# Patient Record
Sex: MALE | Race: BLACK OR AFRICAN AMERICAN | Employment: OTHER | URBAN - METROPOLITAN AREA
[De-identification: names, ages, dates, MRNs, and addresses within clinical notes are randomized per-mention and may not be internally consistent; named-entity substitution may affect disease eponyms.]

---

## 2017-01-18 ENCOUNTER — OFFICE VISIT (OUTPATIENT)
Dept: PULMONOLOGY | Age: 57
End: 2017-01-18

## 2017-01-18 VITALS
RESPIRATION RATE: 16 BRPM | DIASTOLIC BLOOD PRESSURE: 60 MMHG | WEIGHT: 178 LBS | HEIGHT: 66 IN | BODY MASS INDEX: 28.61 KG/M2 | OXYGEN SATURATION: 98 % | HEART RATE: 100 BPM | TEMPERATURE: 97.9 F | SYSTOLIC BLOOD PRESSURE: 110 MMHG

## 2017-01-18 DIAGNOSIS — G72.9 MYOPATHY: ICD-10-CM

## 2017-01-18 DIAGNOSIS — G62.9 PERIPHERAL POLYNEUROPATHY: Primary | ICD-10-CM

## 2017-01-18 DIAGNOSIS — G47.33 OBSTRUCTIVE APNEA: ICD-10-CM

## 2017-01-18 DIAGNOSIS — D86.9 SARCOID: ICD-10-CM

## 2017-01-18 DIAGNOSIS — I27.21 PULMONARY ARTERY HYPERTENSION (HCC): ICD-10-CM

## 2017-01-18 RX ORDER — HYDROCODONE BITARTRATE AND ACETAMINOPHEN 5; 325 MG/1; MG/1
1 TABLET ORAL
Qty: 120 TAB | Refills: 0 | Status: SHIPPED | OUTPATIENT
Start: 2017-01-18 | End: 2017-02-13 | Stop reason: SDUPTHER

## 2017-01-18 NOTE — MR AVS SNAPSHOT
Visit Information Date & Time Provider Department Dept. Phone Encounter #  
 1/18/2017  9:45 AM Raul Reyna MD [facility] [id number] Follow-up Instructions Follow-up and Disposition History Your Appointments 5/12/2017  3:00 PM  
Nurse Visit with [facility] WB NURSE [facility] ([address]) Appt Note: bw  
 [address] 
[address] [place] [address]  
[address] [place] [address] 5/24/2017  3:45 PM  
Any with Isra Espino MD  
[facility] ([address]) Appt Note: annual  
 [address] 
[address] [place] [address]  
[address] [place] [address] Upcoming Health Maintenance Date Due Hepatitis C Screening 1960 Pneumococcal 19-64 Medium Risk (1 of 1 - PPSV23) 10/15/1978 DTaP/Tdap/Td series (1 - Tdap) 10/15/1980 FOBT Q 1 YEAR AGE 50-75 10/15/2009 INFLUENZA AGE 9 TO ADULT 8/1/2016 Allergies as of 1/18/2017  Review Complete On: 1/18/2017 By: Siomara Carroll RN No Known Allergies Current Immunizations  Reviewed on 8/11/2016 Name Date Influenza Vaccine Split 11/2/2012 Not reviewed this visit You Were Diagnosed With   
  
 Codes Comments Peripheral polyneuropathy    -  Primary ICD-10-CM: G62.9 ICD-9-CM: 356.9 Myopathy     ICD-10-CM: G72.9 ICD-9-CM: 359.9 Sarcoid ([facility]     ICD-10-CM: D86.9 ICD-9-CM: 135 Pulmonary artery hypertension ([facility]     ICD-10-CM: I27.2 ICD-9-CM: 416.8 Obstructive apnea     ICD-10-CM: G47.33 
ICD-9-CM: 327.23 Vitals BP Pulse Temp Resp Height(growth percentile) Weight(growth percentile) 110/60 (BP 1 Location: Left arm, BP Patient Position: Sitting) 100 97.9 °F (36.6 °C) (Oral) 16 5' 6\" (1.676 m) 178 lb (80.7 kg) SpO2 BMI Smoking Status 98% 28.73 kg/m2 Never Smoker Vitals History BMI and BSA Data Body Mass Index Body Surface Area 28.73 kg/m 2 1.94 m 2 Preferred Pharmacy Pharmacy Name Phone [facility] #[id number] Haro [phone] Your Updated Medication List  
  
   
This list is accurate as of: 1/18/17 10:14 AM.  Always use your most recent med list.  
  
  
  
  
 Carter Norberto 250-50 mcg/dose diskus inhaler Generic drug:  fluticasone-salmeterol INHALE 2 PUFFS BY MOUTH TWICE DAILY. * albuterol 90 mcg/actuation inhaler Commonly known as:  PROVENTIL HFA, VENTOLIN HFA, PROAIR HFA Take 2 Puffs by inhalation every four (4) hours as needed for Wheezing. * albuterol 2.5 mg /3 mL (0.083 %) nebulizer solution Commonly known as:  PROVENTIL VENTOLIN  
INHALE CONTENTS OF 1 VIAL VIA NEBULIZER EVERY FOUR (4) HOURS AS NEEDED FOR WHEEZING. aspirin delayed-release 81 mg tablet  
  
 bipap machine kit  
by Does Not Apply route nightly. doxazosin 4 mg tablet Commonly known as:  CARDURA Take 1 mg by mouth daily. * HYDROcodone-acetaminophen 5-325 mg per tablet Commonly known as:  Annamary Hadley Take 1 Tab by mouth every six (6) hours as needed for Pain. Max Daily Amount: 4 Tabs. * HYDROcodone-acetaminophen 5-325 mg per tablet Commonly known as:  Annamary Hadley Take 1 Tab by mouth every six (6) hours as needed for Pain. Max Daily Amount: 4 Tabs.  
  
 loratadine 10 mg tablet Commonly known as:  CLARITIN  
  
 modafinil 200 mg tablet Commonly known as:  PROVIGIL  
TAKE 1 TABLET BY MOUTH TWICE A DAY  
  
 NORVASC 10 mg tablet Generic drug:  amLODIPine Take 10 mg by mouth daily. OXYGEN-AIR DELIVERY SYSTEMS  
by Does Not Apply route. 3 liters hs with cpap   1 liter with activity  [employer]  
  
 pantoprazole 40 mg tablet Commonly known as:  PROTONIX Take 1 Tab by mouth daily. raNITIdine 150 mg tablet Commonly known as:  ZANTAC TAKE 1 TABLET BY MOUTH DAILY  
  
 sildenafil citrate 100 mg tablet Commonly known as:  VIAGRA Take 1 Tab by mouth as needed. testosterone 1.62 % (20.25 mg/1.25gram) gel Commonly known as:  ANDROGEL Use 4 pumps once daily THERAPEUTIC-M 9 mg iron-400 mcg Tab tablet Generic drug:  multivitamin, tx-iron-ca-min TRACLEER 125 mg tablet Generic drug:  bosentan Take 125 mg by mouth two (2) times a day. triamterene-hydroCHLOROthiazide 37.5-25 mg per tablet Commonly known as:  Alinda Adebayo Take 1 Tab by mouth daily. * Notice: This list has 4 medication(s) that are the same as other medications prescribed for you. Read the directions carefully, and ask your doctor or other care provider to review them with you. Prescriptions Printed Refills HYDROcodone-acetaminophen (NORCO) 5-325 mg per tablet 0 Sig: Take 1 Tab by mouth every six (6) hours as needed for Pain. Max Daily Amount: 4 Tabs. Class: Print Route: Oral  
  
To-Do List   
 04/11/2017 3:00 PM  
  Appointment with [facility] 1 at [facility] NON-INVASIVE [address] [place] [address] ([phone]) Age Limit for ALL Heart procedures @ all [facility] facilities: 18 yrs and older only. Under the age of 25, refer to 5 [facility] ([phone]). This study requires patient to bring a written physician's order or MD office may fax the order to Central Scheduling at [phone]. Patient needs to bring a current list of all medications. No preparation is required for this study. Patient Instructions Continue Advair one inhalation twice daily and remember to exhale fully before inhaling Advair also remember to wash mouth with water and spit it out after inhaling Advair Albuterol 2 puffs every 4 hours as needed and if you require albuterol too often to control respiratory symptoms call the office 
continueTracleer twice daily Continue CPAP nightly with sleep Norco one tablet every 6 hours as needed for pain Always call for symptoms such as increasing shortness of breath or cough productive of discolored mucus Patient Instructions History Introducing [facility]! [employer] introduces [facility] patient portal. Now you can access parts of your medical record, email your doctor's office, and request medication refills online. 1. In your internet browser, go to [email or web address] 2. Click on the First Time User? Click Here link in the Sign In box. You will see the New Member Sign Up page. 3. Enter your [facility] Access Code exactly as it appears below. You will not need to use this code after youve completed the sign-up process. If you do not sign up before the expiration date, you must request a new code. · [facility] Access Code: [email or web address] Expires: 3/14/2017 11:04 AM 
 
4. Enter the last four digits of your Social Security Number (xxxx) and Date of Birth (mm/dd/yyyy) as indicated and click Submit. You will be taken to the next sign-up page. 5. Create a [facility] ID. This will be your [facility] login ID and cannot be changed, so think of one that is secure and easy to remember. 6. Create a [facility] password. You can change your password at any time. 7. Enter your Password Reset Question and Answer. This can be used at a later time if you forget your password. 8. Enter your e-mail address. You will receive e-mail notification when new information is available in [address] 9. Click Sign Up. You can now view and download portions of your medical record. 10. Click the Download Summary menu link to download a portable copy of your medical information. If you have questions, please visit the Frequently Asked Questions section of the [facility] website. Remember, [facility] is NOT to be used for urgent needs. For medical emergencies, dial 911. Now available from your iPhone and Android! Please provide this summary of care documentation to your next provider. Your primary care clinician is listed as Brenda Barclay. If you have any questions after today's visit, please call [phone].

## 2017-01-18 NOTE — PATIENT INSTRUCTIONS
Continue Advair one inhalation twice daily and remember to exhale fully before inhaling Advair also remember to wash mouth with water and spit it out after inhaling Advair  Albuterol 2 puffs every 4 hours as needed and if you require albuterol too often to control respiratory symptoms call the office  continueTracleer twice daily  Continue CPAP nightly with sleep  Norco one tablet every 6 hours as needed for pain  Always call for symptoms such as increasing shortness of breath or cough productive of discolored mucus

## 2017-01-18 NOTE — PROGRESS NOTES
[facility]  Pulmonary, Critical Care, and Sleep Medicine      Chief complaint:  Sarcoidosis with resulting chronic myopathy and neuropathy and obstructive sleep apnea pulmonary hypertension asthma    HPI:  Elina Damon is 61years old and returns to the office today for followup and relates that he continues to take hisTracleer and use his CPAP faithfully. He also continues his Advair and rarely requires albuterol and says he is not having any chest pain and shortness of breath is stable. He also denies leg swelling. His main complaints are related to profound muscle weakness or muscle tenderness progressive weakness now in his right upper extremity and throbbing chronic pain in his arms and legs which keep him up at night when he does not have an analgesic. No Known Allergies  Current Outpatient Prescriptions   Medication Sig    HYDROcodone-acetaminophen (NORCO) 5-325 mg per tablet Take 1 Tab by mouth every six (6) hours as needed for Pain. Max Daily Amount: 4 Tabs.  sildenafil citrate (VIAGRA) 100 mg tablet Take 1 Tab by mouth as needed.  testosterone (ANDROGEL) 20.25 mg/1.25 gram (1.62 %) gel Use 4 pumps once daily    bipap machine kit by Does Not Apply route nightly.  albuterol (PROVENTIL VENTOLIN) 2.5 mg /3 mL (0.083 %) nebulizer solution INHALE CONTENTS OF 1 VIAL VIA NEBULIZER EVERY FOUR (4) HOURS AS NEEDED FOR WHEEZING.  modafinil (PROVIGIL) 200 mg tablet TAKE 1 TABLET BY MOUTH TWICE A DAY    ranitidine (ZANTAC) 150 mg tablet TAKE 1 TABLET BY MOUTH DAILY    albuterol (PROVENTIL HFA, VENTOLIN HFA, PROAIR HFA) 90 mcg/actuation inhaler Take 2 Puffs by inhalation every four (4) hours as needed for Wheezing.  pantoprazole (PROTONIX) 40 mg tablet Take 1 Tab by mouth daily.     aspirin delayed-release 81 mg tablet     loratadine (CLARITIN) 10 mg tablet     THERAPEUTIC-M 9 mg iron-400 mcg tab tablet     triamterene-hydrochlorothiazide (MAXZIDE) 37.5-25 mg per tablet Take 1 Tab by mouth daily.  ADVAIR DISKUS 250-50 mcg/dose diskus inhaler INHALE 2 PUFFS BY MOUTH TWICE DAILY.  OXYGEN-AIR DELIVERY SYSTEMS by Does Not Apply route. 3 liters hs with cpap   1 liter with activity  [address]    bosentan (TRACLEER) 125 mg tablet Take 125 mg by mouth two (2) times a day.  doxazosin (CARDURA) 4 mg tablet Take 1 mg by mouth daily.  amLODIPine (NORVASC) 10 mg tablet Take 10 mg by mouth daily. No current facility-administered medications for this visit. Past Medical History   Diagnosis Date    Essential hypertension     Essential hypertension, benign     Gout     Hypogonadism male     Impotence     Microscopic hematuria     Myopathy     Obesity, unspecified     Obstructive sleep apnea     Other chronic pulmonary heart diseases ([facility] [address]     Peripheral neuropathy ([facility] [address] 10/25/2012    [facility] 10[facility]2012     Past Surgical History   Procedure Laterality Date    Hx heart catheterization       Social History     Social History    Marital status:      Spouse name: N/A    Number of children: N/A    Years of education: N/A     Occupational History    Not on file.      Social History Main Topics    Smoking status: Never Smoker    Smokeless tobacco: Never Used    Alcohol use 7.0 oz/week     14 Glasses of wine per week    Drug use: No    Sexual activity: Yes     Other Topics Concern    Not on file     Social History Narrative     Family History   Problem Relation Age of Onset    Hypertension Father     Lung Disease Father     Heart Attack Father 61       Review of systems:  He denies fever chills poor appetite reports stable weight    Physical Exam:  Visit Vitals    /60 (BP 1 Location: Left arm, BP Patient Position: Sitting)    Pulse 100    Temp 97.9 °F (36.6 °C) (Oral)    Resp 16    Ht 5' 6\" (1.676 m)    Wt 80.7 kg (178 lb)    SpO2 98%    BMI 28.73 kg/m2       Well-developed well-nourished  HEENT: WNL  Lymph node exam: Supraclavicular cervical lymph nodes negative  Chest: Equal symmetrical expansion no dullness to percussion no wheezes rales rubs  Heart: Regular rhythm no gallop or murmur no JVD no peripheral edema  Extremities: No cyanosis clubbing  Musculoskeletal: Tender muscles particularly the forearms and calves  Neurological:profound weakness of his lower extremities and some weakness of his right upper extremity. The patient is wheelchair-bound    LABS:    O2 sat room air at rest 98%    Impression:   Asthma stable on Advair  Pulmonary hypertension appears to be controlled with Tracleer and CPAP for his TOBY  Sarcoidosis without clinical worsening except for progressive weakness in his right upper extremity. The patient has had a diagnosis from neurology for many years of myopathy and neuropathy related to sarcoidosis and has required analgesic therapy for relieving discomfort and has been on this regimen of Norco one tablet every 6 hours as needed for many years    Plan:  Continue Advair and albuterol, tracleer CPAP  Followup in 6 months with echocardiogram to re evaluate  Pulmonary hypertension  Continue analgesic regimen Norco one tablet every 6 hours as needed  Followup in future for pain control with Pain Management    Sergio Brody MD , [facility]    CC: Day Calderón MD     [address]  [place], [address] [facility] [address] [address]     P: [phone]     F: [phone]

## 2017-02-07 RX ORDER — PANTOPRAZOLE SODIUM 40 MG/1
TABLET, DELAYED RELEASE ORAL
Qty: 30 TAB | Refills: 5 | Status: SHIPPED | OUTPATIENT
Start: 2017-02-07 | End: 2017-03-18

## 2017-02-13 ENCOUNTER — TELEPHONE (OUTPATIENT)
Dept: PULMONOLOGY | Age: 57
End: 2017-02-13

## 2017-02-13 RX ORDER — HYDROCODONE BITARTRATE AND ACETAMINOPHEN 5; 325 MG/1; MG/1
1 TABLET ORAL
Qty: 120 TAB | Refills: 0 | Status: SHIPPED | OUTPATIENT
Start: 2017-02-13 | End: 2017-03-13 | Stop reason: SDUPTHER

## 2017-02-13 NOTE — TELEPHONE ENCOUNTER
The pt. Says he doesn't start Pain Management until March 30. He says that Dr. Tracee Beebe agreed to helping him with pain med scripts until he gets seen. Dr. Tracee Beebe is agreeable to sign the Hydrocodone script. The pt. Is notified that the prescription is ready for .

## 2017-03-13 RX ORDER — HYDROCODONE BITARTRATE AND ACETAMINOPHEN 5; 325 MG/1; MG/1
1 TABLET ORAL
Qty: 72 TAB | Refills: 0 | Status: SHIPPED | OUTPATIENT
Start: 2017-03-13 | End: 2017-12-18 | Stop reason: SDUPTHER

## 2017-03-13 NOTE — TELEPHONE ENCOUNTER
Pt called asking to speak with Alcrow Joselito about his pain medication. He said that he spoke with her a few weeks ago and that she would know what it was about.  Please call [phone]

## 2017-03-13 NOTE — TELEPHONE ENCOUNTER
The pt is requesting Hydrocodone. He is on plan to go to Pain Management March 30. When told that we would give him a script for two weeks, he wasn't happy. The pt. Is informed that once he starts Pain Management they set up a pain control plan for him and no other MD is to give him any kind of pain medication. A Hydrocodone script is provided for 3/14 - 3/31; 72 tabs. The pt. Is notified that it is ready for .

## 2017-03-17 ENCOUNTER — HOSPITAL ENCOUNTER (INPATIENT)
Age: 57
LOS: 1 days | Discharge: HOME | DRG: 254 | End: 2017-03-18
Attending: EMERGENCY MEDICINE | Admitting: FAMILY MEDICINE
Payer: MEDICAID

## 2017-03-17 ENCOUNTER — ANESTHESIA EVENT (OUTPATIENT)
Dept: ENDOSCOPY | Age: 57
DRG: 254 | End: 2017-03-17
Payer: MEDICAID

## 2017-03-17 DIAGNOSIS — K92.2 GASTROINTESTINAL HEMORRHAGE, UNSPECIFIED GASTROINTESTINAL HEMORRHAGE TYPE: Primary | ICD-10-CM

## 2017-03-17 DIAGNOSIS — D64.9 SYMPTOMATIC ANEMIA: ICD-10-CM

## 2017-03-17 LAB
ALBUMIN SERPL BCP-MCNC: 3.3 G/DL (ref 3.4–5)
ALBUMIN/GLOB SERPL: 1 {RATIO} (ref 0.8–1.7)
ALP SERPL-CCNC: 60 U/L (ref 45–117)
ALT SERPL-CCNC: 29 U/L (ref 16–61)
ANION GAP BLD CALC-SCNC: 6 MMOL/L (ref 3–18)
APPEARANCE UR: CLEAR
APTT PPP: 25.3 SEC (ref 23–36.4)
AST SERPL W P-5'-P-CCNC: 31 U/L (ref 15–37)
ATRIAL RATE: 95 BPM
BASOPHILS # BLD AUTO: 0 K/UL (ref 0–0.06)
BASOPHILS # BLD: 0 % (ref 0–2)
BILIRUB SERPL-MCNC: 0.4 MG/DL (ref 0.2–1)
BILIRUB UR QL: NEGATIVE
BUN SERPL-MCNC: 15 MG/DL (ref 7–18)
BUN/CREAT SERPL: 28 (ref 12–20)
CALCIUM SERPL-MCNC: 8.3 MG/DL (ref 8.5–10.1)
CALCULATED P AXIS, ECG09: 65 DEGREES
CALCULATED R AXIS, ECG10: 57 DEGREES
CALCULATED T AXIS, ECG11: 72 DEGREES
CHLORIDE SERPL-SCNC: 105 MMOL/L (ref 100–108)
CO2 SERPL-SCNC: 30 MMOL/L (ref 21–32)
COLOR UR: YELLOW
CREAT SERPL-MCNC: 0.53 MG/DL (ref 0.6–1.3)
DIAGNOSIS, 93000: NORMAL
DIFFERENTIAL METHOD BLD: ABNORMAL
EOSINOPHIL # BLD: 0 K/UL (ref 0–0.4)
EOSINOPHIL NFR BLD: 0 % (ref 0–5)
ERYTHROCYTE [DISTWIDTH] IN BLOOD BY AUTOMATED COUNT: 15 % (ref 11.6–14.5)
GLOBULIN SER CALC-MCNC: 3.2 G/DL (ref 2–4)
GLUCOSE SERPL-MCNC: 111 MG/DL (ref 74–99)
GLUCOSE UR STRIP.AUTO-MCNC: NEGATIVE MG/DL
HCT VFR BLD AUTO: 18.7 % (ref 36–48)
HGB BLD-MCNC: 6.1 G/DL (ref 13–16)
HGB UR QL STRIP: NEGATIVE
INR PPP: 1 (ref 0.8–1.2)
IRON SATN MFR SERPL: 3 %
IRON SERPL-MCNC: 12 UG/DL (ref 50–175)
KETONES UR QL STRIP.AUTO: NEGATIVE MG/DL
LEUKOCYTE ESTERASE UR QL STRIP.AUTO: NEGATIVE
LYMPHOCYTES # BLD AUTO: 11 % (ref 21–52)
LYMPHOCYTES # BLD: 1.1 K/UL (ref 0.9–3.6)
MAGNESIUM SERPL-MCNC: 1.9 MG/DL (ref 1.8–2.4)
MCH RBC QN AUTO: 27.2 PG (ref 24–34)
MCHC RBC AUTO-ENTMCNC: 32.6 G/DL (ref 31–37)
MCV RBC AUTO: 83.5 FL (ref 74–97)
MONOCYTES # BLD: 0.7 K/UL (ref 0.05–1.2)
MONOCYTES NFR BLD AUTO: 7 % (ref 3–10)
NEUTS SEG # BLD: 8.3 K/UL (ref 1.8–8)
NEUTS SEG NFR BLD AUTO: 82 % (ref 40–73)
NITRITE UR QL STRIP.AUTO: NEGATIVE
P-R INTERVAL, ECG05: 150 MS
PH UR STRIP: 6 [PH] (ref 5–8)
PLATELET # BLD AUTO: 262 K/UL (ref 135–420)
PMV BLD AUTO: 9.6 FL (ref 9.2–11.8)
POTASSIUM SERPL-SCNC: 3.2 MMOL/L (ref 3.5–5.5)
PROT SERPL-MCNC: 6.5 G/DL (ref 6.4–8.2)
PROT UR STRIP-MCNC: NEGATIVE MG/DL
PROTHROMBIN TIME: 13 SEC (ref 11.5–15.2)
Q-T INTERVAL, ECG07: 384 MS
QRS DURATION, ECG06: 88 MS
QTC CALCULATION (BEZET), ECG08: 482 MS
RBC # BLD AUTO: 2.24 M/UL (ref 4.7–5.5)
SODIUM SERPL-SCNC: 141 MMOL/L (ref 136–145)
SP GR UR REFRACTOMETRY: 1.01 (ref 1–1.03)
TIBC SERPL-MCNC: 423 UG/DL (ref 250–450)
UROBILINOGEN UR QL STRIP.AUTO: 1 EU/DL (ref 0.2–1)
VENTRICULAR RATE, ECG03: 95 BPM
WBC # BLD AUTO: 10 K/UL (ref 4.6–13.2)

## 2017-03-17 PROCEDURE — 74011000250 HC RX REV CODE- 250: Performed by: INTERNAL MEDICINE

## 2017-03-17 PROCEDURE — 86900 BLOOD TYPING SEROLOGIC ABO: CPT | Performed by: EMERGENCY MEDICINE

## 2017-03-17 PROCEDURE — 96374 THER/PROPH/DIAG INJ IV PUSH: CPT

## 2017-03-17 PROCEDURE — 83540 ASSAY OF IRON: CPT | Performed by: INTERNAL MEDICINE

## 2017-03-17 PROCEDURE — 85610 PROTHROMBIN TIME: CPT | Performed by: EMERGENCY MEDICINE

## 2017-03-17 PROCEDURE — 74011250636 HC RX REV CODE- 250/636: Performed by: EMERGENCY MEDICINE

## 2017-03-17 PROCEDURE — 65660000000 HC RM CCU STEPDOWN

## 2017-03-17 PROCEDURE — 74011250637 HC RX REV CODE- 250/637: Performed by: FAMILY MEDICINE

## 2017-03-17 PROCEDURE — 77030013169 SET IV BLD ICUM -A

## 2017-03-17 PROCEDURE — 85025 COMPLETE CBC W/AUTO DIFF WBC: CPT | Performed by: EMERGENCY MEDICINE

## 2017-03-17 PROCEDURE — 74011250637 HC RX REV CODE- 250/637: Performed by: INTERNAL MEDICINE

## 2017-03-17 PROCEDURE — 83735 ASSAY OF MAGNESIUM: CPT | Performed by: EMERGENCY MEDICINE

## 2017-03-17 PROCEDURE — 99284 EMERGENCY DEPT VISIT MOD MDM: CPT

## 2017-03-17 PROCEDURE — 36430 TRANSFUSION BLD/BLD COMPNT: CPT

## 2017-03-17 PROCEDURE — 30233N1 TRANSFUSION OF NONAUTOLOGOUS RED BLOOD CELLS INTO PERIPHERAL VEIN, PERCUTANEOUS APPROACH: ICD-10-PCS | Performed by: EMERGENCY MEDICINE

## 2017-03-17 PROCEDURE — 86920 COMPATIBILITY TEST SPIN: CPT | Performed by: EMERGENCY MEDICINE

## 2017-03-17 PROCEDURE — P9016 RBC LEUKOCYTES REDUCED: HCPCS | Performed by: EMERGENCY MEDICINE

## 2017-03-17 PROCEDURE — 80053 COMPREHEN METABOLIC PANEL: CPT | Performed by: EMERGENCY MEDICINE

## 2017-03-17 PROCEDURE — 85730 THROMBOPLASTIN TIME PARTIAL: CPT | Performed by: EMERGENCY MEDICINE

## 2017-03-17 PROCEDURE — 94640 AIRWAY INHALATION TREATMENT: CPT

## 2017-03-17 PROCEDURE — 81003 URINALYSIS AUTO W/O SCOPE: CPT | Performed by: EMERGENCY MEDICINE

## 2017-03-17 PROCEDURE — C9113 INJ PANTOPRAZOLE SODIUM, VIA: HCPCS | Performed by: EMERGENCY MEDICINE

## 2017-03-17 PROCEDURE — 93005 ELECTROCARDIOGRAM TRACING: CPT

## 2017-03-17 RX ORDER — PROCHLORPERAZINE EDISYLATE 5 MG/ML
5 INJECTION INTRAMUSCULAR; INTRAVENOUS
Status: DISCONTINUED | OUTPATIENT
Start: 2017-03-17 | End: 2017-03-18 | Stop reason: HOSPADM

## 2017-03-17 RX ORDER — SODIUM CHLORIDE 9 MG/ML
250 INJECTION, SOLUTION INTRAVENOUS AS NEEDED
Status: DISCONTINUED | OUTPATIENT
Start: 2017-03-17 | End: 2017-03-18 | Stop reason: HOSPADM

## 2017-03-17 RX ORDER — MODAFINIL 100 MG/1
200 TABLET ORAL DAILY
Status: DISCONTINUED | OUTPATIENT
Start: 2017-03-18 | End: 2017-03-18 | Stop reason: HOSPADM

## 2017-03-17 RX ORDER — ALBUTEROL SULFATE 0.83 MG/ML
2.5 SOLUTION RESPIRATORY (INHALATION)
Status: DISCONTINUED | OUTPATIENT
Start: 2017-03-17 | End: 2017-03-18 | Stop reason: HOSPADM

## 2017-03-17 RX ORDER — HYDROCODONE BITARTRATE AND ACETAMINOPHEN 5; 325 MG/1; MG/1
1 TABLET ORAL
Status: DISCONTINUED | OUTPATIENT
Start: 2017-03-17 | End: 2017-03-18 | Stop reason: HOSPADM

## 2017-03-17 RX ORDER — PANTOPRAZOLE SODIUM 40 MG/10ML
40 INJECTION, POWDER, LYOPHILIZED, FOR SOLUTION INTRAVENOUS
Status: COMPLETED | OUTPATIENT
Start: 2017-03-17 | End: 2017-03-17

## 2017-03-17 RX ORDER — PANTOPRAZOLE SODIUM 40 MG/1
40 TABLET, DELAYED RELEASE ORAL
Status: DISCONTINUED | OUTPATIENT
Start: 2017-03-17 | End: 2017-03-18 | Stop reason: HOSPADM

## 2017-03-17 RX ORDER — BISACODYL 5 MG
20 TABLET, DELAYED RELEASE (ENTERIC COATED) ORAL ONCE
Status: COMPLETED | OUTPATIENT
Start: 2017-03-17 | End: 2017-03-17

## 2017-03-17 RX ORDER — FAMOTIDINE 10 MG/ML
20 INJECTION INTRAVENOUS ONCE
Status: CANCELLED | OUTPATIENT
Start: 2017-03-18 | End: 2017-03-18

## 2017-03-17 RX ORDER — LORATADINE 10 MG/1
10 TABLET ORAL
Status: DISCONTINUED | OUTPATIENT
Start: 2017-03-17 | End: 2017-03-18 | Stop reason: HOSPADM

## 2017-03-17 RX ORDER — BUDESONIDE AND FORMOTEROL FUMARATE DIHYDRATE 80; 4.5 UG/1; UG/1
2 AEROSOL RESPIRATORY (INHALATION)
Status: DISCONTINUED | OUTPATIENT
Start: 2017-03-17 | End: 2017-03-18 | Stop reason: HOSPADM

## 2017-03-17 RX ORDER — FLUTICASONE PROPIONATE AND SALMETEROL 50; 250 UG/1; UG/1
POWDER RESPIRATORY (INHALATION)
Qty: 1 INHALER | Refills: 5 | Status: SHIPPED | OUTPATIENT
Start: 2017-03-17 | End: 2017-05-11 | Stop reason: SDUPTHER

## 2017-03-17 RX ORDER — SODIUM CHLORIDE 0.9 % (FLUSH) 0.9 %
5-10 SYRINGE (ML) INJECTION EVERY 8 HOURS
Status: DISCONTINUED | OUTPATIENT
Start: 2017-03-17 | End: 2017-03-18 | Stop reason: HOSPADM

## 2017-03-17 RX ORDER — SODIUM CHLORIDE 0.9 % (FLUSH) 0.9 %
5-10 SYRINGE (ML) INJECTION AS NEEDED
Status: DISCONTINUED | OUTPATIENT
Start: 2017-03-17 | End: 2017-03-18 | Stop reason: HOSPADM

## 2017-03-17 RX ADMIN — BISACODYL 20 MG: 5 TABLET, COATED ORAL at 16:28

## 2017-03-17 RX ADMIN — POLYETHYLENE GLYCOL 3350, SODIUM SULFATE ANHYDROUS, SODIUM BICARBONATE, SODIUM CHLORIDE, POTASSIUM CHLORIDE 4000 ML: 236; 22.74; 6.74; 5.86; 2.97 POWDER, FOR SOLUTION ORAL at 17:00

## 2017-03-17 RX ADMIN — PANTOPRAZOLE SODIUM 40 MG: 40 TABLET, DELAYED RELEASE ORAL at 16:28

## 2017-03-17 RX ADMIN — BUDESONIDE AND FORMOTEROL FUMARATE DIHYDRATE 2 PUFF: 80; 4.5 AEROSOL RESPIRATORY (INHALATION) at 20:29

## 2017-03-17 RX ADMIN — PANTOPRAZOLE SODIUM 40 MG: 40 INJECTION, POWDER, FOR SOLUTION INTRAVENOUS at 11:37

## 2017-03-17 RX ADMIN — Medication 10 ML: at 15:00

## 2017-03-17 NOTE — PROGRESS NOTES
TRANSFER - IN REPORT:    Verbal report received from [facility], RN(name) on Petros Claros  being received from ED(unit) for routine progression of care      Report consisted of patients Situation, Background, Assessment and   Recommendations(SBAR). Information from the following report(s) SBAR and ED Summary was reviewed with the receiving nurse. Opportunity for questions and clarification was provided. Assessment completed upon patients arrival to unit and care assumed.

## 2017-03-17 NOTE — ED PROVIDER NOTES
HPI Comments: 10:39 AM Alexa Reyes is a 61 y.o. male with a history of  who presents to the ED c/o abd pain with melena onset ~ 3 days. Pt was sent in due to low H&H from doctor Santino Shin. Pt saw Dr. Santino Shin his GI doctor yesterday, scheduled a colonoscopy for next month on the 20th. Pt last had colonoscopy 6 years ago, normal. Pt has mild nausea and a little bit of lightheadedness a few days ago as associated sxs. No vomiting, CP, or SOB currently. No acute concerns noted. PCP: Nazario Valentino MD      The history is provided by the patient. Past Medical History:   Diagnosis Date    Essential hypertension     Essential hypertension, benign     Gout     Hypogonadism male     Impotence     Microscopic hematuria     Myopathy     Obesity, unspecified     Obstructive sleep apnea     Other chronic pulmonary heart diseases (HCC)     Peripheral neuropathy ([facility] 10/25/2012    Sarcoid ([facility] 10/25/2012       Past Surgical History:   Procedure Laterality Date    HX HEART CATHETERIZATION           Family History:   Problem Relation Age of Onset    Hypertension Father     Lung Disease Father     Heart Attack Father 61       Social History     Social History    Marital status:      Spouse name: N/A    Number of children: N/A    Years of education: N/A     Occupational History    Not on file. Social History Main Topics    Smoking status: Never Smoker    Smokeless tobacco: Never Used    Alcohol use 7.0 oz/week     14 Glasses of wine per week    Drug use: No    Sexual activity: Yes     Other Topics Concern    Not on file     Social History Narrative         ALLERGIES: Review of patient's allergies indicates no known allergies. Review of Systems   Constitutional: Negative. Negative for chills. HENT: Negative. Negative for congestion. Eyes: Negative. Negative for visual disturbance. Respiratory: Negative. Negative for chest tightness. Cardiovascular: Negative. Negative for leg swelling. Gastrointestinal: Positive for blood in stool and nausea. Negative for abdominal pain. Genitourinary: Negative. Negative for difficulty urinating. Musculoskeletal: Negative. Skin: Negative. Negative for rash and wound. Neurological: Negative. Negative for dizziness, speech difficulty, weakness and light-headedness. Psychiatric/Behavioral: Negative. Negative for self-injury. All other systems reviewed and are negative. Vitals:    03/17/17 1023   BP: 110/59   Pulse: 94   Resp: 16   Temp: 97.8 °F (36.6 °C)   SpO2: 100%   Weight: 85.3 kg (188 lb)   Height: 5' 6\" (1.676 m)            Physical Exam   Constitutional: He is oriented to person, place, and time. He appears well-developed and well-nourished. No distress. HENT:   Head: Normocephalic and atraumatic. Pale Mucosa    Eyes: Conjunctivae and EOM are normal. Pupils are equal, round, and reactive to light. No scleral icterus. Neck: Normal range of motion. Neck supple. No JVD present. Cardiovascular: Normal rate, regular rhythm, S1 normal and S2 normal.  Exam reveals no gallop and no friction rub. No murmur heard. Pulmonary/Chest: Effort normal and breath sounds normal. No accessory muscle usage. No respiratory distress. Abdominal: Soft. Normal appearance. He exhibits no distension. There is no tenderness. There is no rigidity, no rebound and no guarding. Genitourinary:   Genitourinary Comments: Rectal exam: Dark black melenic stool, heme positive. Musculoskeletal: Normal range of motion. He exhibits no edema or tenderness. Neurological: He is alert and oriented to person, place, and time. He has normal strength. No cranial nerve deficit or sensory deficit. Coordination normal.   Skin: Skin is warm and intact. No rash noted. Psychiatric: He has a normal mood and affect. His speech is normal and behavior is normal.   Vitals reviewed.        MDM  Number of Diagnoses or Management Options  Gastrointestinal hemorrhage, unspecified gastrointestinal hemorrhage type:   Symptomatic anemia:   Diagnosis management comments: Sharon Gutierrez is a 61 y.o. Male coming in with melena. Wheelchair bound at home. On ASA. No other blood thinners. Will transfuse and admit. ED Course       Procedures      Vitals:  Patient Vitals for the past 12 hrs:   Temp Pulse Resp BP SpO2   03/17/17 1023 97.8 °F (36.6 °C) 94 16 110/59 100 %     EKG: Sinus rhythm at a rate of 95 bpm, no ischemic changes. Progress Notes: 11:24 AM  Consult:  Discussed care with Dr. Avel Cain, Hospitalist; Standard discussion; including history of patients chief complaint, available diagnostic results, and treatment course. Agree to the plan of admission and accepts patient. Consult:  Discussed care with Dr. Melony Welsh, GI; Standard discussion; including history of patients chief complaint, available diagnostic results, and treatment course. Agrees to consult on patient, advises to keep pt NP after midnight. Disposition: ADMIT    Diagnosis:   1. Gastrointestinal hemorrhage, unspecified gastrointestinal hemorrhage type    2. Symptomatic anemia          Scribe Attestation:     Nanette Boone, scribing for and in the presence of Saurav Davalos MD March 17, 2017     Signed by: Ramila Gtz, March 17, 2017 at 12:17 PM     Physician Attestation:   I personally performed the services described in this documentation, reviewed and edited the documentation which was dictated to the scribe in my presence, and it accurately records my words and actions.  Saurav Davalos MD  March 17, 2017

## 2017-03-17 NOTE — IP AVS SNAPSHOT
[address] Patient: Cordell Ferreira MRN: [id number] :1960 You are allergic to the following No active allergies Recent Documentation Height Weight BMI Smoking Status 1.676 m 85.3 kg 30.34 kg/m2 Never Smoker Emergency Contacts Name Discharge Info Relation Home Work Mobile Mariposa Hernandez  Mother [14] [phone] Jyoti Pulliam  Friend [5] [id number] [facility]  Parent [1] [phone] About your hospitalization You were admitted on:  2017 You last received care in the:  [address] [place] [address] You were discharged on:  2017 Unit phone number:  [phone] Why you were hospitalized Your primary diagnosis was:  Gi Bleed Your diagnoses also included:  Symptomatic Anemia, Iron Deficiency Anemia, Gastric Polyp, Hypokalemia, Asthma, Obstructive Apnea, Sarcoid (Hcc), Htn (Hypertension), Hypogonadism Male Providers Seen During Your Hospitalizations Provider Role Specialty Primary office phone Anabela Champagne MD Attending Provider Emergency Medicine [phone] Miquel Sevilla MD Attending Provider [facility] [phone] Your Primary Care Physician (PCP) Primary Care Physician Office Phone Office Fax Maricarmen Funes [phone] Follow-up Information Follow up With Details Comments Contact Info Christiano Jimenez MD  Please schedule an appointment on , thank you. [address] [place], [place] [id number] [phone] Your Appointments 2017  3:00 PM EDT  
ECHO with [facility] LAB RM 1 AVI [facility] NON-INVASIVE CARD [facility]) [address] [place] [address], [address] [id number] [phone] Age Limit for ALL Heart procedures @ all [facility] facilities: 18 yrs and older only. Under the age of 25, refer to [facility] ([phone]). This study requires patient to bring a written physician's order or MD office may fax the order to Central Scheduling at [phone]. Patient needs to bring a current list of all medications. No preparation is required for this study. APPOINTMENTS SCHEDULED BEFORE 3:00PM  Please arrive in the [facility] and check in with the registrar 15 minutes prior to your scheduled appointment time. This [facility], facing [address]. Heart Center Parking: turn off [address] onto [address]. Proceed one block and make a right onto [address] [place] will be on your left). Go to the end of [address] and parking is located on your left. APPOINTMENTS SCHEDULED AT 3:00PM OR LATER:  Registration in the [address] [place] [address] CLOSES at 3:00 p.m. Patients should report to Patient Access/Admitting located on the left after entering the MAIN entrance of [facility]. Patient should plan to arrive 30 minutes prior to their appointment time if going to Patient Access/Admitting. After test, patient may exit from the [address] [place] [address] or [facility] Entrance. Current Discharge Medication List  
  
START taking these medications Dose & Instructions Dispensing Information Comments Morning Noon Evening Bedtime  
 ferrous sulfate 325 mg (65 mg iron) tablet Commonly known as:  [email or web address] Your last dose was: Your next dose is:    
   
   
 Dose:  325 mg Take 1 Tab by mouth Daily (before breakfast). Indications: IRON DEFICIENCY ANEMIA Quantity:  100 Tab Refills:  0  
     
   
   
   
  
 sucralfate 1 gram tablet Commonly known as:  Amanda Quiles Your last dose was: Your next dose is:    
   
   
 Dose:  1 g Take 1 Tab by mouth Before breakfast, lunch, dinner and at bedtime. Quantity:  100 Tab Refills:  1 CONTINUE these medications which have CHANGED Dose & Instructions Dispensing Information Comments Morning Noon Evening Bedtime  
 pantoprazole 40 mg tablet Commonly known as:  PROTONIX What changed:  See the new instructions. Your last dose was: Your next dose is:    
   
   
 Dose:  40 mg Take 1 Tab by mouth Before breakfast and dinner. Indications: Upper GI Bleed Quantity:  100 Tab Refills:  1 CONTINUE these medications which have NOT CHANGED Dose & Instructions Dispensing Information Comments Morning Noon Evening Bedtime ADVAIR DISKUS 250-50 mcg/dose diskus inhaler Generic drug:  fluticasone-salmeterol Your last dose was: Your next dose is:    
   
   
 INHALE 2 PUFFS BY MOUTH TWICE DAILY Quantity:  1 Inhaler Refills:  5  
     
   
   
   
  
 * albuterol 90 mcg/actuation inhaler Commonly known as:  PROVENTIL HFA, VENTOLIN HFA, PROAIR HFA Your last dose was: Your next dose is:    
   
   
 Dose:  2 Puff Take 2 Puffs by inhalation every four (4) hours as needed for Wheezing. Quantity:  1 Inhaler Refills:  3  
     
   
   
   
  
 * albuterol 2.5 mg /3 mL (0.083 %) nebulizer solution Commonly known as:  PROVENTIL VENTOLIN Your last dose was: Your next dose is:    
   
   
 INHALE CONTENTS OF 1 VIAL VIA NEBULIZER EVERY FOUR (4) HOURS AS NEEDED FOR WHEEZING. Quantity:  60 Each Refills:  3  
     
   
   
   
  
 bipap machine kit Your last dose was: Your next dose is:    
   
   
 by Does Not Apply route nightly. Refills:  0 HYDROcodone-acetaminophen 5-325 mg per tablet Commonly known as:  Belem Jimenez Your last dose was: Your next dose is:    
   
   
 Dose:  1 Tab Take 1 Tab by mouth every six (6) hours as needed for Pain. Max Daily Amount: 4 Tabs. Quantity:  72 Tab Refills:  0  
     
   
   
   
  
 loratadine 10 mg tablet Commonly known as:  Nayla Warren Your last dose was: Your next dose is:    
   
   
  Refills:  0  
     
   
   
   
  
 modafinil 200 mg tablet Commonly known as:  PROVIGIL Your last dose was: Your next dose is: TAKE 1 TABLET BY MOUTH TWICE A DAY Refills:  3 OXYGEN-AIR DELIVERY SYSTEMS Your last dose was: Your next dose is:    
   
   
 by Does Not Apply route. 3 liters hs with cpap   1 liter with activity  [address] Refills:  0  
     
   
   
   
  
 sildenafil citrate 100 mg tablet Commonly known as:  VIAGRA Your last dose was: Your next dose is:    
   
   
 Dose:  100 mg Take 1 Tab by mouth as needed. Quantity:  2 Tab Refills:  0  
     
   
   
   
  
 testosterone 20.25 mg/1.25 gram (1.62 %) gel Commonly known as:  ANDROGEL Your last dose was: Your next dose is:    
   
   
 Use 4 pumps once daily Quantity:  1 Bottle Refills:  5 This request is for a new prescription for a controlled substance as required by Federal/State law. Gene Plough THERAPEUTIC-M 9 mg iron-400 mcg Tab tablet Generic drug:  multivitamin, tx-iron-ca-min Your last dose was: Your next dose is:    
   
   
  Refills:  0  
     
   
   
   
  
 * Notice: This list has 2 medication(s) that are the same as other medications prescribed for you. Read the directions carefully, and ask your doctor or other care provider to review them with you. STOP taking these medications   
 aspirin delayed-release 81 mg tablet  
   
  
 doxazosin 4 mg tablet Commonly known as:  CARDURA  
   
  
 NORVASC 10 mg tablet Generic drug:  amLODIPine  
   
  
 raNITIdine 150 mg tablet Commonly known as:  ZANTAC TRACLEER 125 mg tablet Generic drug:  bosentan triamterene-hydroCHLOROthiazide 37.5-25 mg per tablet Commonly known as:  Manpreet Wei Where to Get Your Medications Information on where to get these meds will be given to you by the nurse or doctor. ! Ask your nurse or doctor about these medications  
  ferrous sulfate 325 mg (65 mg iron) tablet  
 pantoprazole 40 mg tablet  
 sucralfate 1 gram tablet Discharge Instructions DISCHARGE SUMMARY from Nurse The following personal items are in your possession at time of discharge: 
 
Dental Appliances: None Home Medications: None Jewelry: None Clothing: Nick President Other Valuables: None PATIENT INSTRUCTIONS: 
 
After general anesthesia or intravenous sedation, for 24 hours or while taking prescription Narcotics: · Limit your activities · Do not drive and operate hazardous machinery · Do not make important personal or business decisions · Do  not drink alcoholic beverages · If you have not urinated within 8 hours after discharge, please contact your surgeon on call. Report the following to your surgeon: 
· Excessive pain, swelling, redness or odor of or around the surgical area · Temperature over 100.5 · Nausea and vomiting lasting longer than 4 hours or if unable to take medications · Any signs of decreased circulation or nerve impairment to extremity: change in color, persistent  numbness, tingling, coldness or increase pain · Any questions What to do at Home: 
Recommended activity: Ambulate in house, If you experience any of the following symptoms Black\ Tarry stools,Nausea, Dizziness, Fainting, Abdominal Pain, Vomiting,lasting more than 4 hours  please follow up with Primary Care Provider or go to the nearest Emergency Room. *  Please give a list of your current medications to your Primary Care Provider.  
 
*  Please update this list whenever your medications are discontinued, doses are 
 changed, or new medications (including over-the-counter products) are added. *  Please carry medication information at all times in case of emergency situations. These are general instructions for a healthy lifestyle: No smoking/ No tobacco products/ Avoid exposure to second hand smoke Surgeon General's Warning:  Quitting smoking now greatly reduces serious risk to your health. Obesity, smoking, and sedentary lifestyle greatly increases your risk for illness A healthy diet, regular physical exercise & weight monitoring are important for maintaining a healthy lifestyle You may be retaining fluid if you have a history of heart failure or if you experience any of the following symptoms:  Weight gain of 3 pounds or more overnight or 5 pounds in a week, increased swelling in our hands or feet or shortness of breath while lying flat in bed. Please call your doctor as soon as you notice any of these symptoms; do not wait until your next office visit. Recognize signs and symptoms of STROKE: 
 
F-face looks uneven A-arms unable to move or move unevenly S-speech slurred or non-existent T-time-call 911 as soon as signs and symptoms begin-DO NOT go Back to bed or wait to see if you get better-TIME IS BRAIN. Warning Signs of HEART ATTACK Call 911 if you have these symptoms: 
? Chest discomfort. Most heart attacks involve discomfort in the center of the chest that lasts more than a few minutes, or that goes away and comes back. It can feel like uncomfortable pressure, squeezing, fullness, or pain. ? Discomfort in other areas of the upper body. Symptoms can include pain or discomfort in one or both arms, the back, neck, jaw, or stomach. ? Shortness of breath with or without chest discomfort. ? Other signs may include breaking out in a cold sweat, nausea, or lightheadedness. Don't wait more than five minutes to call [address]!  Fast action can save your life. Calling 911 is almost always the fastest way to get lifesaving treatment. Emergency Medical Services staff can begin treatment when they arrive  up to an hour sooner than if someone gets to the hospital by car. The discharge information has been reviewed with the patient. The patient verbalized understanding. Discharge medications reviewed with the patient and appropriate educational materials and side effects teaching were provided. Greener Expressions[employer] Activation Thank you for requesting access to SpaceCurve. Please follow the instructions below to securely access and download your online medical record. SpaceCurve allows you to send messages to your doctor, view your test results, renew your prescriptions, schedule appointments, and more. How Do I Sign Up? 1. In your internet browser, go to [email or web address] 
2. Click on the First Time User? Click Here link in the Sign In box. You will be redirect to the New Member Sign Up page. 3. Enter your SpaceCurve Access Code exactly as it appears below. You will not need to use this code after youve completed the sign-up process. If you do not sign up before the expiration date, you must request a new code. SpaceCurve Access Code: [email or web address] Expires: 6/15/2017 11:42 AM (This is the date your SpaceCurve access code will ) 4. Enter the last four digits of your Social Security Number (xxxx) and Date of Birth (mm/dd/yyyy) as indicated and click Submit. You will be taken to the next sign-up page. 5. Create a SpaceCurve ID. This will be your SpaceCurve login ID and cannot be changed, so think of one that is secure and easy to remember. 6. Create a SpaceCurve password. You can change your password at any time. 7. Enter your Password Reset Question and Answer. This can be used at a later time if you forget your password. 8. Enter your e-mail address. You will receive e-mail notification when new information is available in [address] 9. Click Sign Up. You can now view and download portions of your medical record. 10. Click the Download Summary menu link to download a portable copy of your medical information. Additional Information If you have questions, please visit the Frequently Asked Questions section of the Shopnlist website at [email or web address] Remember, Shopnlist is NOT to be used for urgent needs. For medical emergencies, dial 911. Patient armband removed and shredded Discharge Orders Procedure Order Date Status Priority Quantity Spec Type Associated Dx METABOLIC PANEL, BASIC 70/00/63 1119 Future STAT 1 Blood Schedule Instructions:  After 5 runs of KCL and call me [phone] Shopnlist Announcement We are excited to announce that we are making your provider's discharge notes available to you in Shopnlist. You will see these notes when they are completed and signed by the physician that discharged you from your recent hospital stay. If you have any questions or concerns about any information you see in Shopnlist, please call the Health Information Department where you were seen or reach out to your Primary Care Provider for more information about your plan of care. Introducing [facility]! [address] introduces Shopnlist patient portal. Now you can access parts of your medical record, email your doctor's office, and request medication refills online. 1. In your internet browser, go to [email or web address] 2. Click on the First Time User? Click Here link in the Sign In box. You will see the New Member Sign Up page. 3. Enter your Shopnlist Access Code exactly as it appears below. You will not need to use this code after youve completed the sign-up process. If you do not sign up before the expiration date, you must request a new code. · Shopnlist Access Code: [email or web address] Expires: 6/15/2017 11:42 AM 
 
 4. Enter the last four digits of your Social Security Number (xxxx) and Date of Birth (mm/dd/yyyy) as indicated and click Submit. You will be taken to the next sign-up page. 5. Create a New KCBX ID. This will be your New KCBX login ID and cannot be changed, so think of one that is secure and easy to remember. 6. Create a New KCBX password. You can change your password at any time. 7. Enter your Password Reset Question and Answer. This can be used at a later time if you forget your password. 8. Enter your e-mail address. You will receive e-mail notification when new information is available in [address] 9. Click Sign Up. You can now view and download portions of your medical record. 10. Click the Download Summary menu link to download a portable copy of your medical information. If you have questions, please visit the Frequently Asked Questions section of the New KCBX website. Remember, New KCBX is NOT to be used for urgent needs. For medical emergencies, dial 911. Now available from your iPhone and Android! General Information Please provide this summary of care documentation to your next provider. Patient Signature:  ____________________________________________________________ Date:  ____________________________________________________________  
  
Juana Kaur Provider Signature:  ____________________________________________________________ Date:  ____________________________________________________________

## 2017-03-17 NOTE — IP AVS SNAPSHOT
Current Discharge Medication List  
  
START taking these medications Dose & Instructions Dispensing Information Comments Morning Noon Evening Bedtime  
 ferrous sulfate 325 mg (65 mg iron) tablet Commonly known as:  [employer] Your last dose was: Your next dose is:    
   
   
 Dose:  325 mg Take 1 Tab by mouth Daily (before breakfast). Indications: IRON DEFICIENCY ANEMIA Quantity:  100 Tab Refills:  0  
     
   
   
   
  
 sucralfate 1 gram tablet Commonly known as:  Lian Hashimoto Your last dose was: Your next dose is:    
   
   
 Dose:  1 g Take 1 Tab by mouth Before breakfast, lunch, dinner and at bedtime. Quantity:  100 Tab Refills:  1 CONTINUE these medications which have CHANGED Dose & Instructions Dispensing Information Comments Morning Noon Evening Bedtime  
 pantoprazole 40 mg tablet Commonly known as:  PROTONIX What changed:  See the new instructions. Your last dose was: Your next dose is:    
   
   
 Dose:  40 mg Take 1 Tab by mouth Before breakfast and dinner. Indications: Upper GI Bleed Quantity:  100 Tab Refills:  1 CONTINUE these medications which have NOT CHANGED Dose & Instructions Dispensing Information Comments Morning Noon Evening Bedtime ADVAIR DISKUS 250-50 mcg/dose diskus inhaler Generic drug:  fluticasone-salmeterol Your last dose was: Your next dose is:    
   
   
 INHALE 2 PUFFS BY MOUTH TWICE DAILY Quantity:  1 Inhaler Refills:  5  
     
   
   
   
  
 * albuterol 90 mcg/actuation inhaler Commonly known as:  PROVENTIL HFA, VENTOLIN HFA, PROAIR HFA Your last dose was: Your next dose is:    
   
   
 Dose:  2 Puff Take 2 Puffs by inhalation every four (4) hours as needed for Wheezing. Quantity:  1 Inhaler Refills:  3 * albuterol 2.5 mg /3 mL (0.083 %) nebulizer solution Commonly known as:  PROVENTIL VENTOLIN Your last dose was: Your next dose is:    
   
   
 INHALE CONTENTS OF 1 VIAL VIA NEBULIZER EVERY FOUR (4) HOURS AS NEEDED FOR WHEEZING. Quantity:  60 Each Refills:  3  
     
   
   
   
  
 bipap machine kit Your last dose was: Your next dose is:    
   
   
 by Does Not Apply route nightly. Refills:  0 HYDROcodone-acetaminophen 5-325 mg per tablet Commonly known as:  Alfonso Handler Your last dose was: Your next dose is:    
   
   
 Dose:  1 Tab Take 1 Tab by mouth every six (6) hours as needed for Pain. Max Daily Amount: 4 Tabs. Quantity:  72 Tab Refills:  0  
     
   
   
   
  
 loratadine 10 mg tablet Commonly known as:  Dawna Dinesh Your last dose was: Your next dose is:    
   
   
  Refills:  0  
     
   
   
   
  
 modafinil 200 mg tablet Commonly known as:  PROVIGIL Your last dose was: Your next dose is: TAKE 1 TABLET BY MOUTH TWICE A DAY Refills:  3 OXYGEN-AIR DELIVERY SYSTEMS Your last dose was: Your next dose is:    
   
   
 by Does Not Apply route. 3 liters hs with cpap   1 liter with activity  [address] Refills:  0  
     
   
   
   
  
 sildenafil citrate 100 mg tablet Commonly known as:  VIAGRA Your last dose was: Your next dose is:    
   
   
 Dose:  100 mg Take 1 Tab by mouth as needed. Quantity:  2 Tab Refills:  0  
     
   
   
   
  
 testosterone 20.25 mg/1.25 gram (1.62 %) gel Commonly known as:  ANDROGEL Your last dose was: Your next dose is:    
   
   
 Use 4 pumps once daily Quantity:  1 Bottle Refills:  5 This request is for a new prescription for a controlled substance as required by Federal/State law. Travon Sink THERAPEUTIC-M 9 mg iron-400 mcg Tab tablet Generic drug:  multivitamin, tx-iron-ca-min Your last dose was: Your next dose is:    
   
   
  Refills:  0  
     
   
   
   
  
 * Notice: This list has 2 medication(s) that are the same as other medications prescribed for you. Read the directions carefully, and ask your doctor or other care provider to review them with you. STOP taking these medications   
 aspirin delayed-release 81 mg tablet  
   
  
 doxazosin 4 mg tablet Commonly known as:  CARDURA  
   
  
 NORVASC 10 mg tablet Generic drug:  amLODIPine  
   
  
 raNITIdine 150 mg tablet Commonly known as:  ZANTAC TRACLEER 125 mg tablet Generic drug:  bosentan  
   
  
 triamterene-hydroCHLOROthiazide 37.5-25 mg per tablet Commonly known as:  Joann Aburto Where to Get Your Medications Information on where to get these meds will be given to you by the nurse or doctor. ! Ask your nurse or doctor about these medications  
  ferrous sulfate 325 mg (65 mg iron) tablet  
 pantoprazole 40 mg tablet  
 sucralfate 1 gram tablet

## 2017-03-17 NOTE — H&P
[facility]  ROUTINE H AND PS    Name:  Eri Man  MR#:  [id number]  :  1960  Account #:  [de-identified]  Date of Adm:  2017      CHIEF COMPLAINT: Melena or dark stools. HISTORY OF PRESENT ILLNESS: The patient is a 25-year-old black  male who noticed black stools 3 days ago, at which time he was dizzy,  he denied any abdominal pain. He saw Dr. Theda Bence today who found  evidence of bleeding and he sent him to the emergency room. In the  emergency room, his hemoglobin is 6.1 grams. The patient takes 1  aspirin a day. Denies any other medication like ibuprofen or other  medication that can cause GI bleed. He has high blood pressure and  takes blood pressure medication. He denied any prior history of GI  bleed. PAST MEDICAL HISTORY: Includes the following: Hypertension,  hypogonadism, obstructive sleep apnea, COPD, peripheral  neuropathy, and sarcoid. SOCIAL HISTORY: He is single. Does not smoke or drink. Denies  drugs. He is on disability, was a former longshoreman. FAMILY HISTORY: Father  of a heart attack. He was  hypertensive. ALLERGIES: NONE. REVIEW OF SYSTEMS  HEENT: No headache. He had dizziness 3 days ago, but none at the  present time. No visual or hearing problem. CARDIOVASCULAR/RESPIRATORY: No chest pain. No shortness of  breath. GASTROINTESTINAL: Denied abdominal pain. GENITOURINARY: He has hesitancy with urination the past 3 days. MUSCULOSKELETAL: No complaint. PHYSICAL EXAMINATION  GENERAL: He is awake and alert. VITAL SIGNS: Blood pressure is 110/59, pulse 94, respirations 16,  temperature 97.8. HEAD: Normocephalic. EYES: Pupils equal. Conjunctivae are pale. Eye ocular movements  intact. NOSE: No septal deviation. MOUTH: Normal tongue, gums, buccal mucosa. NECK: Thyroid enlarged. CHEST: Symmetrical.  HEART: Regular. No murmur. LUNGS: Clear. ABDOMEN: Soft, nontender. EXTREMITIES: Revealed no edema.     LABORATORY DATA: Review of laboratory data revealed hemoglobin  6.1 gram, hematocrit 18, white count is 10,000. Chemistry: BUN 15,  creatinine 0.53. Potassium is 3.2, blood sugar is 111. Serum iron is 12,  TIBC 423, Fe saturation is 3. Electrocardiogram normal sinus rhythm,  prolonged QT wave, QTc interval is 482. IMPRESSION  1. Acute and chronic gastrointestinal bleed. 2. Iron-deficiency anemia. 3. Hypokalemia. 4. Hypertension. DISCUSSION: The patient will need blood transfusion. He will need  colonoscopy and upper GI endoscopy. We will review home  medications and resume those that he needs.     CODE STATUS: MD Jayden Sher / Byron Long  D:  03/17/2017   14:14  T:  03/17/2017   14:34  Job #:  [id number]

## 2017-03-17 NOTE — ED TRIAGE NOTES
Pt states  He was  Seen  By Abel Olsen in office for   Slight abdminal pain,  Black stools for  2 days ,  Got called today  To come  To ER   For abnormal lab result  H& H  Low, done yesterday @ his office

## 2017-03-17 NOTE — CONSULTS
[facility] 1948, [facility]   www. [email or web address]      Impression:   1. Melena and acute blood loss anemia. Plan:     1. EGD and colo in AM. Transfuse 2 units today. Check iron. Informed Consent: The risks, benefits and alternatives of the procedure and the sedation options were discussed in detail. The patient is aware of potential complications including but not limited to bleeding, perforation, aspiration, infection, sedation adverse events, missed diagnosis, missed lesions, intravenous site complications, potential need for additional procedures, and death. All questions were answered. Informed consent is documented on medical record. Chief Complaint: melena      HPI:  Jenni Bates is a 61 y.o. male who is being seen on consult for melena, seen by my partner yesterday. He has noted several days of black stools without n/v abd pain fevers chills or weight loss. No reflux or dysphagia. Diarrhea initially but now stool solid. He is no longer on prednisone but does use cardiac dose asa. No peptobismol use. 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 No reflux on prn ranitidine or omeprazole      PMH:   Past Medical History:   Diagnosis Date    Essential hypertension     Essential hypertension, benign     Gout     Hypogonadism male     Impotence     Microscopic hematuria     Myopathy     Obesity, unspecified     Obstructive sleep apnea     Other chronic pulmonary heart diseases (HCC)     Peripheral neuropathy ([address] 10/25/2012    Sarcoid ([facility] [address] [address] 10/25/2012       PSH:   Past Surgical History:   Procedure Laterality Date    HX HEART CATHETERIZATION         Social HX:   Social History     Social History    Marital status:      Spouse name: N/A    Number of children: N/A    Years of education: N/A     Occupational History    Not on file.      Social History Main Topics    Smoking status: Never Smoker    Smokeless tobacco: Never Used   [facility] Alcohol use 7.0 oz/week     14 Glasses of wine per week    Drug use: No    Sexual activity: Yes     Other Topics Concern    Not on file     Social History Narrative       FHX:   Family History   Problem Relation Age of Onset    Hypertension Father     Lung Disease Father     Heart Attack Father 61       Allergy:   No Known Allergies    Home Medications:       (Not in a hospital admission)    Review of Systems:     Constitutional: No fevers, chills, weight loss, fatigue. Skin: No rashes, pruritis, jaundice, ulcerations, erythema. HENT: No headaches, nosebleeds, sinus pressure, rhinorrhea, sore throat. Eyes: No visual changes, blurred vision, eye pain, photophobia, jaundice. Cardiovascular: No chest pain, heart palpitations. Respiratory: No cough, SOB, wheezing, chest discomfort, orthopnea. Gastrointestinal:    Genitourinary: No dysuria, bleeding, discharge, pyuria. Musculoskeletal: No weakness, arthralgias, wasting. Endo: No sweats. Heme: No bruising, easy bleeding. Allergies: As noted. Neurological: Cranial nerves intact. Alert and oriented. Gait not assessed. Psychiatric:  No anxiety, depression, hallucinations. Visit Vitals    /59 (BP 1 Location: Left arm, BP Patient Position: Sitting)    Pulse 94    Temp 97.8 °F (36.6 °C)    Resp 16    Ht 5' 6\" (1.676 m)    Wt 85.3 kg (188 lb)    SpO2 100%    BMI 30.34 kg/m2       Physical Assessment:     constitutional: appearance: well developed, normal habitus, no deformities, in no acute distress. skin: inspection: no rashes, ulcers, icterus or other lesions; no clubbing or telangiectasias. palpation: no induration or subcutaneos nodules. eyes: inspection: normal conjunctivae and lids; no jaundice pupils: symmetrical, normoreactive to light, normal accommodation and size. ENMT: mouth: normal oral mucosa,lips and gums; good dentition.    respiratory: effort: normal chest excursion; no intercostal retraction or accessory muscle use. cardiovascular: abdominal aorta: normal size and position; no bruits. palpation: PMI of normal size and position; normal rhythm; no thrill or murmurs. abdominal: abdomen: normal consistency; no tenderness or masses. hernias: no hernias appreciated. liver: normal size and consistency. spleen: not palpable. rectal: hemoccult/guaiac: not performed. musculoskeletal: digits and nails: not assessed. neurologic: cranial nerves: II-XII normal.   psychiatric: judgement/insight: within normal limits. memory: within normal limits for recent and remote events. mood and affect: no evidence of depression, anxiety or agitation. orientation: oriented to time, space and person. Basic Metabolic Profile   Recent Labs      03/17/17   1111   NA  141   K  3.2*   CL  105   CO2  30   BUN  15   GLU  111*   CA  8.3*   MG  1.9         CBC w/Diff    Recent Labs      03/17/17   1111   WBC  10.0   RBC  2.24*   HGB  6.1*   HCT  18.7*   MCV  83.5   MCH  27.2   MCHC  32.6   RDW  15.0*   PLT  262    Recent Labs      03/17/17   1111   GRANS  82*   LYMPH  11*   EOS  0        Hepatic Function   Recent Labs      03/17/17   1111   ALB  3.3*   TP  6.5   TBILI  0.4   SGOT  31   AP  60          Monica Segal MD, M.D. [facility] 1948, [address] [facility]  www. [place]

## 2017-03-17 NOTE — ROUTINE PROCESS
TRANSFER - OUT REPORT:    Verbal report given to Paula Burt RN(name) on Neelam Benedict  being transferred to [address] [place] [address](unit) for routine progression of care       Report consisted of patients Situation, Background, Assessment and   Recommendations(SBAR). Information from the following report(s) SBAR, ED Summary, Intake/Output, MAR, Recent Results and Cardiac Rhythm NSR was reviewed with the receiving nurse. Lines:       Opportunity for questions and clarification was provided.       Patient transported with:   Monitor  Registered Nurse  Tech   IV with PRBC's infusing at 75 ml/hr

## 2017-03-18 ENCOUNTER — ANESTHESIA (OUTPATIENT)
Dept: ENDOSCOPY | Age: 57
DRG: 254 | End: 2017-03-18
Payer: MEDICAID

## 2017-03-18 VITALS
WEIGHT: 188 LBS | HEIGHT: 66 IN | OXYGEN SATURATION: 97 % | BODY MASS INDEX: 30.22 KG/M2 | DIASTOLIC BLOOD PRESSURE: 58 MMHG | HEART RATE: 100 BPM | RESPIRATION RATE: 18 BRPM | SYSTOLIC BLOOD PRESSURE: 119 MMHG | TEMPERATURE: 98.4 F

## 2017-03-18 PROBLEM — E87.6 HYPOKALEMIA: Status: ACTIVE | Noted: 2017-03-18

## 2017-03-18 PROBLEM — E29.1 HYPOGONADISM MALE: Chronic | Status: ACTIVE | Noted: 2017-03-18

## 2017-03-18 PROBLEM — D50.9 IRON DEFICIENCY ANEMIA: Chronic | Status: ACTIVE | Noted: 2017-03-18

## 2017-03-18 PROBLEM — I10 HTN (HYPERTENSION): Status: ACTIVE | Noted: 2017-03-18

## 2017-03-18 PROBLEM — K31.7 GASTRIC POLYP: Chronic | Status: ACTIVE | Noted: 2017-03-18

## 2017-03-18 LAB
ABO + RH BLD: NORMAL
ALBUMIN SERPL BCP-MCNC: 3.2 G/DL (ref 3.4–5)
ALBUMIN/GLOB SERPL: 0.9 {RATIO} (ref 0.8–1.7)
ALP SERPL-CCNC: 46 U/L (ref 45–117)
ALT SERPL-CCNC: 25 U/L (ref 16–61)
ANION GAP BLD CALC-SCNC: 8 MMOL/L (ref 3–18)
AST SERPL W P-5'-P-CCNC: 26 U/L (ref 15–37)
BASOPHILS # BLD AUTO: 0 K/UL (ref 0–0.1)
BASOPHILS # BLD: 0 % (ref 0–2)
BILIRUB SERPL-MCNC: 1.2 MG/DL (ref 0.2–1)
BLD PROD TYP BPU: NORMAL
BLD PROD TYP BPU: NORMAL
BLOOD GROUP ANTIBODIES SERPL: NORMAL
BPU ID: NORMAL
BPU ID: NORMAL
BUN SERPL-MCNC: 8 MG/DL (ref 7–18)
BUN/CREAT SERPL: 22 (ref 12–20)
CALCIUM SERPL-MCNC: 8.2 MG/DL (ref 8.5–10.1)
CALLED TO:,BCALL1: NORMAL
CHLORIDE SERPL-SCNC: 104 MMOL/L (ref 100–108)
CO2 SERPL-SCNC: 29 MMOL/L (ref 21–32)
CREAT SERPL-MCNC: 0.37 MG/DL (ref 0.6–1.3)
CROSSMATCH RESULT,%XM: NORMAL
CROSSMATCH RESULT,%XM: NORMAL
DIFFERENTIAL METHOD BLD: ABNORMAL
EOSINOPHIL # BLD: 0.1 K/UL (ref 0–0.4)
EOSINOPHIL NFR BLD: 1 % (ref 0–5)
ERYTHROCYTE [DISTWIDTH] IN BLOOD BY AUTOMATED COUNT: 14.9 % (ref 11.6–14.5)
GLOBULIN SER CALC-MCNC: 3.4 G/DL (ref 2–4)
GLUCOSE SERPL-MCNC: 99 MG/DL (ref 74–99)
HCT VFR BLD AUTO: 25.5 % (ref 36–48)
HGB BLD-MCNC: 8.5 G/DL (ref 13–16)
LYMPHOCYTES # BLD AUTO: 19 % (ref 21–52)
LYMPHOCYTES # BLD: 1.6 K/UL (ref 0.9–3.6)
MCH RBC QN AUTO: 27.7 PG (ref 24–34)
MCHC RBC AUTO-ENTMCNC: 33.3 G/DL (ref 31–37)
MCV RBC AUTO: 83.1 FL (ref 74–97)
MONOCYTES # BLD: 0.8 K/UL (ref 0.05–1.2)
MONOCYTES NFR BLD AUTO: 9 % (ref 3–10)
NEUTS SEG # BLD: 5.9 K/UL (ref 1.8–8)
NEUTS SEG NFR BLD AUTO: 71 % (ref 40–73)
PLATELET # BLD AUTO: 294 K/UL (ref 135–420)
PMV BLD AUTO: 10.3 FL (ref 9.2–11.8)
POTASSIUM SERPL-SCNC: 2.9 MMOL/L (ref 3.5–5.5)
PROT SERPL-MCNC: 6.6 G/DL (ref 6.4–8.2)
RBC # BLD AUTO: 3.07 M/UL (ref 4.7–5.5)
SODIUM SERPL-SCNC: 141 MMOL/L (ref 136–145)
SPECIMEN EXP DATE BLD: NORMAL
STATUS OF UNIT,%ST: NORMAL
STATUS OF UNIT,%ST: NORMAL
UNIT DIVISION, %UDIV: 0
UNIT DIVISION, %UDIV: 0
WBC # BLD AUTO: 8.4 K/UL (ref 4.6–13.2)

## 2017-03-18 PROCEDURE — 80053 COMPREHEN METABOLIC PANEL: CPT | Performed by: FAMILY MEDICINE

## 2017-03-18 PROCEDURE — 74011000258 HC RX REV CODE- 258: Performed by: FAMILY MEDICINE

## 2017-03-18 PROCEDURE — 74011250636 HC RX REV CODE- 250/636

## 2017-03-18 PROCEDURE — 77030008565 HC TBNG SUC IRR ERBE -B: Performed by: INTERNAL MEDICINE

## 2017-03-18 PROCEDURE — 74011000250 HC RX REV CODE- 250: Performed by: FAMILY MEDICINE

## 2017-03-18 PROCEDURE — 88305 TISSUE EXAM BY PATHOLOGIST: CPT | Performed by: INTERNAL MEDICINE

## 2017-03-18 PROCEDURE — 94640 AIRWAY INHALATION TREATMENT: CPT

## 2017-03-18 PROCEDURE — 74011250637 HC RX REV CODE- 250/637: Performed by: FAMILY MEDICINE

## 2017-03-18 PROCEDURE — 76060000031 HC ANESTHESIA FIRST 0.5 HR: Performed by: INTERNAL MEDICINE

## 2017-03-18 PROCEDURE — 74011250636 HC RX REV CODE- 250/636: Performed by: FAMILY MEDICINE

## 2017-03-18 PROCEDURE — 77030019988 HC FCPS ENDOSC DISP BSC -B: Performed by: INTERNAL MEDICINE

## 2017-03-18 PROCEDURE — 76040000019: Performed by: INTERNAL MEDICINE

## 2017-03-18 PROCEDURE — 36415 COLL VENOUS BLD VENIPUNCTURE: CPT | Performed by: FAMILY MEDICINE

## 2017-03-18 PROCEDURE — 88342 IMHCHEM/IMCYTCHM 1ST ANTB: CPT | Performed by: INTERNAL MEDICINE

## 2017-03-18 PROCEDURE — 74011000250 HC RX REV CODE- 250

## 2017-03-18 PROCEDURE — 85025 COMPLETE CBC W/AUTO DIFF WBC: CPT | Performed by: FAMILY MEDICINE

## 2017-03-18 PROCEDURE — 0DB68ZX EXCISION OF STOMACH, VIA NATURAL OR ARTIFICIAL OPENING ENDOSCOPIC, DIAGNOSTIC: ICD-10-PCS | Performed by: INTERNAL MEDICINE

## 2017-03-18 PROCEDURE — 74011250637 HC RX REV CODE- 250/637: Performed by: INTERNAL MEDICINE

## 2017-03-18 PROCEDURE — 77030018846 HC SOL IRR STRL H20 ICUM -A: Performed by: INTERNAL MEDICINE

## 2017-03-18 RX ORDER — SODIUM CHLORIDE, SODIUM LACTATE, POTASSIUM CHLORIDE, CALCIUM CHLORIDE 600; 310; 30; 20 MG/100ML; MG/100ML; MG/100ML; MG/100ML
75 INJECTION, SOLUTION INTRAVENOUS CONTINUOUS
Status: DISCONTINUED | OUTPATIENT
Start: 2017-03-19 | End: 2017-03-18 | Stop reason: HOSPADM

## 2017-03-18 RX ORDER — FENTANYL CITRATE 50 UG/ML
25 INJECTION, SOLUTION INTRAMUSCULAR; INTRAVENOUS AS NEEDED
Status: CANCELLED | OUTPATIENT
Start: 2017-03-18

## 2017-03-18 RX ORDER — LANOLIN ALCOHOL/MO/W.PET/CERES
325 CREAM (GRAM) TOPICAL
Qty: 100 TAB | Refills: 0 | Status: SHIPPED | OUTPATIENT
Start: 2017-03-18 | End: 2022-05-24

## 2017-03-18 RX ORDER — SODIUM CHLORIDE, SODIUM LACTATE, POTASSIUM CHLORIDE, CALCIUM CHLORIDE 600; 310; 30; 20 MG/100ML; MG/100ML; MG/100ML; MG/100ML
75 INJECTION, SOLUTION INTRAVENOUS CONTINUOUS
Status: CANCELLED | OUTPATIENT
Start: 2017-03-18

## 2017-03-18 RX ORDER — SUCRALFATE 1 G/1
1 TABLET ORAL
Qty: 100 TAB | Refills: 1 | Status: SHIPPED | OUTPATIENT
Start: 2017-03-18 | End: 2022-05-24

## 2017-03-18 RX ORDER — PANTOPRAZOLE SODIUM 40 MG/1
40 TABLET, DELAYED RELEASE ORAL
Qty: 100 TAB | Refills: 1 | Status: SHIPPED | OUTPATIENT
Start: 2017-03-18

## 2017-03-18 RX ORDER — SUCRALFATE 1 G/1
1 TABLET ORAL
Status: DISCONTINUED | OUTPATIENT
Start: 2017-03-18 | End: 2017-03-18 | Stop reason: HOSPADM

## 2017-03-18 RX ORDER — SODIUM CHLORIDE 0.9 % (FLUSH) 0.9 %
5-10 SYRINGE (ML) INJECTION AS NEEDED
Status: CANCELLED | OUTPATIENT
Start: 2017-03-18

## 2017-03-18 RX ORDER — PROPOFOL 10 MG/ML
INJECTION, EMULSION INTRAVENOUS AS NEEDED
Status: DISCONTINUED | OUTPATIENT
Start: 2017-03-18 | End: 2017-03-18 | Stop reason: HOSPADM

## 2017-03-18 RX ORDER — SODIUM CHLORIDE, SODIUM LACTATE, POTASSIUM CHLORIDE, CALCIUM CHLORIDE 600; 310; 30; 20 MG/100ML; MG/100ML; MG/100ML; MG/100ML
INJECTION, SOLUTION INTRAVENOUS
Status: DISCONTINUED | OUTPATIENT
Start: 2017-03-18 | End: 2017-03-18 | Stop reason: HOSPADM

## 2017-03-18 RX ORDER — LIDOCAINE HYDROCHLORIDE 20 MG/ML
INJECTION, SOLUTION EPIDURAL; INFILTRATION; INTRACAUDAL; PERINEURAL AS NEEDED
Status: DISCONTINUED | OUTPATIENT
Start: 2017-03-18 | End: 2017-03-18 | Stop reason: HOSPADM

## 2017-03-18 RX ADMIN — MODAFINIL 200 MG: 100 TABLET ORAL at 10:25

## 2017-03-18 RX ADMIN — SODIUM CHLORIDE, SODIUM LACTATE, POTASSIUM CHLORIDE, CALCIUM CHLORIDE: 600; 310; 30; 20 INJECTION, SOLUTION INTRAVENOUS at 07:33

## 2017-03-18 RX ADMIN — LIDOCAINE HYDROCHLORIDE: 10 INJECTION, SOLUTION EPIDURAL; INFILTRATION; INTRACAUDAL; PERINEURAL at 08:46

## 2017-03-18 RX ADMIN — MULTIPLE VITAMINS W/ MINERALS TAB 1 TABLET: TAB at 08:45

## 2017-03-18 RX ADMIN — BUDESONIDE AND FORMOTEROL FUMARATE DIHYDRATE 2 PUFF: 80; 4.5 AEROSOL RESPIRATORY (INHALATION) at 08:19

## 2017-03-18 RX ADMIN — LIDOCAINE HYDROCHLORIDE: 10 INJECTION, SOLUTION EPIDURAL; INFILTRATION; INTRACAUDAL; PERINEURAL at 06:00

## 2017-03-18 RX ADMIN — PROPOFOL 80 MG: 10 INJECTION, EMULSION INTRAVENOUS at 07:42

## 2017-03-18 RX ADMIN — LIDOCAINE HYDROCHLORIDE: 10 INJECTION, SOLUTION EPIDURAL; INFILTRATION; INTRACAUDAL; PERINEURAL at 10:25

## 2017-03-18 RX ADMIN — LIDOCAINE HYDROCHLORIDE 20 MG: 20 INJECTION, SOLUTION EPIDURAL; INFILTRATION; INTRACAUDAL; PERINEURAL at 07:42

## 2017-03-18 RX ADMIN — LIDOCAINE HYDROCHLORIDE: 10 INJECTION, SOLUTION EPIDURAL; INFILTRATION; INTRACAUDAL; PERINEURAL at 09:00

## 2017-03-18 RX ADMIN — LIDOCAINE HYDROCHLORIDE: 10 INJECTION, SOLUTION EPIDURAL; INFILTRATION; INTRACAUDAL; PERINEURAL at 07:12

## 2017-03-18 RX ADMIN — Medication 10 ML: at 10:37

## 2017-03-18 RX ADMIN — Medication 10 ML: at 15:12

## 2017-03-18 RX ADMIN — PANTOPRAZOLE SODIUM 40 MG: 40 TABLET, DELAYED RELEASE ORAL at 08:45

## 2017-03-18 RX ADMIN — SUCRALFATE 1 G: 1 TABLET ORAL at 12:50

## 2017-03-18 NOTE — PROGRESS NOTES
Alisson Benavidez M.D. PROGRESS NOTE    Name: Norm Cantrell MRN: [id number]   : 1960 Hospital: [address] [place] [address] Dr   Date: 3/18/2017  Admission Date: 3/17/2017     Subjective/Objective/Plans  Discussed with GI Dr Brad Frost, ok to Dc tonight    Vital Signs:  Visit Vitals    /70 (BP 1 Location: Right arm, BP Patient Position: At rest)    Pulse 80    Temp 96.9 °F (36.1 °C)    Resp 14    Ht 5' 6\" (1.676 m)    Wt 85.3 kg (188 lb)    SpO2 96%    BMI 30.34 kg/m2       O2 Device: Room air   O2 Flow Rate (L/min): 2 l/min   Temp (24hrs), Av.5 °F (36.4 °C), Min:96.6 °F (35.9 °C), Max:98.6 °F (37 °C)     10:58 AM  Intake/Output:   Last shift:       07 -  190  In: 320 [P.O.:120; I.V.:200]  Out: -   Last 3 shifts:  190 -  0700  In: 681.7   Out: 550 [Urine:550]    Intake/Output Summary (Last 24 hours) at 17 1058  Last data filed at 17 0939   Gross per 24 hour   Intake           1001.7 ml   Output              550 ml   Net            451.7 ml         Physical Exam:    See other note  BNP No results for input(s): BNPP in the last 72 hours. Liver Enzymes Recent Labs      17   0256   TP  6.6   ALB  3.2*   TBILI  1.2*   AP  46   SGOT  26   ALT  25      Thyroid Studies No results found for: T4, T3U, TSH, TSHEXT       Procedures/imaging: see electronic medical records for all procedures, Xrays and details which were not copied into this note but were reviewed. Allergies:  No Known Allergies    Home Medications:  Prior to Admission Medications   Prescriptions Last Dose Informant Patient Reported? Taking? ADVAIR DISKUS 250-50 mcg/dose diskus inhaler 3/17/2017 at Unknown time  No Yes   Sig: INHALE 2 PUFFS BY MOUTH TWICE DAILY   HYDROcodone-acetaminophen (NORCO) 5-325 mg per tablet 3/16/2017 at Unknown time  No Yes   Sig: Take 1 Tab by mouth every six (6) hours as needed for Pain. Max Daily Amount: 4 Tabs.    OXYGEN-AIR DELIVERY SYSTEMS 3/16/2017 at Unknown time Yes Yes   Sig: by Does Not Apply route. 3 liters hs with cpap   1 liter with activity  [address]   THERAPEUTIC-M 9 mg iron-400 mcg tab tablet 2/17/2017 at Unknown time  Yes Yes   albuterol (PROVENTIL HFA, VENTOLIN HFA, PROAIR HFA) 90 mcg/actuation inhaler 2/17/2017 at Unknown time  No Yes   Sig: Take 2 Puffs by inhalation every four (4) hours as needed for Wheezing. albuterol (PROVENTIL VENTOLIN) 2.5 mg /3 mL (0.083 %) nebulizer solution 2/17/2017 at Unknown time  No Yes   Sig: INHALE CONTENTS OF 1 VIAL VIA NEBULIZER EVERY FOUR (4) HOURS AS NEEDED FOR WHEEZING.   amLODIPine (NORVASC) 10 mg tablet 3/17/2017 at Unknown time  Yes Yes   Sig: Take 10 mg by mouth daily. aspirin delayed-release 81 mg tablet 3/16/2017 at Unknown time  Yes Yes   bipap machine kit 3/16/2017 at Unknown time  Yes Yes   Sig: by Does Not Apply route nightly. bosentan (TRACLEER) 125 mg tablet Not Taking at Unknown time  Yes No   Sig: Take 125 mg by mouth two (2) times a day. doxazosin (CARDURA) 4 mg tablet 3/17/2017 at Unknown time  Yes Yes   Sig: Take 1 mg by mouth daily. loratadine (CLARITIN) 10 mg tablet 2/17/2017 at Unknown time  Yes Yes   modafinil (PROVIGIL) 200 mg tablet Not Taking at Unknown time  Yes No   Sig: TAKE 1 TABLET BY MOUTH TWICE A DAY   pantoprazole (PROTONIX) 40 mg tablet Not Taking at Unknown time  No No   Sig: TAKE 1 TABLET BY MOUTH DAILY   ranitidine (ZANTAC) 150 mg tablet 3/17/2017 at Unknown time  Yes Yes   Sig: TAKE 1 TABLET BY MOUTH DAILY   sildenafil citrate (VIAGRA) 100 mg tablet 2/17/2017 at Unknown time  No Yes   Sig: Take 1 Tab by mouth as needed. testosterone (ANDROGEL) 20.25 mg/1.25 gram (1.62 %) gel 2/17/2017 at Unknown time  No Yes   Sig: Use 4 pumps once daily   triamterene-hydrochlorothiazide (MAXZIDE) 37.5-25 mg per tablet 3/17/2017 at Unknown time  Yes Yes   Sig: Take 1 Tab by mouth daily.       Facility-Administered Medications: None       Current Medications:  Current Facility-Administered Medications   Medication Dose Route Frequency    sucralfate (CARAFATE) tablet 1 g  1 g Oral AC&HS    0.9% sodium chloride infusion 250 mL  250 mL IntraVENous PRN    pantoprazole (PROTONIX) tablet 40 mg  40 mg Oral ACB&D    budesonide-formoterol (SYMBICORT) 80-4.5 mcg inhaler  2 Puff Inhalation BID RT    albuterol (PROVENTIL VENTOLIN) nebulizer solution 2.5 mg  2.5 mg Nebulization Q4H PRN    HYDROcodone-acetaminophen (NORCO) 5-325 mg per tablet 1 Tab  1 Tab Oral Q6H PRN    loratadine (CLARITIN) tablet 10 mg  10 mg Oral DAILY PRN    modafinil (PROVIGIL) tablet 200 mg  200 mg Oral DAILY    multivitamin, tx-iron-ca-min (THERA-M w/ IRON) tablet 1 Tab  1 Tab Oral DAILY    sodium chloride (NS) flush 5-10 mL  5-10 mL IntraVENous Q8H    sodium chloride (NS) flush 5-10 mL  5-10 mL IntraVENous PRN    prochlorperazine (COMPAZINE) injection 5 mg  5 mg IntraVENous Q8H PRN       Chart and notes reviewed. Data reviewed. I have evaluated and examined the patient.         IMPRESSION:   Patient Active Problem List   Diagnosis Code    Sarcoid ([address] D86.9    Peripheral neuropathy ([address] G62.9    Myopathy G72.9    Pulmonary artery hypertension ([address] I27.2    Asthma J45.909    Obstructive apnea G47.33    Shortness of breath R06.02    GI bleed K92.2    Symptomatic anemia D64.9    Iron deficiency anemia D50.9    Gastric polyp K31.7    Hypokalemia E87.6    HTN (hypertension) I10    Hypogonadism male E29.1 ·         Dc tonight   ·         Marcelino Urbina MD  3/18/2017, 10:58 AM

## 2017-03-18 NOTE — DISCHARGE SUMMARY
[address] [facility] [address] SUMMARY    Name:  Belle Mendoza  MR#:  [id number]  :  1960  Account #:  [de-identified]  Date of Adm:  2017  Date of Transfer:      FINAL DIAGNOSES  1. Gastrointestinal bleed. 2. Gastric polyp by endoscopy. 3. Hypertension. 4. Hypokalemia. 5. Iron-deficiency anemia. 6. Asthma. 7. Acute blood loss anemia    DIET: Regular. ACTIVITY: As tolerated. MEDICATIONS: On discharge  1. Symbicort 2 puffs b.i.d.  2. Provigil 200 mg daily. 2. Multivitamins 1 tablet daily. 3. Protonix 40 mg b.i.d.  4. Carafate 1 g 4 times daily. 5. Norco 5/325, 1 every 6 hours p.r.n. pain. 6. Claritin 10 mg daily as needed. SUMMARY: This is a 48-year-old black male who noticed black stools  since 3 days ago associated with dizziness. He denied any abdominal  pain. On admission, hemoglobin was 6.1 grams. The patient reports  history of hypertension, hypogonadism, obstructive sleep apnea,  COPD, asthma, peripheral neuropathy and sarcoid. PHYSICAL EXAMINATION  VITAL SIGNS: Stable. HEART AND LUNGS: Unremarkable. ABDOMEN: Negative. EXTREMITIES: Revealed no edema. CLINICAL COURSE AND MANAGEMENT: Blood pressures are in  good range and he had GI bleeding, so I did not put him back on  his blood pressure medication. LABORATORY DATA revealed serum iron 12, TIBC of 423, iron  saturation is 3. This is consistent with chronic iron-deficiency anemia. The patient underwent upper GI endoscopy the following day, only  showed gastric polyp, no bleeding, no esophagitis. He has a hiatal  hernia. Hemoglobin was stable at 8 grams post-transfusion. Blood  pressure remained normal, without blood pressure medication. His  potassium was down to 2.9, which he will give him KCl 10 mEq x5  doses. The patient is anxious to go home tonight. Discussed with Dr. Abena Curran and it is okay with her for the patient to go home if remains  stable. The patient advised to see Dr. Angela Zayas next week.  He is to  hold off on his blood pressure medication until such time that Dr. Cydney Veronica deems it necessary if pressure goes up. CONDITION ON DISCHARGE: Good.         MD UCHE Rivera / LITTLE  D:  03/18/2017   11:13  T:  03/18/2017   11:47  Job #:  [id number]

## 2017-03-18 NOTE — DISCHARGE INSTRUCTIONS
DISCHARGE SUMMARY from Nurse    The following personal items are in your possession at time of discharge:    Dental Appliances: None        Home Medications: None  Jewelry: None  Clothing: Pants, Shirt  Other Valuables: None             PATIENT INSTRUCTIONS:    After general anesthesia or intravenous sedation, for 24 hours or while taking prescription Narcotics:  · Limit your activities  · Do not drive and operate hazardous machinery  · Do not make important personal or business decisions  · Do  not drink alcoholic beverages  · If you have not urinated within 8 hours after discharge, please contact your surgeon on call. Report the following to your surgeon:  · Excessive pain, swelling, redness or odor of or around the surgical area  · Temperature over 100.5  · Nausea and vomiting lasting longer than 4 hours or if unable to take medications  · Any signs of decreased circulation or nerve impairment to extremity: change in color, persistent  numbness, tingling, coldness or increase pain  · Any questions        What to do at Home:  Recommended activity: Ambulate in house,     If you experience any of the following symptoms Black\ Tarry stools,Nausea, Dizziness, Fainting, Abdominal Pain, Vomiting,lasting more than 4 hours  please follow up with Primary Care Provider or go to the nearest Emergency Room. *  Please give a list of your current medications to your Primary Care Provider. *  Please update this list whenever your medications are discontinued, doses are      changed, or new medications (including over-the-counter products) are added. *  Please carry medication information at all times in case of emergency situations. These are general instructions for a healthy lifestyle:    No smoking/ No tobacco products/ Avoid exposure to second hand smoke    Surgeon General's Warning:  Quitting smoking now greatly reduces serious risk to your health.     Obesity, smoking, and sedentary lifestyle greatly increases your risk for illness    A healthy diet, regular physical exercise & weight monitoring are important for maintaining a healthy lifestyle    You may be retaining fluid if you have a history of heart failure or if you experience any of the following symptoms:  Weight gain of 3 pounds or more overnight or 5 pounds in a week, increased swelling in our hands or feet or shortness of breath while lying flat in bed. Please call your doctor as soon as you notice any of these symptoms; do not wait until your next office visit. Recognize signs and symptoms of STROKE:    F-face looks uneven    A-arms unable to move or move unevenly    S-speech slurred or non-existent    T-time-call 911 as soon as signs and symptoms begin-DO NOT go       Back to bed or wait to see if you get better-TIME IS BRAIN. Warning Signs of HEART ATTACK     Call 911 if you have these symptoms:   Chest discomfort. Most heart attacks involve discomfort in the center of the chest that lasts more than a few minutes, or that goes away and comes back. It can feel like uncomfortable pressure, squeezing, fullness, or pain.  Discomfort in other areas of the upper body. Symptoms can include pain or discomfort in one or both arms, the back, neck, jaw, or stomach.  Shortness of breath with or without chest discomfort.  Other signs may include breaking out in a cold sweat, nausea, or lightheadedness. Don't wait more than five minutes to call 911 - MINUTES MATTER! Fast action can save your life. Calling 911 is almost always the fastest way to get lifesaving treatment. Emergency Medical Services staff can begin treatment when they arrive -- up to an hour sooner than if someone gets to the hospital by car. The discharge information has been reviewed with the patient. The patient verbalized understanding.     Discharge medications reviewed with the patient and appropriate educational materials and side effects teaching were provided. MyChart Activation    Thank you for requesting access to [employer]. Please follow the instructions below to securely access and download your online medical record. [employer] allows you to send messages to your doctor, view your test results, renew your prescriptions, schedule appointments, and more. How Do I Sign Up? 1. In your internet browser, go to [email or web address]  2. Click on the First Time User? Click Here link in the Sign In box. You will be redirect to the New Member Sign Up page. 3. Enter your [employer] Access Code exactly as it appears below. You will not need to use this code after youve completed the sign-up process. If you do not sign up before the expiration date, you must request a new code. [employer] Access Code: [email or web address]  Expires: 6/15/2017 11:42 AM (This is the date your [employer] access code will )    4. Enter the last four digits of your Social Security Number (xxxx) and Date of Birth (mm/dd/yyyy) as indicated and click Submit. You will be taken to the next sign-up page. 5. Create a [employer] ID. This will be your [employer] login ID and cannot be changed, so think of one that is secure and easy to remember. 6. Create a [employer] password. You can change your password at any time. 7. Enter your Password Reset Question and Answer. This can be used at a later time if you forget your password. 8. Enter your e-mail address. You will receive e-mail notification when new information is available in [address]. 9. Click Sign Up. You can now view and download portions of your medical record. 10. Click the Download Summary menu link to download a portable copy of your medical information. Additional Information    If you have questions, please visit the Frequently Asked Questions section of the [employer] website at [email or web address] Remember, [employer] is NOT to be used for urgent needs. For medical emergencies, dial 911.       Patient armband removed and shredded

## 2017-03-18 NOTE — PERIOP NOTES
Spoke with Marilee Boogie inquired about prep she stated pt had finished all prep around [place] and his last stool was black. Also verified pt current potassium level is 2.9 stated Dr. Anu Sauceda ordered 5 runs of potassium and that she will hang the first bag shortly. This information was relayed to Dr. Mariluz Miller and Dr. Mark Paul, per Dr. Jennyfer Diamond will proceed with upper endo.

## 2017-03-18 NOTE — ANESTHESIA PREPROCEDURE EVALUATION
Anesthetic History               Review of Systems / Medical History  Patient summary reviewed and pertinent labs reviewed    Pulmonary        Sleep apnea: CPAP  Shortness of breath  Asthma : well controlled       Neuro/Psych   Within defined limits           Cardiovascular    Hypertension: well controlled              Exercise tolerance: <4 METS     GI/Hepatic/Renal  Within defined limits              Endo/Other        Obesity and anemia     Other Findings   Comments: Polyneuropathy  UGIB    Current Smoker? NO       Elective Surgery? Yes       Abstained from smoking 24 hours prior to anesthesia? N/A    Risk Factors for Postoperative nausea/vomiting:       History of postoperative nausea/vomiting? NO       Female? NO       Motion sickness? NO       Intended opioid administration for postoperative analgesia?   NO           Physical Exam    Airway  Mallampati: II  TM Distance: 4 - 6 cm  Neck ROM: normal range of motion   Mouth opening: Normal     Cardiovascular  Regular rate and rhythm,  S1 and S2 normal,  no murmur, click, rub, or gallop             Dental    Dentition: Poor dentition     Pulmonary  Breath sounds clear to auscultation               Abdominal  GI exam deferred       Other Findings            Anesthetic Plan    ASA: 3  Anesthesia type: MAC          Induction: Intravenous  Anesthetic plan and risks discussed with: Patient

## 2017-03-18 NOTE — PROGRESS NOTES
Lo Martinez M.D. PROGRESS NOTE    Name: Julio English MRN: [id number]   : 1960 Hospital: [facility]   Date: 3/18/2017  Admission Date: 3/17/2017     Subjective/Objective/Plans  1. Acute and chronic gastrointestinal bleed. 2. Iron-deficiency anemia. 3. Hypokalemia. 4. Hypertension    VSS off norvasc  Hg 8.5  K+ 2.9  Wants to go home tonight, brother is leaving in am wants to see him  UGE today findings as follows    Esophagus:small sliding hiatal hernia without esophagitis  Stomach: 15 mm ulcerated polyp in the gastric antrum with evidence of prolapse into duodenal bulb, no active bleeding visualized, biopsies obtained from polyp and at base of polyp  Duodenum/jejunum: normal     Therapies: biopsy of stomach antrum polyp    Plan  KCL 10 x 5  DC in am if stable     Vital Signs:  Visit Vitals    /70 (BP 1 Location: Right arm, BP Patient Position: At rest)    Pulse 80    Temp 96.9 °F (36.1 °C)    Resp 14    Ht 5' 6\" (1.676 m)    Wt 85.3 kg (188 lb)    SpO2 96%    BMI 30.34 kg/m2       O2 Device: Room air   O2 Flow Rate (L/min): 2 l/min   Temp (24hrs), Av.5 °F (36.4 °C), Min:96.6 °F (35.9 °C), Max:98.6 °F (37 °C)     10:36 AM  Intake/Output:   Last shift:      701 - 1900  In: 320 [P.O.:120; I.V.:200]  Out: -   Last 3 shifts:  190 -  0700  In: 681.7   Out: 550 [Urine:550]    Intake/Output Summary (Last 24 hours) at 17 1036  Last data filed at 17 0939   Gross per 24 hour   Intake           1001.7 ml   Output              550 ml   Net            451.7 ml         Physical Exam:    General: in no apparent distress    HEENT: pupils equal, no ear discharge   Neck: No abnormally enlarged lymph nodes. , no JDV   Mouth: MMM no lesions    Chest: normal, no breast masses   Lungs: normal air entry   Heart: Regular rate and rhythm   Abdomen: abdomen is soft without significant tenderness, masses, organomegaly or guarding   Extremity: negative   Neuro: alert    Skin: Skin color, texture, turgor normal. No rashes or lesions    Data Review:    Labs: Results:       Chemistry Recent Labs      03/18/17 0256  03/17/17   1111   GLU  99  111*   NA  141  141   K  2.9*  3.2*   CL  104  105   CO2  29  30   BUN  8  15   CREA  0.37*  0.53*   CA  8.2*  8.3*   AGAP  8  6   BUCR  22*  28*   TBILI  1.2*  0.4   AP  46  60   TP  6.6  6.5   ALB  3.2*  3.3*   GLOB  3.4  3.2   AGRAT  0.9  1.0      CBC w/Diff Recent Labs      03/18/17 0256 03/17/17   1111   WBC  8.4  10.0   RBC  3.07*  2.24*   HGB  8.5*  6.1*   HCT  25.5*  18.7*   PLT  294  262   GRANS  71  82*   LYMPH  19*  11*   EOS  1  0      Cardiac Enzymes No results for input(s): CPK, CKND1, CLYDE in the last 72 hours. No lab exists for component: CKRMB, TROIP   Coagulation Recent Labs      03/17/17   1111   PTP  13.0   INR  1.0   APTT  25.3       Lipid Panel No results found for: CHOL, CHOLPOCT, CHOLX, CHLST, CHOLV, [id number], HDL, LDL, NLDLCT, DLDL, LDLC, DLDLP, 646518, VLDLC, VLDL, TGL, TGLX, TRIGL, [id number], TRIGP, TGLPOCT, [id number], CHHD, CHHDX   BNP No results for input(s): BNPP in the last 72 hours. Liver Enzymes Recent Labs      03/18/17   0256   TP  6.6   ALB  3.2*   TBILI  1.2*   AP  46   SGOT  26   ALT  25      Thyroid Studies No results found for: T4, T3U, TSH, TSHEXT       Procedures/imaging: see electronic medical records for all procedures, Xrays and details which were not copied into this note but were reviewed. Allergies:  No Known Allergies    Home Medications:  Prior to Admission Medications   Prescriptions Last Dose Informant Patient Reported? Taking? ADVAIR DISKUS 250-50 mcg/dose diskus inhaler 3/17/2017 at Unknown time  No Yes   Sig: INHALE 2 PUFFS BY MOUTH TWICE DAILY   HYDROcodone-acetaminophen (NORCO) 5-325 mg per tablet 3/16/2017 at Unknown time  No Yes   Sig: Take 1 Tab by mouth every six (6) hours as needed for Pain. Max Daily Amount: 4 Tabs.    OXYGEN-AIR DELIVERY SYSTEMS 3/16/2017 at Unknown time  Yes Yes   Sig: by Does Not Apply route. 3 liters hs with cpap   1 liter with activity  [address]   THERAPEUTIC-M 9 mg iron-400 mcg tab tablet 2/17/2017 at Unknown time  Yes Yes   albuterol (PROVENTIL HFA, VENTOLIN HFA, PROAIR HFA) 90 mcg/actuation inhaler 2/17/2017 at Unknown time  No Yes   Sig: Take 2 Puffs by inhalation every four (4) hours as needed for Wheezing. albuterol (PROVENTIL VENTOLIN) 2.5 mg /3 mL (0.083 %) nebulizer solution 2/17/2017 at Unknown time  No Yes   Sig: INHALE CONTENTS OF 1 VIAL VIA NEBULIZER EVERY FOUR (4) HOURS AS NEEDED FOR WHEEZING.   amLODIPine (NORVASC) 10 mg tablet 3/17/2017 at Unknown time  Yes Yes   Sig: Take 10 mg by mouth daily. aspirin delayed-release 81 mg tablet 3/16/2017 at Unknown time  Yes Yes   bipap machine kit 3/16/2017 at Unknown time  Yes Yes   Sig: by Does Not Apply route nightly. bosentan (TRACLEER) 125 mg tablet Not Taking at Unknown time  Yes No   Sig: Take 125 mg by mouth two (2) times a day. doxazosin (CARDURA) 4 mg tablet 3/17/2017 at Unknown time  Yes Yes   Sig: Take 1 mg by mouth daily. loratadine (CLARITIN) 10 mg tablet 2/17/2017 at Unknown time  Yes Yes   modafinil (PROVIGIL) 200 mg tablet Not Taking at Unknown time  Yes No   Sig: TAKE 1 TABLET BY MOUTH TWICE A DAY   pantoprazole (PROTONIX) 40 mg tablet Not Taking at Unknown time  No No   Sig: TAKE 1 TABLET BY MOUTH DAILY   ranitidine (ZANTAC) 150 mg tablet 3/17/2017 at Unknown time  Yes Yes   Sig: TAKE 1 TABLET BY MOUTH DAILY   sildenafil citrate (VIAGRA) 100 mg tablet 2/17/2017 at Unknown time  No Yes   Sig: Take 1 Tab by mouth as needed. testosterone (ANDROGEL) 20.25 mg/1.25 gram (1.62 %) gel 2/17/2017 at Unknown time  No Yes   Sig: Use 4 pumps once daily   triamterene-hydrochlorothiazide (MAXZIDE) 37.5-25 mg per tablet 3/17/2017 at Unknown time  Yes Yes   Sig: Take 1 Tab by mouth daily.       Facility-Administered Medications: None       Current Medications:  Current Facility-Administered Medications   Medication Dose Route Frequency    sucralfate (CARAFATE) tablet 1 g  1 g Oral AC&HS    0.9% sodium chloride infusion 250 mL  250 mL IntraVENous PRN    pantoprazole (PROTONIX) tablet 40 mg  40 mg Oral ACB&D    budesonide-formoterol (SYMBICORT) 80-4.5 mcg inhaler  2 Puff Inhalation BID RT    albuterol (PROVENTIL VENTOLIN) nebulizer solution 2.5 mg  2.5 mg Nebulization Q4H PRN    HYDROcodone-acetaminophen (NORCO) 5-325 mg per tablet 1 Tab  1 Tab Oral Q6H PRN    loratadine (CLARITIN) tablet 10 mg  10 mg Oral DAILY PRN    modafinil (PROVIGIL) tablet 200 mg  200 mg Oral DAILY    multivitamin, tx-iron-ca-min (THERA-M w/ IRON) tablet 1 Tab  1 Tab Oral DAILY    sodium chloride (NS) flush 5-10 mL  5-10 mL IntraVENous Q8H    sodium chloride (NS) flush 5-10 mL  5-10 mL IntraVENous PRN    prochlorperazine (COMPAZINE) injection 5 mg  5 mg IntraVENous Q8H PRN       Chart and notes reviewed. Data reviewed. I have evaluated and examined the patient.         IMPRESSION:   Patient Active Problem List   Diagnosis Code    Sarcoid ([address] D86.9    Peripheral neuropathy ([address] G62.9    Myopathy G72.9    Pulmonary artery hypertension ([address] I27.2    Asthma J45.909    Obstructive apnea G47.33    Shortness of breath R06.02    GI bleed K92.2    Symptomatic anemia D64.9 ·         PLAN:/DISCUSSION:   · Dc if ok with Dr Kendy Raza MD  3/18/2017, 10:36 AM

## 2017-03-18 NOTE — ROUTINE PROCESS
Bedside and Verbal shift change report given to Xiao Szymanski (oncoming nurse) by Deep Clancy (offgoing nurse). Report included the following information SBAR, Kardex, MAR and Recent Results.

## 2017-03-18 NOTE — ROUTINE PROCESS
TRANSFER - OUT REPORT:    Verbal report given to 2300 Donald Szymanski RN(name) on Andria Balderas  being transferred to [facility](unit) for routine post - op       Report consisted of patients Situation, Background, Assessment and   Recommendations(SBAR). Information from the following report(s) SBAR, Procedure Summary and MAR was reviewed with the receiving nurse. Lines:       Opportunity for questions and clarification was provided.       Patient transported with:   [facility]

## 2017-03-18 NOTE — PROCEDURES
[email or web address]  [phone]     Endoscopic Gastroduodenoscopy Procedure Note    Mirian Juarez  1960  [id number]    Indication: 1. melena  2. GIB     : Oscar Champagne MD    Referring Provider:  Reece Klein MD    Anesthesia/Sedation:  MAC anesthesia Propofol      Procedure Details     After infomed consent was obtained for the procedure, with all risks and benefits of procedure explained the patient was taken to the endoscopy suite and placed in the left lateral decubitus position. Following sequential administration of sedation as per above, the endoscope was inserted into the mouth and advanced under direct vision to second portion of the duodenum. A careful inspection was made as the gastroscope was withdrawn, including a retroflexed view of the proximal stomach; findings and interventions are described below. Findings:   Esophagus:small sliding hiatal hernia without esophagitis  Stomach: 15 mm ulcerated polyp in the gastric antrum with evidence of prolapse into duodenal bulb, no active bleeding visualized, biopsies obtained from polyp and at base of polyp  Duodenum/jejunum: normal    Therapies:  biopsy of stomach antrum polyp    Specimens:   ID Type Source Tests Collected by Time Destination   1 : Ulcerated Gastric Polyp Bx's Preservative Gastric  Oscar Champagne MD 3/18/2017 8727 Pathology              Complications:   None; patient tolerated the procedure well. EBL:  None.            Impression:    Ulcerated Gastric Polyp       Recommendations:  - Continue BID PPI 40 mg at discharge  - Carafate 1 g four times daily x 14 days  - Follow results of pathology, if benign, will likely require endoscopic resection of polyp as outpatient; if dysplastic, will refer to surgery and for EUS at base of lesion to determine depth **patient scheduled for routine colonoscopy next month with Dr. Marga Chiu, may potentially be able to resect gastric polyp at that time if benign on pathology  - monitor H/H and transfuse as indicated  - may resume diet today, as source of bleeding very likely identified  - if H/H remains stable without over hemorrhage, ok for discharge tomorrow with close outpatient follow up    José Luis Medina MD  3/18/2017  7:47 AM    José Luis Medina MD  [facility] [address] - [phone]  [email or web address]

## 2017-03-18 NOTE — ANESTHESIA POSTPROCEDURE EVALUATION
Post-Anesthesia Evaluation and Assessment    Patient: Petros Claros MRN: [id number]  SSN: [id number]    YOB: 1960  Age: 61 y.o. Sex: male       Cardiovascular Function/Vital Signs  Visit Vitals    /77    Pulse 91    Temp 37 °C (98.6 °F)    Resp 16    Ht 5' 6\" (1.676 m)    Wt 85.3 kg (188 lb)    SpO2 97%    BMI 30.34 kg/m2       Patient is status post MAC anesthesia for Procedure(s):  ENDOSCOPY w/ biopsy. Nausea/Vomiting: None    Postoperative hydration reviewed and adequate. Pain:  Pain Scale 1: Numeric (0 - 10) (03/18/17 6630)  Pain Intensity 1: 0 (03/18/17 0450)   Managed    Neurological Status: At baseline    Mental Status and Level of Consciousness: Arousable    Pulmonary Status:   O2 Device: CO2 nasal cannula (03/18/17 0752)   Adequate oxygenation and airway patent    Complications related to anesthesia: None    Post-anesthesia assessment completed.  No concerns      Signed By: Adrianne Pyle MD     March 18, 2017

## 2017-03-18 NOTE — PROGRESS NOTES
Care Management Interventions  Current Support Network: Lives with Caregiver, Has Personal Caregivers, Family Lives Nearby  Confirm Follow Up Transport: Family  Plan discussed with Pt/Family/Caregiver: Yes  Discharge Location  Discharge Placement: Home     Pt is a 61year old male admitted for GI bleed. Pt is alert and oriented and alone in room. Pt states that he has a caregiver who stays with him and his plan is to return to his home. Pt is being discharged with self care. Pt's family will be assisting him with transportation at discharge. Pt's POCs are his mother Latrice Cagle ([phone]) and his caretaker/[facility] ([phone]). Pt indicates being independent with ADLs and reports that he uses a wheelchair. Pt's wheelchair is in the room.

## 2017-03-22 ENCOUNTER — HOSPITAL ENCOUNTER (OUTPATIENT)
Dept: LAB | Age: 57
Discharge: HOME | End: 2017-03-22

## 2017-03-22 PROCEDURE — 99001 SPECIMEN HANDLING PT-LAB: CPT

## 2017-03-24 ENCOUNTER — HOSPITAL ENCOUNTER (OUTPATIENT)
Dept: LAB | Age: 57
Discharge: HOME | End: 2017-03-24

## 2017-03-24 PROCEDURE — 99001 SPECIMEN HANDLING PT-LAB: CPT

## 2017-03-28 ENCOUNTER — OFFICE VISIT (OUTPATIENT)
Dept: PULMONOLOGY | Age: 57
End: 2017-03-28

## 2017-03-28 VITALS
SYSTOLIC BLOOD PRESSURE: 120 MMHG | OXYGEN SATURATION: 98 % | WEIGHT: 188 LBS | TEMPERATURE: 98.7 F | RESPIRATION RATE: 18 BRPM | BODY MASS INDEX: 30.22 KG/M2 | HEIGHT: 66 IN | HEART RATE: 79 BPM | DIASTOLIC BLOOD PRESSURE: 60 MMHG

## 2017-03-28 DIAGNOSIS — I27.21 PULMONARY ARTERY HYPERTENSION (HCC): ICD-10-CM

## 2017-03-28 DIAGNOSIS — D86.9 SARCOID: ICD-10-CM

## 2017-03-28 DIAGNOSIS — G62.9 PERIPHERAL POLYNEUROPATHY: Primary | ICD-10-CM

## 2017-03-28 DIAGNOSIS — G47.33 OBSTRUCTIVE APNEA: ICD-10-CM

## 2017-03-28 DIAGNOSIS — G72.9 MYOPATHY: ICD-10-CM

## 2017-03-28 RX ORDER — HYDROCODONE BITARTRATE AND ACETAMINOPHEN 5; 325 MG/1; MG/1
1 TABLET ORAL
Qty: 120 TAB | Refills: 0 | Status: SHIPPED | OUTPATIENT
Start: 2017-03-28 | End: 2017-12-18 | Stop reason: DRUGHIGH

## 2017-03-28 NOTE — PROGRESS NOTES
[facility]  Pulmonary, Critical Care, and Sleep Medicine      Chief complaint:  sarcoidosis    HPI:  Andria Balderas is 61years old and returns to the office today for followup after a hospitalization for a GI bleed requiring several blood transfusions. The patient relates his pulmonary hypertension medication was discontinued probably because of hypotension and low blood pressure after that. Now the patient denies chest pain shortness of breath dizziness and relates that the pain he had in his abdomen has resolved after a polypectomy. He still notes his usual discomforts related to his arms and legs attributed to myopathy and poly-neuropathy from sarcoidosis  No Known Allergies  Current Outpatient Prescriptions   Medication Sig    pantoprazole (PROTONIX) 40 mg tablet Take 1 Tab by mouth Before breakfast and dinner. Indications: Upper GI Bleed    sucralfate (CARAFATE) 1 gram tablet Take 1 Tab by mouth Before breakfast, lunch, dinner and at bedtime.  ferrous sulfate (FEROSUL) 325 mg (65 mg iron) tablet Take 1 Tab by mouth Daily (before breakfast). Indications: IRON DEFICIENCY ANEMIA    ADVAIR DISKUS 250-50 mcg/dose diskus inhaler INHALE 2 PUFFS BY MOUTH TWICE DAILY    HYDROcodone-acetaminophen (NORCO) 5-325 mg per tablet Take 1 Tab by mouth every six (6) hours as needed for Pain. Max Daily Amount: 4 Tabs.  sildenafil citrate (VIAGRA) 100 mg tablet Take 1 Tab by mouth as needed.  testosterone (ANDROGEL) 20.25 mg/1.25 gram (1.62 %) gel Use 4 pumps once daily    bipap machine kit by Does Not Apply route nightly.  albuterol (PROVENTIL VENTOLIN) 2.5 mg /3 mL (0.083 %) nebulizer solution INHALE CONTENTS OF 1 VIAL VIA NEBULIZER EVERY FOUR (4) HOURS AS NEEDED FOR WHEEZING.     modafinil (PROVIGIL) 200 mg tablet TAKE 1 TABLET BY MOUTH TWICE A DAY    albuterol (PROVENTIL HFA, VENTOLIN HFA, PROAIR HFA) 90 mcg/actuation inhaler Take 2 Puffs by inhalation every four (4) hours as needed for Wheezing.  loratadine (CLARITIN) 10 mg tablet     THERAPEUTIC-M 9 mg iron-400 mcg tab tablet     OXYGEN-AIR DELIVERY SYSTEMS by Does Not Apply route. 3 liters hs with cpap   1 liter with activity  [address]     No current facility-administered medications for this visit. Past Medical History:   Diagnosis Date    Essential hypertension     Essential hypertension, benign     Gout     Hypogonadism male     Impotence     Microscopic hematuria     Myopathy     Obesity, unspecified     Obstructive sleep apnea     Other chronic pulmonary heart diseases [facility]     Peripheral neuropathy ([facility] 10/25/2012    [facility] ([facility] 10/25/2012     Past Surgical History:   Procedure Laterality Date    HX HEART CATHETERIZATION       Social History     Social History    Marital status:      Spouse name: N/A    Number of children: N/A    Years of education: N/A     Occupational History    Not on file.      Social History Main Topics    Smoking status: Never Smoker    Smokeless tobacco: Never Used    Alcohol use 7.0 oz/week     14 Glasses of wine per week    Drug use: No    Sexual activity: Yes     Other Topics Concern    Not on file     Social History Narrative     Family History   Problem Relation Age of Onset    Hypertension Father     Lung Disease Father     Heart Attack Father 61       Review of systems:  He denies having any more GI bleeding    Physical Exam:  Visit Vitals    /60 (BP 1 Location: Left arm, BP Patient Position: Sitting)    Pulse 79    Temp 98.7 °F (37.1 °C) (Oral)    Resp 18    Ht 5' 6\" (1.676 m)    Wt 85.3 kg (188 lb)    SpO2 98%    BMI 30.34 kg/m2       Well-developed well-nourished  HEENT: WNl  Chest: Equal symmetrical expansion no dullness or wheezes rales lrubs  Heart: Regular rhythm no gallop or murmur JVD no peripheral edema  Neurological alert and oriented    LABS:    O2 sat 98% room air at rest    Impression:   Pulmonary hypertension and has done very well on Tracleer in past blood since the patient's pulmonary artery pressures are so low and it is not absolutely certain that the previously untreated and now treated sleep apnea was the cause of his pulmonary hypertension will hold Tracleer and monitor  Chronic pain related to myopathy and neuropathy and he is going to the pain clinic in April his March visit was canceled because of his hospitalization    Plan:  Continue Tylenol with Codeine with [place] one time prescription until seen and managed by Pain Management  Continue CPAP Advair and when necessary albuterol  Followup for regular visit with echocardiogram    Ron Roland MD , [facility]    CC: Woo Patterson MD     [address] [facility]  [facility], [address] [address]     P: [phone]     F: [phone]

## 2017-03-28 NOTE — MR AVS SNAPSHOT
Visit Information Date & Time Provider Department Dept. Phone Encounter #  
 3/28/2017  3:45 PM MD Teresita L[facility] [place] [phone] Follow-up Instructions Return in about 3 months (around 6/28/2017). Follow-up and Disposition History Your Appointments 5/12/2017  3:00 PM  
Nurse Visit with [facility] CARLOS MANUEL NURSE [facility] ([facility]) Appt Note: bw  
 [address] 
[address] [place] [address]  
[address] [place] [address] 5/24/2017  3:45 PM  
Any with Sandy Mercer MD  
[facility] ([facility]) Appt Note: annual  
 [address] 
[address] [place] [address]  
[address] [place] [address] Upcoming Health Maintenance Date Due Hepatitis C Screening 1960 Pneumococcal 19-64 Medium Risk (1 of 1 - PPSV23) 10/15/1978 DTaP/Tdap/Td series (1 - Tdap) 10/15/1980 FOBT Q 1 YEAR AGE 50-75 10/15/2009 INFLUENZA AGE 9 TO ADULT 8/1/2016 Allergies as of 3/28/2017  Review Complete On: 3/28/2017 By: Emmanuel Resendiz LPN No Known Allergies Current Immunizations  Reviewed on 8/11/2016 Name Date Influenza Vaccine Split 11/2/2012 Not reviewed this visit You Were Diagnosed With   
  
 Codes Comments Peripheral polyneuropathy    -  Primary ICD-10-CM: G62.9 ICD-9-CM: 356.9 Myopathy     ICD-10-CM: G72.9 ICD-9-CM: 359.9 Sarcoid ([address]     ICD-10-CM: D86.9 ICD-9-CM: 135 Pulmonary artery hypertension ([address]     ICD-10-CM: I27.2 ICD-9-CM: 416.8 Obstructive apnea     ICD-10-CM: G47.33 
ICD-9-CM: 327.23 Vitals BP Pulse Temp Resp Height(growth percentile) Weight(growth percentile) 120/60 (BP 1 Location: Left arm, BP Patient Position: Sitting) 79 98.7 °F (37.1 °C) (Oral) 18 5' 6\" (1.676 m) 188 lb (85.3 kg) SpO2 BMI Smoking Status 98% 30.34 kg/m2 Never Smoker BMI and BSA Data Body Mass Index Body Surface Area  
 30.34 kg/m 2 1.99 m 2 Preferred Pharmacy Pharmacy Name Phone [id number] [phone] Your Updated Medication List  
  
   
This list is accurate as of: 3/28/17  4:37 PM.  Always use your most recent med list.  
  
  
  
  
 Carter Norberto 250-50 mcg/dose diskus inhaler Generic drug:  fluticasone-salmeterol INHALE 2 PUFFS BY MOUTH TWICE DAILY  
  
 * albuterol 90 mcg/actuation inhaler Commonly known as:  PROVENTIL HFA, VENTOLIN HFA, PROAIR HFA Take 2 Puffs by inhalation every four (4) hours as needed for Wheezing. * albuterol 2.5 mg /3 mL (0.083 %) nebulizer solution Commonly known as:  PROVENTIL VENTOLIN  
INHALE CONTENTS OF 1 VIAL VIA NEBULIZER EVERY FOUR (4) HOURS AS NEEDED FOR WHEEZING. bipap machine kit  
by Does Not Apply route nightly. ferrous sulfate 325 mg (65 mg iron) tablet Commonly known as:  [employer] Take 1 Tab by mouth Daily (before breakfast). Indications: IRON DEFICIENCY ANEMIA  
  
 * HYDROcodone-acetaminophen 5-325 mg per tablet Commonly known as:  Annamary Hadley Take 1 Tab by mouth every six (6) hours as needed for Pain. Max Daily Amount: 4 Tabs. * HYDROcodone-acetaminophen 5-325 mg per tablet Commonly known as:  Annamary Hadley Take 1 Tab by mouth every six (6) hours as needed for Pain. Max Daily Amount: 4 Tabs.  
  
 loratadine 10 mg tablet Commonly known as:  CLARITIN  
  
 modafinil 200 mg tablet Commonly known as:  PROVIGIL  
TAKE 1 TABLET BY MOUTH TWICE A DAY OXYGEN-AIR DELIVERY SYSTEMS  
by Does Not Apply route. 3 liters hs with cpap   1 liter with activity  [employer]  
  
 pantoprazole 40 mg tablet Commonly known as:  PROTONIX Take 1 Tab by mouth Before breakfast and dinner. Indications: Upper GI Bleed sildenafil citrate 100 mg tablet Commonly known as:  VIAGRA Take 1 Tab by mouth as needed. sucralfate 1 gram tablet Commonly known as:  Aleyda Kirks Take 1 Tab by mouth Before breakfast, lunch, dinner and at bedtime. testosterone 20.25 mg/1.25 gram (1.62 %) gel Commonly known as:  ANDROGEL Use 4 pumps once daily THERAPEUTIC-M 9 mg iron-400 mcg Tab tablet Generic drug:  multivitamin, tx-iron-ca-min * Notice: This list has 4 medication(s) that are the same as other medications prescribed for you. Read the directions carefully, and ask your doctor or other care provider to review them with you. Prescriptions Printed Refills HYDROcodone-acetaminophen (NORCO) 5-325 mg per tablet 0 Sig: Take 1 Tab by mouth every six (6) hours as needed for Pain. Max Daily Amount: 4 Tabs. Class: Print Route: Oral  
  
Follow-up Instructions Return in about 3 months (around 6/28/2017). To-Do List   
 04/11/2017 3:00 PM  
  Appointment with [facility] 1 at [facility] NON-INVASIVE [address] [place] [address] ([phone]) Age Limit for ALL Heart procedures @ all [facility] facilities: 18 yrs and older only. Under the age of 25, refer to [facility] ([phone]). This study requires patient to bring a written physician's order or MD office may fax the order to Central Scheduling at [phone]. Patient needs to bring a current list of all medications. No preparation is required for this study. Around 06/19/2017 ECHO:  2D ECHO COMPLETE ADULT (TTE) W OR WO CONTR Patient Instructions Continue CPAP nightly with oxygen Continue Advair and as needed albuterol Discontinue Tracleer Notify the office if you note increased shortness of breath Obtain echocardiogram just before next visit Introducing [facility]! [facility] introduces Log[employer] patient portal. Now you can access parts of your medical record, email your doctor's office, and request medication refills online. 1. In your internet browser, go to [email or web address] 2. Click on the First Time User? Click Here link in the Sign In box. You will see the New Member Sign Up page. 3. Enter your [employer] Access Code exactly as it appears below. You will not need to use this code after youve completed the sign-up process. If you do not sign up before the expiration date, you must request a new code. · [employer] Access Code: [email or web address] Expires: 6/15/2017 11:42 AM 
 
4. Enter the last four digits of your Social Security Number (xxxx) and Date of Birth (mm/dd/yyyy) as indicated and click Submit. You will be taken to the next sign-up page. 5. Create a Yotomot ID. This will be your [employer] login ID and cannot be changed, so think of one that is secure and easy to remember. 6. Create a [employer] password. You can change your password at any time. 7. Enter your Password Reset Question and Answer. This can be used at a later time if you forget your password. 8. Enter your e-mail address. You will receive e-mail notification when new information is available in [address] 9. Click Sign Up. You can now view and download portions of your medical record. 10. Click the Download Summary menu link to download a portable copy of your medical information. If you have questions, please visit the Frequently Asked Questions section of the [employer] website. Remember, [employer] is NOT to be used for urgent needs. For medical emergencies, dial 911. Now available from your iPhone and Android! Please provide this summary of care documentation to your next provider. Your primary care clinician is listed as Sixto Jordan. If you have any questions after today's visit, please call [phone].

## 2017-03-28 NOTE — PATIENT INSTRUCTIONS
Continue CPAP nightly with oxygen  Continue Advair and as needed albuterol  Discontinue Tracleer  Notify the office if you note increased shortness of breath  Obtain echocardiogram just before next visit

## 2017-03-28 NOTE — PROGRESS NOTES
Chief Complaint   Patient presents with   [facility] Follow Up    Sarcoidosis    Hypertension     Pulmonary     Patient here for hospital follow up. Patient was in [facility] 3/17/17-3/18/17.

## 2017-04-20 ENCOUNTER — ANESTHESIA EVENT (OUTPATIENT)
Dept: ENDOSCOPY | Age: 57
End: 2017-04-20
Payer: MEDICAID

## 2017-04-21 ENCOUNTER — HOSPITAL ENCOUNTER (OUTPATIENT)
Age: 57
Setting detail: OUTPATIENT SURGERY
Discharge: HOME | End: 2017-04-21
Attending: INTERNAL MEDICINE | Admitting: INTERNAL MEDICINE
Payer: MEDICAID

## 2017-04-21 ENCOUNTER — ANESTHESIA (OUTPATIENT)
Dept: ENDOSCOPY | Age: 57
End: 2017-04-21
Payer: MEDICAID

## 2017-04-21 VITALS
HEIGHT: 66 IN | HEART RATE: 75 BPM | OXYGEN SATURATION: 99 % | BODY MASS INDEX: 27.64 KG/M2 | TEMPERATURE: 97.7 F | SYSTOLIC BLOOD PRESSURE: 112 MMHG | WEIGHT: 172 LBS | DIASTOLIC BLOOD PRESSURE: 73 MMHG | RESPIRATION RATE: 18 BRPM

## 2017-04-21 PROCEDURE — 74011000250 HC RX REV CODE- 250: Performed by: NURSE ANESTHETIST, CERTIFIED REGISTERED

## 2017-04-21 PROCEDURE — 74011250636 HC RX REV CODE- 250/636: Performed by: NURSE ANESTHETIST, CERTIFIED REGISTERED

## 2017-04-21 PROCEDURE — 77030008565 HC TBNG SUC IRR ERBE -B: Performed by: INTERNAL MEDICINE

## 2017-04-21 PROCEDURE — 77030013992 HC SNR POLYP ENDOSC BSC -B: Performed by: INTERNAL MEDICINE

## 2017-04-21 PROCEDURE — 76040000019: Performed by: INTERNAL MEDICINE

## 2017-04-21 PROCEDURE — 88305 TISSUE EXAM BY PATHOLOGIST: CPT | Performed by: INTERNAL MEDICINE

## 2017-04-21 PROCEDURE — 74011000250 HC RX REV CODE- 250

## 2017-04-21 PROCEDURE — 76060000031 HC ANESTHESIA FIRST 0.5 HR: Performed by: INTERNAL MEDICINE

## 2017-04-21 PROCEDURE — 74011250636 HC RX REV CODE- 250/636

## 2017-04-21 PROCEDURE — 77030018846 HC SOL IRR STRL H20 ICUM -A: Performed by: INTERNAL MEDICINE

## 2017-04-21 RX ORDER — SODIUM CHLORIDE 0.9 % (FLUSH) 0.9 %
5-10 SYRINGE (ML) INJECTION AS NEEDED
Status: DISCONTINUED | OUTPATIENT
Start: 2017-04-21 | End: 2017-04-21 | Stop reason: HOSPADM

## 2017-04-21 RX ORDER — FAMOTIDINE 10 MG/ML
20 INJECTION INTRAVENOUS ONCE
Status: COMPLETED | OUTPATIENT
Start: 2017-04-21 | End: 2017-04-21

## 2017-04-21 RX ORDER — SODIUM CHLORIDE 0.9 % (FLUSH) 0.9 %
5-10 SYRINGE (ML) INJECTION EVERY 8 HOURS
Status: DISCONTINUED | OUTPATIENT
Start: 2017-04-21 | End: 2017-04-21 | Stop reason: HOSPADM

## 2017-04-21 RX ORDER — PROPOFOL 10 MG/ML
INJECTION, EMULSION INTRAVENOUS
Status: DISCONTINUED | OUTPATIENT
Start: 2017-04-21 | End: 2017-04-21 | Stop reason: HOSPADM

## 2017-04-21 RX ORDER — SODIUM CHLORIDE, SODIUM LACTATE, POTASSIUM CHLORIDE, CALCIUM CHLORIDE 600; 310; 30; 20 MG/100ML; MG/100ML; MG/100ML; MG/100ML
75 INJECTION, SOLUTION INTRAVENOUS CONTINUOUS
Status: DISCONTINUED | OUTPATIENT
Start: 2017-04-21 | End: 2017-04-21 | Stop reason: HOSPADM

## 2017-04-21 RX ORDER — FENTANYL CITRATE 50 UG/ML
50 INJECTION, SOLUTION INTRAMUSCULAR; INTRAVENOUS
Status: DISCONTINUED | OUTPATIENT
Start: 2017-04-21 | End: 2017-04-21 | Stop reason: HOSPADM

## 2017-04-21 RX ORDER — GLYCOPYRROLATE 0.2 MG/ML
INJECTION INTRAMUSCULAR; INTRAVENOUS AS NEEDED
Status: DISCONTINUED | OUTPATIENT
Start: 2017-04-21 | End: 2017-04-21 | Stop reason: HOSPADM

## 2017-04-21 RX ORDER — PROPOFOL 10 MG/ML
INJECTION, EMULSION INTRAVENOUS AS NEEDED
Status: DISCONTINUED | OUTPATIENT
Start: 2017-04-21 | End: 2017-04-21 | Stop reason: HOSPADM

## 2017-04-21 RX ADMIN — Medication 10 ML: at 08:56

## 2017-04-21 RX ADMIN — GLYCOPYRROLATE 0.1 MG: 0.2 INJECTION INTRAMUSCULAR; INTRAVENOUS at 09:00

## 2017-04-21 RX ADMIN — PROPOFOL 100 MG: 10 INJECTION, EMULSION INTRAVENOUS at 09:04

## 2017-04-21 RX ADMIN — SODIUM CHLORIDE, SODIUM LACTATE, POTASSIUM CHLORIDE, AND CALCIUM CHLORIDE 75 ML/HR: 600; 310; 30; 20 INJECTION, SOLUTION INTRAVENOUS at 08:56

## 2017-04-21 RX ADMIN — FAMOTIDINE 20 MG: 10 INJECTION, SOLUTION INTRAVENOUS at 08:56

## 2017-04-21 RX ADMIN — PROPOFOL 50 MCG/KG/MIN: 10 INJECTION, EMULSION INTRAVENOUS at 09:04

## 2017-04-21 NOTE — ANESTHESIA PREPROCEDURE EVALUATION
Anesthetic History   No history of anesthetic complications            Review of Systems / Medical History  Patient summary reviewed and pertinent labs reviewed    Pulmonary  Within defined limits      Sleep apnea: CPAP    Asthma : well controlled       Neuro/Psych   Within defined limits           Cardiovascular    Hypertension              Exercise tolerance: >4 METS     GI/Hepatic/Renal  Within defined limits              Endo/Other  Within defined limits      Morbid obesity     Other Findings   Comments:   Risk Factors for Postoperative nausea/vomiting:       History of postoperative nausea/vomiting? NO       Female? NO       Motion sickness? NO       Intended opioid administration for postoperative analgesia?   NO           Physical Exam    Airway  Mallampati: II  TM Distance: 4 - 6 cm  Neck ROM: normal range of motion   Mouth opening: Normal     Cardiovascular    Rhythm: regular  Rate: normal         Dental    Dentition: Poor dentition     Pulmonary  Breath sounds clear to auscultation               Abdominal  GI exam deferred       Other Findings            Anesthetic Plan    ASA: 3  Anesthesia type: MAC          Induction: Intravenous  Anesthetic plan and risks discussed with: Patient

## 2017-04-21 NOTE — H&P
[facility]   [email or web address]      Impression:   1. Hx polyps    Plan:     1. [place] all risks discussed     Chief Complaint: surveillance     HPI:  Ashley Tyler is a 61 y.o. male who is being seen on consult for surviellance. PMH:   Past Medical History:   Diagnosis Date    Essential hypertension     Essential hypertension, benign     Gout     Hypogonadism male     Impotence     Microscopic hematuria     Myopathy     Obesity, unspecified     Obstructive sleep apnea     on bipap    Other chronic pulmonary heart diseases     Peripheral neuropathy ([facility] 10/25/2012    Sarcoid ([facility] 10/25/2012       PSH:   Past Surgical History:   Procedure Laterality Date    HX APPENDECTOMY      HX ENDOSCOPY  03/2017    HX HEART CATHETERIZATION      HX HEMORRHOIDECTOMY         Social HX:   Social History     Social History    Marital status:      Spouse name: N/A    Number of children: N/A    Years of education: N/A     Occupational History    Not on file. Social History Main Topics    Smoking status: Never Smoker    Smokeless tobacco: Never Used    Alcohol use Yes      Comment: daily wine at dinner or beer    Drug use: No    Sexual activity: Yes     Other Topics Concern    Not on file     Social History Narrative       FHX:   Family History   Problem Relation Age of Onset    Hypertension Father     Lung Disease Father     Heart Attack Father 61       Allergy:   No Known Allergies    Home Medications:     Prescriptions Prior to Admission   Medication Sig    pantoprazole (PROTONIX) 40 mg tablet Take 1 Tab by mouth Before breakfast and dinner. Indications: Upper GI Bleed    sucralfate (CARAFATE) 1 gram tablet Take 1 Tab by mouth Before breakfast, lunch, dinner and at bedtime.  ferrous sulfate (FEROSUL) 325 mg (65 mg iron) tablet Take 1 Tab by mouth Daily (before breakfast).  Indications: IRON DEFICIENCY ANEMIA    ADVAIR DISKUS 250-50 mcg/dose diskus inhaler INHALE 2 PUFFS BY MOUTH TWICE DAILY    HYDROcodone-acetaminophen (NORCO) 5-325 mg per tablet Take 1 Tab by mouth every six (6) hours as needed for Pain. Max Daily Amount: 4 Tabs.  sildenafil citrate (VIAGRA) 100 mg tablet Take 1 Tab by mouth as needed.  testosterone (ANDROGEL) 20.25 mg/1.25 gram (1.62 %) gel Use 4 pumps once daily    albuterol (PROVENTIL VENTOLIN) 2.5 mg /3 mL (0.083 %) nebulizer solution INHALE CONTENTS OF 1 VIAL VIA NEBULIZER EVERY FOUR (4) HOURS AS NEEDED FOR WHEEZING.  modafinil (PROVIGIL) 200 mg tablet TAKE 1 TABLET BY MOUTH TWICE A DAY    albuterol (PROVENTIL HFA, VENTOLIN HFA, PROAIR HFA) 90 mcg/actuation inhaler Take 2 Puffs by inhalation every four (4) hours as needed for Wheezing.  loratadine (CLARITIN) 10 mg tablet     THERAPEUTIC-M 9 mg iron-400 mcg tab tablet     HYDROcodone-acetaminophen (NORCO) 5-325 mg per tablet Take 1 Tab by mouth every six (6) hours as needed for Pain. Max Daily Amount: 4 Tabs.  bipap machine kit by Does Not Apply route nightly.  OXYGEN-AIR DELIVERY SYSTEMS by Does Not Apply route. 3 liters hs with cpap   1 liter with activity  [id number]       Review of Systems:     Constitutional: No fevers, chills, weight loss, fatigue. Skin: No rashes, pruritis, jaundice, ulcerations, erythema. HENT: No headaches, nosebleeds, sinus pressure, rhinorrhea, sore throat. Eyes: No visual changes, blurred vision, eye pain, photophobia, jaundice. Cardiovascular: No chest pain, heart palpitations. Respiratory: No cough, SOB, wheezing, chest discomfort, orthopnea. Gastrointestinal: No bleeding    Genitourinary: No dysuria, bleeding, discharge, pyuria. Musculoskeletal: No weakness, arthralgias, wasting. Endo: No sweats. Heme: No bruising, easy bleeding. Allergies: As noted. Neurological: Cranial nerves intact. Alert and oriented. Gait not assessed.    Psychiatric:  No anxiety, depression, hallucinations. Visit Vitals    Ht 5' 6\" (1.676 m)    Wt 82.6 kg (182 lb)    BMI 29.38 kg/m2       Physical Assessment:     constitutional: appearance: well developed, well nourished, normal habitus, no deformities, in no acute distress. skin: inspection: no rashes, ulcers, icterus or other lesions; no clubbing or telangiectasias. palpation: no induration or subcutaneos nodules. eyes: inspection: normal conjunctivae and lids; no jaundice pupils: symmetrical, normoreactive to light, normal accommodation and size. ENMT: mouth: normal oral mucosa,lips and gums; good dentition. oropharynx: normal tongue, hard and soft palate; posterior pharynx without erythema, exudate or lesions. neck: no masses organomegaly or tenderness. respiratory: effort: normal chest excursion; no intercostal retraction or accessory muscle use. cardiovascular: abdominal aorta: normal size and position; no bruits. palpation: PMI of normal size and position; normal rhythm; no thrill or murmurs. abdominal: abdomen: normal consistency; no tenderness or masses. hernias: no hernias appreciated. liver: normal size and consistency. spleen: not palpable. rectal: hemoccult/guaiac: not performed. musculoskeletal: paraplegic    lymphatic: axilae: not palpable. groin: not palpable. neck: within normal limits. other: not palpable. neurologic: cranial nerves: II-XII normal.   psychiatric: judgement/insight: within normal limits. memory: within normal limits for recent and remote events. mood and affect: no evidence of depression, anxiety or agitation. orientation: oriented to time, space and person. Basic Metabolic Profile   No results for input(s): NA, K, CL, CO2, BUN, GLU, CA, MG, PHOS in the last 72 hours. No lab exists for component: CREAT      CBC w/Diff    No results for input(s): WBC, RBC, HGB, HCT, MCV, MCH, MCHC, RDW, PLT, HGBEXT, HCTEXT, PLTEXT in the last 72 hours.     No lab exists for component: MPV No results for input(s): GRANS, LYMPH, EOS, PRO, MYELO, METAS, BLAST in the last 72 hours. No lab exists for component: MONO, BASO     Hepatic Function   No results for input(s): ALB, TP, TBILI, GPT, SGOT, AP, AML, LPSE in the last 72 hours. No lab exists for component: Ricky Ansari MD, M.D. [facility] [address] 1948, [address] [facility]  [email or web address]

## 2017-04-21 NOTE — DISCHARGE INSTRUCTIONS
Colonoscopy: What to Expect at [address] [place] [address]  After you have a colonoscopy, you will stay at the clinic for 1 to 2 hours until the medicines wear off. Then you can go home. But you will need to arrange for a ride. Your doctor will tell you when you can eat and do your other usual activities. Your doctor will talk to you about when you will need your next colonoscopy. Your doctor can help you decide how often you need to be checked. This will depend on the results of your test and your risk for colorectal cancer. After the test, you may be bloated or have gas pains. You may need to pass gas. If a biopsy was done or a polyp was removed, you may have streaks of blood in your stool (feces) for a few days. This care sheet gives you a general idea about how long it will take for you to recover. But each person recovers at a different pace. Follow the steps below to get better as quickly as possible. How can you care for yourself at home? Activity  · Rest when you feel tired. · You can do your normal activities when it feels okay to do so. Diet  · Follow your doctor's directions for eating. · Unless your doctor has told you not to, drink plenty of fluids. This helps to replace the fluids that were lost during the colon prep. · Do not drink alcohol. Medicines  · Your doctor will tell you if and when you can restart your medicines. He or she will also give you instructions about taking any new medicines. · If you take blood thinners, such as warfarin (Coumadin), clopidogrel (Plavix), or aspirin, be sure to talk to your doctor. He or she will tell you if and when to start taking those medicines again. Make sure that you understand exactly what your doctor wants you to do. · If polyps were removed or a biopsy was done during the test, your doctor may tell you not to take aspirin or other anti-inflammatory medicines for a few days. These include ibuprofen (Advil, Motrin) and naproxen (Aleve).   Other instructions  · For your safety, do not drive or operate machinery until the medicine wears off and you can think clearly. Your doctor may tell you not to drive or operate machinery until the day after your test.  · Do not sign legal documents or make major decisions until the medicine wears off and you can think clearly. The anesthesia can make it hard for you to fully understand what you are agreeing to. Follow-up care is a key part of your treatment and safety. Be sure to make and go to all appointments, and call your doctor if you are having problems. It's also a good idea to know your test results and keep a list of the medicines you take. When should you call for help? Call 911 anytime you think you may need emergency care. For example, call if:  · You passed out (lost consciousness). · You pass maroon or bloody stools. · You have severe belly pain. Call your doctor now or seek immediate medical care if:  · Your stools are black and tarlike. · Your stools have streaks of blood, but you did not have a biopsy or any polyps removed. · You have belly pain, or your belly is swollen and firm. · You vomit. · You have a fever. · You are very dizzy. Watch closely for changes in your health, and be sure to contact your doctor if you have any problems. Where can you learn more? Go to http://andres-heide.info/. Enter E264 in the search box to learn more about \"Colonoscopy: What to Expect at Home. \"  Current as of: August 9, 2016  Content Version: 11.2  © 7722-0725 [employer]. Care instructions adapted under license by [employer] (which disclaims liability or warranty for this information). If you have questions about a medical condition or this instruction, always ask your healthcare professional. [address] any warranty or liability for your use of this information.      Colon Polyps: Care Instructions  Your Care Instructions    Colon polyps are growths in the colon or the rectum. The cause of most colon polyps is not known, and most people who get them do not have any problems. But a certain kind can turn into cancer. For this reason, regular testing for colon polyps is important for people age 48 and older and anyone who has an increased risk for colon cancer. Polyps are usually found through routine colon cancer screening tests. Although most colon polyps are not cancerous, they are usually removed and then tested for cancer. Screening for colon cancer saves lives because the cancer can usually be cured if it is caught early. If you have a polyp that is the type that can turn into cancer, you may need more tests to examine your entire colon. The doctor will remove any other polyps that he or she finds, and you will be tested more often. Follow-up care is a key part of your treatment and safety. Be sure to make and go to all appointments, and call your doctor if you are having problems. It's also a good idea to know your test results and keep a list of the medicines you take. How can you care for yourself at home? Regular exams to look for colon polyps are the best way to prevent polyps from turning into colon cancer. These can include stool tests, sigmoidoscopy, colonoscopy, and CT colonography. Talk with your doctor about a testing schedule that is right for you. To prevent polyps  There is no home treatment that can prevent colon polyps. But these steps may help lower your risk for cancer. · Stay active. Being active can help you get to and stay at a healthy weight. Try to exercise on most days of the week. Walking is a good choice. · Eat well. Choose a variety of vegetables, fruits, legumes (such as peas and beans), fish, poultry, and whole grains. · Do not smoke. If you need help quitting, talk to your doctor about stop-smoking programs and medicines. These can increase your chances of quitting for good.   · If you drink alcohol, limit how much you drink. Limit alcohol to 2 drinks a day for men and 1 drink a day for women. When should you call for help? Call your doctor now or seek immediate medical care if:  · You have severe belly pain. · Your stools are maroon or very bloody. Watch closely for changes in your health, and be sure to contact your doctor if:  · You have a fever. · You have nausea or vomiting. · You have a change in bowel habits (new constipation or diarrhea). · Your symptoms get worse or are not improving as expected. Where can you learn more? Go to http://andres-heide.info/. Enter 95 [id number] in the search box to learn more about \"Colon Polyps: Care Instructions. \"  Current as of: August 24, 2016  Content Version: 11.2  © 8935-3863 [employer]. Care instructions adapted under license by [employer] (which disclaims liability or warranty for this information). If you have questions about a medical condition or this instruction, always ask your healthcare professional. [place] [address] any warranty or liability for your use of this information. DISCHARGE SUMMARY from Nurse    The following personal items are in your possession at time of discharge:    Dental Appliances: None  Visual Aid: None                    PATIENT INSTRUCTIONS:    After general anesthesia or intravenous sedation, for 24 hours or while taking prescription Narcotics:  · Limit your activities  · Do not drive and operate hazardous machinery  · Do not make important personal or business decisions  · Do  not drink alcoholic beverages  · If you have not urinated within 8 hours after discharge, please contact your surgeon on call.     Report the following to your surgeon:  · Excessive pain, swelling, redness or odor of or around the surgical area  · Temperature over 100.5  · Nausea and vomiting lasting longer than 4 hours or if unable to take medications  · Any signs of decreased circulation or nerve impairment to extremity: change in color, persistent  numbness, tingling, coldness or increase pain  · Any questions    *  Please give a list of your current medications to your Primary Care Provider. *  Please update this list whenever your medications are discontinued, doses are      changed, or new medications (including over-the-counter products) are added. *  Please carry medication information at all times in case of emergency situations. These are general instructions for a healthy lifestyle:    No smoking/ No tobacco products/ Avoid exposure to second hand smoke    Surgeon General's Warning:  Quitting smoking now greatly reduces serious risk to your health. Obesity, smoking, and sedentary lifestyle greatly increases your risk for illness    A healthy diet, regular physical exercise & weight monitoring are important for maintaining a healthy lifestyle    You may be retaining fluid if you have a history of heart failure or if you experience any of the following symptoms:  Weight gain of 3 pounds or more overnight or 5 pounds in a week, increased swelling in our hands or feet or shortness of breath while lying flat in bed. Please call your doctor as soon as you notice any of these symptoms; do not wait until your next office visit. Recognize signs and symptoms of STROKE:    F-face looks uneven    A-arms unable to move or move unevenly    S-speech slurred or non-existent    T-time-call 911 as soon as signs and symptoms begin-DO NOT go       Back to bed or wait to see if you get better-TIME IS BRAIN. Warning Signs of HEART ATTACK     Call 911 if you have these symptoms:   Chest discomfort. Most heart attacks involve discomfort in the center of the chest that lasts more than a few minutes, or that goes away and comes back. It can feel like uncomfortable pressure, squeezing, fullness, or pain.  Discomfort in other areas of the upper body.  Symptoms can include pain or discomfort in one or both arms, the back, neck, jaw, or stomach.  Shortness of breath with or without chest discomfort.  Other signs may include breaking out in a cold sweat, nausea, or lightheadedness. Don't wait more than five minutes to call 911 - MINUTES MATTER! Fast action can save your life. Calling 911 is almost always the fastest way to get lifesaving treatment. Emergency Medical Services staff can begin treatment when they arrive -- up to an hour sooner than if someone gets to the hospital by car. The discharge information has been reviewed with the patient. The patient verbalized understanding. Discharge medications reviewed with the patient and appropriate educational materials and side effects teaching were provided.

## 2017-04-21 NOTE — IP AVS SNAPSHOT
[place] 
 
 
 920 [facility] 61 [facility] Patient: Alyson Tafoya MRN: [id number] :1960 You are allergic to the following No active allergies Recent Documentation Height Weight BMI Smoking Status 1.676 m 78 kg 27.76 kg/m2 Never Smoker Emergency Contacts Name Discharge Info Relation Home Work Mobile Mariposa Hernandez  Mother [14] [phone] Jyoti Pulliam  Friend [5] [id number] About your hospitalization You were admitted on:  2017 You last received care in the:  [facility] PHASE 2 RECOVERY You were discharged on:  2017 Unit phone number:  [phone] Why you were hospitalized Your primary diagnosis was:  Not on File Providers Seen During Your Hospitalizations Provider Role Specialty Primary office phone Anabela Riley MD Attending Provider Gastroenterology [phone] Your Primary Care Physician (PCP) Primary Care Physician Office Phone Office Fax Frederich Sicard [phone] Follow-up Information Follow up With Details Comments Contact Info Francisco Lugo MD   [address] [place] [address] 
[phone] Anabela Riley MD  Colonoscopy in 3 years. 8 [facility] 200 200 [facility] 
[phone] Your Appointments Friday May 12, 2017  3:00 PM EDT Nurse Visit with [facility] NURSE [facility] ([facility]) Conor Gil [address] 9549 [facility]  
[phone] Wednesday May 24, 2017  3:45 PM EDT Any with Joanna Cheung MD  
[facility] ([facility]) Conor Gil [address] 4300 [facility]  
[phone] Current Discharge Medication List  
  
CONTINUE these medications which have NOT CHANGED Dose & Instructions Dispensing Information Comments Morning Noon Evening Bedtime ADVAIR DISKUS 250-50 mcg/dose diskus inhaler Generic drug:  fluticasone-salmeterol Your last dose was: Your next dose is:    
   
   
 INHALE 2 PUFFS BY MOUTH TWICE DAILY Quantity:  1 Inhaler Refills:  5  
     
   
   
   
  
 * albuterol 90 mcg/actuation inhaler Commonly known as:  PROVENTIL HFA, VENTOLIN HFA, PROAIR HFA Your last dose was: Your next dose is:    
   
   
 Dose:  2 Puff Take 2 Puffs by inhalation every four (4) hours as needed for Wheezing. Quantity:  1 Inhaler Refills:  3  
     
   
   
   
  
 * albuterol 2.5 mg /3 mL (0.083 %) nebulizer solution Commonly known as:  PROVENTIL VENTOLIN Your last dose was: Your next dose is:    
   
   
 INHALE CONTENTS OF 1 VIAL VIA NEBULIZER EVERY FOUR (4) HOURS AS NEEDED FOR WHEEZING. Quantity:  60 Each Refills:  3  
     
   
   
   
  
 bipap machine kit Your last dose was: Your next dose is:    
   
   
 by Does Not Apply route nightly. Refills:  0  
     
   
   
   
  
 ferrous sulfate 325 mg (65 mg iron) tablet Commonly known as:  Bin1 ATE [employer] Your last dose was: Your next dose is:    
   
   
 Dose:  325 mg Take 1 Tab by mouth Daily (before breakfast). Indications: IRON DEFICIENCY ANEMIA Quantity:  100 Tab Refills:  0  
     
   
   
   
  
 * HYDROcodone-acetaminophen 5-325 mg per tablet Commonly known as:  Vero Tucker Your last dose was: Your next dose is:    
   
   
 Dose:  1 Tab Take 1 Tab by mouth every six (6) hours as needed for Pain. Max Daily Amount: 4 Tabs. Quantity:  72 Tab Refills:  0  
     
   
   
   
  
 * HYDROcodone-acetaminophen 5-325 mg per tablet Commonly known as:  Vero Tucker Your last dose was: Your next dose is:    
   
   
 Dose:  1 Tab Take 1 Tab by mouth every six (6) hours as needed for Pain.  Max Daily Amount: 4 Tabs. Quantity:  120 Tab Refills:  0  
     
   
   
   
  
 loratadine 10 mg tablet Commonly known as:  Noreen Livers Your last dose was: Your next dose is:    
   
   
  Refills:  0  
     
   
   
   
  
 modafinil 200 mg tablet Commonly known as:  PROVIGIL Your last dose was: Your next dose is: TAKE 1 TABLET BY MOUTH TWICE A DAY Refills:  3 OXYGEN-AIR DELIVERY SYSTEMS Your last dose was: Your next dose is:    
   
   
 by Does Not Apply route. 3 liters hs with cpap   1 liter with activity  [address] Refills:  0  
     
   
   
   
  
 pantoprazole 40 mg tablet Commonly known as:  PROTONIX Your last dose was: Your next dose is:    
   
   
 Dose:  40 mg Take 1 Tab by mouth Before breakfast and dinner. Indications: Upper GI Bleed Quantity:  100 Tab Refills:  1  
     
   
   
   
  
 sildenafil citrate 100 mg tablet Commonly known as:  VIAGRA Your last dose was: Your next dose is:    
   
   
 Dose:  100 mg Take 1 Tab by mouth as needed. Quantity:  2 Tab Refills:  0  
     
   
   
   
  
 sucralfate 1 gram tablet Commonly known as:  Jason Ke Your last dose was: Your next dose is:    
   
   
 Dose:  1 g Take 1 Tab by mouth Before breakfast, lunch, dinner and at bedtime. Quantity:  100 Tab Refills:  1  
     
   
   
   
  
 testosterone 20.25 mg/1.25 gram (1.62 %) gel Commonly known as:  ANDROGEL Your last dose was: Your next dose is:    
   
   
 Use 4 pumps once daily Quantity:  1 Bottle Refills:  5 This request is for a new prescription for a controlled substance as required by Federal/State law. Conner Summer THERAPEUTIC-M 9 mg iron-400 mcg Tab tablet Generic drug:  multivitamin, tx-iron-ca-min Your last dose was: Your next dose is:    
   
   
  Refills:  0 * Notice: This list has 4 medication(s) that are the same as other medications prescribed for you. Read the directions carefully, and ask your doctor or other care provider to review them with you. Discharge Instructions Colonoscopy: What to Expect at [facility] Your Recovery After you have a colonoscopy, you will stay at the clinic for 1 to 2 hours until the medicines wear off. Then you can go home. But you will need to arrange for a ride. Your doctor will tell you when you can eat and do your other usual activities. Your doctor will talk to you about when you will need your next colonoscopy. Your doctor can help you decide how often you need to be checked. This will depend on the results of your test and your risk for colorectal cancer. After the test, you may be bloated or have gas pains. You may need to pass gas. If a biopsy was done or a polyp was removed, you may have streaks of blood in your stool (feces) for a few days. This care sheet gives you a general idea about how long it will take for you to recover. But each person recovers at a different pace. Follow the steps below to get better as quickly as possible. How can you care for yourself at home? Activity · Rest when you feel tired. · You can do your normal activities when it feels okay to do so. Diet · Follow your doctor's directions for eating. · Unless your doctor has told you not to, drink plenty of fluids. This helps to replace the fluids that were lost during the colon prep. · Do not drink alcohol. Medicines · Your doctor will tell you if and when you can restart your medicines. He or she will also give you instructions about taking any new medicines. · If you take blood thinners, such as warfarin (Coumadin), clopidogrel (Plavix), or aspirin, be sure to talk to your doctor. He or she will tell you if and when to start taking those medicines again.  Make sure that you understand exactly what your doctor wants you to do. · If polyps were removed or a biopsy was done during the test, your doctor may tell you not to take aspirin or other anti-inflammatory medicines for a few days. These include ibuprofen (Advil, Motrin) and naproxen (Aleve). Other instructions · For your safety, do not drive or operate machinery until the medicine wears off and you can think clearly. Your doctor may tell you not to drive or operate machinery until the day after your test. 
· Do not sign legal documents or make major decisions until the medicine wears off and you can think clearly. The anesthesia can make it hard for you to fully understand what you are agreeing to. Follow-up care is a key part of your treatment and safety. Be sure to make and go to all appointments, and call your doctor if you are having problems. It's also a good idea to know your test results and keep a list of the medicines you take. When should you call for help? Call 911 anytime you think you may need emergency care. For example, call if: 
· You passed out (lost consciousness). · You pass maroon or bloody stools. · You have severe belly pain. Call your doctor now or seek immediate medical care if: 
· Your stools are black and tarlike. · Your stools have streaks of blood, but you did not have a biopsy or any polyps removed. · You have belly pain, or your belly is swollen and firm. · You vomit. · You have a fever. · You are very dizzy. Watch closely for changes in your health, and be sure to contact your doctor if you have any problems. Where can you learn more? Go to http://andres-heide.info/. Enter E264 in the search box to learn more about \"Colonoscopy: What to Expect at Home. \" Current as of: August 9, 2016 Content Version: 11.2 © 5804-1092 [employer].  Care instructions adapted under license by [employer] (which disclaims liability or warranty for this information). If you have questions about a medical condition or this instruction, always ask your healthcare professional. [address] any warranty or liability for your use of this information. Colon Polyps: Care Instructions Your Care Instructions Colon polyps are growths in the colon or the rectum. The cause of most colon polyps is not known, and most people who get them do not have any problems. But a certain kind can turn into cancer. For this reason, regular testing for colon polyps is important for people age 48 and older and anyone who has an increased risk for colon cancer. Polyps are usually found through routine colon cancer screening tests. Although most colon polyps are not cancerous, they are usually removed and then tested for cancer. Screening for colon cancer saves lives because the cancer can usually be cured if it is caught early. If you have a polyp that is the type that can turn into cancer, you may need more tests to examine your entire colon. The doctor will remove any other polyps that he or she finds, and you will be tested more often. Follow-up care is a key part of your treatment and safety. Be sure to make and go to all appointments, and call your doctor if you are having problems. It's also a good idea to know your test results and keep a list of the medicines you take. How can you care for yourself at home? Regular exams to look for colon polyps are the best way to prevent polyps from turning into colon cancer. These can include stool tests, sigmoidoscopy, colonoscopy, and CT colonography. Talk with your doctor about a testing schedule that is right for you. To prevent polyps There is no home treatment that can prevent colon polyps. But these steps may help lower your risk for cancer. · Stay active. Being active can help you get to and stay at a healthy weight. Try to exercise on most days of the week. Walking is a good choice. · Eat well. Choose a variety of vegetables, fruits, legumes (such as peas and beans), fish, poultry, and whole grains. · Do not smoke. If you need help quitting, talk to your doctor about stop-smoking programs and medicines. These can increase your chances of quitting for good. · If you drink alcohol, limit how much you drink. Limit alcohol to 2 drinks a day for men and 1 drink a day for women. When should you call for help? Call your doctor now or seek immediate medical care if: 
· You have severe belly pain. · Your stools are maroon or very bloody. Watch closely for changes in your health, and be sure to contact your doctor if: 
· You have a fever. · You have nausea or vomiting. · You have a change in bowel habits (new constipation or diarrhea). · Your symptoms get worse or are not improving as expected. Where can you learn more? Go to http://andres-heide.info/. Enter 95 [id number] in the search box to learn more about \"Colon Polyps: Care Instructions. \" Current as of: August 24, 2016 Content Version: 11.2 © 0333-2690 [employer]. Care instructions adapted under license by [employer] (which disclaims liability or warranty for this information). If you have questions about a medical condition or this instruction, always ask your healthcare professional. [place] [address] any warranty or liability for your use of this information. DISCHARGE SUMMARY from Nurse The following personal items are in your possession at time of discharge: 
 
Dental Appliances: None Visual Aid: None PATIENT INSTRUCTIONS: 
 
 
F-face looks uneven A-arms unable to move or move unevenly S-speech slurred or non-existent T-time-call 911 as soon as signs and symptoms begin-DO NOT go Back to bed or wait to see if you get better-TIME IS BRAIN.  
 
Warning Signs of HEART ATTACK  
 Call 911 if you have these symptoms: 
? Chest discomfort. Most heart attacks involve discomfort in the center of the chest that lasts more than a few minutes, or that goes away and comes back. It can feel like uncomfortable pressure, squeezing, fullness, or pain. ? Discomfort in other areas of the upper body. Symptoms can include pain or discomfort in one or both arms, the back, neck, jaw, or stomach. ? Shortness of breath with or without chest discomfort. ? Other signs may include breaking out in a cold sweat, nausea, or lightheadedness. Don't wait more than five minutes to call [address]! Fast action can save your life. Calling 911 is almost always the fastest way to get lifesaving treatment. Emergency Medical Services staff can begin treatment when they arrive  up to an hour sooner than if someone gets to the hospital by car. The discharge information has been reviewed with the patient. The patient verbalized understanding. Discharge medications reviewed with the patient and appropriate educational materials and side effects teaching were provided. Discharge Orders None Introducing [facility]! Urmila Kingston introduces [employer] patient portal. Now you can access parts of your medical record, email your doctor's office, and request medication refills online. 1. In your internet browser, go to [email or web address] 2. Click on the First Time User? Click Here link in the Sign In box. You will see the New Member Sign Up page. 3. Enter your [employer] Access Code exactly as it appears below. You will not need to use this code after youve completed the sign-up process. If you do not sign up before the expiration date, you must request a new code. · [employer] Access Code: [email or web address] Expires: 6/15/2017 11:42 AM 
 
4.  Enter the last four digits of your Social Security Number (xxxx) and Date of Birth (mm/dd/yyyy) as indicated and click Submit. You will be taken to the next sign-up page. 5. Create a [employer] ID. This will be your [employer] login ID and cannot be changed, so think of one that is secure and easy to remember. 6. Create a [employer] password. You can change your password at any time. 7. Enter your Password Reset Question and Answer. This can be used at a later time if you forget your password. 8. Enter your e-mail address. You will receive e-mail notification when new information is available in [address] 9. Click Sign Up. You can now view and download portions of your medical record. 10. Click the Download Summary menu link to download a portable copy of your medical information. If you have questions, please visit the Frequently Asked Questions section of the [employer] website. Remember, [employer] is NOT to be used for urgent needs. For medical emergencies, dial 911. Now available from your iPhone and Android! General Information Please provide this summary of care documentation to your next provider. Patient Signature:  ____________________________________________________________ Date:  ____________________________________________________________  
  
Acosta Andrae Provider Signature:  ____________________________________________________________ Date:  ____________________________________________________________

## 2017-04-21 NOTE — ANESTHESIA POSTPROCEDURE EVALUATION
Post-Anesthesia Evaluation and Assessment    Patient: Anu Park MRN: [id number]  SSN: [id number]    YOB: 1960  Age: 61 y.o. Sex: male       Cardiovascular Function/Vital Signs  Visit Vitals    /73    Pulse 75    Temp 36.5 °C (97.7 °F)    Resp 18    Ht 5' 6\" (1.676 m)    Wt 78 kg (172 lb)    SpO2 99%    BMI 27.76 kg/m2       Patient is status post MAC anesthesia for Procedure(s):  COLONOSCOPY with polypectomy. Nausea/Vomiting: None    Postoperative hydration reviewed and adequate. Pain:  Pain Scale 1: Numeric (0 - 10) (04/21/17 0956)  Pain Intensity 1: 0 (04/21/17 0956)   Managed    Neurological Status:   Neuro (WDL): Exceptions to WDL (04/21/17 0921)   At baseline    Mental Status and Level of Consciousness: Arousable    Pulmonary Status:   O2 Device: Room air (04/21/17 0956)   Adequate oxygenation and airway patent    Complications related to anesthesia: None    Post-anesthesia assessment completed.  No concerns    Signed By: Rosalio Jhaveri MD     April 21, 2017

## 2017-05-17 ENCOUNTER — HOSPITAL ENCOUNTER (OUTPATIENT)
Dept: LAB | Age: 57
Discharge: HOME | End: 2017-05-17

## 2017-05-17 PROCEDURE — 99001 SPECIMEN HANDLING PT-LAB: CPT | Performed by: INTERNAL MEDICINE

## 2017-06-06 ENCOUNTER — HOSPITAL ENCOUNTER (OUTPATIENT)
Dept: NON INVASIVE DIAGNOSTICS | Age: 57
Discharge: HOME | End: 2017-06-06
Attending: INTERNAL MEDICINE
Payer: MEDICAID

## 2017-06-06 DIAGNOSIS — I27.21 PULMONARY ARTERY HYPERTENSION (HCC): ICD-10-CM

## 2017-06-06 PROCEDURE — 93306 TTE W/DOPPLER COMPLETE: CPT

## 2017-06-13 ENCOUNTER — OFFICE VISIT (OUTPATIENT)
Dept: PULMONOLOGY | Age: 57
End: 2017-06-13

## 2017-06-13 VITALS
WEIGHT: 182 LBS | DIASTOLIC BLOOD PRESSURE: 58 MMHG | TEMPERATURE: 98.6 F | RESPIRATION RATE: 18 BRPM | HEART RATE: 85 BPM | SYSTOLIC BLOOD PRESSURE: 112 MMHG | OXYGEN SATURATION: 98 % | HEIGHT: 66 IN | BODY MASS INDEX: 29.25 KG/M2

## 2017-06-13 DIAGNOSIS — D86.9 SARCOID: ICD-10-CM

## 2017-06-13 DIAGNOSIS — G47.33 OBSTRUCTIVE APNEA: ICD-10-CM

## 2017-06-13 DIAGNOSIS — I27.21 PULMONARY ARTERY HYPERTENSION (HCC): ICD-10-CM

## 2017-06-13 DIAGNOSIS — J45.909 UNCOMPLICATED ASTHMA, UNSPECIFIED ASTHMA SEVERITY: ICD-10-CM

## 2017-06-13 DIAGNOSIS — G62.9 PERIPHERAL POLYNEUROPATHY: Primary | ICD-10-CM

## 2017-06-13 DIAGNOSIS — G72.9 MYOPATHY: ICD-10-CM

## 2017-06-13 RX ORDER — HYDROCODONE BITARTRATE AND ACETAMINOPHEN 7.5; 325 MG/1; MG/1
1 TABLET ORAL
Refills: 0 | COMMUNITY
Start: 2017-05-30

## 2017-06-13 NOTE — MR AVS SNAPSHOT
Visit Information Date & Time Provider Department Dept. Phone Encounter #  
 6/13/2017 10:45 AM Marty Edmonds MD [facility] [place] [phone] [id number] Follow-up Instructions Return in about 6 months (around 12/13/2017). Follow-up and Disposition History Your Appointments 11/9/2017  8:30 AM  
Nurse Visit with [facility] WB NURSE [facility] ([facility]) Appt Note: T, LFTs, H&H, PSA  
 [address] 
[address] [facility] [address]  
[phone]  
  
   
 [address] [facility] [address]  
  
    
  
 11/22/2017 10:30 AM  
Any with Carter Chi MD  
[facility] ([address]) Appt Note: 6 MON F/U W/ LABS PRIOR  
 [address] 
[address] [facility] [address]  
[address] [facility] [address] [facility] [address] [facility] [address] Upcoming Health Maintenance Date Due Hepatitis C Screening 1960 Pneumococcal 19-64 Medium Risk (1 of 1 - PPSV23) 10/15/1978 DTaP/Tdap/Td series (1 - Tdap) 10/15/1980 FOBT Q 1 YEAR AGE 50-75 10/15/2009 INFLUENZA AGE 9 TO ADULT 8/1/2017 Allergies as of 6/13/2017  Review Complete On: 6/13/2017 By: Ebony Brown RN No Known Allergies Current Immunizations  Reviewed on 8/11/2016 Name Date Influenza Vaccine Split 11/2/2012 Not reviewed this visit You Were Diagnosed With   
  
 Codes Comments Peripheral polyneuropathy    -  Primary ICD-10-CM: G62.9 ICD-9-CM: 356.9 Myopathy     ICD-10-CM: G72.9 ICD-9-CM: 359.9 Sarcoid ([address]     ICD-10-CM: D86.9 ICD-9-CM: 135 Pulmonary artery hypertension ([facility] [address]     ICD-10-CM: I27.2 ICD-9-CM: 416.8 Obstructive apnea     ICD-10-CM: G47.33 
ICD-9-CM: 327.23 Uncomplicated asthma, unspecified asthma severity     ICD-10-CM: J45.909 ICD-9-CM: 493.90 Vitals BP Pulse Temp Resp Height(growth percentile) Weight(growth percentile) 112/58 (BP 1 Location: Left arm, BP Patient Position: Sitting) 85 98.6 °F (37 °C) (Oral) 18 5' 6\" (1.676 m) 182 lb (82.6 kg) SpO2 BMI Smoking Status 98% 29.38 kg/m2 Never Smoker Vitals History BMI and BSA Data Body Mass Index Body Surface Area  
 29.38 kg/m 2 1.96 m 2 Preferred Pharmacy Pharmacy Name Phone [address], [address] [place] [address] [phone] Your Updated Medication List  
  
   
This list is accurate as of: 6/13/17 10:59 AM.  Always use your most recent med list.  
  
  
  
  
 * albuterol 90 mcg/actuation inhaler Commonly known as:  PROVENTIL HFA, VENTOLIN HFA, PROAIR HFA Take 2 Puffs by inhalation every four (4) hours as needed for Wheezing. * albuterol 2.5 mg /3 mL (0.083 %) nebulizer solution Commonly known as:  PROVENTIL VENTOLIN  
INHALE CONTENTS OF 1 VIAL VIA NEBULIZER EVERY FOUR (4) HOURS AS NEEDED FOR WHEEZING. amLODIPine 10 mg tablet Commonly known as:  Janelle Good TAKE 1 TABLET BY MOUTH ONCE DAILY  
  
 aspirin delayed-release 81 mg tablet TAKE 1 TABLET BY MOUTH ONCE DAILY FOR 30 DAYS  
  
 bipap machine kit  
by Does Not Apply route nightly. diclofenac 1 % Gel Commonly known as:  VOLTAREN  
  
 doxazosin 8 mg tablet Commonly known as:  CARDURA TAKE 1 TABLET BY MOUTH BEFORE BEDTIME FOR 30 DAYS  
  
 ferrous sulfate 325 mg (65 mg iron) tablet Commonly known as:  [employer] Take 1 Tab by mouth Daily (before breakfast). Indications: IRON DEFICIENCY ANEMIA  
  
 fluticasone-salmeterol 250-50 mcg/dose diskus inhaler Commonly known as:  ADVAIR DISKUS INHALE 2 PUFFS BY MOUTH TWICE DAILY  
  
 * HYDROcodone-acetaminophen 5-325 mg per tablet Commonly known as:  Montrose Littler Take 1 Tab by mouth every six (6) hours as needed for Pain. Max Daily Amount: 4 Tabs. * HYDROcodone-acetaminophen 5-325 mg per tablet Commonly known as:  Montrose Littler  
 Take 1 Tab by mouth every six (6) hours as needed for Pain. Max Daily Amount: 4 Tabs. * HYDROcodone-acetaminophen 7.5-325 mg per tablet Commonly known as:  Raj Nigh Take 1 Tab by mouth four (4) times daily as needed. loratadine 10 mg tablet Commonly known as:  CLARITIN  
  
 modafinil 200 mg tablet Commonly known as:  PROVIGIL  
TAKE 1 TABLET BY MOUTH TWICE A DAY OXYGEN-AIR DELIVERY SYSTEMS  
by Does Not Apply route. 3 liters hs with cpap   1 liter with activity  [employer]  
  
 pantoprazole 40 mg tablet Commonly known as:  PROTONIX Take 1 Tab by mouth Before breakfast and dinner. Indications: Upper GI Bleed  
  
 sildenafil citrate 100 mg tablet Commonly known as:  VIAGRA Take 1 Tab by mouth daily as needed. sucralfate 1 gram tablet Commonly known as:  Geralynn Karin Take 1 Tab by mouth Before breakfast, lunch, dinner and at bedtime. testosterone 20.25 mg/1.25 gram (1.62 %) gel Commonly known as:  ANDROGEL Use 4 pumps once daily THERAPEUTIC-M 9 mg iron-400 mcg Tab tablet Generic drug:  multivitamin, tx-iron-ca-min  
  
 triamterene-hydroCHLOROthiazide 37.5-25 mg per tablet Commonly known as:  Gupta Yisel TAKE 1 TABLET BY MOUTH ONCE DAILY * Notice: This list has 5 medication(s) that are the same as other medications prescribed for you. Read the directions carefully, and ask your doctor or other care provider to review them with you. Follow-up Instructions Return in about 6 months (around 12/13/2017). To-Do List   
 Around 12/04/2017 ECHO:  2D ECHO COMPLETE ADULT (TTE) W OR WO CONTR Patient Instructions Continue BiPAP nightly  
continue Advair 1 inhalation twice daily and remember to exhale fully before inhaling and also remember to wash mouth with water and spit it out after inhaling Albuterol 2 inhalations every 4 hours as needed if you require albuterol too often to control respiratory symptoms call the office for severe symptoms go to the emergency room Always call for symptoms such as increasing shortness of breath or a cough productive of discolored mucus Introducing [facility]! [employer] introduces [employer] patient portal. Now you can access parts of your medical record, email your doctor's office, and request medication refills online. 1. In your internet browser, go to [email or web address] 2. Click on the First Time User? Click Here link in the Sign In box. You will see the New Member Sign Up page. 3. Enter your [employer] Access Code exactly as it appears below. You will not need to use this code after youve completed the sign-up process. If you do not sign up before the expiration date, you must request a new code. · [employer] Access Code: [email or web address] Expires: 6/15/2017 11:42 AM 
 
4. Enter the last four digits of your Social Security Number (xxxx) and Date of Birth (mm/dd/yyyy) as indicated and click Submit. You will be taken to the next sign-up page. 5. Create a [employer] ID. This will be your [employer] login ID and cannot be changed, so think of one that is secure and easy to remember. 6. Create a [employer] password. You can change your password at any time. 7. Enter your Password Reset Question and Answer. This can be used at a later time if you forget your password. 8. Enter your e-mail address. You will receive e-mail notification when new information is available in [address] 9. Click Sign Up. You can now view and download portions of your medical record. 10. Click the Download Summary menu link to download a portable copy of your medical information. If you have questions, please visit the Frequently Asked Questions section of the [employer] website. Remember, [employer] is NOT to be used for urgent needs. For medical emergencies, dial 911. Now available from your iPhone and Android! Please provide this summary of care documentation to your next provider. Your primary care clinician is listed as [facility]. If you have any questions after today's visit, please call [phone].

## 2017-06-13 NOTE — PROGRESS NOTES
[facility]  Pulmonary, Critical Care, and Sleep Medicine      Chief complaint:  Sarcoidosis asthma obstructive sleep apnea pulmonary hypertension    HPI:  Shavonne Hinton is 61years old and returns the office today for follow-up of asthma sarcoidosis pulmonary hypertension which was at first felt to be due to sarcoidosis but now it is felt to be due to untreated sleep apnea before he was treated. The patient is now off Tracleer for [place] and relates he has no chest pain dizziness or increased shortness of breath and continues to take Advair twice daily and rarely requires albuterol. He denies coughing leg swelling. Unfortunately he still has severe muscle weakness and pain related to neuropathy and myopathy from sarcoidosis. He says he uses his CPAP faithfully  No Known Allergies  Current Outpatient Prescriptions   Medication Sig    amLODIPine (NORVASC) 10 mg tablet TAKE 1 TABLET BY MOUTH ONCE DAILY    aspirin delayed-release 81 mg tablet TAKE 1 TABLET BY MOUTH ONCE DAILY FOR 30 DAYS    diclofenac (VOLTAREN) 1 % gel     doxazosin (CARDURA) 8 mg tablet TAKE 1 TABLET BY MOUTH BEFORE BEDTIME FOR 30 DAYS    triamterene-hydroCHLOROthiazide (MAXZIDE) 37.5-25 mg per tablet TAKE 1 TABLET BY MOUTH ONCE DAILY    testosterone (ANDROGEL) 20.25 mg/1.25 gram (1.62 %) gel Use 4 pumps once daily    fluticasone-salmeterol (ADVAIR DISKUS) 250-50 mcg/dose diskus inhaler INHALE 2 PUFFS BY MOUTH TWICE DAILY    albuterol (PROVENTIL HFA, VENTOLIN HFA, PROAIR HFA) 90 mcg/actuation inhaler Take 2 Puffs by inhalation every four (4) hours as needed for Wheezing.  albuterol (PROVENTIL VENTOLIN) 2.5 mg /3 mL (0.083 %) nebulizer solution INHALE CONTENTS OF 1 VIAL VIA NEBULIZER EVERY FOUR (4) HOURS AS NEEDED FOR WHEEZING.  pantoprazole (PROTONIX) 40 mg tablet Take 1 Tab by mouth Before breakfast and dinner.  Indications: Upper GI Bleed    sucralfate (CARAFATE) 1 gram tablet Take 1 Tab by mouth Before breakfast, lunch, dinner and at bedtime.  ferrous sulfate (FEROSUL) 325 mg (65 mg iron) tablet Take 1 Tab by mouth Daily (before breakfast). Indications: IRON DEFICIENCY ANEMIA    bipap machine kit by Does Not Apply route nightly.  modafinil (PROVIGIL) 200 mg tablet TAKE 1 TABLET BY MOUTH TWICE A DAY    loratadine (CLARITIN) 10 mg tablet     THERAPEUTIC-M 9 mg iron-400 mcg tab tablet     OXYGEN-AIR DELIVERY SYSTEMS by Does Not Apply route. 3 liters hs with cpap   1 liter with activity  [address]    HYDROcodone-acetaminophen (NORCO) 7.5-325 mg per tablet Take 1 Tab by mouth four (4) times daily as needed.  sildenafil citrate (VIAGRA) 100 mg tablet Take 1 Tab by mouth daily as needed.  HYDROcodone-acetaminophen (NORCO) 5-325 mg per tablet Take 1 Tab by mouth every six (6) hours as needed for Pain. Max Daily Amount: 4 Tabs.  HYDROcodone-acetaminophen (NORCO) 5-325 mg per tablet Take 1 Tab by mouth every six (6) hours as needed for Pain. Max Daily Amount: 4 Tabs. No current facility-administered medications for this visit. Past Medical History:   Diagnosis Date    Essential hypertension     Essential hypertension, benign     Gout     Hypogonadism male     Impotence     Microscopic hematuria     Myopathy     Obesity, unspecified     Obstructive sleep apnea     on bipap    Other chronic pulmonary heart diseases     Peripheral neuropathy ([address] 10/25/2012    Sarcoid ([address] 10/25/2012     Past Surgical History:   Procedure Laterality Date    COLONOSCOPY N/A 4/21/2017    COLONOSCOPY with polypectomy performed by Allen Bolton MD at [address] HX APPENDECTOMY      HX ENDOSCOPY  03/2017    HX HEART CATHETERIZATION      HX HEMORRHOIDECTOMY       Social History     Social History    Marital status:      Spouse name: N/A    Number of children: N/A    Years of education: N/A     Occupational History    Not on file.      Social History Main Topics    Smoking status: Never Smoker    Smokeless tobacco: Never Used    Alcohol use Yes      Comment: daily wine at dinner or beer    Drug use: No    Sexual activity: Yes     Other Topics Concern    Not on file     Social History Narrative     Family History   Problem Relation Age of Onset    Hypertension Father     Lung Disease Father     Heart Attack Father 61       Review of systems:  He denies fever chills poor appetite or weight loss    Physical Exam:  Visit Vitals    /58 (BP 1 Location: Left arm, BP Patient Position: Sitting)    Pulse 85    Temp 98.6 °F (37 °C) (Oral)    Resp 18    Ht 5' 6\" (1.676 m)    Wt 82.6 kg (182 lb)  Comment: per patient. unable to stand    SpO2 98%    BMI 29.38 kg/m2       Well-developed well-nourished  HEENT: WNL  Lymph node exam: Supraclavicular cervical lymph nodes negative  Chest: Equal symmetrical expansion no dullness no wheezes rales rubs  Heart: Regular rhythm no gallop no murmur no JVD no peripheral edema  Extremities: No cyanosis clubbing or calf tenderness  Neurological: Profound weakness of his lower extremities and weakness of his upper extremities as well    LABS:  Echocardiogram 6/6/17: Normal pulmonary artery pressures and left ventricular ejection fraction  O2 sat 98% room air at rest    Impression:   Despite discontinuing Tracleer patient continues to be asymptomatic and confirmed normal pulmonary artery pressures  Asthma under good control  Sarcoidosis stable however severe disability related to muscle and nerve damage which is being followed by neurology and pain clinic    Plan:  Continue Advair as needed albuterol and BiPAP  Follow-up echocardiogram in 6 months with follow-up visit    Kyle Tejeda MD , [facility]    CC: Eros Archuleta MD     [address] [facility]  [facility], [address] [address]     P: [phone]     F: [phone]

## 2017-06-13 NOTE — PATIENT INSTRUCTIONS
Continue BiPAP nightly   continue Advair 1 inhalation twice daily and remember to exhale fully before inhaling and also remember to wash mouth with water and spit it out after inhaling  Albuterol 2 inhalations every 4 hours as needed if you require albuterol too often to control respiratory symptoms call the office for severe symptoms go to the emergency room  Always call for symptoms such as increasing shortness of breath or a cough productive of discolored mucus

## 2017-06-27 ENCOUNTER — OFFICE VISIT (OUTPATIENT)
Dept: ORTHOPEDIC SURGERY | Facility: CLINIC | Age: 57
End: 2017-06-27

## 2017-06-27 VITALS — TEMPERATURE: 97 F | HEART RATE: 84 BPM | SYSTOLIC BLOOD PRESSURE: 121 MMHG | DIASTOLIC BLOOD PRESSURE: 70 MMHG

## 2017-06-27 DIAGNOSIS — S80.02XA CONTUSION OF LEFT KNEE, INITIAL ENCOUNTER: Primary | ICD-10-CM

## 2017-06-27 RX ORDER — TRIAMCINOLONE ACETONIDE 40 MG/ML
60 INJECTION, SUSPENSION INTRA-ARTICULAR; INTRAMUSCULAR ONCE
Qty: 2 ML | Refills: 0
Start: 2017-06-27 | End: 2017-06-27

## 2017-06-27 RX ORDER — LIDOCAINE HYDROCHLORIDE 10 MG/ML
6 INJECTION INFILTRATION; PERINEURAL ONCE
Qty: 6 ML | Refills: 0
Start: 2017-06-27 | End: 2017-06-27

## 2017-06-27 NOTE — PROGRESS NOTES
Patient: Iverson Skiff                MRN: [id number]       SSN: [id number]  YOB: 1960        AGE: 61 y.o. SEX: male  There is no height or weight on file to calculate BMI. PCP: King Raymond MD  06/27/17      No chief complaint on file. HISTORY OF PRESENT ILLNESS: Iverson Skiff is a 61 y.o. male who presents to the office and is requesting a cortisone injection for his left knee. He is more or less in a wheel chair on a regular basis and he recently bumped his knee. He has had this occur before and had a cortisone injection for the pain. Review of Systems   Constitutional: Negative. HENT: Negative. Eyes: Negative. Respiratory: Negative. Cardiovascular: Negative. Gastrointestinal: Negative. Genitourinary: Negative. Musculoskeletal: Positive for joint pain (left knee). Skin: Negative. Neurological: Negative. Endo/Heme/Allergies: Negative. Psychiatric/Behavioral: Negative. Social History     Social History    Marital status:      Spouse name: N/A    Number of children: N/A    Years of education: N/A     Occupational History    Not on file.      Social History Main Topics    Smoking status: Never Smoker    Smokeless tobacco: Never Used    Alcohol use Yes      Comment: daily wine at dinner or beer    Drug use: No    Sexual activity: Yes     Other Topics Concern    Not on file     Social History Narrative        Past Medical History:   Diagnosis Date    Essential hypertension     Essential hypertension, benign     Gout     Hypogonadism male     Impotence     Microscopic hematuria     Myopathy     Obesity, unspecified     Obstructive sleep apnea     on bipap    Other chronic pulmonary heart diseases     Peripheral neuropathy (HCC) 10/25/2012    Sarcoid ([address] 10/25/2012        No Known Allergies      Current Outpatient Prescriptions   Medication Sig    HYDROcodone-acetaminophen (NORCO) 7.5-325 mg per tablet Take 1 Tab by mouth four (4) times daily as needed.  amLODIPine (NORVASC) 10 mg tablet TAKE 1 TABLET BY MOUTH ONCE DAILY    aspirin delayed-release 81 mg tablet TAKE 1 TABLET BY MOUTH ONCE DAILY FOR 30 DAYS    diclofenac (VOLTAREN) 1 % gel     doxazosin (CARDURA) 8 mg tablet TAKE 1 TABLET BY MOUTH BEFORE BEDTIME FOR 30 DAYS    triamterene-hydroCHLOROthiazide (MAXZIDE) 37.5-25 mg per tablet TAKE 1 TABLET BY MOUTH ONCE DAILY    testosterone (ANDROGEL) 20.25 mg/1.25 gram (1.62 %) gel Use 4 pumps once daily    sildenafil citrate (VIAGRA) 100 mg tablet Take 1 Tab by mouth daily as needed.  fluticasone-salmeterol (ADVAIR DISKUS) 250-50 mcg/dose diskus inhaler INHALE 2 PUFFS BY MOUTH TWICE DAILY    albuterol (PROVENTIL HFA, VENTOLIN HFA, PROAIR HFA) 90 mcg/actuation inhaler Take 2 Puffs by inhalation every four (4) hours as needed for Wheezing.  albuterol (PROVENTIL VENTOLIN) 2.5 mg /3 mL (0.083 %) nebulizer solution INHALE CONTENTS OF 1 VIAL VIA NEBULIZER EVERY FOUR (4) HOURS AS NEEDED FOR WHEEZING.    HYDROcodone-acetaminophen (NORCO) 5-325 mg per tablet Take 1 Tab by mouth every six (6) hours as needed for Pain. Max Daily Amount: 4 Tabs.  pantoprazole (PROTONIX) 40 mg tablet Take 1 Tab by mouth Before breakfast and dinner. Indications: Upper GI Bleed    sucralfate (CARAFATE) 1 gram tablet Take 1 Tab by mouth Before breakfast, lunch, dinner and at bedtime.  ferrous sulfate (FEROSUL) 325 mg (65 mg iron) tablet Take 1 Tab by mouth Daily (before breakfast). Indications: IRON DEFICIENCY ANEMIA    HYDROcodone-acetaminophen (NORCO) 5-325 mg per tablet Take 1 Tab by mouth every six (6) hours as needed for Pain. Max Daily Amount: 4 Tabs.  bipap machine kit by Does Not Apply route nightly.     modafinil (PROVIGIL) 200 mg tablet TAKE 1 TABLET BY MOUTH TWICE A DAY    loratadine (CLARITIN) 10 mg tablet     THERAPEUTIC-M 9 mg iron-400 mcg tab tablet     OXYGEN-AIR DELIVERY SYSTEMS by Does Not Apply route. 3 liters hs with cpap   1 liter with activity  [address]     No current facility-administered medications for this visit. Physical Exam  Constitutional: he is oriented to person, place, and time and well-developed, well-nourished, and in no distress. No distress. HENT:   Head: Normocephalic and atraumatic. Right Ear: Hearing normal.   Left Ear: Hearing normal.   Nose: Nose normal.   Eyes: Conjunctivae, EOM and lids are normal. Pupils are equal, round, and reactive to light. Neck: Trachea normal.   Pulmonary/Chest: Effort normal and breath sounds normal. No respiratory distress. Abdominal: Soft. Neurological: he is alert and oriented to person, place, and time. Skin: Skin is warm, dry and intact. he is not diaphoretic. Psychiatric: Affect normal.   Nursing note and vitals reviewed. Ortho Exam   Left knee shows no bruising, swelling or effusion. He had no TTP. ROM was normal.       Procedure: After timeout and under sterile conditions, patient's left knee was injected with 6 cc of Xylocaine and 1.5 cc of Kenalog. [address]  OFFICE PROCEDURE PROGRESS NOTE        Chart reviewed for the following:   Olivia Dillard MD, have reviewed the History, Physical and updated the Allergic reactions for [address] [place] [address] performed immediately prior to start of procedure:   Olivia Dillard MD, have performed the following reviews on Corrinne Chance prior to the start of the procedure:            * Patient was identified by name and date of birth   * Agreement on procedure being performed was verified  * Risks and Benefits explained to the patient  * Procedure site verified and marked as necessary  * Patient was positioned for comfort  * Consent was signed and verified     Time: 9:08 AM        Date of procedure: 6/27/2017    Procedure performed by:   Alok Lazar MD    Provider assisted by: None     Patient assisted by: self    How tolerated by patient: tolerated the procedure well with no complications    Comments: none      RADIOGRAPHS:   No new XR's taken today. IMPRESSION & PLAN: I feel his knee pain is due to synovitis secondary to contusion. At the pt's request I injected the knee with cortisone. Since this has worked in the past, I will leave the follow up to him. Written by Yin Gonsales, as dictated by Dr. Stanley Rather, Dr. Ciara Muller, confirm that all documentation is accurate.

## 2017-08-29 ENCOUNTER — APPOINTMENT (OUTPATIENT)
Dept: GENERAL RADIOLOGY | Age: 57
End: 2017-08-29
Attending: EMERGENCY MEDICINE
Payer: MEDICAID

## 2017-08-29 ENCOUNTER — HOSPITAL ENCOUNTER (EMERGENCY)
Age: 57
Discharge: HOME | End: 2017-08-29
Attending: EMERGENCY MEDICINE | Admitting: EMERGENCY MEDICINE
Payer: MEDICAID

## 2017-08-29 VITALS
DIASTOLIC BLOOD PRESSURE: 75 MMHG | SYSTOLIC BLOOD PRESSURE: 126 MMHG | RESPIRATION RATE: 18 BRPM | BODY MASS INDEX: 29.25 KG/M2 | WEIGHT: 182 LBS | OXYGEN SATURATION: 98 % | HEIGHT: 66 IN | TEMPERATURE: 97.6 F | HEART RATE: 81 BPM

## 2017-08-29 DIAGNOSIS — S60.459A FOREIGN BODY OF FINGER OF LEFT HAND, INITIAL ENCOUNTER: Primary | ICD-10-CM

## 2017-08-29 PROCEDURE — 73130 X-RAY EXAM OF HAND: CPT

## 2017-08-29 PROCEDURE — 99284 EMERGENCY DEPT VISIT MOD MDM: CPT

## 2017-08-29 PROCEDURE — 74011250636 HC RX REV CODE- 250/636: Performed by: EMERGENCY MEDICINE

## 2017-08-29 PROCEDURE — 90715 TDAP VACCINE 7 YRS/> IM: CPT | Performed by: EMERGENCY MEDICINE

## 2017-08-29 PROCEDURE — 90471 IMMUNIZATION ADMIN: CPT

## 2017-08-29 PROCEDURE — 99283 EMERGENCY DEPT VISIT LOW MDM: CPT

## 2017-08-29 PROCEDURE — 75810000121 HC INCSN/RMVL FB ANY OTHER SITE

## 2017-08-29 RX ORDER — CEPHALEXIN 500 MG/1
500 CAPSULE ORAL 4 TIMES DAILY
Qty: 28 CAP | Refills: 0 | Status: SHIPPED | OUTPATIENT
Start: 2017-08-29 | End: 2017-09-05

## 2017-08-29 RX ORDER — IBUPROFEN 600 MG/1
600 TABLET ORAL
Qty: 20 TAB | Refills: 0 | Status: SHIPPED | OUTPATIENT
Start: 2017-08-29

## 2017-08-29 RX ADMIN — TETANUS TOXOID, REDUCED DIPHTHERIA TOXOID AND ACELLULAR PERTUSSIS VACCINE, ADSORBED 0.5 ML: 5; 2.5; 8; 8; 2.5 SUSPENSION INTRAMUSCULAR at 12:25

## 2017-08-29 NOTE — ED PROVIDER NOTES
HPI Comments: 61year old male to the ED with C/O a foreign body stuck in his left thumb fingernail for about 3 days. States that he was trying to manipulate his wheelchair and things he got a piece of metal stuck in his fingernail. He is not up to date on his tetanus. The history is provided by the patient. Past Medical History:   Diagnosis Date    Essential hypertension     Essential hypertension, benign     Gout     Hypogonadism male     Impotence     Microscopic hematuria     Myopathy     Obesity, unspecified     Obstructive sleep apnea     on bipap    Other chronic pulmonary heart diseases     Peripheral neuropathy ([facility] [address] 10/25/2012    Sarcoid ([facility] [address] 10/25/2012       Past Surgical History:   Procedure Laterality Date    COLONOSCOPY N/A 4/21/2017    COLONOSCOPY with polypectomy performed by Jessie Pereira MD at [facility] ENDOSCOPY    HX APPENDECTOMY      HX ENDOSCOPY  03/2017    HX HEART CATHETERIZATION      HX HEMORRHOIDECTOMY           Family History:   Problem Relation Age of Onset    Hypertension Father     Lung Disease Father     Heart Attack Father 61       Social History     Social History    Marital status:      Spouse name: N/A    Number of children: N/A    Years of education: N/A     Occupational History    Not on file. Social History Main Topics    Smoking status: Never Smoker    Smokeless tobacco: Never Used    Alcohol use Yes      Comment: daily wine at dinner or beer    Drug use: No    Sexual activity: Yes     Other Topics Concern    Not on file     Social History Narrative         ALLERGIES: Review of patient's allergies indicates no known allergies. Review of Systems   Constitutional: Negative for chills and fever. Respiratory: Negative. Genitourinary: Negative for dysuria, flank pain and frequency. Musculoskeletal: Positive for arthralgias. Skin: Positive for wound.         + foreign body to the nail of the left thumb   Neurological: Negative. Vitals:    08/29/17 1112 08/29/17 1216   BP: 124/73 126/75   Pulse: 85 83   Resp: 20 17   Temp: 98.1 °F (36.7 °C) 97.6 °F (36.4 °C)   SpO2: 99% 97%   Weight: 82.6 kg (182 lb)    Height: 5' 6\" (1.676 m)             Physical Exam   Constitutional: He is oriented to person, place, and time. He appears well-developed and well-nourished. HENT:   Head: Normocephalic and atraumatic. Eyes: EOM are normal. Pupils are equal, round, and reactive to light. No scleral icterus. Neck: No tracheal deviation present. Cardiovascular: Normal rate, regular rhythm and normal heart sounds. Exam reveals no gallop and no friction rub. No murmur heard. Pulmonary/Chest: Effort normal and breath sounds normal. No respiratory distress. He has no wheezes. He has no rales. Lymphadenopathy:     He has no cervical adenopathy. Neurological: He is alert and oriented to person, place, and time. No cranial nerve deficit. Skin: He is not diaphoretic. There is a foreign body to the left thumb nail. No other lacerations, no active bleeding. Psychiatric: He has a normal mood and affect. His behavior is normal.   Nursing note and vitals reviewed. MDM  Number of Diagnoses or Management Options  Diagnosis management comments: Impression:  Thumb foreign body removal. Ptient tolerated well, will send home with motrin and keflex.  JONO Jennings 2:53 PM           Amount and/or Complexity of Data Reviewed  Tests in the radiology section of CPT®: ordered and reviewed    Risk of Complications, Morbidity, and/or Mortality  Presenting problems: low  Diagnostic procedures: low  Management options: low    Patient Progress  Patient progress: stable    ED Course       Foreign Body Removal  Date/Time: 8/29/2017 2:54 PM  Performed by: Zulay Alex  Authorized by: Zulay Alex     Consent:     Consent obtained:  Verbal    Consent given by:  Patient    Risks discussed:  Infection, pain and poor cosmetic result    Alternatives discussed:  No treatment  Location:     Location:  Finger    Finger location:  L thumb    Depth:  Subungual    Tendon involvement:  None  Pre-procedure details:     Imaging:  X-ray    Neurovascular status: intact      Preparation: Patient was prepped and draped in usual sterile fashion    Anesthesia (see MAR for exact dosages): Anesthesia method:  Local infiltration    Local anesthetic:  Lidocaine 1% w/o epi  Procedure type:     Procedure complexity:  Simple  Procedure details:     Scalpel size:  11    Incision length:  Less than 0.5 cm    Localization method:  Visualized    Dissection of underlying tissues: no      Removal mechanism: Forceps    Foreign bodies recovered:  1    Description:  Black metallic body recovered from the left thumb    Intact foreign body removal: yes    Post-procedure details:     Neurovascular status: intact      Confirmation:  No additional foreign bodies on visualization    Skin closure:  None    Dressing: bandaid. Patient tolerance of procedure:   Tolerated well, no immediate complications

## 2017-08-29 NOTE — ED NOTES
Assumed care of patient he states that he has attempted to get the wire or wood out of his thumb with a safety pin after heating it up with a match. Patient rates pain 9/10. Call bell within reach and immunizations are not UTD.

## 2017-09-20 ENCOUNTER — TELEPHONE (OUTPATIENT)
Dept: PULMONOLOGY | Age: 57
End: 2017-09-20

## 2017-09-20 NOTE — TELEPHONE ENCOUNTER
Pt states he has a terrible cough and coughing up yellow. He would like a zpack and robitussin dm called to [facility].

## 2017-09-21 ENCOUNTER — TELEPHONE (OUTPATIENT)
Dept: PULMONOLOGY | Age: 57
End: 2017-09-21

## 2017-09-21 RX ORDER — AZITHROMYCIN 250 MG/1
250 TABLET, FILM COATED ORAL DAILY
Qty: 6 TAB | Refills: 0 | Status: SHIPPED | OUTPATIENT
Start: 2017-09-21 | End: 2017-09-26

## 2017-09-21 NOTE — TELEPHONE ENCOUNTER
Called pt to notify that zpak rx has been sent to [facility] and he can get robitussin dm OTC while picking up zpak. Patient verbalized understanding.

## 2017-09-21 NOTE — TELEPHONE ENCOUNTER
Please call Gianluca Johnson at [facility] at [phone] to clarify instructions on zithromycin prescription.

## 2017-09-21 NOTE — TELEPHONE ENCOUNTER
Spoke with patient, has c/o cough producing yellow mucus for several days. Pt requesting Héctor Padilla and Lisset-ANGIE to be sent in to [facility].

## 2017-12-12 ENCOUNTER — HOSPITAL ENCOUNTER (OUTPATIENT)
Dept: NON INVASIVE DIAGNOSTICS | Age: 57
Discharge: HOME | End: 2017-12-12
Attending: INTERNAL MEDICINE
Payer: MEDICAID

## 2017-12-12 DIAGNOSIS — I27.21 PULMONARY ARTERY HYPERTENSION (HCC): ICD-10-CM

## 2017-12-12 PROCEDURE — 74011250636 HC RX REV CODE- 250/636: Performed by: INTERNAL MEDICINE

## 2017-12-12 PROCEDURE — 74011000250 HC RX REV CODE- 250: Performed by: INTERNAL MEDICINE

## 2017-12-12 PROCEDURE — 96374 THER/PROPH/DIAG INJ IV PUSH: CPT

## 2017-12-12 RX ORDER — SODIUM CHLORIDE 9 MG/ML
10 INJECTION INTRAMUSCULAR; INTRAVENOUS; SUBCUTANEOUS
Status: COMPLETED | OUTPATIENT
Start: 2017-12-12 | End: 2017-12-12

## 2017-12-12 RX ADMIN — PERFLUTREN 2 ML: 6.52 INJECTION, SUSPENSION INTRAVENOUS at 15:38

## 2017-12-12 RX ADMIN — SODIUM CHLORIDE 10 ML: 9 INJECTION INTRAMUSCULAR; INTRAVENOUS; SUBCUTANEOUS at 15:38

## 2017-12-12 NOTE — PROGRESS NOTES
Completed echocardiogram. Report to follow. I removed the band off and placed in shred box.        Bharat Luna, RCS, RDCS

## 2017-12-18 ENCOUNTER — OFFICE VISIT (OUTPATIENT)
Dept: PULMONOLOGY | Age: 57
End: 2017-12-18

## 2017-12-18 VITALS
BODY MASS INDEX: 29.25 KG/M2 | TEMPERATURE: 98.5 F | OXYGEN SATURATION: 98 % | SYSTOLIC BLOOD PRESSURE: 120 MMHG | WEIGHT: 182 LBS | HEIGHT: 66 IN | RESPIRATION RATE: 20 BRPM | HEART RATE: 81 BPM | DIASTOLIC BLOOD PRESSURE: 70 MMHG

## 2017-12-18 DIAGNOSIS — J45.909 UNCOMPLICATED ASTHMA, UNSPECIFIED ASTHMA SEVERITY, UNSPECIFIED WHETHER PERSISTENT: ICD-10-CM

## 2017-12-18 DIAGNOSIS — G47.33 OBSTRUCTIVE APNEA: ICD-10-CM

## 2017-12-18 DIAGNOSIS — D86.9 SARCOIDOSIS: ICD-10-CM

## 2017-12-18 DIAGNOSIS — Z23 ENCOUNTER FOR IMMUNIZATION: Primary | ICD-10-CM

## 2017-12-18 DIAGNOSIS — I27.21 PULMONARY ARTERY HYPERTENSION (HCC): ICD-10-CM

## 2017-12-18 NOTE — PROGRESS NOTES
[facility]  Pulmonary, Critical Care, and Sleep Medicine      Chief complaint:  Asthma sarcoidosis pulmonary hypertension obstructive sleep apnea on BiPAP    HPI:    Nicole Mclaughlin    is 61years old returns the office today for follow-up concerning asthma and sarcoidosis pulmonary hypertension obstructive sleep apnea on BiPAP. The patient at one time was on Tracleer but this was discontinued and he is being monitored for pulmonary hypertension while on BiPAP for sleep apnea. He relates he uses his BiPAP faithfully and has very few symptoms of sleep apnea including no leg swelling. He also denies chest pain cough but does have shortness of breath occasionally using his albuterol a couple times a week and taking his Advair faithfully. No Known Allergies  Current Outpatient Prescriptions   Medication Sig    sildenafil (REVATIO) 20 mg tablet Take two to three tablets prn    ibuprofen (MOTRIN) 600 mg tablet Take 1 Tab by mouth every six (6) hours as needed for Pain.  testosterone (ANDROGEL) 20.25 mg/1.25 gram (1.62 %) gel Use 4 pumps once daily    HYDROcodone-acetaminophen (NORCO) 7.5-325 mg per tablet Take 1 Tab by mouth four (4) times daily as needed.  amLODIPine (NORVASC) 10 mg tablet TAKE 1 TABLET BY MOUTH ONCE DAILY    aspirin delayed-release 81 mg tablet TAKE 1 TABLET BY MOUTH ONCE DAILY FOR 30 DAYS    diclofenac (VOLTAREN) 1 % gel     doxazosin (CARDURA) 8 mg tablet TAKE 1 TABLET BY MOUTH BEFORE BEDTIME FOR 30 DAYS    triamterene-hydroCHLOROthiazide (MAXZIDE) 37.5-25 mg per tablet TAKE 1 TABLET BY MOUTH ONCE DAILY    fluticasone-salmeterol (ADVAIR DISKUS) 250-50 mcg/dose diskus inhaler INHALE 2 PUFFS BY MOUTH TWICE DAILY    albuterol (PROVENTIL HFA, VENTOLIN HFA, PROAIR HFA) 90 mcg/actuation inhaler Take 2 Puffs by inhalation every four (4) hours as needed for Wheezing.     albuterol (PROVENTIL VENTOLIN) 2.5 mg /3 mL (0.083 %) nebulizer solution INHALE CONTENTS OF 1 VIAL VIA NEBULIZER EVERY FOUR (4) HOURS AS NEEDED FOR WHEEZING.  pantoprazole (PROTONIX) 40 mg tablet Take 1 Tab by mouth Before breakfast and dinner. Indications: Upper GI Bleed    sucralfate (CARAFATE) 1 gram tablet Take 1 Tab by mouth Before breakfast, lunch, dinner and at bedtime.  bipap machine kit by Does Not Apply route nightly.  modafinil (PROVIGIL) 200 mg tablet TAKE 1 TABLET BY MOUTH TWICE A DAY    loratadine (CLARITIN) 10 mg tablet     THERAPEUTIC-M 9 mg iron-400 mcg tab tablet     OXYGEN-AIR DELIVERY SYSTEMS by Does Not Apply route. 3 liters hs with cpap   1 liter with activity  [address]    ferrous sulfate (FEROSUL) 325 mg (65 mg iron) tablet Take 1 Tab by mouth Daily (before breakfast). Indications: IRON DEFICIENCY ANEMIA     No current facility-administered medications for this visit. Past Medical History:   Diagnosis Date    Essential hypertension     Essential hypertension, benign     Gout     Hypogonadism male     Impotence     Microscopic hematuria     Myopathy     Obesity, unspecified     Obstructive sleep apnea     on bipap    Other chronic pulmonary heart diseases     Peripheral neuropathy 10/25/2012    Sarcoid 10/25/2012     Past Surgical History:   Procedure Laterality Date    COLONOSCOPY N/A 4/21/2017    COLONOSCOPY with polypectomy performed by Noa Workman MD at [address] HX APPENDECTOMY      HX ENDOSCOPY  03/2017    HX HEART CATHETERIZATION      HX HEMORRHOIDECTOMY       Social History     Social History    Marital status:      Spouse name: N/A    Number of children: N/A    Years of education: N/A     Occupational History    Not on file.      Social History Main Topics    Smoking status: Never Smoker    Smokeless tobacco: Never Used    Alcohol use Yes      Comment: daily wine at dinner or beer    Drug use: No    Sexual activity: Yes     Other Topics Concern    Not on file     Social History Narrative     Family History   Problem Relation Age of Onset    Hypertension Father     Lung Disease Father     Heart Attack Father 61       Review of systems:  He denies fever chills poor appetite weight loss he continues to have muscle or nerve pain and muscle weakness. He denies any visual disturbance    Physical Exam:  Visit Vitals    /70 (BP 1 Location: Left arm, BP Patient Position: Sitting)    Pulse 81    Temp 98.5 °F (36.9 °C)    Resp 20    Ht 5' 6\" (1.676 m)    Wt 82.6 kg (182 lb)    SpO2 98%    BMI 29.38 kg/m2       Well-developed well-nourished  HEENT: WNL  Lymph node exam: Supraclavicular cervical lymph nodes negative  Chest: An absence of wheezes rales rubs no dullness no accessory muscle usage  Heart: Regular rhythm no gallop no murmur no JVD no peripheral edema  Extremities: No cyanosis clubbing or calf tenderness  Neurological: difficulty in raising right upper extremity profound weakness in lower extremities    Labs:  Echocardiogram 12/12/17: Pulmonary artery systolic pressure estimated at 35-40 mmHg  O2 sat 98% room air at rest    Impression:     Asthma under adequate control  Sarcoidosis appears to be stable except for possible neuromuscular disease  Pulmonary hypertension appears adequately controlled with BiPAP    Plan:     Continue BiPAP with supplemental oxygen  Continue Advair and as needed albuterol  Pneumonia vaccine  Follow-up in 6 months or sooner if needed    Froilan Amador MD , [facility]    CC: Jude Marina MD     [address] [facility]  [place], [address] [address]     P: [phone]     F: [phone]

## 2017-12-18 NOTE — MR AVS SNAPSHOT
Visit Information Date & Time Provider Department Dept. Phone Encounter #  
 12/18/2017  3:30 PM Isha Bennett MD [facility] [id number] Follow-up Instructions Return in about 6 months (around 6/18/2018). Your Appointments 12/26/2017  9:10 AM  
Nurse Visit with [facility] NURSE [facility] ([facility]) Appt Note: T, LFTs, H&H, PSA  
 [address] 
[address] [place] [address]  
[address] [place] [address] 1/15/2018  3:45 PM  
ESTABLISHED PATIENT with Saida Macias MD  
[facility] ([facility]) Appt Note: 6 MON F/U W/ LABS PRIOR  
 [address] 
[address] [place] [address]  
[address] [place] [address] Upcoming Health Maintenance Date Due Hepatitis C Screening 1960 Pneumococcal 19-64 Medium Risk (1 of 1 - PPSV23) 10/15/1978 FOBT Q 1 YEAR AGE 50-75 10/15/2009 Influenza Age 5 to Adult 8/1/2017 DTaP/Tdap/Td series (2 - Td) 8/29/2027 Allergies as of 12/18/2017  Review Complete On: 12/18/2017 By: Pranay Sutton LPN No Known Allergies Current Immunizations  Reviewed on 8/11/2016 Name Date Influenza Vaccine 10/10/2017 Influenza Vaccine Split 11/2/2012 Pneumococcal Conjugate (PCV-13)  Incomplete Tdap 8/29/2017 12:25 PM  
  
 Not reviewed this visit You Were Diagnosed With   
  
 Codes Comments Encounter for immunization    -  Primary ICD-10-CM: A47 ICD-9-CM: V03.89 Pulmonary artery hypertension     ICD-10-CM: I27.21 
ICD-9-CM: 416.8 Obstructive apnea     ICD-10-CM: G47.33 
ICD-9-CM: 327.23 Uncomplicated asthma, unspecified asthma severity, unspecified whether persistent     ICD-10-CM: J45.909 ICD-9-CM: 493.90 Sarcoidosis     ICD-10-CM: D86.9 ICD-9-CM: 135 Vitals BP Pulse Temp Resp Height(growth percentile) Weight(growth percentile) 120/70 (BP 1 Location: Left arm, BP Patient Position: Sitting) 81 98.5 °F (36.9 °C) 20 5' 6\" (1.676 m) 182 lb (82.6 kg) SpO2 BMI Smoking Status 98% 29.38 kg/m2 Never Smoker BMI and BSA Data Body Mass Index Body Surface Area  
 29.38 kg/m 2 1.96 m 2 Preferred Pharmacy Pharmacy Name Phone [address], [address] [place] [address] [phone] Your Updated Medication List  
  
   
This list is accurate as of: 12/18/17  4:37 PM.  Always use your most recent med list.  
  
  
  
  
 * albuterol 90 mcg/actuation inhaler Commonly known as:  PROVENTIL HFA, VENTOLIN HFA, PROAIR HFA Take 2 Puffs by inhalation every four (4) hours as needed for Wheezing. * albuterol 2.5 mg /3 mL (0.083 %) nebulizer solution Commonly known as:  PROVENTIL VENTOLIN  
INHALE CONTENTS OF 1 VIAL VIA NEBULIZER EVERY FOUR (4) HOURS AS NEEDED FOR WHEEZING. amLODIPine 10 mg tablet Commonly known as:  Glenn Presser TAKE 1 TABLET BY MOUTH ONCE DAILY  
  
 aspirin delayed-release 81 mg tablet TAKE 1 TABLET BY MOUTH ONCE DAILY FOR 30 DAYS  
  
 bipap machine kit  
by Does Not Apply route nightly. diclofenac 1 % Gel Commonly known as:  VOLTAREN  
  
 doxazosin 8 mg tablet Commonly known as:  CARDURA TAKE 1 TABLET BY MOUTH BEFORE BEDTIME FOR 30 DAYS  
  
 ferrous sulfate 325 mg (65 mg iron) tablet Commonly known as:  Yoopay [employer] Take 1 Tab by mouth Daily (before breakfast). Indications: IRON DEFICIENCY ANEMIA  
  
 fluticasone-salmeterol 250-50 mcg/dose diskus inhaler Commonly known as:  ADVAIR DISKUS INHALE 2 PUFFS BY MOUTH TWICE DAILY HYDROcodone-acetaminophen 7.5-325 mg per tablet Commonly known as:  Lynnetta Cifuentes Take 1 Tab by mouth four (4) times daily as needed. ibuprofen 600 mg tablet Commonly known as:  MOTRIN  
 Take 1 Tab by mouth every six (6) hours as needed for Pain.  
  
 loratadine 10 mg tablet Commonly known as:  CLARITIN  
  
 modafinil 200 mg tablet Commonly known as:  PROVIGIL  
TAKE 1 TABLET BY MOUTH TWICE A DAY OXYGEN-AIR DELIVERY SYSTEMS  
by Does Not Apply route. 3 liters hs with cpap   1 liter with activity  [employer]  
  
 pantoprazole 40 mg tablet Commonly known as:  PROTONIX Take 1 Tab by mouth Before breakfast and dinner. Indications: Upper GI Bleed  
  
 sildenafil (antihypertensive) 20 mg tablet Commonly known as:  REVATIO Take two to three tablets prn  
  
 sucralfate 1 gram tablet Commonly known as:  Xochitl Shelter Take 1 Tab by mouth Before breakfast, lunch, dinner and at bedtime. testosterone 20.25 mg/1.25 gram (1.62 %) gel Commonly known as:  ANDROGEL Use 4 pumps once daily THERAPEUTIC-M 9 mg iron-400 mcg Tab tablet Generic drug:  multivitamin, tx-iron-ca-min  
  
 triamterene-hydroCHLOROthiazide 37.5-25 mg per tablet Commonly known as:  Viviana Elliset TAKE 1 TABLET BY MOUTH ONCE DAILY * Notice: This list has 2 medication(s) that are the same as other medications prescribed for you. Read the directions carefully, and ask your doctor or other care provider to review them with you. We Performed the Following PNEUMOCOCCAL CONJ VACCINE 13 VALENT IM [id number] CPT(R)] Follow-up Instructions Return in about 6 months (around 6/18/2018). Patient Instructions Continue using BiPAP nightly supplemental oxygen Continue Advair 1 inhalation twice daily and remember to exhale fully before inhaling and to wash mouth with water and spit it out after inhaling Albuterol 2 puffs every 4 hours as needed but if you require albuterol too often to control respiratory symptoms call the office for severe symptoms go to the emergency room Always call for symptoms such as increasing shortness of breath or cough productive of discolored mucus Introducing [facility]! Antonia Rodriguez introduces [employer] patient portal. Now you can access parts of your medical record, email your doctor's office, and request medication refills online. 1. In your internet browser, go to [email or web address] 2. Click on the First Time User? Click Here link in the Sign In box. You will see the New Member Sign Up page. 3. Enter your [employer] Access Code exactly as it appears below. You will not need to use this code after youve completed the sign-up process. If you do not sign up before the expiration date, you must request a new code. · [employer] Access Code: [email or web address] Expires: 3/11/2018  3:39 PM 
 
4. Enter the last four digits of your Social Security Number (xxxx) and Date of Birth (mm/dd/yyyy) as indicated and click Submit. You will be taken to the next sign-up page. 5. Create a [employer] ID. This will be your [employer] login ID and cannot be changed, so think of one that is secure and easy to remember. 6. Create a [employer] password. You can change your password at any time. 7. Enter your Password Reset Question and Answer. This can be used at a later time if you forget your password. 8. Enter your e-mail address. You will receive e-mail notification when new information is available in [address] 9. Click Sign Up. You can now view and download portions of your medical record. 10. Click the Download Summary menu link to download a portable copy of your medical information. If you have questions, please visit the Frequently Asked Questions section of the [employer] website. Remember, [employer] is NOT to be used for urgent needs. For medical emergencies, dial 911. Now available from your iPhone and Android! Please provide this summary of care documentation to your next provider. Your primary care clinician is listed as [employer].  If you have any questions after today's visit, please call [phone].

## 2017-12-18 NOTE — PATIENT INSTRUCTIONS
Continue using BiPAP nightly supplemental oxygen    Continue Advair 1 inhalation twice daily and remember to exhale fully before inhaling and to wash mouth with water and spit it out after inhaling    Albuterol 2 puffs every 4 hours as needed but if you require albuterol too often to control respiratory symptoms call the office for severe symptoms go to the emergency room    Always call for symptoms such as increasing shortness of breath or cough productive of discolored mucus

## 2017-12-18 NOTE — PROGRESS NOTES
Prachi Johnson is a 61 y.o. male who presents for routine immunizations. Prevnar 13  He denies any symptoms , reactions or allergies that would exclude them from being immunized today. Risks and adverse reactions were discussed and the VIS was given to them. All questions were addressed. He was observed for 10 min post injection. There were no reactions observed. No c/os upon discharge.   Dwaine Cruz LPN

## 2018-02-19 ENCOUNTER — HOSPITAL ENCOUNTER (OUTPATIENT)
Dept: LAB | Age: 58
Discharge: HOME | End: 2018-02-19
Payer: MEDICAID

## 2018-02-19 LAB
ALBUMIN SERPL-MCNC: 3.6 G/DL (ref 3.4–5)
ALBUMIN/GLOB SERPL: 0.9 {RATIO} (ref 0.8–1.7)
ALP SERPL-CCNC: 69 U/L (ref 45–117)
ALT SERPL-CCNC: 20 U/L (ref 16–61)
ANION GAP SERPL CALC-SCNC: 7 MMOL/L (ref 3–18)
AST SERPL-CCNC: 21 U/L (ref 15–37)
BASOPHILS # BLD: 0 K/UL (ref 0–0.06)
BASOPHILS NFR BLD: 0 % (ref 0–2)
BILIRUB SERPL-MCNC: 0.8 MG/DL (ref 0.2–1)
BUN SERPL-MCNC: 18 MG/DL (ref 7–18)
BUN/CREAT SERPL: 44 (ref 12–20)
CALCIUM SERPL-MCNC: 9 MG/DL (ref 8.5–10.1)
CHLORIDE SERPL-SCNC: 101 MMOL/L (ref 100–108)
CHOLEST SERPL-MCNC: 178 MG/DL
CO2 SERPL-SCNC: 33 MMOL/L (ref 21–32)
CREAT SERPL-MCNC: 0.41 MG/DL (ref 0.6–1.3)
DIFFERENTIAL METHOD BLD: ABNORMAL
EOSINOPHIL # BLD: 0.2 K/UL (ref 0–0.4)
EOSINOPHIL NFR BLD: 2 % (ref 0–5)
ERYTHROCYTE [DISTWIDTH] IN BLOOD BY AUTOMATED COUNT: 14.4 % (ref 11.6–14.5)
GLOBULIN SER CALC-MCNC: 3.9 G/DL (ref 2–4)
GLUCOSE SERPL-MCNC: 91 MG/DL (ref 74–99)
HCT VFR BLD AUTO: 40.7 % (ref 36–48)
HDLC SERPL-MCNC: 83 MG/DL (ref 40–60)
HDLC SERPL: 2.1 {RATIO} (ref 0–5)
HGB BLD-MCNC: 13.8 G/DL (ref 13–16)
LDLC SERPL CALC-MCNC: 83.4 MG/DL (ref 0–100)
LIPID PROFILE,FLP: ABNORMAL
LYMPHOCYTES # BLD: 1.3 K/UL (ref 0.9–3.6)
LYMPHOCYTES NFR BLD: 14 % (ref 21–52)
MCH RBC QN AUTO: 29.6 PG (ref 24–34)
MCHC RBC AUTO-ENTMCNC: 33.9 G/DL (ref 31–37)
MCV RBC AUTO: 87.2 FL (ref 74–97)
MONOCYTES # BLD: 1 K/UL (ref 0.05–1.2)
MONOCYTES NFR BLD: 11 % (ref 3–10)
NEUTS SEG # BLD: 6.8 K/UL (ref 1.8–8)
NEUTS SEG NFR BLD: 73 % (ref 40–73)
PLATELET # BLD AUTO: 301 K/UL (ref 135–420)
PMV BLD AUTO: 10.4 FL (ref 9.2–11.8)
POTASSIUM SERPL-SCNC: 3.6 MMOL/L (ref 3.5–5.5)
PROT SERPL-MCNC: 7.5 G/DL (ref 6.4–8.2)
RBC # BLD AUTO: 4.67 M/UL (ref 4.7–5.5)
SODIUM SERPL-SCNC: 141 MMOL/L (ref 136–145)
T4 SERPL-MCNC: 8.5 UG/DL (ref 4.5–10.9)
TRIGL SERPL-MCNC: 58 MG/DL (ref ?–150)
TSH SERPL DL<=0.05 MIU/L-ACNC: 1.26 UIU/ML (ref 0.36–3.74)
VLDLC SERPL CALC-MCNC: 11.6 MG/DL
WBC # BLD AUTO: 9.3 K/UL (ref 4.6–13.2)

## 2018-02-19 PROCEDURE — 80061 LIPID PANEL: CPT | Performed by: INTERNAL MEDICINE

## 2018-02-19 PROCEDURE — 36415 COLL VENOUS BLD VENIPUNCTURE: CPT | Performed by: INTERNAL MEDICINE

## 2018-02-19 PROCEDURE — 80053 COMPREHEN METABOLIC PANEL: CPT | Performed by: INTERNAL MEDICINE

## 2018-02-19 PROCEDURE — 84436 ASSAY OF TOTAL THYROXINE: CPT | Performed by: INTERNAL MEDICINE

## 2018-02-19 PROCEDURE — 85025 COMPLETE CBC W/AUTO DIFF WBC: CPT | Performed by: INTERNAL MEDICINE

## 2018-02-19 RX ORDER — ALBUTEROL SULFATE 90 UG/1
AEROSOL, METERED RESPIRATORY (INHALATION)
Qty: 1 INHALER | Refills: 5 | Status: SHIPPED | OUTPATIENT
Start: 2018-02-19 | End: 2018-04-12 | Stop reason: CLARIF

## 2018-02-19 RX ORDER — FLUTICASONE PROPIONATE AND SALMETEROL 50; 250 UG/1; UG/1
POWDER RESPIRATORY (INHALATION)
Qty: 1 INHALER | Refills: 5 | Status: SHIPPED | OUTPATIENT
Start: 2018-02-19 | End: 2018-10-22 | Stop reason: SDUPTHER

## 2018-04-09 NOTE — TELEPHONE ENCOUNTER
A note of authorization need is received from [facility] re: Ventolin HFA. Often times, Medicaid requires all inhalers to be authorized but, I gave the pt. a call to check which Albuterol MDI he has. Clarice Romero called back to say whichever the Ins. will cover is fine. An order for ProAir HFA is pended to GOMEZ Bird NP.

## 2018-04-12 RX ORDER — ALBUTEROL SULFATE 90 UG/1
2 AEROSOL, METERED RESPIRATORY (INHALATION)
Qty: 1 INHALER | Refills: 5 | Status: SHIPPED | OUTPATIENT
Start: 2018-04-12 | End: 2018-10-22 | Stop reason: SDUPTHER

## 2018-05-01 PROBLEM — E29.1 HYPOGONADISM IN MALE: Status: ACTIVE | Noted: 2018-05-01

## 2018-05-01 PROBLEM — N52.9 IMPOTENCE OF ORGANIC ORIGIN: Status: ACTIVE | Noted: 2018-05-01

## 2018-06-04 ENCOUNTER — OFFICE VISIT (OUTPATIENT)
Dept: PULMONOLOGY | Age: 58
End: 2018-06-04

## 2018-06-04 VITALS
DIASTOLIC BLOOD PRESSURE: 60 MMHG | HEIGHT: 66 IN | WEIGHT: 178 LBS | RESPIRATION RATE: 20 BRPM | BODY MASS INDEX: 28.61 KG/M2 | HEART RATE: 85 BPM | OXYGEN SATURATION: 97 % | TEMPERATURE: 97.3 F | SYSTOLIC BLOOD PRESSURE: 110 MMHG

## 2018-06-04 DIAGNOSIS — D86.9 SARCOIDOSIS: ICD-10-CM

## 2018-06-04 DIAGNOSIS — I27.21 PULMONARY ARTERY HYPERTENSION (HCC): Primary | ICD-10-CM

## 2018-06-04 DIAGNOSIS — J45.909 UNCOMPLICATED ASTHMA, UNSPECIFIED ASTHMA SEVERITY, UNSPECIFIED WHETHER PERSISTENT: ICD-10-CM

## 2018-06-04 DIAGNOSIS — G47.33 OBSTRUCTIVE APNEA: ICD-10-CM

## 2018-06-04 DIAGNOSIS — G72.9 MYOPATHY: ICD-10-CM

## 2018-06-04 RX ORDER — ALBUTEROL SULFATE 0.83 MG/ML
2.5 SOLUTION RESPIRATORY (INHALATION)
Qty: 24 EACH | Refills: 3 | Status: SHIPPED | OUTPATIENT
Start: 2018-06-04 | End: 2020-12-21 | Stop reason: SDUPTHER

## 2018-06-04 NOTE — MR AVS SNAPSHOT
[address] [facility], [address] [place] [address] 
[phone] Patient: Kaitlyn Lawson MRN: [id number] :1960 Visit Information Date & Time Provider Department Dept. Phone Encounter #  
 2018  3:30 PM Kae Yarbrough MD [facility] B[place] Heath [id number] Follow-up Instructions Return in about 6 months (around 2018). Follow-up and Disposition History Your Appointments 2018  3:30 PM  
Follow Up with Kae Yarbrough MD  
[address] ([address]) Appt Note: from 18  
 [address] [place] [address], [address] [address]  
[phone]  
  
   
 [address] [place] [address], [address] [place] [address] Upcoming Health Maintenance Date Due Hepatitis C Screening 1960 Pneumococcal 19-64 Medium Risk (1 of 1 - PPSV23) 10/15/1978 FOBT Q 1 YEAR AGE 50-75 10/15/2009 MEDICARE YEARLY EXAM 2018 Influenza Age 5 to Adult 2018 DTaP/Tdap/Td series (2 - Td) 2027 Allergies as of 2018  Review Complete On: 2018 By: Grant Dominguez LPN No Known Allergies Current Immunizations  Reviewed on 2016 Name Date Influenza Vaccine 10/10/2017 Influenza Vaccine Split 2012 Pneumococcal Conjugate (PCV-13) 2017  4:30 PM  
 Tdap 2017 12:25 PM  
  
 Not reviewed this visit You Were Diagnosed With   
  
 Codes Comments Pulmonary artery hypertension ([facility] [address]    -  Primary ICD-10-CM: I27.21 
ICD-9-CM: 416.8 Obstructive apnea     ICD-10-CM: G47.33 
ICD-9-CM: 327.23 Uncomplicated asthma, unspecified asthma severity, unspecified whether persistent     ICD-10-CM: J45.909 ICD-9-CM: 493.90 Sarcoidosis     ICD-10-CM: D86.9 ICD-9-CM: 135 Myopathy     ICD-10-CM: G72.9 ICD-9-CM: 359.9 Vitals BP Pulse Temp Resp Height(growth percentile) Weight(growth percentile) 110/60 (BP 1 Location: Left arm, BP Patient Position: Sitting) 85 97.3 °F (36.3 °C) 20 5' 6\" (1.676 m) 178 lb (80.7 kg) SpO2 BMI Smoking Status 97% 28.73 kg/m2 Never Smoker BMI and BSA Data Body Mass Index Body Surface Area 28.73 kg/m 2 1.94 m 2 Preferred Pharmacy Pharmacy Name Phone [address], [address] [place], [place] [address]way [phone] Your Updated Medication List  
  
   
This list is accurate as of 6/4/18  4:15 PM.  Always use your most recent med list.  
  
  
  
  
 Wong Parcel 250-50 mcg/dose diskus inhaler Generic drug:  fluticasone-salmeterol USE 2 PUFFS ORALLY 2 TIMES A DAY AS DIRECTED * albuterol 2.5 mg /3 mL (0.083 %) nebulizer solution Commonly known as:  PROVENTIL VENTOLIN  
INHALE CONTENTS OF 1 VIAL VIA NEBULIZER EVERY FOUR (4) HOURS AS NEEDED FOR WHEEZING. * albuterol 90 mcg/actuation inhaler Commonly known as:  PROAIR HFA Take 2 Puffs by inhalation every four (4) hours as needed for Wheezing. * albuterol 2.5 mg /3 mL (0.083 %) nebulizer solution Commonly known as:  PROVENTIL VENTOLIN  
3 mL by Nebulization route every four (4) hours as needed for Wheezing. amLODIPine 10 mg tablet Commonly known as:  Unknown [place] TAKE 1 TABLET BY MOUTH ONCE DAILY  
  
 aspirin delayed-release 81 mg tablet TAKE 1 TABLET BY MOUTH ONCE DAILY FOR 30 DAYS  
  
 bipap machine kit  
by Does Not Apply route nightly. diclofenac 1 % Gel Commonly known as:  VOLTAREN  
  
 doxazosin 8 mg tablet Commonly known as:  CARDURA TAKE 1 TABLET BY MOUTH BEFORE BEDTIME FOR 30 DAYS  
  
 ferrous sulfate 325 mg (65 mg iron) tablet Commonly known as:  [employer] Take 1 Tab by mouth Daily (before breakfast). Indications: IRON DEFICIENCY ANEMIA HYDROcodone-acetaminophen 7.5-325 mg per tablet Commonly known as:  Niko Raven Take 1 Tab by mouth four (4) times daily as needed. ibuprofen 600 mg tablet Commonly known as:  MOTRIN Take 1 Tab by mouth every six (6) hours as needed for Pain.  
  
 loratadine 10 mg tablet Commonly known as:  CLARITIN  
  
 modafinil 200 mg tablet Commonly known as:  PROVIGIL  
TAKE 1 TABLET BY MOUTH TWICE A DAY OXYGEN-AIR DELIVERY SYSTEMS  
by Does Not Apply route. 3 liters hs with cpap   1 liter with activity  [employer]  
  
 pantoprazole 40 mg tablet Commonly known as:  PROTONIX Take 1 Tab by mouth Before breakfast and dinner. Indications: Upper GI Bleed * sildenafil (antihypertensive) 20 mg tablet Commonly known as:  REVATIO Take two to three tablets prn  
  
 * sildenafil (antihypertensive) 20 mg tablet Commonly known as:  REVATIO Take PO 1-5 tabs PRN. * sildenafil (antihypertensive) 20 mg tablet Commonly known as:  REVATIO Take PO 1-5 tabs PRN. sucralfate 1 gram tablet Commonly known as:  Jessy Carrel Take 1 Tab by mouth Before breakfast, lunch, dinner and at bedtime. testosterone 20.25 mg/1.25 gram (1.62 %) gel Commonly known as:  ANDROGEL Use 4 pumps once daily THERAPEUTIC-M 9 mg iron-400 mcg Tab tablet Generic drug:  multivitamin, tx-iron-ca-min  
  
 triamterene-hydroCHLOROthiazide 37.5-25 mg per tablet Commonly known as:  Mariam Davidson TAKE 1 TABLET BY MOUTH ONCE DAILY * Notice: This list has 6 medication(s) that are the same as other medications prescribed for you. Read the directions carefully, and ask your doctor or other care provider to review them with you. Prescriptions Sent to Pharmacy Refills  
 albuterol (PROVENTIL VENTOLIN) 2.5 mg /3 mL (0.083 %) nebulizer solution 3 Sig: 3 mL by Nebulization route every four (4) hours as needed for Wheezing. Class: Normal  
 Pharmacy: [id number] [facility], [address] [facility] Ph #: [phone] Route: Nebulization Follow-up Instructions Return in about 6 months (around 12/4/2018). Patient Instructions Continue Advair 1 inhalation twice daily and remember to exhale fully before inhaling also remember to wash mouth with water and spit it out after inhaling Continue albuterol 2 puffs every 4 hours as needed or by nebulizer every 4 hours as needed and if you require albuterol too often to control respiratory symptoms call the office for severe symptoms go to the emergency room Continue BiPAP Always call for symptoms such as increasing shortness of breath Please provide this summary of care documentation to your next provider. Your primary care clinician is listed as Oniel Glaser. If you have any questions after today's visit, please call [phone].

## 2018-06-04 NOTE — PROGRESS NOTES
[facility]  Pulmonary, Critical Care, and Sleep Medicine      Chief complaint:  Sarcoidosis asthma pulmonary hypertension TOBY neuromuscular disease related to sarcoidosis    HPI:    London Callow    is 61years old and returns to the office today for follow-up and relates he continues his Advair and uses his albuterol with significant activity about once a week. He denies a chronic cough chest pain leg swelling uses his BiPAP faithfully for TOBY. He says his muscle disease in his arms seems to be worsening slowly and has lost all use of his legs      No Known Allergies  Current Outpatient Prescriptions   Medication Sig    sildenafil, antihypertensive, (REVATIO) 20 mg tablet Take PO 1-5 tabs PRN.  sildenafil, antihypertensive, (REVATIO) 20 mg tablet Take PO 1-5 tabs PRN.  albuterol (PROAIR HFA) 90 mcg/actuation inhaler Take 2 Puffs by inhalation every four (4) hours as needed for Wheezing.  ADVAIR DISKUS 250-50 mcg/dose diskus inhaler USE 2 PUFFS ORALLY 2 TIMES A DAY AS DIRECTED    sildenafil, antihypertensive, (REVATIO) 20 mg tablet Take two to three tablets prn    ibuprofen (MOTRIN) 600 mg tablet Take 1 Tab by mouth every six (6) hours as needed for Pain.  testosterone (ANDROGEL) 20.25 mg/1.25 gram (1.62 %) gel Use 4 pumps once daily    HYDROcodone-acetaminophen (NORCO) 7.5-325 mg per tablet Take 1 Tab by mouth four (4) times daily as needed.     amLODIPine (NORVASC) 10 mg tablet TAKE 1 TABLET BY MOUTH ONCE DAILY    aspirin delayed-release 81 mg tablet TAKE 1 TABLET BY MOUTH ONCE DAILY FOR 30 DAYS    diclofenac (VOLTAREN) 1 % gel     doxazosin (CARDURA) 8 mg tablet TAKE 1 TABLET BY MOUTH BEFORE BEDTIME FOR 30 DAYS    triamterene-hydroCHLOROthiazide (MAXZIDE) 37.5-25 mg per tablet TAKE 1 TABLET BY MOUTH ONCE DAILY    albuterol (PROVENTIL VENTOLIN) 2.5 mg /3 mL (0.083 %) nebulizer solution INHALE CONTENTS OF 1 VIAL VIA NEBULIZER EVERY FOUR (4) HOURS AS NEEDED FOR WHEEZING.  pantoprazole (PROTONIX) 40 mg tablet Take 1 Tab by mouth Before breakfast and dinner. Indications: Upper GI Bleed    sucralfate (CARAFATE) 1 gram tablet Take 1 Tab by mouth Before breakfast, lunch, dinner and at bedtime.  ferrous sulfate (FEROSUL) 325 mg (65 mg iron) tablet Take 1 Tab by mouth Daily (before breakfast). Indications: IRON DEFICIENCY ANEMIA    bipap machine kit by Does Not Apply route nightly.  modafinil (PROVIGIL) 200 mg tablet TAKE 1 TABLET BY MOUTH TWICE A DAY    loratadine (CLARITIN) 10 mg tablet     THERAPEUTIC-M 9 mg iron-400 mcg tab tablet     OXYGEN-AIR DELIVERY SYSTEMS by Does Not Apply route. 3 liters hs with cpap   1 liter with activity  [id number]     No current facility-administered medications for this visit. Past Medical History:   Diagnosis Date    Essential hypertension     Essential hypertension, benign     Gout     Hypogonadism male     Impotence     Microscopic hematuria     Myopathy     Obesity, unspecified     Obstructive sleep apnea     on bipap    Other chronic pulmonary heart diseases     Peripheral neuropathy 10/25/2012    Sarcoid 10/25/2012     Past Surgical History:   Procedure Laterality Date    COLONOSCOPY N/A 4/21/2017    COLONOSCOPY with polypectomy performed by Laura Rodriguez MD at [address] HX APPENDECTOMY      HX ENDOSCOPY  03/2017    HX HEART CATHETERIZATION      HX HEMORRHOIDECTOMY       Social History     Social History    Marital status:      Spouse name: N/A    Number of children: N/A    Years of education: N/A     Occupational History    Not on file.      Social History Main Topics    Smoking status: Never Smoker    Smokeless tobacco: Never Used    Alcohol use Yes      Comment: daily wine at dinner or beer    Drug use: No    Sexual activity: Yes     Other Topics Concern    Not on file     Social History Narrative     Family History   Problem Relation Age of Onset    Hypertension Father     Lung Disease Father     Heart Attack Father 61       Review of systems:  He denies fever chills poor appetite or weight loss    Physical Exam:  Visit Vitals    /60 (BP 1 Location: Left arm, BP Patient Position: Sitting)    Pulse 85    Temp 97.3 °F (36.3 °C)    Resp 20    Ht 5' 6\" (1.676 m)    Wt 80.7 kg (178 lb)    SpO2 97%    BMI 28.73 kg/m2       Well-developed well-nourished  HEENT: WNL  Lymph node exam: Supraclavicular cervical lymph nodes negative  Chest: Equal symmetrical expansion no dullness no wheezes rales rubs  Heart: Regular rhythm no gallop no murmur no JVD no peripheral edema  Extremities: No cyanosis clubbing or calf tenderness  Neurological: Alert and oriented    Labs:    O2 sat room air at rest 97%    Impression:     Sarcoidosis and asthma which appears stable  Pulmonary hypertension on last echocardiogram 6 months ago pulmonary artery pressures controlled and higher limits of normal  TOBY treated with BiPAP may be the cause of the pulmonary hypertension  Neuromuscular disease related to sarcoidosis which is slowly worsening    Plan:     Consider second opinion at [address] as needed albuterol BiPAP  Follow-up in 6 months with echocardiogram at that time    Rosamaria Rocha MD , [facility]    CC: Chantel Gutierrez MD     [address] [facility]  [place], [address] [address]     P: [phone]     F: [phone]

## 2018-06-04 NOTE — PATIENT INSTRUCTIONS
Continue Advair 1 inhalation twice daily and remember to exhale fully before inhaling also remember to wash mouth with water and spit it out after inhaling    Continue albuterol 2 puffs every 4 hours as needed or by nebulizer every 4 hours as needed and if you require albuterol too often to control respiratory symptoms call the office for severe symptoms go to the emergency room    Continue BiPAP    Always call for symptoms such as increasing shortness of breath

## 2018-06-15 ENCOUNTER — HOSPITAL ENCOUNTER (EMERGENCY)
Age: 58
Discharge: HOME | End: 2018-06-15
Attending: EMERGENCY MEDICINE | Admitting: EMERGENCY MEDICINE
Payer: MEDICAID

## 2018-06-15 VITALS
TEMPERATURE: 98 F | SYSTOLIC BLOOD PRESSURE: 141 MMHG | WEIGHT: 178 LBS | BODY MASS INDEX: 28.73 KG/M2 | DIASTOLIC BLOOD PRESSURE: 82 MMHG | HEART RATE: 89 BPM | RESPIRATION RATE: 16 BRPM | OXYGEN SATURATION: 96 %
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
## 2018-06-15 PROCEDURE — 10120 INC&RMVL FB SUBQ TISS SMPL: CPT

## 2018-06-15 PROCEDURE — 99282 EMERGENCY DEPT VISIT SF MDM: CPT

## 2018-06-15 RX ORDER — CEPHALEXIN 500 MG/1
500 CAPSULE ORAL 2 TIMES DAILY
Qty: 14 CAP | Refills: 0 | Status: SHIPPED | OUTPATIENT
Start: 2018-06-15 | End: 2018-06-22

## 2018-06-15 NOTE — ED PROVIDER NOTES
HPI Comments: Pt c/o piece of wood lodged under left thumb nail yesterday. Pt states he thought he pulled it out but questioning whether anything is retained. C/o continuing pain and thinks he sees something. UTD vaccinations. No other complaints at this time. The history is provided by the patient. Past Medical History:   Diagnosis Date    Essential hypertension     Essential hypertension, benign     Gout     Hypogonadism male     Impotence     Microscopic hematuria     Myopathy     Obesity, unspecified     Obstructive sleep apnea     on bipap    Other chronic pulmonary heart diseases     Peripheral neuropathy 10/25/2012    Sarcoid 10/25/2012       Past Surgical History:   Procedure Laterality Date    COLONOSCOPY N/A 4/21/2017    COLONOSCOPY with polypectomy performed by Skinny Giordano MD at [facility] ENDOSCOPY    HX APPENDECTOMY      HX ENDOSCOPY  03/2017    HX HEART CATHETERIZATION      HX HEMORRHOIDECTOMY           Family History:   Problem Relation Age of Onset    Hypertension Father     Lung Disease Father     Heart Attack Father 61       Social History     Social History    Marital status:      Spouse name: N/A    Number of children: N/A    Years of education: N/A     Occupational History    Not on file. Social History Main Topics    Smoking status: Never Smoker    Smokeless tobacco: Never Used    Alcohol use Yes      Comment: daily wine at dinner or beer    Drug use: No    Sexual activity: Yes     Other Topics Concern    Not on file     Social History Narrative         ALLERGIES: Review of patient's allergies indicates no known allergies. Review of Systems    Vitals:    06/15/18 1101   BP: 141/82   Pulse: 89   Resp: 16   Temp: 98 °F (36.7 °C)   SpO2: 96%   Weight: 80.7 kg (178 lb)            Physical Exam   Constitutional: He is oriented to person, place, and time. He appears well-developed and well-nourished. No distress.    NAD, well hydrated, non toxic HENT:   Head: Normocephalic and atraumatic. Nose: Nose normal.   Mouth/Throat: Oropharynx is clear and moist. No oropharyngeal exudate. Eyes: Conjunctivae and EOM are normal. Pupils are equal, round, and reactive to light. Neck: Normal range of motion. Neck supple. Cardiovascular: Normal rate, regular rhythm and normal heart sounds. No murmur heard. Pulmonary/Chest: Effort normal and breath sounds normal. No respiratory distress. He has no wheezes. He has no rales. Abdominal: Soft. He exhibits no distension. There is no tenderness. There is no guarding. Musculoskeletal: Normal range of motion. Left hand: He exhibits tenderness. He exhibits normal range of motion, normal two-point discrimination, normal capillary refill, no deformity, no laceration and no swelling. Normal sensation noted. Normal strength noted. Hands:  Left nail TTP, no obvious FB appreciated. Normal sensation    Lymphadenopathy:     He has no cervical adenopathy. Neurological: He is alert and oriented to person, place, and time. No cranial nerve deficit. Coordination normal.   Skin: Skin is warm. No rash noted. He is not diaphoretic. Psychiatric: He has a normal mood and affect. His behavior is normal.   Nursing note and vitals reviewed. MDM  Number of Diagnoses or Management Options  Nail, injury by, initial encounter:   Diagnosis management comments: Laceration made to look under nail for wood splinter. No object noted. Pt tolerated well. Dressed and given Rx for keflex. Pt has pain medication at home.  UTD tetanus/         ED Course       Foreign Body Removal  Date/Time: 6/15/2018 11:42 AM  Performed by: Calin Vásquez  Authorized by: Calin Vásquez     Consent:     Consent obtained:  Verbal    Consent given by:  Patient    Risks discussed:  Infection, bleeding, pain, worsening of condition and poor cosmetic result    Alternatives discussed:  Alternative treatment, observation, referral and delayed treatment  Location:     Location:  Finger    Finger location:  L thumb    Depth:  Subcutaneous    Tendon involvement:  None  Pre-procedure details:     Imaging:  None    Neurovascular status: intact      Preparation: Patient was prepped and draped in usual sterile fashion    Anesthesia (see MAR for exact dosages): Anesthesia method:  Local infiltration    Local anesthetic:  Lidocaine 1% w/o epi  Procedure type:     Procedure complexity:  Simple  Procedure details:     Scalpel size:  11    Incision length:  0.25 cm    Localization method:  Probed and visualized    Dissection of underlying tissues: no      Bloodless field: no      Foreign bodies recovered:  None  Post-procedure details:     Neurovascular status: intact      Confirmation:  No additional foreign bodies on visualization    Skin closure:  None    Dressing:  Antibiotic ointment and adhesive bandage    Patient tolerance of procedure:   Tolerated well, no immediate complications

## 2018-06-15 NOTE — DISCHARGE INSTRUCTIONS
Toenail or Fingernail Avulsion: Care Instructions  Your Care Instructions  Losing a toenail or fingernail because of an injury is called avulsion. The nail may be completely or partially torn off after a trauma to the area. Your doctor may have removed the nail, put part of it back into place, or repaired the nail bed. Your toe or finger may be sore after treatment. You may have stitches. You may have some swelling, color changes, and bloody crusting on or around the wound for 2 or 3 days. This is normal. Taking good care of your wound at home will help it heal quickly and reduce your chance of infection. The wound should heal within a few weeks. If completely removed, fingernails may take 6 months to grow back. Toenails may take 12 to 18 months to grow back. Injured nails may look different when they grow back. Follow-up care is a key part of your treatment and safety. Be sure to make and go to all appointments, and call your doctor if you are having problems. It's also a good idea to know your test results and keep a list of the medicines you take. How can you care for yourself at home? · If possible, prop up the injured area on a pillow anytime you sit or lie down during the next 3 days. Try to keep it above the level of your heart. This will help reduce swelling. · Leave the bandage on, and if you have stitches, do not get them wet for the first 24 to 48 hours. Use a plastic bag to cover the area when you shower. · If your doctor told you how to care for your wound, follow your doctor's instructions. If you did not get instructions, follow this general advice:  ¨ After the first 24 to 48 hours, you can remove the bandage and gently wash around the wound with clean water 2 times a day. If the bandage sticks to the wound, use warm water to loosen it. Do not scrub or soak the area. ¨ You may cover the wound with a thin layer of petroleum jelly, such as Vaseline, and a nonstick bandage.   ¨ Apply more petroleum jelly and replace the bandage as needed. · Do not go swimming. · If you have stitches, do not remove them on your own. Your doctor will tell you when to return to have the stitches removed. · Be safe with medicines. Take pain medicines exactly as directed. ¨ If the doctor gave you a prescription medicine for pain, take it as prescribed. ¨ If you are not taking a prescription pain medicine, ask your doctor if you can take an over-the-counter medicine. · If your doctor prescribed antibiotics, take them as directed. Do not stop taking them just because you feel better. You need to take the full course of antibiotics. When should you call for help? Call your doctor now or seek immediate medical care if:  ? · The skin near the wound is cool or pale or changes color. ? · The wound starts to bleed, and blood soaks through the bandage. Oozing small amounts of blood is normal.   ? · You have signs of infection, such as:  ¨ Increased pain, swelling, warmth, or redness. ¨ Red streaks leading from your toe or finger. ¨ Pus draining from your toe or finger. ¨ Swollen lymph nodes in your neck, armpits, or groin. ¨ A fever. ? Watch closely for changes in your health, and be sure to contact your doctor if:  ? · You have problems with the nail as it grows back. ? · You do not get better as expected. Where can you learn more? Go to http://andres-heide.info/. Enter W325 in the search box to learn more about \"Toenail or Fingernail Avulsion: Care Instructions. \"  Current as of: October 13, 2016  Content Version: 11.4  © 1883-2063 [employer]. Care instructions adapted under license by [employer] (which disclaims liability or warranty for this information). If you have questions about a medical condition or this instruction, always ask your healthcare professional. [place] [address] any warranty or liability for your use of this information.

## 2018-08-08 ENCOUNTER — HOSPITAL ENCOUNTER (OUTPATIENT)
Dept: LAB | Age: 58
Discharge: HOME | End: 2018-08-08
Payer: MEDICAID

## 2018-08-08 LAB
ALBUMIN SERPL-MCNC: 3.8 G/DL (ref 3.4–5)
ALBUMIN/GLOB SERPL: 1 {RATIO} (ref 0.8–1.7)
ALP SERPL-CCNC: 63 U/L (ref 45–117)
ALT SERPL-CCNC: 21 U/L (ref 16–61)
ANION GAP SERPL CALC-SCNC: 6 MMOL/L (ref 3–18)
AST SERPL-CCNC: 23 U/L (ref 15–37)
BILIRUB SERPL-MCNC: 1 MG/DL (ref 0.2–1)
BUN SERPL-MCNC: 18 MG/DL (ref 7–18)
BUN/CREAT SERPL: 40 (ref 12–20)
CALCIUM SERPL-MCNC: 8.9 MG/DL (ref 8.5–10.1)
CHLORIDE SERPL-SCNC: 103 MMOL/L (ref 100–108)
CO2 SERPL-SCNC: 33 MMOL/L (ref 21–32)
CREAT SERPL-MCNC: 0.45 MG/DL (ref 0.6–1.3)
GLOBULIN SER CALC-MCNC: 3.7 G/DL (ref 2–4)
GLUCOSE SERPL-MCNC: 109 MG/DL (ref 74–99)
POTASSIUM SERPL-SCNC: 3.8 MMOL/L (ref 3.5–5.5)
PROT SERPL-MCNC: 7.5 G/DL (ref 6.4–8.2)
SODIUM SERPL-SCNC: 142 MMOL/L (ref 136–145)

## 2018-08-08 PROCEDURE — 80053 COMPREHEN METABOLIC PANEL: CPT | Performed by: PHYSICIAN ASSISTANT

## 2018-08-08 PROCEDURE — 36415 COLL VENOUS BLD VENIPUNCTURE: CPT | Performed by: PHYSICIAN ASSISTANT

## 2018-09-04 ENCOUNTER — TELEPHONE (OUTPATIENT)
Dept: PULMONOLOGY | Age: 58
End: 2018-09-04

## 2018-09-04 RX ORDER — PREDNISONE 20 MG/1
40 TABLET ORAL
Qty: 6 TAB | Refills: 0 | Status: SHIPPED | OUTPATIENT
Start: 2018-09-04 | End: 2020-01-20 | Stop reason: ALTCHOICE

## 2018-09-04 RX ORDER — AZITHROMYCIN 250 MG/1
TABLET, FILM COATED ORAL
Qty: 6 TAB | Refills: 0 | Status: SHIPPED | OUTPATIENT
Start: 2018-09-04 | End: 2018-09-09

## 2018-09-04 NOTE — TELEPHONE ENCOUNTER
Pt stated that he is having a really bad cough and wants to know if he could have Zpack as well as cough medicine sent to [facility]. Please advise [phone].

## 2018-09-04 NOTE — TELEPHONE ENCOUNTER
Pt calling requesting zpak for cough/cold sxs. Pt c/o cough with congesion. Coughing up clear sputum, no fever. Pt offered appt but unable to get a ride. Pt in wheelchair.

## 2018-10-23 RX ORDER — FLUTICASONE PROPIONATE AND SALMETEROL 250; 50 UG/1; UG/1
POWDER RESPIRATORY (INHALATION)
Qty: 1 INHALER | Refills: 5 | Status: SHIPPED | OUTPATIENT
Start: 2018-10-23 | End: 2019-04-26 | Stop reason: SDUPTHER

## 2018-10-23 RX ORDER — ALBUTEROL SULFATE 90 UG/1
2 AEROSOL, METERED RESPIRATORY (INHALATION)
Qty: 1 INHALER | Refills: 4 | Status: SHIPPED | OUTPATIENT
Start: 2018-10-23 | End: 2019-07-24 | Stop reason: SDUPTHER

## 2018-10-25 ENCOUNTER — OFFICE VISIT (OUTPATIENT)
Dept: ORTHOPEDIC SURGERY | Facility: CLINIC | Age: 58
End: 2018-10-25

## 2018-10-25 VITALS
TEMPERATURE: 97.7 F | RESPIRATION RATE: 18 BRPM | SYSTOLIC BLOOD PRESSURE: 142 MMHG | HEIGHT: 66 IN | HEART RATE: 91 BPM | OXYGEN SATURATION: 97 % | BODY MASS INDEX: 28.73 KG/M2 | DIASTOLIC BLOOD PRESSURE: 71 MMHG

## 2018-10-25 DIAGNOSIS — M25.561 RIGHT KNEE PAIN, UNSPECIFIED CHRONICITY: Primary | ICD-10-CM

## 2018-10-25 DIAGNOSIS — S80.01XA CONTUSION OF RIGHT KNEE, INITIAL ENCOUNTER: ICD-10-CM

## 2018-10-25 RX ORDER — TRIAMCINOLONE ACETONIDE 40 MG/ML
40 INJECTION, SUSPENSION INTRA-ARTICULAR; INTRAMUSCULAR ONCE
Qty: 1 ML | Refills: 0
Start: 2018-10-25 | End: 2018-10-25

## 2018-10-25 NOTE — PROGRESS NOTES
Patient: Liban Alcaraz. MRN: [id number]       SSN: [id number] YOB: 1960        AGE: 60 y.o. SEX: male Body mass index is 28.73 kg/m². PCP: Jennifer Barry MD 
10/25/18 Chief Complaint Patient presents with  Knee Pain Right HISTORY OF PRESENT ILLNESS: Liban Alcaraz. is a 60 y.o. male who presents to the office and is requesting a cortisone injection for his right knee. He is more or less in a wheel chair on a regular basis and he recently bumped his knee on the edge of the freezer. Review of Systems Constitutional: Negative. HENT: Negative. Eyes: Negative. Respiratory: Negative. Cardiovascular: Negative. Gastrointestinal: Negative. Genitourinary: Negative. Musculoskeletal: Positive for joint pain (right knee). Skin: Negative. Neurological: Negative. Endo/Heme/Allergies: Negative. Psychiatric/Behavioral: Negative. Social History Socioeconomic History  Marital status:  Spouse name: Not on file  Number of children: Not on file  Years of education: Not on file  Highest education level: Not on file Social Needs  Financial resource strain: Not on file  Food insecurity - worry: Not on file  Food insecurity - inability: Not on file  Transportation needs - medical: Not on file  Transportation needs - non-medical: Not on file Occupational History  Not on file Tobacco Use  Smoking status: Never Smoker  Smokeless tobacco: Never Used Substance and Sexual Activity  Alcohol use: Yes Comment: daily wine at dinner or beer  Drug use: No  
 Sexual activity: Yes Other Topics Concern  Not on file Social History Narrative  Not on file Past Medical History:  
Diagnosis Date  Essential hypertension  Essential hypertension, benign  Gout  Hypogonadism male  Impotence  Microscopic hematuria  Myopathy  Obesity, unspecified  Obstructive sleep apnea   
 on bipap  Other chronic pulmonary heart diseases  Peripheral neuropathy 10/25/2012  Sarcoid 10/25/2012 No Known Allergies Current Outpatient Medications Medication Sig  
 triamcinolone acetonide (KENALOG) 40 mg/mL injection 1 mL by Intra artICUlar route once for 1 dose.  fluticasone-salmeterol (ADVAIR DISKUS) 250-50 mcg/dose diskus inhaler USE 2 PUFFS ORALLY 2 TIMES A DAY AS DIRECTED  albuterol (PROAIR HFA) 90 mcg/actuation inhaler Take 2 Puffs by inhalation every four (4) hours as needed for Wheezing.  sildenafil, antihypertensive, (REVATIO) 20 mg tablet Take two to three tablets prn  
 HYDROcodone-acetaminophen (NORCO) 7.5-325 mg per tablet Take 1 Tab by mouth four (4) times daily as needed.  amLODIPine (NORVASC) 10 mg tablet TAKE 1 TABLET BY MOUTH ONCE DAILY  aspirin delayed-release 81 mg tablet TAKE 1 TABLET BY MOUTH ONCE DAILY FOR 30 DAYS  doxazosin (CARDURA) 8 mg tablet TAKE 1 TABLET BY MOUTH BEFORE BEDTIME FOR 30 DAYS  triamterene-hydroCHLOROthiazide (MAXZIDE) 37.5-25 mg per tablet TAKE 1 TABLET BY MOUTH ONCE DAILY  albuterol (PROVENTIL VENTOLIN) 2.5 mg /3 mL (0.083 %) nebulizer solution INHALE CONTENTS OF 1 VIAL VIA NEBULIZER EVERY FOUR (4) HOURS AS NEEDED FOR WHEEZING.  pantoprazole (PROTONIX) 40 mg tablet Take 1 Tab by mouth Before breakfast and dinner. Indications: Upper GI Bleed  bipap machine kit by Does Not Apply route nightly.  modafinil (PROVIGIL) 200 mg tablet TAKE 1 TABLET BY MOUTH TWICE A DAY  loratadine (CLARITIN) 10 mg tablet  THERAPEUTIC-M 9 mg iron-400 mcg tab tablet  OXYGEN-AIR DELIVERY SYSTEMS by Does Not Apply route. 3 liters hs with cpap   1 liter with activity  [address]  sildenafil, antihypertensive, (REVATIO) 20 mg tablet Take PO 1-5 tabs PRN.  predniSONE (DELTASONE) 20 mg tablet Take 2 Tabs by mouth daily (with breakfast).  albuterol (PROVENTIL VENTOLIN) 2.5 mg /3 mL (0.083 %) nebulizer solution 3 mL by Nebulization route every four (4) hours as needed for Wheezing.  ibuprofen (MOTRIN) 600 mg tablet Take 1 Tab by mouth every six (6) hours as needed for Pain.  testosterone (ANDROGEL) 20.25 mg/1.25 gram (1.62 %) gel Use 4 pumps once daily  diclofenac (VOLTAREN) 1 % gel  sucralfate (CARAFATE) 1 gram tablet Take 1 Tab by mouth Before breakfast, lunch, dinner and at bedtime.  ferrous sulfate (FEROSUL) 325 mg (65 mg iron) tablet Take 1 Tab by mouth Daily (before breakfast). Indications: IRON DEFICIENCY ANEMIA No current facility-administered medications for this visit. Physical Exam 
Constitutional: he is oriented to person, place, and time and well-developed, well-nourished, and in no distress. No distress. HENT:  
Head: Normocephalic and atraumatic. Right Ear: Hearing normal.  
Left Ear: Hearing normal.  
Nose: Nose normal.  
Eyes: Conjunctivae, EOM and lids are normal. Pupils are equal, round, and reactive to light. Neck: Trachea normal.  
Pulmonary/Chest: Effort normal and breath sounds normal. No respiratory distress. Abdominal: Soft. Neurological: he is alert and oriented to person, place, and time. Skin: Skin is warm, dry and intact. he is not diaphoretic. Psychiatric: Affect normal.  
Nursing note and vitals reviewed. Right Knee Exam  
Swelling: Mild Effusion: Yes Tenderness The patient is experiencing tenderness in the lateral joint line, LCL. Range of Motion Extension: Abnormal 
Flexion:     Abnormal 
 
 
 
 
 
Procedure: After timeout and under sterile conditions, patient's right knee was injected with 8 ml of 0.75 % sensorcaine mised with 1cc Kenalog at 40mg/ml [facility] PROCEDURE PROGRESS NOTE Chart reviewed for the following: 
 Cher THAPA PA-C, have reviewed the History, Physical and updated the Allergic reactions for [address] performed immediately prior to start of procedure: 
 I, Luiz Smith PA-C, have performed the following reviews on [facility]. prior to the start of the procedure: 
         
* Patient was identified by name and date of birth * Agreement on procedure being performed was verified * Risks and Benefits explained to the patient * Procedure site verified and marked as necessary * Patient was positioned for comfort * Consent was signed and verified Time: 2:10pm 
 
 
 
Date of procedure: 10/25/2018 Procedure performed by:  Teri Henao PA-C Provider assisted by: None Patient assisted by: self How tolerated by patient: tolerated the procedure well with no complications Comments: none RADIOGRAPHS:  
3 view right knee moderate tri compartment osteo arthritis of right knree. IMPRESSION & PLAN: I feel his knee pain is due to synovitis secondary to contusion. At the pt's request I injected the knee with cortisone. Since this has worked in the past, I will leave the follow up to him.

## 2018-11-03 ENCOUNTER — HOSPITAL ENCOUNTER (OUTPATIENT)
Dept: LAB | Age: 58
Discharge: HOME | End: 2018-11-03
Payer: MEDICAID

## 2018-11-03 LAB
ALBUMIN SERPL-MCNC: 3.6 G/DL (ref 3.4–5)
ALBUMIN/GLOB SERPL: 0.9 {RATIO} (ref 0.8–1.7)
ALP SERPL-CCNC: 57 U/L (ref 45–117)
ALT SERPL-CCNC: 23 U/L (ref 16–61)
ANION GAP SERPL CALC-SCNC: 5 MMOL/L (ref 3–18)
AST SERPL-CCNC: 20 U/L (ref 15–37)
BASOPHILS # BLD: 0 K/UL (ref 0–0.1)
BASOPHILS NFR BLD: 0 % (ref 0–2)
BILIRUB SERPL-MCNC: 1.4 MG/DL (ref 0.2–1)
BUN SERPL-MCNC: 17 MG/DL (ref 7–18)
BUN/CREAT SERPL: 32 (ref 12–20)
CALCIUM SERPL-MCNC: 8.8 MG/DL (ref 8.5–10.1)
CHLORIDE SERPL-SCNC: 103 MMOL/L (ref 100–108)
CHOLEST SERPL-MCNC: 189 MG/DL
CO2 SERPL-SCNC: 33 MMOL/L (ref 21–32)
CREAT SERPL-MCNC: 0.53 MG/DL (ref 0.6–1.3)
DIFFERENTIAL METHOD BLD: ABNORMAL
EOSINOPHIL # BLD: 0 K/UL (ref 0–0.4)
EOSINOPHIL NFR BLD: 0 % (ref 0–5)
ERYTHROCYTE [DISTWIDTH] IN BLOOD BY AUTOMATED COUNT: 14.1 % (ref 11.6–14.5)
GLOBULIN SER CALC-MCNC: 3.8 G/DL (ref 2–4)
GLUCOSE SERPL-MCNC: 88 MG/DL (ref 74–99)
HCT VFR BLD AUTO: 42.2 % (ref 36–48)
HDLC SERPL-MCNC: 83 MG/DL (ref 40–60)
HDLC SERPL: 2.3 {RATIO} (ref 0–5)
HGB BLD-MCNC: 14.3 G/DL (ref 13–16)
LDLC SERPL CALC-MCNC: 95.4 MG/DL (ref 0–100)
LIPID PROFILE,FLP: ABNORMAL
LYMPHOCYTES # BLD: 1.5 K/UL (ref 0.9–3.6)
LYMPHOCYTES NFR BLD: 16 % (ref 21–52)
MCH RBC QN AUTO: 29.4 PG (ref 24–34)
MCHC RBC AUTO-ENTMCNC: 33.9 G/DL (ref 31–37)
MCV RBC AUTO: 86.7 FL (ref 74–97)
MONOCYTES # BLD: 0.8 K/UL (ref 0.05–1.2)
MONOCYTES NFR BLD: 9 % (ref 3–10)
NEUTS SEG # BLD: 6.9 K/UL (ref 1.8–8)
NEUTS SEG NFR BLD: 75 % (ref 40–73)
PLATELET # BLD AUTO: 348 K/UL (ref 135–420)
PMV BLD AUTO: 10.3 FL (ref 9.2–11.8)
POTASSIUM SERPL-SCNC: 3.6 MMOL/L (ref 3.5–5.5)
PROT SERPL-MCNC: 7.4 G/DL (ref 6.4–8.2)
RBC # BLD AUTO: 4.87 M/UL (ref 4.7–5.5)
SODIUM SERPL-SCNC: 141 MMOL/L (ref 136–145)
TRIGL SERPL-MCNC: 53 MG/DL (ref ?–150)
VLDLC SERPL CALC-MCNC: 10.6 MG/DL
WBC # BLD AUTO: 9.3 K/UL (ref 4.6–13.2)

## 2018-11-03 PROCEDURE — 85025 COMPLETE CBC W/AUTO DIFF WBC: CPT | Performed by: INTERNAL MEDICINE

## 2018-11-03 PROCEDURE — 80061 LIPID PANEL: CPT | Performed by: INTERNAL MEDICINE

## 2018-11-03 PROCEDURE — 36415 COLL VENOUS BLD VENIPUNCTURE: CPT | Performed by: INTERNAL MEDICINE

## 2018-11-03 PROCEDURE — 80053 COMPREHEN METABOLIC PANEL: CPT | Performed by: INTERNAL MEDICINE

## 2018-12-04 ENCOUNTER — OFFICE VISIT (OUTPATIENT)
Dept: PULMONOLOGY | Age: 58
End: 2018-12-04

## 2018-12-04 VITALS
WEIGHT: 182 LBS | OXYGEN SATURATION: 99 % | SYSTOLIC BLOOD PRESSURE: 132 MMHG | HEIGHT: 66 IN | TEMPERATURE: 97.6 F | DIASTOLIC BLOOD PRESSURE: 70 MMHG | BODY MASS INDEX: 29.25 KG/M2 | RESPIRATION RATE: 18 BRPM | HEART RATE: 91 BPM

## 2018-12-04 DIAGNOSIS — J45.909 UNCOMPLICATED ASTHMA, UNSPECIFIED ASTHMA SEVERITY, UNSPECIFIED WHETHER PERSISTENT: ICD-10-CM

## 2018-12-04 DIAGNOSIS — R07.9 CHEST PAIN, UNSPECIFIED TYPE: ICD-10-CM

## 2018-12-04 DIAGNOSIS — G47.33 OBSTRUCTIVE APNEA: Primary | ICD-10-CM

## 2018-12-04 DIAGNOSIS — I27.21 PULMONARY ARTERY HYPERTENSION (HCC): ICD-10-CM

## 2018-12-04 DIAGNOSIS — G62.9 PERIPHERAL POLYNEUROPATHY: ICD-10-CM

## 2018-12-04 DIAGNOSIS — D86.9 SARCOIDOSIS: ICD-10-CM

## 2018-12-04 DIAGNOSIS — G72.9 MYOPATHY: ICD-10-CM

## 2018-12-04 NOTE — PATIENT INSTRUCTIONS
Continue Advair 1 inhalation twice daily and remember to exhale fully before inhaling and also remember to wash mouth with water after inhaling Albuterol 2 puffs every 4 hours as needed but if you require albuterol too often to control respiratory symptoms call the office for severe symptoms go to the emergency room Continue BiPAP Always call for symptoms such as worsening shortness of breath

## 2018-12-04 NOTE — PROGRESS NOTES
[facility] Pulmonary, Critical Care, and Sleep Medicine Chief complaint: 
Sarcoidosis asthma myopathy and neuropathy felt to be related to sarcoidosis TOBY pulmonary hypertension felt to be related to TOBY HPI: 
 
Shelly Ritchie    is 60years old and returns the office today for follow-up and relates that his shortness of breath is stable on Advair but he does require albuterol several times a week. He denies significant cough. He also relates he uses his BiPAP faithfully. He also notes pain when he turns at night in his chest substernal and nonradiating and it usually goes away if he lies on his back is bothered him for at least 2 months. No Known Allergies Current Outpatient Medications Medication Sig  
 fluticasone-salmeterol (ADVAIR DISKUS) 250-50 mcg/dose diskus inhaler USE 2 PUFFS ORALLY 2 TIMES A DAY AS DIRECTED  albuterol (PROAIR HFA) 90 mcg/actuation inhaler Take 2 Puffs by inhalation every four (4) hours as needed for Wheezing.  testosterone (ANDROGEL) 20.25 mg/1.25 gram (1.62 %) gel Use 4 pumps once daily  HYDROcodone-acetaminophen (NORCO) 7.5-325 mg per tablet Take 1 Tab by mouth four (4) times daily as needed.  amLODIPine (NORVASC) 10 mg tablet TAKE 1 TABLET BY MOUTH ONCE DAILY  aspirin delayed-release 81 mg tablet TAKE 1 TABLET BY MOUTH ONCE DAILY FOR 30 DAYS  triamterene-hydroCHLOROthiazide (MAXZIDE) 37.5-25 mg per tablet TAKE 1 TABLET BY MOUTH ONCE DAILY  albuterol (PROVENTIL VENTOLIN) 2.5 mg /3 mL (0.083 %) nebulizer solution INHALE CONTENTS OF 1 VIAL VIA NEBULIZER EVERY FOUR (4) HOURS AS NEEDED FOR WHEEZING.  pantoprazole (PROTONIX) 40 mg tablet Take 1 Tab by mouth Before breakfast and dinner. Indications: Upper GI Bleed  bipap machine kit by Does Not Apply route nightly.  modafinil (PROVIGIL) 200 mg tablet TAKE 1 TABLET BY MOUTH TWICE A DAY  loratadine (CLARITIN) 10 mg tablet  THERAPEUTIC-M 9 mg iron-400 mcg tab tablet  OXYGEN-AIR DELIVERY SYSTEMS by Does Not Apply route. 3 liters hs with cpap   1 liter with activity  [address]  sildenafil, antihypertensive, (REVATIO) 20 mg tablet Take PO 1-5 tabs PRN.  predniSONE (DELTASONE) 20 mg tablet Take 2 Tabs by mouth daily (with breakfast).  albuterol (PROVENTIL VENTOLIN) 2.5 mg /3 mL (0.083 %) nebulizer solution 3 mL by Nebulization route every four (4) hours as needed for Wheezing.  sildenafil, antihypertensive, (REVATIO) 20 mg tablet Take two to three tablets prn  ibuprofen (MOTRIN) 600 mg tablet Take 1 Tab by mouth every six (6) hours as needed for Pain.  diclofenac (VOLTAREN) 1 % gel  doxazosin (CARDURA) 8 mg tablet TAKE 1 TABLET BY MOUTH BEFORE BEDTIME FOR 30 DAYS  sucralfate (CARAFATE) 1 gram tablet Take 1 Tab by mouth Before breakfast, lunch, dinner and at bedtime.  ferrous sulfate (FEROSUL) 325 mg (65 mg iron) tablet Take 1 Tab by mouth Daily (before breakfast). Indications: IRON DEFICIENCY ANEMIA No current facility-administered medications for this visit. Past Medical History:  
Diagnosis Date  Essential hypertension  Essential hypertension, benign  Gout  Hypogonadism male  Impotence  Microscopic hematuria  Myopathy  Obesity, unspecified  Obstructive sleep apnea   
 on bipap  Other chronic pulmonary heart diseases  Peripheral neuropathy 10/25/2012  Sarcoid 10/25/2012 Past Surgical History:  
Procedure Laterality Date  COLONOSCOPY N/A 4/21/2017 COLONOSCOPY with polypectomy performed by Rylan Lester MD at [address] [place] [address]  HX ENDOSCOPY  03/2017  HX HEART CATHETERIZATION    
 HX HEMORRHOIDECTOMY Social History Socioeconomic History  Marital status:  Spouse name: Not on file  Number of children: Not on file  Years of education: Not on file  Highest education level: Not on file Social Needs  Financial resource strain: Not on file  Food insecurity - worry: Not on file  Food insecurity - inability: Not on file  Transportation needs - medical: Not on file  Transportation needs - non-medical: Not on file Occupational History  Not on file Tobacco Use  Smoking status: Never Smoker  Smokeless tobacco: Never Used Substance and Sexual Activity  Alcohol use: Yes Comment: daily wine at dinner or beer  Drug use: No  
 Sexual activity: Yes Other Topics Concern  Not on file Social History Narrative  Not on file Family History Problem Relation Age of Onset  Hypertension Father  Lung Disease Father  Heart Attack Father 61 Review of systems: No fever chills poor appetite the patient does report progressive weakness in his right upper extremity and for many years has had severe weakness in his lower extremities Physical Exam: 
Visit Vitals /70 (BP 1 Location: Left arm, BP Patient Position: Sitting) Pulse 91 Temp 97.6 °F (36.4 °C) (Oral) Resp 18 Ht 5' 6\" (1.676 m) Wt 82.6 kg (182 lb) SpO2 99% Comment: RA Rest  
BMI 29.38 kg/m² Well-developed well-nourished HEENT: WNL Lymph node exam: Supraclavicular cervical lymph nodes negative Chest: Equal symmetrical expansion no dullness no wheezes rales rubs Heart: Regular rhythm no gallop no murmur no JVD no peripheral edema Extremities no cyanosis clubbing there is mild calf and thigh tenderness bilateral 
Neurological: Weakness in right upper extremity and both lower extremities alert and oriented Labs: 
O2 sat room air at rest 99 Impression: No evidence of worsening pulmonary hypertension but will obtain echocardiogram to confirm Chest pain cause unclear will obtain chest x-ray Asthma appears stable Slowly progressive muscular and neuropathic disease Plan: Repeat echocardiogram continue BiPAP Advair as needed albuterol Refer to [facility] for neuromuscular deterioration Tawanda Sr [facility] , [facility] 
 
CC: Ban Le MD  
 
[address] [facility], [address],[place] [address]     P: [phone]     F: [phone]

## 2018-12-04 NOTE — PROGRESS NOTES
Mr. Stefania Ferreira has a reminder for a \"due or due soon\" health maintenance. I have asked that he contact his primary care provider for follow-up on this health maintenance. Chief Complaint Patient presents with  Asthma 1. Have you been to the ER, urgent care clinic since your last visit? Hospitalized since your last visit? No 
 
2. Have you seen or consulted any other health care providers outside of the [address] [place] [address] since your last visit? Include any pap smears or colon screening.  No

## 2018-12-12 ENCOUNTER — HOSPITAL ENCOUNTER (OUTPATIENT)
Dept: NON INVASIVE DIAGNOSTICS | Age: 58
Discharge: HOME | End: 2018-12-12
Attending: INTERNAL MEDICINE
Payer: MEDICAID

## 2018-12-12 VITALS
DIASTOLIC BLOOD PRESSURE: 70 MMHG | HEIGHT: 66 IN | SYSTOLIC BLOOD PRESSURE: 132 MMHG | BODY MASS INDEX: 29.25 KG/M2 | WEIGHT: 182 LBS

## 2018-12-12 DIAGNOSIS — I27.21 PULMONARY ARTERY HYPERTENSION (HCC): ICD-10-CM

## 2018-12-12 LAB
ECHO AO ROOT DIAM: 3.72 CM
ECHO LA AREA 4C: 11.9 CM2
ECHO LA VOL 2C: 41.17 ML (ref 18–58)
ECHO LA VOL 4C: 23.64 ML (ref 18–58)
ECHO LA VOL BP: 35.17 ML (ref 18–58)
ECHO LA VOL/BSA BIPLANE: 18.3 ML/M2 (ref 16–28)
ECHO LA VOLUME INDEX A2C: 21.43 ML/M2 (ref 16–28)
ECHO LA VOLUME INDEX A4C: 12.3 ML/M2 (ref 16–28)
ECHO LV INTERNAL DIMENSION DIASTOLIC: 3.31 CM (ref 4.2–5.9)
ECHO LV INTERNAL DIMENSION SYSTOLIC: 2.45 CM
ECHO LV IVSD: 1.26 CM (ref 0.6–1)
ECHO LV MASS 2D: 159.6 G (ref 88–224)
ECHO LV MASS INDEX 2D: 83.1 G/M2 (ref 49–115)
ECHO LV POSTERIOR WALL DIASTOLIC: 1.3 CM (ref 0.6–1)
ECHO LVOT DIAM: 2.26 CM
ECHO MV A VELOCITY: 59.1 CM/S
ECHO MV E DECELERATION TIME (DT): 185.4 MS
ECHO MV E VELOCITY: 0.61 CM/S
ECHO MV E/A RATIO: 0.01
ECHO PVEIN A DURATION: 122.3 MS
ECHO PVEIN A VELOCITY: 23.04 CM/S

## 2018-12-12 PROCEDURE — 74011250636 HC RX REV CODE- 250/636: Performed by: INTERNAL MEDICINE

## 2018-12-12 PROCEDURE — C8929 TTE W OR WO FOL WCON,DOPPLER: HCPCS

## 2018-12-12 RX ADMIN — PERFLUTREN 2 ML: 6.52 INJECTION, SUSPENSION INTRAVENOUS at 15:50

## 2018-12-18 DIAGNOSIS — R91.1 LUNG NODULE: Primary | ICD-10-CM

## 2018-12-28 ENCOUNTER — HOSPITAL ENCOUNTER (OUTPATIENT)
Dept: CT IMAGING | Age: 58
Discharge: HOME | End: 2018-12-28
Attending: INTERNAL MEDICINE
Payer: MEDICAID

## 2018-12-28 DIAGNOSIS — R91.1 LUNG NODULE: ICD-10-CM

## 2018-12-28 PROCEDURE — 71250 CT THORAX DX C-: CPT

## 2018-12-31 DIAGNOSIS — R91.8 LUNG MASS: Primary | ICD-10-CM

## 2019-01-07 ENCOUNTER — TELEPHONE (OUTPATIENT)
Dept: PULMONOLOGY | Age: 59
End: 2019-01-07

## 2019-01-07 NOTE — TELEPHONE ENCOUNTER
Called and spoke to patient, he states he spoke with Kaity Steel from [address] and the earliest appointment they will have is going to be June 2019. They will give patient a call closer to that time to schedule. Also, patient states someone from scheduling should be calling him within the next few days to schedule his biopsy. Asked him to call me if he has not heard from anyone by Wednesday. Patient verbalized understanding.

## 2019-01-21 ENCOUNTER — APPOINTMENT (OUTPATIENT)
Dept: GENERAL RADIOLOGY | Age: 59
End: 2019-01-21
Attending: RADIOLOGY
Payer: MEDICAID

## 2019-01-21 ENCOUNTER — HOSPITAL ENCOUNTER (OUTPATIENT)
Dept: CT IMAGING | Age: 59
Discharge: HOME | End: 2019-01-21
Attending: RADIOLOGY | Admitting: RADIOLOGY
Payer: MEDICAID

## 2019-01-21 VITALS
BODY MASS INDEX: 29.25 KG/M2 | SYSTOLIC BLOOD PRESSURE: 129 MMHG | TEMPERATURE: 97.7 F | RESPIRATION RATE: 18 BRPM | OXYGEN SATURATION: 97 % | HEIGHT: 66 IN | HEART RATE: 86 BPM | DIASTOLIC BLOOD PRESSURE: 70 MMHG | WEIGHT: 182 LBS

## 2019-01-21 DIAGNOSIS — R91.8 LUNG MASS: ICD-10-CM

## 2019-01-21 LAB
ANION GAP SERPL CALC-SCNC: 7 MMOL/L (ref 3–18)
APTT PPP: 28.8 SEC (ref 23–36.4)
BUN SERPL-MCNC: 18 MG/DL (ref 7–18)
BUN/CREAT SERPL: 29 (ref 12–20)
CALCIUM SERPL-MCNC: 9.2 MG/DL (ref 8.5–10.1)
CHLORIDE SERPL-SCNC: 101 MMOL/L (ref 100–108)
CO2 SERPL-SCNC: 32 MMOL/L (ref 21–32)
CREAT SERPL-MCNC: 0.62 MG/DL (ref 0.6–1.3)
ERYTHROCYTE [DISTWIDTH] IN BLOOD BY AUTOMATED COUNT: 15 % (ref 11.6–14.5)
GLUCOSE SERPL-MCNC: 105 MG/DL (ref 74–99)
HCT VFR BLD AUTO: 44.9 % (ref 36–48)
HGB BLD-MCNC: 15.1 G/DL (ref 13–16)
INR PPP: 1 (ref 0.8–1.2)
MCH RBC QN AUTO: 29.5 PG (ref 24–34)
MCHC RBC AUTO-ENTMCNC: 33.6 G/DL (ref 31–37)
MCV RBC AUTO: 87.9 FL (ref 74–97)
PLATELET # BLD AUTO: 355 K/UL (ref 135–420)
PMV BLD AUTO: 10.4 FL (ref 9.2–11.8)
POTASSIUM SERPL-SCNC: 3.1 MMOL/L (ref 3.5–5.5)
PROTHROMBIN TIME: 12.6 SEC (ref 11.5–15.2)
RBC # BLD AUTO: 5.11 M/UL (ref 4.7–5.5)
SODIUM SERPL-SCNC: 140 MMOL/L (ref 136–145)
WBC # BLD AUTO: 11.8 K/UL (ref 4.6–13.2)

## 2019-01-21 PROCEDURE — 74011250636 HC RX REV CODE- 250/636: Performed by: INTERNAL MEDICINE

## 2019-01-21 PROCEDURE — 32405 CT BX LUNG NDL PERC: CPT

## 2019-01-21 PROCEDURE — 71045 X-RAY EXAM CHEST 1 VIEW: CPT

## 2019-01-21 PROCEDURE — 85610 PROTHROMBIN TIME: CPT

## 2019-01-21 PROCEDURE — 85730 THROMBOPLASTIN TIME PARTIAL: CPT

## 2019-01-21 PROCEDURE — 74011250636 HC RX REV CODE- 250/636: Performed by: RADIOLOGY

## 2019-01-21 PROCEDURE — 74011250636 HC RX REV CODE- 250/636

## 2019-01-21 PROCEDURE — 85027 COMPLETE CBC AUTOMATED: CPT

## 2019-01-21 PROCEDURE — 80048 BASIC METABOLIC PNL TOTAL CA: CPT

## 2019-01-21 PROCEDURE — 88334 PATH CONSLTJ SURG CYTO XM EA: CPT

## 2019-01-21 PROCEDURE — 88305 TISSUE EXAM BY PATHOLOGIST: CPT

## 2019-01-21 PROCEDURE — 88313 SPECIAL STAINS GROUP 2: CPT

## 2019-01-21 PROCEDURE — 88333 PATH CONSLTJ SURG CYTO XM 1: CPT

## 2019-01-21 RX ORDER — SODIUM CHLORIDE 9 MG/ML
20 INJECTION, SOLUTION INTRAVENOUS CONTINUOUS
Status: DISCONTINUED | OUTPATIENT
Start: 2019-01-21 | End: 2019-01-21 | Stop reason: HOSPADM

## 2019-01-21 RX ORDER — MIDAZOLAM HYDROCHLORIDE 1 MG/ML
1 INJECTION, SOLUTION INTRAMUSCULAR; INTRAVENOUS
Status: DISCONTINUED | OUTPATIENT
Start: 2019-01-21 | End: 2019-01-21 | Stop reason: HOSPADM

## 2019-01-21 RX ORDER — FENTANYL CITRATE 50 UG/ML
12.5-5 INJECTION, SOLUTION INTRAMUSCULAR; INTRAVENOUS
Status: DISCONTINUED | OUTPATIENT
Start: 2019-01-21 | End: 2019-01-21 | Stop reason: HOSPADM

## 2019-01-21 RX ORDER — OXYCODONE AND ACETAMINOPHEN 5; 325 MG/1; MG/1
1 TABLET ORAL
Status: DISCONTINUED | OUTPATIENT
Start: 2019-01-21 | End: 2019-01-21 | Stop reason: HOSPADM

## 2019-01-21 RX ORDER — LIDOCAINE HYDROCHLORIDE 10 MG/ML
INJECTION, SOLUTION EPIDURAL; INFILTRATION; INTRACAUDAL; PERINEURAL
Status: COMPLETED
Start: 2019-01-21 | End: 2019-01-21

## 2019-01-21 RX ADMIN — MIDAZOLAM HYDROCHLORIDE 1 MG: 2 INJECTION, SOLUTION INTRAMUSCULAR; INTRAVENOUS at 10:35

## 2019-01-21 RX ADMIN — FENTANYL CITRATE 50 MCG: 50 INJECTION INTRAMUSCULAR; INTRAVENOUS at 10:35

## 2019-01-21 RX ADMIN — FENTANYL CITRATE 25 MCG: 50 INJECTION INTRAMUSCULAR; INTRAVENOUS at 10:45

## 2019-01-21 RX ADMIN — MIDAZOLAM HYDROCHLORIDE 0.5 MG: 2 INJECTION, SOLUTION INTRAMUSCULAR; INTRAVENOUS at 10:40

## 2019-01-21 RX ADMIN — FENTANYL CITRATE 25 MCG: 50 INJECTION INTRAMUSCULAR; INTRAVENOUS at 10:40

## 2019-01-21 RX ADMIN — MIDAZOLAM HYDROCHLORIDE 0.5 MG: 2 INJECTION, SOLUTION INTRAMUSCULAR; INTRAVENOUS at 10:45

## 2019-01-21 RX ADMIN — SODIUM CHLORIDE 20 ML/HR: 900 INJECTION, SOLUTION INTRAVENOUS at 07:37

## 2019-01-21 RX ADMIN — LIDOCAINE HYDROCHLORIDE 30 ML: 10 INJECTION, SOLUTION EPIDURAL; INFILTRATION; INTRACAUDAL; PERINEURAL at 11:14

## 2019-01-21 NOTE — PROGRESS NOTES
TRANSFER - OUT REPORT:    Verbal report given to Domenic Jackson RN(name) on [facility].  being transferred to Phase 2(unit) for routine post - op       Report consisted of patients Situation, Background, Assessment and   Recommendations(SBAR). Information from the following report(s) SBAR, Kardex, Procedure Summary and MAR was reviewed with the receiving nurse. Lines:   Peripheral IV 01/21/19 Left Hand (Active)   Site Assessment Clean, dry, & intact 1/21/2019  7:13 AM   Phlebitis Assessment 0 1/21/2019 11:48 AM   Infiltration Assessment 0 1/21/2019 11:48 AM   Dressing Status Clean, dry, & intact; Occlusive 1/21/2019  7:13 AM   Dressing Type Transparent 1/21/2019  7:13 AM   Hub Color/Line Status Capped 1/21/2019 11:48 AM        Opportunity for questions and clarification was provided.       Patient transported with:   [employer]

## 2019-01-21 NOTE — DISCHARGE INSTRUCTIONS
Percutaneous Lung Biopsy: What to Expect at [address] [place] [address]    A percutaneous (say \"per-liannaw-NINO-nee-us) lung biopsy is a procedure to take a sample (biopsy) of lung tissue. The doctor puts a long needle through your chest wall to get the sample. Another doctor will look at the lung tissue with a microscope to check for infection, cancer, or other lung problems. This procedure is also called a needle biopsy. You may be sore where the doctor made the cut (incision) in your skin and put in the biopsy needle. You may feel some pain in your lung when you take a deep breath. These symptoms usually get better in a few days. If you cough up mucus, there may be streaks of blood in the mucus for the first week after the procedure. You may need to take it easy at home for a day or two after the procedure. For 1 week, try to avoid heavy lifting and strenuous activities. These activities could cause bleeding from the biopsy site. It can take several days to get the results of the biopsy. The doctor or nurse will discuss the results with you. This care sheet gives you a general idea about how long it will take for you to recover. But each person recovers at a different pace. Follow the steps below to feel better as quickly as possible. How can you care for yourself at home? Activity    · Rest when you feel tired. Getting enough sleep will help you recover.     · Try to walk each day. Start by walking a little more than you did the day before. Bit by bit, increase the amount you walk. Walking boosts blood flow and helps prevent pneumonia and constipation.     · Avoid strenuous activities, such as bicycle riding, jogging, weight lifting, or aerobic exercise, for 1 week or until your doctor says it is okay.     · For 1 week, avoid lifting anything that would make you strain.  This may include a child, heavy grocery bags and milk containers, a heavy briefcase or backpack, cat litter or dog food bags, or a vacuum .     · Ask your doctor when you can drive again.     · You may need to take 1 or 2 days off from work. It depends on the type of work you do and how you feel.     · You may shower 1 or 2 days after the procedure, if your doctor says it is okay. Pat the incision dry. Do not take a bath for the first week, or until your doctor tells you it is okay.     · Do not fly in an airplane or dive deeply (such as in scuba diving) until your doctor tells you it is okay. Avoid any situations where there is increased air pressure. Diet    · You can eat your normal diet. If your stomach is upset, try bland, low-fat foods like plain rice, broiled chicken, toast, and yogurt. Medicines    · Your doctor will tell you if and when you can restart your medicines. He or she will also give you instructions about taking any new medicines.     · If you take blood thinners, such as warfarin (Coumadin), clopidogrel (Plavix), or aspirin, be sure to talk to your doctor. He or she will tell you if and when to start taking those medicines again. Make sure that you understand exactly what your doctor wants you to do.     · Be safe with medicines. Take pain medicines exactly as directed. ? If the doctor gave you a prescription medicine for pain, take it as prescribed. ? If you are not taking a prescription pain medicine, ask your doctor if you can take an over-the-counter medicine.     · If you think your pain medicine is making you sick to your stomach:  ? Take your medicine after meals (unless your doctor has told you not to). ? Ask your doctor for a different pain medicine.     · If your doctor prescribed antibiotics, take them as directed. Do not stop taking them just because you feel better. You need to take the full course of antibiotics. Incision care    · If you have strips of tape on the incision, leave the tape on for a week or until it falls off.     · Wash the area daily with warm, soapy water and pat it dry.  Don't use hydrogen peroxide or alcohol, which can slow healing. You may cover the area with a gauze bandage if it weeps or rubs against clothing. Change the bandage every day.     · Keep the area clean and dry. Follow-up care is a key part of your treatment and safety. Be sure to make and go to all appointments, and call your doctor if you are having problems. It's also a good idea to know your test results and keep a list of the medicines you take. When should you call for help? Call 911 anytime you think you may need emergency care. For example, call if:    · You passed out (lost consciousness).     · You have severe trouble breathing.     · You have sudden chest pain and shortness of breath, or you cough up blood.    Call your doctor now or seek immediate medical care if:    · You are sick to your stomach or cannot keep fluids down.     · You have pain that does not get better after you take pain medicine.     · You have a fever over 100°F.     · You have signs of infection, such as:  ? Increased pain, swelling, warmth, or redness. ? Red streaks leading from the incision. ? Pus draining from the incision. ? Swollen lymph nodes in your neck, armpits, or groin. ? A fever.     · Bright red blood has soaked through the bandage over your incision.     · You cough up a lot more mucus than normal, or the mucus changes color.    Watch closely for changes in your health, and be sure to contact your doctor if you have any problems. Where can you learn more? Go to http://andres-heide.info/. Enter J630 in the search box to learn more about \"Percutaneous Lung Biopsy: What to Expect at Home. \"  Current as of: September 5, 2018  Content Version: 11.9  © 3829-9967 [employer]. Care instructions adapted under license by [employer] (which disclaims liability or warranty for this information).  If you have questions about a medical condition or this instruction, always ask your healthcare inna. [address] any warranty or liability for your use of this information. DISCHARGE SUMMARY from Nurse    PATIENT INSTRUCTIONS:    After general anesthesia or intravenous sedation, for 24 hours or while taking prescription Narcotics:  · Limit your activities  · Do not drive and operate hazardous machinery  · Do not make important personal or business decisions  · Do  not drink alcoholic beverages  · If you have not urinated within 8 hours after discharge, please contact your surgeon on call. Report the following to your surgeon:  · Excessive pain, swelling, redness or odor of or around the surgical area  · Temperature over 100.5  · Nausea and vomiting lasting longer than 4 hours or if unable to take medications  · Any signs of decreased circulation or nerve impairment to extremity: change in color, persistent  numbness, tingling, coldness or increase pain  · Any questions    *  Please give a list of your current medications to your Primary Care Provider. *  Please update this list whenever your medications are discontinued, doses are      changed, or new medications (including over-the-counter products) are added. *  Please carry medication information at all times in case of emergency situations. These are general instructions for a healthy lifestyle:    No smoking/ No tobacco products/ Avoid exposure to second hand smoke  Surgeon General's Warning:  Quitting smoking now greatly reduces serious risk to your health.     Obesity, smoking, and sedentary lifestyle greatly increases your risk for illness    A healthy diet, regular physical exercise & weight monitoring are important for maintaining a healthy lifestyle    You may be retaining fluid if you have a history of heart failure or if you experience any of the following symptoms:  Weight gain of 3 pounds or more overnight or 5 pounds in a week, increased swelling in our hands or feet or shortness of breath while lying flat in bed. Please call your doctor as soon as you notice any of these symptoms; do not wait until your next office visit. Recognize signs and symptoms of STROKE:    F-face looks uneven    A-arms unable to move or move unevenly    S-speech slurred or non-existent    T-time-call 911 as soon as signs and symptoms begin-DO NOT go       Back to bed or wait to see if you get better-TIME IS BRAIN. Warning Signs of HEART ATTACK     Call 911 if you have these symptoms:   Chest discomfort. Most heart attacks involve discomfort in the center of the chest that lasts more than a few minutes, or that goes away and comes back. It can feel like uncomfortable pressure, squeezing, fullness, or pain.  Discomfort in other areas of the upper body. Symptoms can include pain or discomfort in one or both arms, the back, neck, jaw, or stomach.  Shortness of breath with or without chest discomfort.  Other signs may include breaking out in a cold sweat, nausea, or lightheadedness. Don't wait more than five minutes to call 911 - MINUTES MATTER! Fast action can save your life. Calling 911 is almost always the fastest way to get lifesaving treatment. Emergency Medical Services staff can begin treatment when they arrive -- up to an hour sooner than if someone gets to the hospital by car. The discharge information has been reviewed with the patient/girlfriend. The patient/girlfriend verbalized understanding. Discharge medications reviewed with the patient/girlfriend and appropriate educational materials and side effects teaching were provided.   ___________________________________________________________________________________________________________________________________

## 2019-01-21 NOTE — PROCEDURES
RADIOLOGY POST PROCEDURE NOTE     January 21, 2019       11:11 AM     Preoperative Diagnosis:   VEENA lung mass. Postoperative Diagnosis:  Same. :  Dr. Mary Tillman    Assistant:  None. Type of Anesthesia: 1% plain lidocaine and IV moderate sedation with Versed and Fentanyl. Procedure/Description:  Image guided VEENA lung mass core needle Bx. Findings:   No PTX or bleeding. Estimated blood Loss:  Minimal    Specimen Removed:   yes    Blood transfusions:  None. Implants:  None. Complications: None    Condition: Stable    Discharge Plan:  discharge home  if CXR is normal and patient is stable.     Wilma Borrero MD

## 2019-01-21 NOTE — PERIOP NOTES
Patient confirmed by two identifiers with discharge instructions prior too being provided to patient and mother/girlfriend.

## 2019-01-21 NOTE — H&P
OUTPATIENT HISTORY AND PHYSICAL      Today 1/21/2019     Indication/Symptoms:   Rosy Segovia. is a 60 y.o. male here for image guided VEENA lung mass core needle Bx. Current Meds:    Prior to Admission medications    Medication Sig Start Date End Date Taking? Authorizing Provider   sildenafil, antihypertensive, (REVATIO) 20 mg tablet Take PO 1-5 tabs PRN. 12/11/18  Yes Trae Franco MD   HYDROcodone-acetaminophen [facility]) 7.5-325 mg per tablet Take 1 Tab by mouth four (4) times daily as needed. 5/30/17  Yes Provider, Historical   amLODIPine (NORVASC) 10 mg tablet TAKE 1 TABLET BY MOUTH ONCE DAILY 5/15/17  Yes Provider, Historical   doxazosin (CARDURA) 8 mg tablet TAKE 1 TABLET BY MOUTH BEFORE BEDTIME FOR 30 DAYS 5/15/17  Yes Provider, Historical   triamterene-hydroCHLOROthiazide (MAXZIDE) 37.5-25 mg per tablet TAKE 1 TABLET BY MOUTH ONCE DAILY 5/15/17  Yes Provider, Historical   pantoprazole (PROTONIX) 40 mg tablet Take 1 Tab by mouth Before breakfast and dinner. Indications: Upper GI Bleed 3/18/17  Yes Haydee Herring MD   modafinil (PROVIGIL) 200 mg tablet TAKE 1 TABLET BY MOUTH TWICE A DAY 7/27/16  Yes Provider, Historical   fluticasone-salmeterol (ADVAIR DISKUS) 250-50 mcg/dose diskus inhaler USE 2 PUFFS ORALLY 2 TIMES A DAY AS DIRECTED 10/23/18   Bairon Keller MD   albuterol [facility]) 90 mcg/actuation inhaler Take 2 Puffs by inhalation every four (4) hours as needed for Wheezing. 10/23/18   Bairon Keller MD   predniSONE (DELTASONE) 20 mg tablet Take 2 Tabs by mouth daily (with breakfast). 9/4/18   Bairon Keller MD   albuterol (PROVENTIL VENTOLIN) 2.5 mg /3 mL (0.083 %) nebulizer solution 3 mL by Nebulization route every four (4) hours as needed for Wheezing. 6/4/18   Bairon Keller MD   ibuprofen (MOTRIN) 600 mg tablet Take 1 Tab by mouth every six (6) hours as needed for Pain.  8/29/17   Bertram Koyanagi, PA   testosterone (ANDROGEL) 20.25 mg/1.25 gram (1.62 %) gel Use 4 pumps once daily 7/6/17   Jassi Echevarria MD   aspirin delayed-release 81 mg tablet TAKE 1 TABLET BY MOUTH ONCE DAILY FOR 30 DAYS 5/15/17   Provider, Historical   diclofenac (VOLTAREN) 1 % gel  5/17/17   Provider, Historical   albuterol (PROVENTIL VENTOLIN) 2.5 mg /3 mL (0.083 %) nebulizer solution INHALE CONTENTS OF 1 VIAL VIA NEBULIZER EVERY FOUR (4) HOURS AS NEEDED FOR WHEEZING. 5/11/17   Dameon Ibrahim NP   sucralfate (CARAFATE) 1 gram tablet Take 1 Tab by mouth Before breakfast, lunch, dinner and at bedtime. 3/18/17   Darien Olsen MD   ferrous sulfate (FEROSUL) 325 mg (65 mg iron) tablet Take 1 Tab by mouth Daily (before breakfast). Indications: IRON DEFICIENCY ANEMIA 3/18/17   Darien Olsen MD   bipap machine kit by Does Not Apply route nightly. Provider, Historical   loratadine (CLARITIN) 10 mg tablet  4/18/16   Provider, Historical   THERAPEUTIC-M 9 mg iron-400 mcg tab tablet  4/18/16   Provider, Historical   OXYGEN-AIR DELIVERY SYSTEMS by Does Not Apply route.  3 liters hs with cpap   1 liter with activity  [address]    Provider, Historical       Allergies:    No Known Allergies    Comorbid Conditions:    Past Medical History:   Diagnosis Date    Essential hypertension     Essential hypertension, benign     Gout     Hypogonadism male     Impotence     Microscopic hematuria     Myopathy     Obesity, unspecified     Obstructive sleep apnea     on bipap    Other chronic pulmonary heart diseases     Peripheral neuropathy 10/25/2012    Sarcoid 10/25/2012          Past Surgical History:   Procedure Laterality Date    COLONOSCOPY N/A 4/21/2017    COLONOSCOPY with polypectomy performed by Keon Guerrier MD at [address] HX APPENDECTOMY      HX ENDOSCOPY  03/2017    HX HEART CATHETERIZATION      HX HEMORRHOIDECTOMY       Data:    Visit Vitals  /78 (BP 1 Location: Right arm, BP Patient Position: At rest)   Pulse 91   Temp 97.7 °F (36.5 °C)   Resp 18   Ht 5' 6\" (1.676 m) Wt 82.6 kg (182 lb)   SpO2 99%   BMI 29.38 kg/m²   :  Recent Labs     01/21/19  0708        Recent Labs     01/21/19  0708   INR 1.0   APTT 28.8       The H & P and/or progress notes and any available imaging were reviewed. The risks, indications and possible alternatives to the procedure, including doing nothing, were discussed and informed consent was obtained. Physical Exam:      Mental status:   Alert and oriented. Examination specific to the procedure proposed to be performed and any co morbid conditions:   Mallampati classification 2 ,  ASA2   Heart:   RRR. Lungs:   CTAB. No wheezes, rales or rhonchi. The patient is an appropriate candidate to undergo the planned procedure and sedation.     Shamir Lynch MD

## 2019-04-26 NOTE — TELEPHONE ENCOUNTER
We received a fax from [facility] requesting refill on Advair 250/50. Last ordered 10/23/18. The pt. was last seen 12/4/18. The order is pended to Dr. Rosalio Fung.

## 2019-04-29 RX ORDER — FLUTICASONE PROPIONATE AND SALMETEROL 250; 50 UG/1; UG/1
POWDER RESPIRATORY (INHALATION)
Qty: 1 INHALER | Refills: 5 | Status: SHIPPED | OUTPATIENT
Start: 2019-04-29 | End: 2019-04-30 | Stop reason: CLARIF

## 2019-04-30 RX ORDER — FLUTICASONE PROPIONATE AND SALMETEROL 250; 50 UG/1; UG/1
1 POWDER RESPIRATORY (INHALATION) 2 TIMES DAILY
Qty: 1 INHALER | Refills: 5 | Status: SHIPPED | OUTPATIENT
Start: 2019-04-30 | End: 2019-10-24 | Stop reason: SDUPTHER

## 2019-05-13 ENCOUNTER — HOSPITAL ENCOUNTER (OUTPATIENT)
Dept: LAB | Age: 59
Discharge: HOME | End: 2019-05-13
Payer: MEDICAID

## 2019-05-13 ENCOUNTER — HOSPITAL ENCOUNTER (OUTPATIENT)
Dept: GENERAL RADIOLOGY | Age: 59
Discharge: HOME | End: 2019-05-13
Payer: MEDICAID

## 2019-05-13 DIAGNOSIS — M25.572 LEFT ANKLE PAIN: ICD-10-CM

## 2019-05-13 LAB
ALBUMIN SERPL-MCNC: 3.3 G/DL (ref 3.4–5)
ALBUMIN/GLOB SERPL: 0.8 {RATIO} (ref 0.8–1.7)
ALP SERPL-CCNC: 63 U/L (ref 45–117)
ALT SERPL-CCNC: 25 U/L (ref 16–61)
ANION GAP SERPL CALC-SCNC: 5 MMOL/L (ref 3–18)
AST SERPL-CCNC: 24 U/L (ref 15–37)
BASOPHILS # BLD: 0 K/UL (ref 0–0.1)
BASOPHILS NFR BLD: 0 % (ref 0–2)
BILIRUB SERPL-MCNC: 0.7 MG/DL (ref 0.2–1)
BUN SERPL-MCNC: 19 MG/DL (ref 7–18)
BUN/CREAT SERPL: 48 (ref 12–20)
CALCIUM SERPL-MCNC: 9.3 MG/DL (ref 8.5–10.1)
CHLORIDE SERPL-SCNC: 106 MMOL/L (ref 100–108)
CHOLEST SERPL-MCNC: 181 MG/DL
CO2 SERPL-SCNC: 32 MMOL/L (ref 21–32)
CREAT SERPL-MCNC: 0.4 MG/DL (ref 0.6–1.3)
DIFFERENTIAL METHOD BLD: ABNORMAL
EOSINOPHIL # BLD: 0.1 K/UL (ref 0–0.4)
EOSINOPHIL NFR BLD: 1 % (ref 0–5)
ERYTHROCYTE [DISTWIDTH] IN BLOOD BY AUTOMATED COUNT: 14.6 % (ref 11.6–14.5)
GLOBULIN SER CALC-MCNC: 3.9 G/DL (ref 2–4)
GLUCOSE SERPL-MCNC: 93 MG/DL (ref 74–99)
HCT VFR BLD AUTO: 39.5 % (ref 36–48)
HDLC SERPL-MCNC: 86 MG/DL (ref 40–60)
HDLC SERPL: 2.1 {RATIO} (ref 0–5)
HGB BLD-MCNC: 12.9 G/DL (ref 13–16)
LDLC SERPL CALC-MCNC: 85.6 MG/DL (ref 0–100)
LIPID PROFILE,FLP: ABNORMAL
LYMPHOCYTES # BLD: 1.2 K/UL (ref 0.9–3.6)
LYMPHOCYTES NFR BLD: 15 % (ref 21–52)
MCH RBC QN AUTO: 28.5 PG (ref 24–34)
MCHC RBC AUTO-ENTMCNC: 32.7 G/DL (ref 31–37)
MCV RBC AUTO: 87.2 FL (ref 74–97)
MONOCYTES # BLD: 0.8 K/UL (ref 0.05–1.2)
MONOCYTES NFR BLD: 10 % (ref 3–10)
NEUTS SEG # BLD: 5.7 K/UL (ref 1.8–8)
NEUTS SEG NFR BLD: 74 % (ref 40–73)
PLATELET # BLD AUTO: 350 K/UL (ref 135–420)
PMV BLD AUTO: 10.1 FL (ref 9.2–11.8)
POTASSIUM SERPL-SCNC: 4.4 MMOL/L (ref 3.5–5.5)
PROT SERPL-MCNC: 7.2 G/DL (ref 6.4–8.2)
RBC # BLD AUTO: 4.53 M/UL (ref 4.7–5.5)
SODIUM SERPL-SCNC: 143 MMOL/L (ref 136–145)
T4 SERPL-MCNC: 8.5 UG/DL (ref 4.7–13.3)
TRIGL SERPL-MCNC: 47 MG/DL (ref ?–150)
TSH SERPL DL<=0.05 MIU/L-ACNC: 1.15 UIU/ML (ref 0.36–3.74)
VLDLC SERPL CALC-MCNC: 9.4 MG/DL
WBC # BLD AUTO: 7.8 K/UL (ref 4.6–13.2)

## 2019-05-13 PROCEDURE — 80061 LIPID PANEL: CPT

## 2019-05-13 PROCEDURE — 84443 ASSAY THYROID STIM HORMONE: CPT

## 2019-05-13 PROCEDURE — 84436 ASSAY OF TOTAL THYROXINE: CPT

## 2019-05-13 PROCEDURE — 73630 X-RAY EXAM OF FOOT: CPT

## 2019-05-13 PROCEDURE — 80053 COMPREHEN METABOLIC PANEL: CPT

## 2019-05-13 PROCEDURE — 85025 COMPLETE CBC W/AUTO DIFF WBC: CPT

## 2019-05-13 PROCEDURE — 73610 X-RAY EXAM OF ANKLE: CPT

## 2019-05-13 PROCEDURE — 36415 COLL VENOUS BLD VENIPUNCTURE: CPT

## 2019-05-30 ENCOUNTER — OFFICE VISIT (OUTPATIENT)
Dept: ORTHOPEDIC SURGERY | Age: 59
End: 2019-05-30

## 2019-05-30 VITALS
BODY MASS INDEX: 29.38 KG/M2 | DIASTOLIC BLOOD PRESSURE: 74 MMHG | RESPIRATION RATE: 18 BRPM | SYSTOLIC BLOOD PRESSURE: 120 MMHG | OXYGEN SATURATION: 99 % | TEMPERATURE: 96.9 F | HEART RATE: 86 BPM | HEIGHT: 66 IN

## 2019-05-30 DIAGNOSIS — M25.572 LEFT ANKLE PAIN, UNSPECIFIED CHRONICITY: Primary | ICD-10-CM

## 2019-05-30 DIAGNOSIS — M25.472 LEFT ANKLE SWELLING: ICD-10-CM

## 2019-05-30 NOTE — PROGRESS NOTES
HISTORY OF PRESENT ILLNESS:  Alysha Chapman is a pleasant, 58-year-old, generally fit, -American male who presents to the office with symptoms that associate with his left ankle. Apparently, he is a nonambulatory, and as he was making a transition on a slideboard from a seated position at bedside to his wheelchair, he twisted his ankle inward. This happened about one month ago. He did see Podiatry in the [place] area, and they recommended giving a low-dose cortisone injection to his heel region. The injection was given, and he had pain relief for two days. Since then, his pain has returned, and swelling is present. He is very uncomfortable. ALLERGIES:  None. REVIEW OF SYSTEMS:  No chest pain. No shortness of breath. No fever, chills, or night sweats. No rash, no itching. No nausea and no vomiting. Pain to the left ankle at rest with his knee bent 90ø is generally 2-3/10. When he attempts to use his leg for balance on transfers, bed to chair, chair to bed, bed to bath, etcetera, the pain increases to upwards of a 7-8/10 with pain on the lateral side of the ankle just above the \"outer ankle bone\". He has tried Motrin with some success in symptom relief. PHYSICAL EXAM:  He is a healthy-appearing, 58-year-old, generally fit, -American male, atraumatic, normocephalic, alert, and oriented times three, sitting on the table comfortably. His BMI is 29.38. Height is 5'6\" with a weight of 182 pounds. Examination of the left lower extremity reveals no warmth, erythema, or ecchymosis. He does have diffuse swelling circumferentially around the left ankle just starting about 2 centimeters proximal to the superior pole of the lateral malleolus and even extending into the dorsum of the left foot. Inversion stressing causes increased pain to the lateral ankle just to the posterior superior area behind the lateral malleolus.   He has guarded plantarflexion, which is weak and no active dorsiflexion. There is a negative anterior drawer sign. RADIOGRAPHS:  X-rays from a hospital visit revealed no osseous deformities, lesions, masses, or bony fractures. IMPRESSION:      1. Lateral grade II ankle sprain of the left ankle. 2. Left ankle pain. 3. Left ankle swelling. PLAN:   I am currently recommending a figure of eight lace-up brace. Essentially, he is a nonambulatory, so the brace can be worn only when he is up and about during the day, since he says he does not get up to transfer to the bathroom in the late evening and overnight. We will plan on seeing him in about three weeks. He was recommended a therapeutic dose of anti-inflammatories such as Motrin up to three times a day per 's directions. Otherwise, all his questions were answered to his satisfaction.

## 2019-05-30 NOTE — PROGRESS NOTES
1. Have you been to the ER, urgent care clinic since your last visit? Hospitalized since your last visit? No    2. Have you seen or consulted any other health care providers outside of the [address] [place] [address] since your last visit? Include any pap smears or colon screening.  No

## 2019-06-03 ENCOUNTER — OFFICE VISIT (OUTPATIENT)
Dept: PULMONOLOGY | Age: 59
End: 2019-06-03

## 2019-06-03 VITALS
BODY MASS INDEX: 29.25 KG/M2 | DIASTOLIC BLOOD PRESSURE: 70 MMHG | HEART RATE: 81 BPM | SYSTOLIC BLOOD PRESSURE: 124 MMHG | OXYGEN SATURATION: 99 % | WEIGHT: 182 LBS | HEIGHT: 66 IN | RESPIRATION RATE: 16 BRPM | TEMPERATURE: 97.7 F

## 2019-06-03 DIAGNOSIS — G72.9 MYOPATHY: ICD-10-CM

## 2019-06-03 DIAGNOSIS — J45.909 UNCOMPLICATED ASTHMA, UNSPECIFIED ASTHMA SEVERITY, UNSPECIFIED WHETHER PERSISTENT: ICD-10-CM

## 2019-06-03 DIAGNOSIS — G47.33 OBSTRUCTIVE APNEA: Primary | ICD-10-CM

## 2019-06-03 DIAGNOSIS — I27.21 PULMONARY ARTERY HYPERTENSION (HCC): ICD-10-CM

## 2019-06-03 DIAGNOSIS — G62.9 PERIPHERAL POLYNEUROPATHY: ICD-10-CM

## 2019-06-03 DIAGNOSIS — D86.9 SARCOIDOSIS: ICD-10-CM

## 2019-06-03 RX ORDER — LINACLOTIDE 290 UG/1
290 CAPSULE, GELATIN COATED ORAL
COMMUNITY

## 2019-06-03 NOTE — PROGRESS NOTES
Humera Bowles. presents today for   Chief Complaint   Patient presents with    Asthma     CXR done 1/21    Lung Mass    Sarcoidosis       Is someone accompanying this pt? No     Is the patient using any DME equipment during [address]? w/c    -DME Company n/a    Depression Screening:  3 most recent PHQ Screens 6/3/2019   PHQ Not Done -   Little interest or pleasure in doing things Not at all   Feeling down, depressed, irritable, or hopeless Not at all   Total Score PHQ 2 0       Learning Assessment:  Learning Assessment 1/18/2017   PRIMARY LEARNER Patient   HIGHEST LEVEL OF EDUCATION - PRIMARY LEARNER  2 YEARS OF COLLEGE   BARRIERS PRIMARY LEARNER -   PRIMARY LANGUAGE ENGLISH   LEARNER PREFERENCE PRIMARY READING   ANSWERED BY patient Vicente Guevara     bpsps   RELATIONSHIP SELF       Abuse Screening:  Abuse Screening Questionnaire 6/3/2019   Do you ever feel afraid of your partner? N   Are you in a relationship with someone who physically or mentally threatens you? N   Is it safe for you to go home? Y       Fall Risk  Fall Risk Assessment, last 12 mths 12/18/2017   Able to walk? No   Fall in past 12 months? No         Coordination of Care:  1. Have you been to the ER, urgent care clinic since your last visit? Hospitalized since your last visit? Yes; Name: [facility]    2. Have you seen or consulted any other health care providers outside of the [address] [place] [address] since your last visit? Include any pap smears or colon screening.  No

## 2019-06-03 NOTE — PATIENT INSTRUCTIONS
Advair 1 inhalation twice daily and remember to exhale fully before inhaling and also remember to wash mouth with water and spit it out after inhaling    Albuterol 2 inhalations every 4 hours as needed if you require albuterol too often to control respiratory symptoms call the office for severe symptoms go to the emergency room    Always call for symptoms such as increasing shortness of breath    Use CPAP nightly faithfully

## 2019-06-03 NOTE — PROGRESS NOTES
[facility]  Pulmonary, Critical Care, and Sleep Medicine      Chief complaint:  Sarcoidosis asthma sarcoidosis related myopathy and peripheral neuropathy obstructive sleep apnea and pulmonary hypertension    HPI:    Giselle Verma    is 60years old and returns the office today for follow-up and relates that his weakness in his upper extremities is stable now that he does not cough his shortness of breath is stable although he has a very sedentary life. He uses Advair twice daily and rarely requires albuterol. He also uses his CPAP faithfully and notes the significant improvement in his shortness of breath since using CPAP      No Known Allergies  Current Outpatient Medications   Medication Sig    linaclotide (LINZESS) 290 mcg cap capsule Take  by mouth Daily (before breakfast).  fluticasone propion-salmeterol (ADVAIR) 250-50 mcg/dose diskus inhaler Take 1 Puff by inhalation two (2) times a day.  albuterol (PROAIR HFA) 90 mcg/actuation inhaler Take 2 Puffs by inhalation every four (4) hours as needed for Wheezing.  ibuprofen (MOTRIN) 600 mg tablet Take 1 Tab by mouth every six (6) hours as needed for Pain.  HYDROcodone-acetaminophen (NORCO) 7.5-325 mg per tablet Take 1 Tab by mouth four (4) times daily as needed.  amLODIPine (NORVASC) 10 mg tablet TAKE 1 TABLET BY MOUTH ONCE DAILY    aspirin delayed-release 81 mg tablet TAKE 1 TABLET BY MOUTH ONCE DAILY FOR 30 DAYS    doxazosin (CARDURA) 8 mg tablet TAKE 1 TABLET BY MOUTH BEFORE BEDTIME FOR 30 DAYS    triamterene-hydroCHLOROthiazide (MAXZIDE) 37.5-25 mg per tablet TAKE 1 TABLET BY MOUTH ONCE DAILY    albuterol (PROVENTIL VENTOLIN) 2.5 mg /3 mL (0.083 %) nebulizer solution INHALE CONTENTS OF 1 VIAL VIA NEBULIZER EVERY FOUR (4) HOURS AS NEEDED FOR WHEEZING.  pantoprazole (PROTONIX) 40 mg tablet Take 1 Tab by mouth Before breakfast and dinner.  Indications: Upper GI Bleed    sucralfate (CARAFATE) 1 gram tablet Take 1 Tab by mouth Before breakfast, lunch, dinner and at bedtime.  bipap machine kit by Does Not Apply route nightly.  loratadine (CLARITIN) 10 mg tablet     THERAPEUTIC-M 9 mg iron-400 mcg tab tablet     OXYGEN-AIR DELIVERY SYSTEMS by Does Not Apply route. 3 liters hs with cpap   1 liter with activity  [address]    sildenafil, antihypertensive, (REVATIO) 20 mg tablet Take PO 1-5 tabs PRN.  predniSONE (DELTASONE) 20 mg tablet Take 2 Tabs by mouth daily (with breakfast).  albuterol (PROVENTIL VENTOLIN) 2.5 mg /3 mL (0.083 %) nebulizer solution 3 mL by Nebulization route every four (4) hours as needed for Wheezing.  diclofenac (VOLTAREN) 1 % gel     ferrous sulfate (FEROSUL) 325 mg (65 mg iron) tablet Take 1 Tab by mouth Daily (before breakfast). Indications: IRON DEFICIENCY ANEMIA    modafinil (PROVIGIL) 200 mg tablet TAKE 1 TABLET BY MOUTH TWICE A DAY     No current facility-administered medications for this visit.       Past Medical History:   Diagnosis Date    Essential hypertension     Essential hypertension, benign     Gout     Hypogonadism male     Impotence     Microscopic hematuria     Myopathy     Obesity, unspecified     Obstructive sleep apnea     on bipap    Other chronic pulmonary heart diseases     Peripheral neuropathy 10/25/2012    Sarcoid 10/25/2012     Past Surgical History:   Procedure Laterality Date    COLONOSCOPY N/A 4/21/2017    COLONOSCOPY with polypectomy performed by Vu Culver MD at [address] HX APPENDECTOMY      HX ENDOSCOPY  03/2017    HX HEART CATHETERIZATION      HX HEMORRHOIDECTOMY       Social History     Socioeconomic History    Marital status:      Spouse name: Not on file    Number of children: Not on file    Years of education: Not on file    Highest education level: Not on file   Occupational History    Not on file   Social Needs    Financial resource strain: Not on file    Food insecurity:     Worry: Not on file Inability: Not on file    Transportation needs:     Medical: Not on file     Non-medical: Not on file   Tobacco Use    Smoking status: Never Smoker    Smokeless tobacco: Never Used   Substance and Sexual Activity    Alcohol use: Yes     Comment: daily wine at dinner or beer    Drug use: No    Sexual activity: Yes   Lifestyle    Physical activity:     Days per week: Not on file     Minutes per session: Not on file    Stress: Not on file   Relationships    Social connections:     Talks on phone: Not on file     Gets together: Not on file     Attends Latter-day service: Not on file     Active member of club or organization: Not on file     Attends meetings of clubs or organizations: Not on file     Relationship status: Not on file    Intimate partner violence:     Fear of current or ex partner: Not on file     Emotionally abused: Not on file     Physically abused: Not on file     Forced sexual activity: Not on file   Other Topics Concern    Not on file   Social History Narrative    Not on file     Family History   Problem Relation Age of Onset    Hypertension Father     Lung Disease Father     Heart Attack Father 61       Review of systems:  He denies fever chills poor appetite or weight loss  He required an injection in his left lower extremity recently for inflammation  Physical Exam:  Visit Vitals  /70 (BP 1 Location: Left arm, BP Patient Position: Sitting)   Pulse 81   Temp 97.7 °F (36.5 °C) (Oral)   Resp 16   Ht 5' 6\" (1.676 m)   Wt 82.6 kg (182 lb)   SpO2 99%   BMI 29.38 kg/m²       Well-developed well-nourished  HEENT: WNL  Lymph node exam: Supraclavicular cervical lymph nodes negative  Chest: Equal symmetrical expansion no dullness and absence of wheezes rales rubs  Heart: Regular rhythm no gallop no murmur no JVD no peripheral edema in the right lower extremity trace pretibial edema in the left lower extremity  Extremities: No cyanosis clubbing or calf tenderness  Neurological: Alert and oriented with upper and lower extremity weakness    Labs:    O2 sat 99% room air at rest      Impression:     By history physical exam sarcoidosis pulmonary hypertension sleep apnea seem to be well controlled. 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

## 2019-07-24 ENCOUNTER — TELEPHONE (OUTPATIENT)
Dept: PULMONOLOGY | Age: 59
End: 2019-07-24

## 2019-07-29 RX ORDER — ALBUTEROL SULFATE 90 UG/1
AEROSOL, METERED RESPIRATORY (INHALATION)
Qty: 8.5 G | Refills: 4 | Status: SHIPPED | OUTPATIENT
Start: 2019-07-29 | End: 2019-10-28 | Stop reason: SDUPTHER

## 2019-10-28 RX ORDER — ALBUTEROL SULFATE 90 UG/1
AEROSOL, METERED RESPIRATORY (INHALATION)
Qty: 8.5 G | Refills: 2 | Status: SHIPPED | OUTPATIENT
Start: 2019-10-28 | End: 2020-03-30

## 2019-10-28 RX ORDER — ALBUTEROL SULFATE 0.83 MG/ML
SOLUTION RESPIRATORY (INHALATION)
Qty: 60 EACH | Refills: 3 | Status: SHIPPED | OUTPATIENT
Start: 2019-10-28 | End: 2020-12-21

## 2019-10-28 RX ORDER — FLUTICASONE PROPIONATE AND SALMETEROL 50; 250 UG/1; UG/1
POWDER RESPIRATORY (INHALATION)
Qty: 1 INHALER | Refills: 5 | Status: SHIPPED | OUTPATIENT
Start: 2019-10-28 | End: 2020-01-27 | Stop reason: CLARIF

## 2019-10-28 NOTE — TELEPHONE ENCOUNTER
Pt requesting albuterol sol and Proair to be sent to [facility], [place]. They sent request last week. Pt states he is out of solution. Please call pt at [phone] once done.

## 2019-12-21 ENCOUNTER — HOSPITAL ENCOUNTER (OUTPATIENT)
Dept: LAB | Age: 59
Discharge: HOME | End: 2019-12-21

## 2019-12-21 LAB — XX-LABCORP SPECIMEN COL,LCBCF: NORMAL

## 2019-12-21 PROCEDURE — 99001 SPECIMEN HANDLING PT-LAB: CPT

## 2020-01-17 ENCOUNTER — TELEPHONE (OUTPATIENT)
Dept: PULMONOLOGY | Age: 60
End: 2020-01-17

## 2020-01-17 NOTE — TELEPHONE ENCOUNTER
Pt c/o cough and congestion. Pt has appt Monday with Dr. Sharri Zafar. Pt to get Musinex and Delsym to try and push fluids.  Pt aggress with plan

## 2020-01-20 ENCOUNTER — OFFICE VISIT (OUTPATIENT)
Dept: PULMONOLOGY | Age: 60
End: 2020-01-20

## 2020-01-20 VITALS
HEIGHT: 66 IN | WEIGHT: 182 LBS | SYSTOLIC BLOOD PRESSURE: 142 MMHG | BODY MASS INDEX: 29.25 KG/M2 | RESPIRATION RATE: 18 BRPM | OXYGEN SATURATION: 98 % | DIASTOLIC BLOOD PRESSURE: 72 MMHG | HEART RATE: 93 BPM | TEMPERATURE: 98.4 F

## 2020-01-20 DIAGNOSIS — G47.33 OBSTRUCTIVE APNEA: Primary | ICD-10-CM

## 2020-01-20 DIAGNOSIS — D86.9 SARCOIDOSIS: ICD-10-CM

## 2020-01-20 DIAGNOSIS — I27.21 PULMONARY ARTERY HYPERTENSION (HCC): ICD-10-CM

## 2020-01-20 DIAGNOSIS — G62.9 PERIPHERAL POLYNEUROPATHY: ICD-10-CM

## 2020-01-20 DIAGNOSIS — J45.909 UNCOMPLICATED ASTHMA, UNSPECIFIED ASTHMA SEVERITY, UNSPECIFIED WHETHER PERSISTENT: ICD-10-CM

## 2020-01-20 DIAGNOSIS — G72.9 MYOPATHY: ICD-10-CM

## 2020-01-20 RX ORDER — TESTOSTERONE 10 MG/G
10 GEL TOPICAL
COMMUNITY
Start: 2011-03-24 | End: 2020-01-20

## 2020-01-20 RX ORDER — TESTOSTERONE 20.25 MG/1.25G
GEL TOPICAL
COMMUNITY
Start: 2020-01-20 | End: 2020-01-20

## 2020-01-20 RX ORDER — RANITIDINE 150 MG/1
TABLET, FILM COATED ORAL
COMMUNITY
Start: 2020-01-20 | End: 2020-01-20

## 2020-01-20 RX ORDER — TOBRAMYCIN AND DEXAMETHASONE 3; 1 MG/ML; MG/ML
SUSPENSION/ DROPS OPHTHALMIC
COMMUNITY
Start: 2020-01-20 | End: 2020-01-20

## 2020-01-20 RX ORDER — INDOMETHACIN 50 MG/1
CAPSULE ORAL
COMMUNITY
Start: 2020-01-20 | End: 2020-01-20

## 2020-01-20 RX ORDER — POLYETHYLENE GLYCOL 3350, SODIUM CHLORIDE, SODIUM BICARBONATE, POTASSIUM CHLORIDE 420; 11.2; 5.72; 1.48 G/4L; G/4L; G/4L; G/4L
POWDER, FOR SOLUTION ORAL
COMMUNITY
End: 2020-01-20 | Stop reason: ALTCHOICE

## 2020-01-20 RX ORDER — ONDANSETRON 4 MG/1
4 TABLET, ORALLY DISINTEGRATING ORAL
COMMUNITY
End: 2022-05-24

## 2020-01-20 RX ORDER — AZITHROMYCIN 250 MG/1
250 TABLET, FILM COATED ORAL DAILY
COMMUNITY
End: 2020-10-17

## 2020-01-20 RX ORDER — NITROGLYCERIN 0.4 MG/1
0.4 TABLET SUBLINGUAL
COMMUNITY
End: 2020-08-06 | Stop reason: ALTCHOICE

## 2020-01-20 RX ORDER — BOSENTAN 125 MG/1
TABLET, FILM COATED ORAL
COMMUNITY
Start: 2020-01-20 | End: 2020-01-20

## 2020-01-20 RX ORDER — CEPHALEXIN 500 MG/1
CAPSULE ORAL
COMMUNITY
End: 2020-01-20 | Stop reason: ALTCHOICE

## 2020-01-20 NOTE — PROGRESS NOTES
Jocelin Castillo. presents today for   Chief Complaint   Patient presents with    Sleep Apnea     follow up from 6/3/2019    Asthma    Sarcoidosis    Other     pulmonary HTN       Is someone accompanying this pt? No    Is the patient using any DME equipment during OV? Yes. wheelchair   -DME Company N/A    Depression Screening:  3 most recent PHQ Screens 1/20/2020   PHQ Not Done -   Little interest or pleasure in doing things Not at all   Feeling down, depressed, irritable, or hopeless Not at all   Total Score PHQ 2 0       Learning Assessment:  Learning Assessment 1/18/2017   PRIMARY LEARNER Patient   HIGHEST LEVEL OF EDUCATION - PRIMARY LEARNER  2 YEARS OF COLLEGE   BARRIERS PRIMARY LEARNER -   PRIMARY LANGUAGE ENGLISH   LEARNER PREFERENCE PRIMARY READING   ANSWERED BY patient Marva Haywood     bpsps   RELATIONSHIP SELF       Abuse Screening:  Abuse Screening Questionnaire 6/3/2019   Do you ever feel afraid of your partner? N   Are you in a relationship with someone who physically or mentally threatens you? N   Is it safe for you to go home? Y       Fall Risk  Fall Risk Assessment, last 12 mths 1/20/2020   Able to walk? No   Fall in past 12 months? -         Coordination of Care:  1. Have you been to the ER, urgent care clinic since your last visit? Hospitalized since your last visit? No    2. Have you seen or consulted any other health care providers outside of the [address] [place] [address] since your last visit? Include any pap smears or colon screening. No    Influenza vaccine received from [facility] in November 2019 per patient.  Immunization record updated,

## 2020-01-20 NOTE — PROGRESS NOTES
[facility]  Pulmonary, Critical Care, and Sleep Medicine      Chief complaint: Sarcoidosis asthma sleep apnea myopathy and peripheral neuropathy    HPI:    Haydee Michael.    is 60years old and returns the office today for follow-up and relates that his shortness of breath is stable and he has had a cough only for 1 week related to upper respiratory symptoms and the cough is occasionally yellow and is being treated with Zithromax. He denies any chest pain leg swelling. He continues to use his Advair and albuterol and CPAP at night      No Known Allergies  Current Outpatient Medications   Medication Sig    avanafiL (STENDRA) 200 mg tab tablet Take 200 mg by mouth as needed.  azithromycin (ZITHROMAX) 250 mg tablet Take 250 mg by mouth daily.  nitroglycerin (NITROSTAT) 0.4 mg SL tablet 0.4 mg by SubLINGual route every five (5) minutes as needed.  ondansetron (ZOFRAN ODT) 4 mg disintegrating tablet Take 4 mg by mouth every eight (8) hours as needed.  ADVAIR DISKUS 250-50 mcg/dose diskus inhaler USE 1 PUFF 2 TIMES DAILY **RINSE MOUTH AFTER EACH USE**    PROAIR HFA 90 mcg/actuation inhaler USE 2 PUFFS ORALLY EVERY FOUR HOURS AS NEEDED FOR WHEEZING    albuterol (PROVENTIL VENTOLIN) 2.5 mg /3 mL (0.083 %) nebu INHALE CONTENTS OF 1 VIAL VIA NEBULIZER EVERY FOUR (4) HOURS AS NEEDED FOR WHEEZING.  sildenafil, antihypertensive, (REVATIO) 20 mg tablet Take PO 1-5 tabs PRN.  linaCLOtide (LINZESS) 290 mcg cap capsule Take 290 mcg by mouth Daily (before breakfast).  albuterol (PROVENTIL VENTOLIN) 2.5 mg /3 mL (0.083 %) nebulizer solution 3 mL by Nebulization route every four (4) hours as needed for Wheezing.  ibuprofen (MOTRIN) 600 mg tablet Take 1 Tab by mouth every six (6) hours as needed for Pain.  HYDROcodone-acetaminophen (NORCO) 7.5-325 mg per tablet Take 1 Tab by mouth four (4) times daily as needed.     amLODIPine (NORVASC) 10 mg tablet TAKE 1 TABLET BY MOUTH ONCE DAILY    aspirin delayed-release 81 mg tablet TAKE 1 TABLET BY MOUTH ONCE DAILY FOR 30 DAYS    doxazosin (CARDURA) 8 mg tablet TAKE 1 TABLET BY MOUTH BEFORE BEDTIME FOR 30 DAYS    triamterene-hydroCHLOROthiazide (MAXZIDE) 37.5-25 mg per tablet Take 1 Tab by mouth daily.  pantoprazole (PROTONIX) 40 mg tablet Take 1 Tab by mouth Before breakfast and dinner. Indications: Upper GI Bleed    sucralfate (CARAFATE) 1 gram tablet Take 1 Tab by mouth Before breakfast, lunch, dinner and at bedtime.  ferrous sulfate (FEROSUL) 325 mg (65 mg iron) tablet Take 1 Tab by mouth Daily (before breakfast). Indications: IRON DEFICIENCY ANEMIA    bipap machine kit by Does Not Apply route nightly. Indications: DME: [employer]    modafinil (PROVIGIL) 200 mg tablet TAKE 1 TABLET BY MOUTH TWICE A DAY    loratadine (CLARITIN) 10 mg tablet Take 10 mg by mouth daily.  THERAPEUTIC-M 9 mg iron-400 mcg tab tablet Take 1 Tab by mouth daily.  OXYGEN-AIR DELIVERY SYSTEMS by Does Not Apply route. 3 liters hs with cpap   1 liter with activity  [employer]    bosentan (TRACLEER) 125 mg tablet Tracleer 125 mg tablet    indomethacin (INDOCIN) 50 mg capsule indomethacin 50 mg capsule   TAKE 1 CAPSULE BY MOUTH 2 TIMES A DAY    raNITIdine (ZANTAC) 150 mg tablet ranitidine 150 mg tablet    testosterone (ANDROGEL) 20.25 mg/1.25 gram (1.62 %) gel AndroGel 20.25 mg/1.25 gram (1.62 %) transdermal gel pump    testosterone 12.5 mg/ 1.25 gram (1 %) glpm 10 g by TransDERmal route.  tobramycin-dexamethasone (TOBRADEX) ophthalmic suspension TobraDex 0.3 %-0.1 % eye drops,suspension   INSTILL ONE DROP IN THE RIGHT EYE THREE TIMES A DAY AS DIRECTED    diclofenac (VOLTAREN) 1 % gel 2 g. No current facility-administered medications for this visit.       Past Medical History:   Diagnosis Date    Essential hypertension     Essential hypertension, benign     Gout     Hypogonadism male     Impotence     Microscopic hematuria     Myopathy     Obesity, unspecified     Obstructive sleep apnea     on bipap    Other chronic pulmonary heart diseases     Peripheral neuropathy 10/25/2012    Sarcoid 10/25/2012     Past Surgical History:   Procedure Laterality Date    COLONOSCOPY N/A 4/21/2017    COLONOSCOPY with polypectomy performed by Katarzyna Pisano MD at [address] [place] [address] HX APPENDECTOMY      HX ENDOSCOPY  03/2017    HX HEART CATHETERIZATION      HX HEMORRHOIDECTOMY       Social History     Socioeconomic History    Marital status:      Spouse name: Not on file    Number of children: Not on file    Years of education: Not on file    Highest education level: Not on file   Occupational History    Not on file   Social Needs    Financial resource strain: Not on file    Food insecurity:     Worry: Not on file     Inability: Not on file    Transportation needs:     Medical: Not on file     Non-medical: Not on file   Tobacco Use    Smoking status: Never Smoker    Smokeless tobacco: Never Used   Substance and Sexual Activity    Alcohol use: Yes     Comment: daily wine at dinner or beer    Drug use: No    Sexual activity: Yes   Lifestyle    Physical activity:     Days per week: Not on file     Minutes per session: Not on file    Stress: Not on file   Relationships    Social connections:     Talks on phone: Not on file     Gets together: Not on file     Attends Christian service: Not on file     Active member of club or organization: Not on file     Attends meetings of clubs or organizations: Not on file     Relationship status: Not on file    Intimate partner violence:     Fear of current or ex partner: Not on file     Emotionally abused: Not on file     Physically abused: Not on file     Forced sexual activity: Not on file   Other Topics Concern    Not on file   Social History Narrative    Not on file     Family History   Problem Relation Age of Onset    Hypertension Father     Lung Disease Father     Heart Attack Father 61       Review of systems:  He denies fever chills poor appetite or weight loss    Physical Exam:  Visit Vitals  /72 (BP 1 Location: Left arm, BP Patient Position: Sitting)   Pulse 93   Temp 98.4 °F (36.9 °C) (Oral)   Resp 18   Ht 5' 6\" (1.676 m)   Wt 82.6 kg (182 lb) Comment: unable to get on scale-used last weight recorded   SpO2 98%   BMI 29.38 kg/m²       Well-developed well-nourished appearing but in a wheelchair  HEENT: WNL  Lymph node exam: Supraclavicular cervical lymph nodes negative  Chest: Equal symmetrical expansion no dullness no wheezes rales rubs  Heart: Regular rhythm no gallop no murmur no JVD no peripheral edema  Extremities: No cyanosis clubbing or calf tenderness  Neurological: Alert and oriented    Labs:    O2 sat room air at rest 98%    Impression:   Sarcoidosis respiratory wise appears stable with stable asthma however he has myopathy and neuropathy which is being evaluated in the near future at [facility] treated with CPAP and stable    Plan:   Continue Advair PRN albuterol  Continue CPAP at night  Follow-up in 6 months or sooner if needed  Usha Fall MD , [facility]    CC: Viktor Gregorio MD     [address] [facility], [address] [facility] [address]     P: [phone]     F: [phone]

## 2020-01-20 NOTE — PATIENT INSTRUCTIONS
Continue Advair 1 inhalation twice daily and remember to exhale fully before inhaling Continue albuterol 2 puffs every 4 hours as needed only if you require albuterol too often to control respiratory symptoms call the office Please call the office if your cold and cough symptoms do not continue to improve or worsen Continue CPAP nightly

## 2020-01-27 ENCOUNTER — TELEPHONE (OUTPATIENT)
Dept: PULMONOLOGY | Age: 60
End: 2020-01-27

## 2020-01-27 RX ORDER — FLUTICASONE PROPIONATE AND SALMETEROL 250; 50 UG/1; UG/1
1 POWDER RESPIRATORY (INHALATION) 2 TIMES DAILY
Qty: 1 INHALER | Refills: 5 | Status: SHIPPED | OUTPATIENT
Start: 2020-01-27 | End: 2020-07-30 | Stop reason: SDUPTHER

## 2020-03-30 RX ORDER — ALBUTEROL SULFATE 90 UG/1
AEROSOL, METERED RESPIRATORY (INHALATION)
Qty: 8.5 G | Refills: 2 | Status: SHIPPED | OUTPATIENT
Start: 2020-03-30 | End: 2020-06-29

## 2020-06-16 ENCOUNTER — HOSPITAL ENCOUNTER (OUTPATIENT)
Dept: NON INVASIVE DIAGNOSTICS | Age: 60
Discharge: HOME | End: 2020-06-16
Attending: INTERNAL MEDICINE
Payer: MEDICAID

## 2020-06-16 VITALS
BODY MASS INDEX: 29.25 KG/M2 | HEIGHT: 66 IN | DIASTOLIC BLOOD PRESSURE: 72 MMHG | WEIGHT: 182 LBS | SYSTOLIC BLOOD PRESSURE: 142 MMHG

## 2020-06-16 DIAGNOSIS — D86.9 SARCOIDOSIS: ICD-10-CM

## 2020-06-16 DIAGNOSIS — G47.33 OBSTRUCTIVE APNEA: ICD-10-CM

## 2020-06-16 DIAGNOSIS — I27.21 PULMONARY ARTERY HYPERTENSION (HCC): ICD-10-CM

## 2020-06-16 LAB
ECHO AO ROOT DIAM: 3.83 CM
ECHO LA AREA 4C: 15.1 CM2
ECHO LA VOL 4C: 35.5 ML (ref 18–58)
ECHO LA VOLUME INDEX A4C: 18.47 ML/M2 (ref 16–28)
ECHO LV EDV TEICHHOLZ: 0.31 ML
ECHO LV ESV TEICHHOLZ: 0.13 ML
ECHO LV INTERNAL DIMENSION DIASTOLIC: 3.54 CM (ref 4.2–5.9)
ECHO LV INTERNAL DIMENSION SYSTOLIC: 2.45 CM
ECHO LV IVSD: 1.31 CM (ref 0.6–1)
ECHO LV MASS 2D: 178.5 G (ref 88–224)
ECHO LV MASS INDEX 2D: 92.9 G/M2 (ref 49–115)
ECHO LV POSTERIOR WALL DIASTOLIC: 1.26 CM (ref 0.6–1)
ECHO LVOT DIAM: 2.29 CM
ECHO LVOT PEAK GRADIENT: 3.2 MMHG
ECHO LVOT PEAK VELOCITY: 89.01 CM/S
ECHO LVOT VTI: 19.41 CM
ECHO MV A VELOCITY: 87.42 CM/S
ECHO MV E DECELERATION TIME (DT): 189.3 MS
ECHO MV E VELOCITY: 81.59 CM/S
ECHO MV E/A RATIO: 0.93
ECHO PVEIN A DURATION: 108.4 MS
ECHO PVEIN A VELOCITY: 20.98 CM/S
ECHO RV INTERNAL DIMENSION: 3.4 CM
ECHO TV REGURGITANT MAX VELOCITY: 282.95 CM/S
ECHO TV REGURGITANT PEAK GRADIENT: 32 MMHG
LVFS 2D: 30.75 %
LVOT MG: 1.84 MMHG
LVOT MV: 0.64 CM/S
LVSV (TEICH): 15.87 ML
MV DEC SLOPE: 4.31

## 2020-06-16 PROCEDURE — 93306 TTE W/DOPPLER COMPLETE: CPT

## 2020-06-29 RX ORDER — ALBUTEROL SULFATE 90 UG/1
AEROSOL, METERED RESPIRATORY (INHALATION)
Qty: 8.5 G | Refills: 2 | Status: SHIPPED | OUTPATIENT
Start: 2020-06-29 | End: 2020-09-25 | Stop reason: SDUPTHER

## 2020-07-30 ENCOUNTER — TELEPHONE (OUTPATIENT)
Dept: PULMONOLOGY | Age: 60
End: 2020-07-30

## 2020-07-30 RX ORDER — FLUTICASONE PROPIONATE AND SALMETEROL 250; 50 UG/1; UG/1
1 POWDER RESPIRATORY (INHALATION) 2 TIMES DAILY
Qty: 1 INHALER | Refills: 5 | Status: SHIPPED | OUTPATIENT
Start: 2020-07-30 | End: 2021-03-22 | Stop reason: SDUPTHER

## 2020-08-10 ENCOUNTER — OFFICE VISIT (OUTPATIENT)
Dept: PULMONOLOGY | Age: 60
End: 2020-08-10

## 2020-08-10 VITALS
WEIGHT: 182 LBS | HEART RATE: 82 BPM | BODY MASS INDEX: 29.25 KG/M2 | SYSTOLIC BLOOD PRESSURE: 145 MMHG | HEIGHT: 66 IN | RESPIRATION RATE: 18 BRPM | DIASTOLIC BLOOD PRESSURE: 73 MMHG | TEMPERATURE: 98.3 F | OXYGEN SATURATION: 98 %

## 2020-08-10 DIAGNOSIS — R91.1 PULMONARY NODULE: ICD-10-CM

## 2020-08-10 DIAGNOSIS — D86.9 SARCOIDOSIS: ICD-10-CM

## 2020-08-10 DIAGNOSIS — G47.33 OBSTRUCTIVE APNEA: Primary | ICD-10-CM

## 2020-08-10 DIAGNOSIS — G62.9 PERIPHERAL POLYNEUROPATHY: ICD-10-CM

## 2020-08-10 DIAGNOSIS — J45.909 UNCOMPLICATED ASTHMA, UNSPECIFIED ASTHMA SEVERITY, UNSPECIFIED WHETHER PERSISTENT: ICD-10-CM

## 2020-08-10 DIAGNOSIS — E85.9 AMYLOIDOSIS, UNSPECIFIED TYPE (HCC): ICD-10-CM

## 2020-08-10 DIAGNOSIS — R91.8 PULMONARY INFILTRATE: ICD-10-CM

## 2020-08-10 DIAGNOSIS — G72.9 MYOPATHY: ICD-10-CM

## 2020-08-10 NOTE — PROGRESS NOTES
[facility]  Pulmonary, Critical Care, and Sleep Medicine      Chief complaint:  Sarcoidosis TOBY pulmonary hypertension peripheral neuropathy myopathy amyloidosis pulmonary nodule pulmonary infiltrate    HPI:    Janet Matias    is 60years old and comes to the office today for follow-up relating that he is sedentary uses his wheelchair because of his myopathy and neuropathy secondary to sarcoidosis. He denies any chest pain significant cough leg swelling or change in shortness of breath which is Nil when he is stationary. He continues to use Advair twice daily and PRN albuterol only occasionally and he uses his CPAP at night      No Known Allergies  Current Outpatient Medications   Medication Sig    sildenafil citrate (VIAGRA) 100 mg tablet Take 1 Tab by mouth daily as needed for Erectile Dysfunction.  fluticasone propion-salmeteroL (Advair Diskus) 250-50 mcg/dose diskus inhaler Take 1 Puff by inhalation two (2) times a day.  ProAir HFA 90 mcg/actuation inhaler INHALE 2 PUFFS BY MOUTH EVERY FOUR HOURS AS NEEDED FOR WHEEZING -SHAKE WELL-    albuterol (PROVENTIL VENTOLIN) 2.5 mg /3 mL (0.083 %) nebu INHALE CONTENTS OF 1 VIAL VIA NEBULIZER EVERY FOUR (4) HOURS AS NEEDED FOR WHEEZING.    HYDROcodone-acetaminophen (NORCO) 7.5-325 mg per tablet Take 1 Tab by mouth four (4) times daily as needed.  amLODIPine (NORVASC) 10 mg tablet TAKE 1 TABLET BY MOUTH ONCE DAILY    aspirin delayed-release 81 mg tablet TAKE 1 TABLET BY MOUTH ONCE DAILY FOR 30 DAYS    triamterene-hydroCHLOROthiazide (MAXZIDE) 37.5-25 mg per tablet Take 1 Tab by mouth daily.  pantoprazole (PROTONIX) 40 mg tablet Take 1 Tab by mouth Before breakfast and dinner. Indications: Upper GI Bleed    bipap machine kit by Does Not Apply route nightly. Indications: DME: [employer]    modafinil (PROVIGIL) 200 mg tablet TAKE 1 TABLET BY MOUTH TWICE A DAY    loratadine (CLARITIN) 10 mg tablet Take 10 mg by mouth daily.     OXYGEN-AIR DELIVERY SYSTEMS by Does Not Apply route. 3 liters hs with cpap   1 liter with activity  [address]    azithromycin (ZITHROMAX) 250 mg tablet Take 250 mg by mouth daily.  ondansetron (ZOFRAN ODT) 4 mg disintegrating tablet Take 4 mg by mouth every eight (8) hours as needed.  linaCLOtide (LINZESS) 290 mcg cap capsule Take 290 mcg by mouth Daily (before breakfast).  albuterol (PROVENTIL VENTOLIN) 2.5 mg /3 mL (0.083 %) nebulizer solution 3 mL by Nebulization route every four (4) hours as needed for Wheezing.  ibuprofen (MOTRIN) 600 mg tablet Take 1 Tab by mouth every six (6) hours as needed for Pain.  doxazosin (CARDURA) 8 mg tablet TAKE 1 TABLET BY MOUTH BEFORE BEDTIME FOR 30 DAYS    sucralfate (CARAFATE) 1 gram tablet Take 1 Tab by mouth Before breakfast, lunch, dinner and at bedtime.  ferrous sulfate (FEROSUL) 325 mg (65 mg iron) tablet Take 1 Tab by mouth Daily (before breakfast). Indications: IRON DEFICIENCY ANEMIA    THERAPEUTIC-M 9 mg iron-400 mcg tab tablet Take 1 Tab by mouth daily. No current facility-administered medications for this visit.       Past Medical History:   Diagnosis Date    Essential hypertension     Essential hypertension, benign     Gout     Hypogonadism male     Impotence     Microscopic hematuria     Myopathy     Obesity, unspecified     Obstructive sleep apnea     on bipap    Other chronic pulmonary heart diseases     Peripheral neuropathy 10/25/2012    Sarcoid 10/25/2012     Past Surgical History:   Procedure Laterality Date    COLONOSCOPY N/A 4/21/2017    COLONOSCOPY with polypectomy performed by Destiny Whitlock MD at [facility] ENDOSCOPY    HX APPENDECTOMY      HX ENDOSCOPY  03/2017    HX HEART CATHETERIZATION      HX HEMORRHOIDECTOMY       Social History     Socioeconomic History    Marital status:      Spouse name: Not on file    Number of children: Not on file    Years of education: Not on file    Highest education level: Not on file   Occupational History    Not on file   Social Needs    Financial resource strain: Not on file    Food insecurity     Worry: Not on file     Inability: Not on file    Transportation needs     Medical: Not on file     Non-medical: Not on file   Tobacco Use    Smoking status: Never Smoker    Smokeless tobacco: Never Used   Substance and Sexual Activity    Alcohol use: Yes     Comment: daily wine at dinner or beer    Drug use: No    Sexual activity: Yes   Lifestyle    Physical activity     Days per week: Not on file     Minutes per session: Not on file    Stress: Not on file   Relationships    Social connections     Talks on phone: Not on file     Gets together: Not on file     Attends Yarsani service: Not on file     Active member of club or organization: Not on file     Attends meetings of clubs or organizations: Not on file     Relationship status: Not on file    Intimate partner violence     Fear of current or ex partner: Not on file     Emotionally abused: Not on file     Physically abused: Not on file     Forced sexual activity: Not on file   Other Topics Concern    Not on file   Social History Narrative    Not on file     Family History   Problem Relation Age of Onset    Hypertension Father     Lung Disease Father     Heart Attack Father 61       Review of systems:  He denies fever chills poor appetite weight loss    Physical Exam:  Visit Vitals  /73 (BP 1 Location: Right arm, BP Patient Position: Sitting)   Pulse 82   Temp 98.3 °F (36.8 °C) (Oral)   Resp 18   Ht 5' 6\" (1.676 m)   Wt 82.6 kg (182 lb)   SpO2 98% Comment: RA Rest   BMI 29.38 kg/m²       Well-developed well-nourished  HEENT: WNL  Lymph node exam: Supraclavicular cervical lymph nodes negative  Chest: Equal symmetrical expansion no dullness no wheezes rales rubs  Heart: Regular rhythm no gallop no murmur no JVD no peripheral edema  Extremities: No cyanosis clubbing or calf tenderness  Neurological: Alert and oriented    Labs:  Echocardiogram 6/16/2020: Pulmonary artery pressure 35 mmHg  O2 sat room air at rest 98%    Impression:   Pulmonary hypertension borderline elevated but asymptomatic with treatment with CPAP  By history and physical exam sarcoidosis stable and continuing myopathy and neuropathy  Pulmonary nodule and pulmonary infiltrate probably related to amyloidosis which needs to be followed      Plan:   Monitor chest x-ray  Continue CPAP  Continue PRN albuterol and Advair  Follow-up in 6 months  Armando Bean MD , [facility]    CC: Felipe Bermudez MD     [address] [facility], [address] [facility] [address]     P: [phone]     F: [phone]

## 2020-08-10 NOTE — PROGRESS NOTES
Kamron Pinto. presents today for   Chief Complaint   Patient presents with    Follow Up Chronic Condition       Is someone accompanying this pt? No    Is the patient using any DME equipment during OV? No    -[employer]     Depression Screening:  3 most recent PHQ Screens 1/20/2020   PHQ Not Done -   Little interest or pleasure in doing things Not at all   Feeling down, depressed, irritable, or hopeless Not at all   Total Score PHQ 2 0       Learning Assessment:  Learning Assessment 1/18/2017   PRIMARY LEARNER Patient   HIGHEST LEVEL OF EDUCATION - PRIMARY LEARNER  2 YEARS OF COLLEGE   BARRIERS PRIMARY LEARNER -   PRIMARY LANGUAGE ENGLISH   LEARNER PREFERENCE PRIMARY READING   ANSWERED BY patient Abdelrahman Vickey     bpsps   RELATIONSHIP SELF       Abuse Screening:  Abuse Screening Questionnaire 6/3/2019   Do you ever feel afraid of your partner? N   Are you in a relationship with someone who physically or mentally threatens you? N   Is it safe for you to go home? Y       Fall Risk  Fall Risk Assessment, last 12 mths 1/20/2020   Able to walk? No   Fall in past 12 months? -         Coordination of Care:  1. Have you been to the ER, urgent care clinic since your last visit? Hospitalized since your last visit? No    2. Have you seen or consulted any other health care providers outside of the [address] [place] [address] since your last visit? Include any pap smears or colon screening.  No

## 2020-08-10 NOTE — PATIENT INSTRUCTIONS
Obtain chest x-ray at [facility] Continue CPAP nightly Continue Advair 1 inhalation twice daily and remember to exhale fully before inhaling and to wash mouth with water and spit it out after inhaling Albuterol by nebulization or 2 puffs by inhaler every 4 hours as needed if you require albuterol too often to control respiratory symptoms call the office Always call for symptoms related to shortness of breath unexplained discomfort unexplained fever or visual disturbances Continue to get yearly eye exams

## 2020-08-13 ENCOUNTER — HOSPITAL ENCOUNTER (OUTPATIENT)
Dept: GENERAL RADIOLOGY | Age: 60
Discharge: HOME | End: 2020-08-13
Payer: MEDICAID

## 2020-08-13 DIAGNOSIS — R91.8 PULMONARY INFILTRATE: ICD-10-CM

## 2020-08-13 DIAGNOSIS — D86.9 SARCOIDOSIS: ICD-10-CM

## 2020-08-13 DIAGNOSIS — E85.9 AMYLOIDOSIS, UNSPECIFIED TYPE (HCC): ICD-10-CM

## 2020-08-13 DIAGNOSIS — R91.1 PULMONARY NODULE: ICD-10-CM

## 2020-08-13 PROCEDURE — 71046 X-RAY EXAM CHEST 2 VIEWS: CPT

## 2020-08-14 ENCOUNTER — OFFICE VISIT (OUTPATIENT)
Dept: ORTHOPEDIC SURGERY | Facility: CLINIC | Age: 60
End: 2020-08-14

## 2020-08-14 VITALS
BODY MASS INDEX: 29.38 KG/M2 | DIASTOLIC BLOOD PRESSURE: 74 MMHG | HEIGHT: 66 IN | HEART RATE: 85 BPM | SYSTOLIC BLOOD PRESSURE: 124 MMHG | TEMPERATURE: 96.6 F | OXYGEN SATURATION: 100 %

## 2020-08-14 DIAGNOSIS — M75.52 CHRONIC BURSITIS OF LEFT SHOULDER: Primary | ICD-10-CM

## 2020-08-14 DIAGNOSIS — G95.9 MYELOPATHY (HCC): ICD-10-CM

## 2020-08-14 DIAGNOSIS — M25.512 ACUTE PAIN OF LEFT SHOULDER: ICD-10-CM

## 2020-08-14 RX ORDER — BETAMETHASONE SODIUM PHOSPHATE AND BETAMETHASONE ACETATE 3; 3 MG/ML; MG/ML
6 INJECTION, SUSPENSION INTRA-ARTICULAR; INTRALESIONAL; INTRAMUSCULAR; SOFT TISSUE ONCE
Qty: 0.5 ML | Refills: 0
Start: 2020-08-14 | End: 2020-08-14

## 2020-08-14 NOTE — PROGRESS NOTES
Patient: Dante Tillman MRN: [id number]       SSN: [id number]  YOB: 1960        AGE: 60 y.o. SEX: male    PCP: Yaneli Markham MD  08/14/20    Chief Complaint   Patient presents with    Shoulder Pain     Left     HISTORY:  Dante Tillman is a 60 y.o. male who is seen for left shoulder pain. He has been experiencing left shoulder pain for the past week. He felt a pulling sensation in his left shoulder when he grabbed his headboard to get out of bed on 8/10/20. He feels shoulder pain with overhead activities and at night. He is right handed. He responded to previous cortisone injections. He does not recall any shoulder injury or arm trauma. He was diagnosed with autoimmune myelopathy and sarcodosis by Dr. Gavin Huang. He believes prednisone caused his muscle weakness. He is in pain management with Dr. Ashwin Burks. Pain Assessment  8/14/2020   Location of Pain Shoulder   Location Modifiers Left   Severity of Pain 10   Quality of Pain Throbbing; Marletta Serene; Aching   Duration of Pain -   Frequency of Pain Constant   Aggravating Factors Other (Comment)   Aggravating Factors Comment Getting out of bed   Limiting Behavior No   Relieving Factors Nothing   Result of Injury No   Work-Related Injury -   Type of Injury -   Type of Injury Comment -     Occupation, etc:  Mr. Lionel Holguin previously worked as a [employer]. He is now on disability. He [facility] [place] [facility]. He has a 49 yo son. He has 6 grandchildren and 2 great grandchildren. He is in a wheelchair since 1998 because of myelopathy. He can't walk or stand. He enjoys watching old [place] movies. Mr. Lionel Holguin weighs 182 lbs and is 5'6\" tall.        No results found for: HBA1C, HGBE8, SLF1EZHF, XOS4XCUP, NWK7QWVC  Weight Metrics 8/14/2020 8/10/2020 8/6/2020 6/16/2020 1/20/2020 6/3/2019 5/30/2019   Weight - 182 lb 182 lb 182 lb 182 lb 182 lb -   BMI 29.38 kg/m2 29.38 kg/m2 29.38 kg/m2 29.38 kg/m2 29.38 kg/m2 29.38 kg/m2 29.38 kg/m2       Patient Active Problem List   Diagnosis Code    Sarcoid D86.9    Peripheral neuropathy G62.9    Myopathy G72.9    Pulmonary artery hypertension (HCC) I27.21    Asthma J45.909    Obstructive apnea G47.33    Shortness of breath R06.02    GI bleed K92.2    Symptomatic anemia D64.9    Iron deficiency anemia D50.9    Gastric polyp K31.7    Hypokalemia E87.6    HTN (hypertension) I10    Hypogonadism male E29.1    Impotence of organic origin N52.9    Hypogonadism in male E29.1     REVIEW OF SYSTEMS:    Constitutional Symptoms: Negative   Eyes: Negative   Ears, Nose, Throat and Mouth: Negative   Cardiovascular: Negative   Respiratory: Negative   Genitourinary: Per HPI   Gastrointestinal: Per HPI   Integumentary (Skin and/or Breast): Negative   Musculoskeletal: Per HPI   Endocrine/Rheumatologic: Negative   Neurological: Per HPI   Hematology/Lymphatic: Negative    Allergic/Immunologic: Negative   Phychiatric: Negative    Social History     Socioeconomic History    Marital status:      Spouse name: Not on file    Number of children: Not on file    Years of education: Not on file    Highest education level: Not on file   Occupational History    Not on file   Social Needs    Financial resource strain: Not on file    Food insecurity     Worry: Not on file     Inability: Not on file    Transportation needs     Medical: Not on file     Non-medical: Not on file   Tobacco Use    Smoking status: Never Smoker    Smokeless tobacco: Never Used   Substance and Sexual Activity    Alcohol use: Yes     Comment: daily wine at dinner or beer    Drug use: No    Sexual activity: Yes   Lifestyle    Physical activity     Days per week: Not on file     Minutes per session: Not on file    Stress: Not on file   Relationships    Social connections     Talks on phone: Not on file     Gets together: Not on file     Attends Pentecostalism service: Not on file     Active member of club or organization: Not on file     Attends meetings of clubs or organizations: Not on file     Relationship status: Not on file    Intimate partner violence     Fear of current or ex partner: Not on file     Emotionally abused: Not on file     Physically abused: Not on file     Forced sexual activity: Not on file   Other Topics Concern    Not on file   Social History Narrative    Not on file      No Known Allergies   Current Outpatient Medications   Medication Sig    sildenafil citrate (VIAGRA) 100 mg tablet Take 1 Tab by mouth daily as needed for Erectile Dysfunction.  fluticasone propion-salmeteroL (Advair Diskus) 250-50 mcg/dose diskus inhaler Take 1 Puff by inhalation two (2) times a day.  ProAir HFA 90 mcg/actuation inhaler INHALE 2 PUFFS BY MOUTH EVERY FOUR HOURS AS NEEDED FOR WHEEZING -SHAKE WELL-    azithromycin (ZITHROMAX) 250 mg tablet Take 250 mg by mouth daily.  ondansetron (ZOFRAN ODT) 4 mg disintegrating tablet Take 4 mg by mouth every eight (8) hours as needed.  albuterol (PROVENTIL VENTOLIN) 2.5 mg /3 mL (0.083 %) nebu INHALE CONTENTS OF 1 VIAL VIA NEBULIZER EVERY FOUR (4) HOURS AS NEEDED FOR WHEEZING.  linaCLOtide (LINZESS) 290 mcg cap capsule Take 290 mcg by mouth Daily (before breakfast).  albuterol (PROVENTIL VENTOLIN) 2.5 mg /3 mL (0.083 %) nebulizer solution 3 mL by Nebulization route every four (4) hours as needed for Wheezing.  ibuprofen (MOTRIN) 600 mg tablet Take 1 Tab by mouth every six (6) hours as needed for Pain.  HYDROcodone-acetaminophen (NORCO) 7.5-325 mg per tablet Take 1 Tab by mouth four (4) times daily as needed.  amLODIPine (NORVASC) 10 mg tablet TAKE 1 TABLET BY MOUTH ONCE DAILY    aspirin delayed-release 81 mg tablet TAKE 1 TABLET BY MOUTH ONCE DAILY FOR 30 DAYS    doxazosin (CARDURA) 8 mg tablet TAKE 1 TABLET BY MOUTH BEFORE BEDTIME FOR 30 DAYS    triamterene-hydroCHLOROthiazide (MAXZIDE) 37.5-25 mg per tablet Take 1 Tab by mouth daily.     pantoprazole (PROTONIX) 40 mg tablet Take 1 Tab by mouth Before breakfast and dinner. Indications: Upper GI Bleed    sucralfate (CARAFATE) 1 gram tablet Take 1 Tab by mouth Before breakfast, lunch, dinner and at bedtime.  ferrous sulfate (FEROSUL) 325 mg (65 mg iron) tablet Take 1 Tab by mouth Daily (before breakfast). Indications: IRON DEFICIENCY ANEMIA    bipap machine kit by Does Not Apply route nightly. Indications: DME: [employer]    modafinil (PROVIGIL) 200 mg tablet TAKE 1 TABLET BY MOUTH TWICE A DAY    loratadine (CLARITIN) 10 mg tablet Take 10 mg by mouth daily.  THERAPEUTIC-M 9 mg iron-400 mcg tab tablet Take 1 Tab by mouth daily.  OXYGEN-AIR DELIVERY SYSTEMS by Does Not Apply route. 3 liters hs with cpap   1 liter with activity  [address]     No current facility-administered medications for this visit. PHYSICAL EXAMINATION:  Visit Vitals  /74 (BP 1 Location: Right arm)   Pulse 85   Temp (!) 96.6 °F (35.9 °C) (Temporal)   Ht 5' 6\" (1.676 m)   SpO2 100%   BMI 29.38 kg/m²    Appearance: Alert, well appearing and pleasant patient who is in no distress, oriented to person, place/time, and who follows commands. HEENT: [place] Joe. hears well, does not require hearing aids. His sclera of the eyes are non-icteric. He is breathing normally and no respiratory accessory muscle use is noted. No JVD present and Neck ROM within normal limits. Psychiatric: Affect and mood are appropriate. Oriented x3  Cardiovascular/Peripheral Vascular: Normal pulses to each foot. Integumentary: No rashes. Warm and normal color. No drainage.    Gait: nonambulatory in WC  Sensory Exam: Intact/Normal Sensation    Lymphatic: No evidence of Lymphedema  Vascular:       Pulses: palpable  Varicosities none  Wounds/Abrasion: None Present  Neuro: Negative, no tremors  ORTHO EXAMINATION:  Examination Right shoulder Left shoulder   Skin Intact Intact   Effusion - ++, lateral non tender fatty collection--lipoma   Biceps deformity - - Atrophy - -   AC joint tenderness - -   Acromial tenderness - +   Biceps tenderness - -   Forward flexion/Elevation  180   Active abduction  180   External rotation ROM 30 20   Internal rotation ROM 90 80   Apprehension - -   Impingement - -   Drop Arm Test - -   Neurovascular Intact Intact    Using a wheelchair  Mesomorphic     TIME OUT:  Chart reviewed for the following:   Soila Kessler MD, have reviewed the History, Physical and updated the Allergic reactions for [address] performed immediately prior to start of procedure:  Soila Kessler MD, have performed the following reviews on [facility]. prior to the start of the procedure:          * Patient was identified by name and date of birth   * Agreement on procedure being performed was verified  * Risks and Benefits explained to the patient  * Procedure site verified and marked as necessary  * Patient was positioned for comfort  * Consent was obtained     Time: 11:54 AM     Date of procedure: 8/14/2020  Procedure performed by:  Svetlana Valdez MD  Mr. Janine Boss tolerated the procedure well with no complications. IMPRESSION:      ICD-10-CM ICD-9-CM    1. Chronic bursitis of left shoulder  M75.52 726.10 betamethasone (Celestone Soluspan) 6 mg/mL injection      BETAMETHASONE ACETATE & SODIUM PHOSPHATE INJECTION 3 MG EA.      DRAIN/INJECT LARGE JOINT/BURSA   2. Acute pain of left shoulder  M25.512 719.41 AMB POC XRAY, SHOULDER; COMPLETE, 2+      betamethasone (Celestone Soluspan) 6 mg/mL injection      BETAMETHASONE ACETATE & SODIUM PHOSPHATE INJECTION 3 MG EA.      DRAIN/INJECT LARGE JOINT/BURSA   3. Myelopathy ([facility] [address]  G95.9 336.9      PLAN: After discussing treatment options, patient's left shoulder was injected with 4 cc Marcaine and 1/2 cc Celestone. We discussed a possible need for left shoulder MR arthrogram in the future if pain continues. There is no need for surgery at this time.   He will follow up as needed.       Scribed by Ruben Hernandez ([address] [place] [address] [address]) as dictated by Luna Goldberg MD

## 2020-09-25 RX ORDER — ALBUTEROL SULFATE 90 UG/1
2 AEROSOL, METERED RESPIRATORY (INHALATION)
Qty: 8.5 G | Refills: 2 | Status: SHIPPED | OUTPATIENT
Start: 2020-09-25 | End: 2021-01-06 | Stop reason: CLARIF

## 2020-09-25 NOTE — TELEPHONE ENCOUNTER
Pt requesting refill for proair to be sent to [facility], pt is out. Please advise [phone].

## 2020-10-08 ENCOUNTER — OFFICE VISIT (OUTPATIENT)
Dept: ORTHOPEDIC SURGERY | Age: 60
End: 2020-10-08
Payer: MEDICAID

## 2020-10-08 VITALS
SYSTOLIC BLOOD PRESSURE: 110 MMHG | OXYGEN SATURATION: 98 % | BODY MASS INDEX: 29.25 KG/M2 | TEMPERATURE: 97.1 F | RESPIRATION RATE: 16 BRPM | DIASTOLIC BLOOD PRESSURE: 63 MMHG | WEIGHT: 182 LBS | HEART RATE: 91 BPM | HEIGHT: 66 IN

## 2020-10-08 DIAGNOSIS — M85.80 OSTEOPENIA, UNSPECIFIED LOCATION: ICD-10-CM

## 2020-10-08 DIAGNOSIS — M75.52 CHRONIC BURSITIS OF LEFT SHOULDER: Primary | ICD-10-CM

## 2020-10-08 DIAGNOSIS — G89.29 CHRONIC LEFT SHOULDER PAIN: ICD-10-CM

## 2020-10-08 DIAGNOSIS — M25.512 CHRONIC LEFT SHOULDER PAIN: ICD-10-CM

## 2020-10-08 PROCEDURE — 20610 DRAIN/INJ JOINT/BURSA W/O US: CPT | Performed by: SPECIALIST

## 2020-10-08 PROCEDURE — 99213 OFFICE O/P EST LOW 20 MIN: CPT | Performed by: SPECIALIST

## 2020-10-08 PROCEDURE — 73030 X-RAY EXAM OF SHOULDER: CPT | Performed by: SPECIALIST

## 2020-10-08 RX ORDER — BETAMETHASONE SODIUM PHOSPHATE AND BETAMETHASONE ACETATE 3; 3 MG/ML; MG/ML
6 INJECTION, SUSPENSION INTRA-ARTICULAR; INTRALESIONAL; INTRAMUSCULAR; SOFT TISSUE ONCE
Qty: 0.5 ML | Refills: 0
Start: 2020-10-08 | End: 2020-10-08

## 2020-10-08 NOTE — PROGRESS NOTES
Patient: Ying Wells. MRN: [id number]       SSN: [id number]  YOB: 1960        AGE: 60 y.o. SEX: male    PCP: Whit Leblanc MD  10/08/20    CC: LEFT SHOULDER PAIN    HISTORY:  Ying Franco is a 60 y.o. male who is seen for left shoulder pain. He responded to a cortisone injection last ov but his pain has returned. He has been experiencing left shoulder pain for the past several months with an increase in pain 3 weeks ago. He does not recall any recent shoulder injury or arm trauma. He feels swelling and aching in his left shoulder. He notes a prominence of his left AC joint. He felt a pulling sensation in his left shoulder when he grabbed his headboard to get out of bed on 8/10/20. He feels shoulder pain with overhead activities and at night. He is right handed. He was diagnosed with autoimmune myelopathy and sarcodosis by Dr. Yuliana Tabor. He believes prednisone caused his muscle weakness. His myelopathy affects his right side more than his left. He is unable to raise his right arm very high. He is in pain management with Dr. Estella Bermudez. He takes Norco for his pain. Pain Assessment  10/8/2020   Location of Pain Arm   Location Modifiers Left   Severity of Pain 8   Quality of Pain Throbbing; Sharp;Dull;Aching   Duration of Pain Persistent   Frequency of Pain Constant   Aggravating Factors Bending;Stretching;Straightening   Aggravating Factors Comment -   Limiting Behavior -   Relieving Factors (No Data)   Relieving Factors Comment NORCO   Result of Injury No   Work-Related Injury -   Type of Injury -   Type of Injury Comment -     Occupation, etc:  Mr. Sander Jaramillo previously worked as a [employer]. He is now on disability. [facility] [place] [facility]. He has a 49 yo son. He has 6 grandchildren and 2 great grandchildren. He is in a wheelchair since 1998 because of myelopathy. He is paraplegic--unable to walk or stand.  He is denies any back pain or sores from being in a wheelchair. He is self-suffcient. He enjoys watching old cowboy movies. Mr. Jessica Ocampo weighs 182 lbs and is 5'6\" tall.        No results found for: HBA1C, HGBE8, YQX3NUFS, YWB5QHWU, QYQ6VHYK  Weight Metrics 10/8/2020 8/14/2020 8/10/2020 8/6/2020 6/16/2020 1/20/2020 6/3/2019   Weight 182 lb - 182 lb 182 lb 182 lb 182 lb 182 lb   BMI 29.38 kg/m2 29.38 kg/m2 29.38 kg/m2 29.38 kg/m2 29.38 kg/m2 29.38 kg/m2 29.38 kg/m2       Patient Active Problem List   Diagnosis Code    Sarcoid D86.9    Peripheral neuropathy G62.9    Myopathy G72.9    Pulmonary artery hypertension (HCC) I27.21    Asthma J45.909    Obstructive apnea G47.33    Shortness of breath R06.02    GI bleed K92.2    Symptomatic anemia D64.9    Iron deficiency anemia D50.9    Gastric polyp K31.7    Hypokalemia E87.6    HTN (hypertension) I10    Hypogonadism male E29.1    Impotence of organic origin N52.9    Hypogonadism in male E29.1     REVIEW OF SYSTEMS:    Constitutional Symptoms: Negative   Eyes: Negative   Ears, Nose, Throat and Mouth: Negative   Cardiovascular: Negative   Respiratory: Negative   Genitourinary: Per HPI   Gastrointestinal: Per HPI   Integumentary (Skin and/or Breast): Negative   Musculoskeletal: Per HPI   Endocrine/Rheumatologic: Negative   Neurological: Per HPI   Hematology/Lymphatic: Negative    Allergic/Immunologic: Negative   Phychiatric: Negative    Social History     Socioeconomic History    Marital status:      Spouse name: Not on file    Number of children: Not on file    Years of education: Not on file    Highest education level: Not on file   Occupational History    Not on file   Social Needs    Financial resource strain: Not on file    Food insecurity     Worry: Not on file     Inability: Not on file    Transportation needs     Medical: Not on file     Non-medical: Not on file   Tobacco Use    Smoking status: Never Smoker    Smokeless tobacco: Never Used   Substance and Sexual Activity  Alcohol use: Yes     Comment: daily wine at dinner or beer    Drug use: No    Sexual activity: Yes   Lifestyle    Physical activity     Days per week: Not on file     Minutes per session: Not on file    Stress: Not on file   Relationships    Social connections     Talks on phone: Not on file     Gets together: Not on file     Attends Orthodoxy service: Not on file     Active member of club or organization: Not on file     Attends meetings of clubs or organizations: Not on file     Relationship status: Not on file    Intimate partner violence     Fear of current or ex partner: Not on file     Emotionally abused: Not on file     Physically abused: Not on file     Forced sexual activity: Not on file   Other Topics Concern    Not on file   Social History Narrative    Not on file      No Known Allergies   Current Outpatient Medications   Medication Sig    betamethasone (Celestone Soluspan) 6 mg/mL injection 1 mL by Intra artICUlar route once for 1 dose.  ProAir HFA 90 mcg/actuation inhaler Take 2 Puffs by inhalation every four (4) hours as needed for Wheezing.  sildenafil citrate (VIAGRA) 100 mg tablet Take 1 Tab by mouth daily as needed for Erectile Dysfunction.  fluticasone propion-salmeteroL (Advair Diskus) 250-50 mcg/dose diskus inhaler Take 1 Puff by inhalation two (2) times a day.  azithromycin (ZITHROMAX) 250 mg tablet Take 250 mg by mouth daily.  ondansetron (ZOFRAN ODT) 4 mg disintegrating tablet Take 4 mg by mouth every eight (8) hours as needed.  albuterol (PROVENTIL VENTOLIN) 2.5 mg /3 mL (0.083 %) nebu INHALE CONTENTS OF 1 VIAL VIA NEBULIZER EVERY FOUR (4) HOURS AS NEEDED FOR WHEEZING.  linaCLOtide (LINZESS) 290 mcg cap capsule Take 290 mcg by mouth Daily (before breakfast).  albuterol (PROVENTIL VENTOLIN) 2.5 mg /3 mL (0.083 %) nebulizer solution 3 mL by Nebulization route every four (4) hours as needed for Wheezing.     ibuprofen (MOTRIN) 600 mg tablet Take 1 Tab by mouth every six (6) hours as needed for Pain.  HYDROcodone-acetaminophen (NORCO) 7.5-325 mg per tablet Take 1 Tab by mouth four (4) times daily as needed.  amLODIPine (NORVASC) 10 mg tablet TAKE 1 TABLET BY MOUTH ONCE DAILY    aspirin delayed-release 81 mg tablet TAKE 1 TABLET BY MOUTH ONCE DAILY FOR 30 DAYS    triamterene-hydroCHLOROthiazide (MAXZIDE) 37.5-25 mg per tablet Take 1 Tab by mouth daily.  pantoprazole (PROTONIX) 40 mg tablet Take 1 Tab by mouth Before breakfast and dinner. Indications: Upper GI Bleed    ferrous sulfate (FEROSUL) 325 mg (65 mg iron) tablet Take 1 Tab by mouth Daily (before breakfast). Indications: IRON DEFICIENCY ANEMIA    bipap machine kit by Does Not Apply route nightly. Indications: DME: [employer]    modafinil (PROVIGIL) 200 mg tablet TAKE 1 TABLET BY MOUTH TWICE A DAY    loratadine (CLARITIN) 10 mg tablet Take 10 mg by mouth daily.  THERAPEUTIC-M 9 mg iron-400 mcg tab tablet Take 1 Tab by mouth daily.  OXYGEN-AIR DELIVERY SYSTEMS by Does Not Apply route. 3 liters hs with cpap   1 liter with activity  [employer]    doxazosin (CARDURA) 8 mg tablet TAKE 1 TABLET BY MOUTH BEFORE BEDTIME FOR 30 DAYS    sucralfate (CARAFATE) 1 gram tablet Take 1 Tab by mouth Before breakfast, lunch, dinner and at bedtime. No current facility-administered medications for this visit.        PHYSICAL EXAMINATION:  Visit Vitals  /63 (BP 1 Location: Right arm, BP Patient Position: Sitting)   Pulse 91   Temp 97.1 °F (36.2 °C) (Temporal)   Resp 16   Ht 5' 6\" (1.676 m)   Wt 182 lb (82.6 kg) Comment: verbal   SpO2 98%   BMI 29.38 kg/m²   ORTHO EXAMINATION:  Examination Right shoulder Left shoulder   Skin Intact Intact, prominence AC joint   Effusion - ++, lateral non tender fatty collection--lipoma   Biceps deformity - -   Atrophy - -   AC joint tenderness - -   Acromial tenderness - +   Biceps tenderness - -   Forward flexion/Elevation  180   Active abduction  180   External rotation ROM 30 20   Internal rotation ROM 90 80   Apprehension - -   Impingement - -   Drop Arm Test - -   Neurovascular Intact Intact    Using a wheelchair  Mesomorphic     TIME OUT:  Chart reviewed for the following:   Pippa Mcmanus MD, have reviewed the History, Physical and updated the Allergic reactions for [address] performed immediately prior to start of procedure:  Pippa Mcmanus MD, have performed the following reviews on [facility]. prior to the start of the procedure:          * Patient was identified by name and date of birth   * Agreement on procedure being performed was verified  * Risks and Benefits explained to the patient  * Procedure site verified and marked as necessary  * Patient was positioned for comfort  * Consent was obtained     Time: 10:05 AM     Date of procedure: 10/8/2020  Procedure performed by:  Jayshree Troncoso MD  Mr. Lisa Lara tolerated the procedure well with no complications. RADIOGRAPHS:  XR LEFT SHOULDER 10/8/20 AURE  IMPRESSION:  Three views - No fractures, no acromioclavicular narrowing, mild glenohumeral narrowing, no calcific densities, slightly high riding humeral head, osteopenia. IMPRESSION:      ICD-10-CM ICD-9-CM    1. Chronic bursitis of left shoulder  M75.52 726.10 betamethasone (Celestone Soluspan) 6 mg/mL injection      BETAMETHASONE ACETATE & SODIUM PHOSPHATE INJECTION 3 MG EA.      DRAIN/INJECT LARGE JOINT/BURSA      AMB POC XRAY, SHOULDER; COMPLETE, 2+   2. Chronic left shoulder pain  M25.512 719.41 betamethasone (Celestone Soluspan) 6 mg/mL injection    G89.29 338.29 BETAMETHASONE ACETATE & SODIUM PHOSPHATE INJECTION 3 MG EA.      DRAIN/INJECT LARGE JOINT/BURSA      AMB POC XRAY, SHOULDER; COMPLETE, 2+   3. Osteopenia, unspecified location  M85.80 733.90      PLAN: After discussing treatment options, patient's left shoulder was injected with 4 cc Marcaine and 1/2 cc Celestone.  We discussed a possible need for left shoulder MR arthrogram in the future if pain continues. There is no need for surgery at this time. He will follow up as needed.       Scribed by Herminia Arriola ([address] [facility] [address]) as dictated by Luis Enrique Walton MD

## 2020-10-17 ENCOUNTER — HOSPITAL ENCOUNTER (EMERGENCY)
Age: 60
Discharge: HOME | End: 2020-10-17
Attending: EMERGENCY MEDICINE
Payer: MEDICAID

## 2020-10-17 VITALS
TEMPERATURE: 99.2 F | SYSTOLIC BLOOD PRESSURE: 153 MMHG | HEART RATE: 110 BPM | OXYGEN SATURATION: 98 % | RESPIRATION RATE: 20 BRPM | DIASTOLIC BLOOD PRESSURE: 74 MMHG

## 2020-10-17 DIAGNOSIS — T30.0 PARTIAL THICKNESS BURNS OF MULTIPLE SITES: Primary | ICD-10-CM

## 2020-10-17 PROCEDURE — 99282 EMERGENCY DEPT VISIT SF MDM: CPT

## 2020-10-17 PROCEDURE — 75810000057 HC BURN CR DRS/DEB SM<5%TBSA

## 2020-10-17 PROCEDURE — 74011000250 HC RX REV CODE- 250: Performed by: EMERGENCY MEDICINE

## 2020-10-17 RX ORDER — BACITRACIN ZINC 500 UNIT/G
OINTMENT (GRAM) TOPICAL
Status: COMPLETED | OUTPATIENT
Start: 2020-10-17 | End: 2020-10-17

## 2020-10-17 RX ADMIN — Medication: at 12:50
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
## 2020-12-12 ENCOUNTER — HOSPITAL ENCOUNTER (OUTPATIENT)
Dept: LAB | Age: 60
Discharge: HOME | End: 2020-12-12

## 2020-12-12 LAB — XX-LABCORP SPECIMEN COL,LCBCF: NORMAL

## 2020-12-12 PROCEDURE — 99001 SPECIMEN HANDLING PT-LAB: CPT

## 2020-12-19 ENCOUNTER — TELEPHONE (OUTPATIENT)
Dept: PULMONOLOGY | Age: 60
End: 2020-12-19

## 2020-12-21 RX ORDER — ALBUTEROL SULFATE 0.83 MG/ML
2.5 SOLUTION RESPIRATORY (INHALATION)
Qty: 30 NEBULE | Refills: 3 | Status: SHIPPED | OUTPATIENT
Start: 2020-12-21 | End: 2021-09-28 | Stop reason: SDUPTHER

## 2020-12-21 RX ORDER — ALBUTEROL SULFATE 0.83 MG/ML
2.5 SOLUTION RESPIRATORY (INHALATION)
Qty: 75 NEBULE | Refills: 3 | Status: SHIPPED | OUTPATIENT
Start: 2020-12-21 | End: 2021-09-28 | Stop reason: SDUPTHER

## 2020-12-21 NOTE — TELEPHONE ENCOUNTER
Pt called to check status on albuterol solution for nebulizer sent to [facility].  Please call [phone]

## 2021-01-06 RX ORDER — ALBUTEROL SULFATE 90 UG/1
2 AEROSOL, METERED RESPIRATORY (INHALATION)
Qty: 1 INHALER | Refills: 3 | Status: SHIPPED | OUTPATIENT
Start: 2021-01-06 | End: 2021-05-25

## 2021-01-06 NOTE — TELEPHONE ENCOUNTER
Pt called, [facility] pharmacy faxed over a refill request for his proair yesterday, but he needs it asap. He is out. Please call pt at [phone] and let him know status.

## 2021-03-22 ENCOUNTER — TELEPHONE (OUTPATIENT)
Dept: PULMONOLOGY | Age: 61
End: 2021-03-22

## 2021-03-22 RX ORDER — FLUTICASONE PROPIONATE AND SALMETEROL 250; 50 UG/1; UG/1
1 POWDER RESPIRATORY (INHALATION) 2 TIMES DAILY
Qty: 1 INHALER | Refills: 5 | Status: SHIPPED | OUTPATIENT
Start: 2021-03-22 | End: 2021-09-28 | Stop reason: SDUPTHER

## 2021-03-30 ENCOUNTER — CLINICAL SUPPORT (OUTPATIENT)
Dept: SURGERY | Age: 61
End: 2021-03-30

## 2021-03-30 VITALS
BODY MASS INDEX: 29.25 KG/M2 | WEIGHT: 182 LBS | HEIGHT: 66 IN | HEART RATE: 88 BPM | RESPIRATION RATE: 18 BRPM | TEMPERATURE: 98.7 F | OXYGEN SATURATION: 98 %

## 2021-03-30 DIAGNOSIS — Z12.11 COLON CANCER SCREENING: ICD-10-CM

## 2021-03-30 DIAGNOSIS — Z01.818 PRE-OP TESTING: Primary | ICD-10-CM

## 2021-03-30 NOTE — PROGRESS NOTES
Review of Systems   Constitutional: Negative. HENT: Negative. Eyes: Negative. Respiratory: Negative. Cardiovascular: Negative. Gastrointestinal: Negative. Genitourinary: Negative. Musculoskeletal: Negative. Skin: Negative. Neurological: Negative. Endo/Heme/Allergies: Negative. Psychiatric/Behavioral: Negative. Colon Screen    Patient: Erica Reza MRN: [id number]  SSN: [id number]    YOB: 1960  Age: 63 y.o. Sex: male        Subjective: Erica Perla. was referred by his PCP, Cyrus Luna MD.  Patient referred for colonoscopy for   Personal history of colon polyps (screening only). Patient denies rectal pain or bleeding. Abdominal surgeries as described below, specifically appendectomy. Family history as described below, specifically sister who passed away at age 62 of colon cancer. Last colonoscopy was 4 years with Dr. Rani Armstrong. Patient had a tubular adenoma found. He was recalled for 3 years. [facility] no longer takes patients insurance so he is scheduling with us today. He will be scheduled at [facility] due to co-morbidities. Patient states he has difficulty breathing while laying on his left side. I told patient to let pre-admission testing know when they call him and also to let staff know when he arrives for his colonoscopy.     No Known Allergies    Past Medical History:   Diagnosis Date    Essential hypertension     Essential hypertension, benign     Gout     Hypogonadism male     Impotence     Microscopic hematuria     Myopathy     Obesity, unspecified     Obstructive sleep apnea     on bipap    Other chronic pulmonary heart diseases     Peripheral neuropathy 10/25/2012    Sarcoid 10/25/2012    Sarcoidosis      Past Surgical History:   Procedure Laterality Date    COLONOSCOPY N/A 4/21/2017    COLONOSCOPY with polypectomy performed by Tres Cortez MD at [address] HX APPENDECTOMY      HX ENDOSCOPY  03/2017    HX HEART CATHETERIZATION      HX HEMORRHOIDECTOMY        Family History   Problem Relation Age of Onset    Hypertension Father     Lung Disease Father     Heart Attack Father 61    Colon Cancer Sister 62     Social History     Tobacco Use    Smoking status: Never Smoker    Smokeless tobacco: Never Used   Substance Use Topics    Alcohol use: Yes     Comment: daily wine at dinner or beer      Prior to Admission medications    Medication Sig Start Date End Date Taking? Authorizing Provider   fluticasone propion-salmeteroL (Advair Diskus) 250-50 mcg/dose diskus inhaler Take 1 Puff by inhalation two (2) times a day. 3/22/21  Yes Kenisha Brown MD   sildenafil citrate (VIAGRA) 100 mg tablet Take 1 Tab by mouth daily as needed for Erectile Dysfunction. 1/26/21  Yes Blas Ho NP   albuterol (PROVENTIL HFA, VENTOLIN HFA, PROAIR HFA) 90 mcg/actuation inhaler Take 2 Puffs by inhalation every four (4) hours as needed for Wheezing. 1/6/21  Yes Jeffery Murrell MD   albuterol (PROVENTIL VENTOLIN) 2.5 mg /3 mL (0.083 %) nebu 3 mL by Nebulization route every six (6) hours as needed for Wheezing. 12/21/20  Yes Jeffery Murrell MD   albuterol (PROVENTIL VENTOLIN) 2.5 mg /3 mL (0.083 %) nebu 3 mL by Nebulization route every four (4) hours as needed for Wheezing or Shortness of Breath. Diag. Code: J39.833 12/21/20  Yes Jeffery Murrell MD   ondansetron (ZOFRAN ODT) 4 mg disintegrating tablet Take 4 mg by mouth every eight (8) hours as needed. Yes Provider, Historical   linaCLOtide (LINZESS) 290 mcg cap capsule Take 290 mcg by mouth Daily (before breakfast). Yes Provider, Historical   ibuprofen (MOTRIN) 600 mg tablet Take 1 Tab by mouth every six (6) hours as needed for Pain. 8/29/17  Yes Cristy Crawford   HYDROcodone-acetaminophen [facility]) 7.5-325 mg per tablet Take 1 Tab by mouth four (4) times daily as needed.  5/30/17  Yes Provider, Historical   amLODIPine (NORVASC) 10 mg tablet TAKE 1 TABLET BY MOUTH ONCE DAILY 5/15/17  Yes Provider, Historical   aspirin delayed-release 81 mg tablet TAKE 1 TABLET BY MOUTH ONCE DAILY FOR 30 DAYS 5/15/17  Yes Provider, Historical   doxazosin (CARDURA) 8 mg tablet TAKE 1 TABLET BY MOUTH BEFORE BEDTIME FOR 30 DAYS 5/15/17  Yes Provider, Historical   triamterene-hydroCHLOROthiazide (MAXZIDE) 37.5-25 mg per tablet Take 1 Tab by mouth daily. 5/15/17  Yes Provider, Historical   pantoprazole (PROTONIX) 40 mg tablet Take 1 Tab by mouth Before breakfast and dinner. Indications: Upper GI Bleed 3/18/17  Yes Dasha Bhat MD   sucralfate (CARAFATE) 1 gram tablet Take 1 Tab by mouth Before breakfast, lunch, dinner and at bedtime. 3/18/17  Yes Dasha Bhat MD   ferrous sulfate (FEROSUL) 325 mg (65 mg iron) tablet Take 1 Tab by mouth Daily (before breakfast). Indications: IRON DEFICIENCY ANEMIA 3/18/17  Yes Dasha Bhat MD   bipap machine kit by Does Not Apply route nightly. Indications: DME: [employer] [place] [employer] [place]   Yes Provider, Historical   modafinil (PROVIGIL) 200 mg tablet TAKE 1 TABLET BY MOUTH TWICE A DAY 7/27/16  Yes Provider, Historical   loratadine (CLARITIN) 10 mg tablet Take 10 mg by mouth daily. 4/18/16  Yes Provider, Historical   THERAPEUTIC-M 9 mg iron-400 mcg tab tablet Take 1 Tab by mouth daily. 4/18/16  Yes Provider, Historical   OXYGEN-AIR DELIVERY SYSTEMS by Does Not Apply route. 3 liters hs with cpap   1 liter with activity  [address]   Yes Provider, Historical          Risks colonoscopy described- colon injury, missed lesion, anesthesia problems, bleeding       Shoshana Amaya, LPN  March 30, 0317  3:30 PM

## 2021-05-11 ENCOUNTER — HOSPITAL ENCOUNTER (EMERGENCY)
Age: 61
Discharge: HOME | End: 2021-05-11
Attending: EMERGENCY MEDICINE
Payer: MEDICAID

## 2021-05-11 ENCOUNTER — APPOINTMENT (OUTPATIENT)
Dept: GENERAL RADIOLOGY | Age: 61
End: 2021-05-11
Attending: EMERGENCY MEDICINE
Payer: MEDICAID

## 2021-05-11 VITALS
TEMPERATURE: 98 F | BODY MASS INDEX: 29.25 KG/M2 | HEART RATE: 84 BPM | OXYGEN SATURATION: 100 % | SYSTOLIC BLOOD PRESSURE: 129 MMHG | RESPIRATION RATE: 16 BRPM | WEIGHT: 182 LBS | HEIGHT: 66 IN | DIASTOLIC BLOOD PRESSURE: 72 MMHG

## 2021-05-11 DIAGNOSIS — R94.31 ABNORMAL EKG: ICD-10-CM

## 2021-05-11 DIAGNOSIS — T18.108A FOREIGN BODY IN ESOPHAGUS, INITIAL ENCOUNTER: Primary | ICD-10-CM

## 2021-05-11 LAB
ALBUMIN SERPL-MCNC: 3.7 G/DL (ref 3.4–5)
ALBUMIN/GLOB SERPL: 0.9 {RATIO} (ref 0.8–1.7)
ALP SERPL-CCNC: 74 U/L (ref 45–117)
ALT SERPL-CCNC: 27 U/L (ref 16–61)
ANION GAP SERPL CALC-SCNC: 5 MMOL/L (ref 3–18)
AST SERPL-CCNC: 25 U/L (ref 10–38)
ATRIAL RATE: 78 BPM
BASOPHILS # BLD: 0.1 K/UL (ref 0–0.1)
BASOPHILS NFR BLD: 0 % (ref 0–2)
BILIRUB SERPL-MCNC: 0.7 MG/DL (ref 0.2–1)
BUN SERPL-MCNC: 19 MG/DL (ref 7–18)
BUN/CREAT SERPL: 38 (ref 12–20)
CALCIUM SERPL-MCNC: 8.8 MG/DL (ref 8.5–10.1)
CALCULATED P AXIS, ECG09: 19 DEGREES
CALCULATED R AXIS, ECG10: 53 DEGREES
CALCULATED T AXIS, ECG11: 83 DEGREES
CHLORIDE SERPL-SCNC: 103 MMOL/L (ref 100–111)
CO2 SERPL-SCNC: 31 MMOL/L (ref 21–32)
CREAT SERPL-MCNC: 0.5 MG/DL (ref 0.6–1.3)
DIAGNOSIS, 93000: NORMAL
DIFFERENTIAL METHOD BLD: ABNORMAL
EOSINOPHIL # BLD: 0.2 K/UL (ref 0–0.4)
EOSINOPHIL NFR BLD: 1 % (ref 0–5)
ERYTHROCYTE [DISTWIDTH] IN BLOOD BY AUTOMATED COUNT: 14.8 % (ref 11.6–14.5)
GLOBULIN SER CALC-MCNC: 4.3 G/DL (ref 2–4)
GLUCOSE SERPL-MCNC: 123 MG/DL (ref 74–99)
HCT VFR BLD AUTO: 40.7 % (ref 36–48)
HGB BLD-MCNC: 13.4 G/DL (ref 13–16)
LYMPHOCYTES # BLD: 1 K/UL (ref 0.9–3.6)
LYMPHOCYTES NFR BLD: 8 % (ref 21–52)
MCH RBC QN AUTO: 28.8 PG (ref 24–34)
MCHC RBC AUTO-ENTMCNC: 32.9 G/DL (ref 31–37)
MCV RBC AUTO: 87.5 FL (ref 74–97)
MONOCYTES # BLD: 1.1 K/UL (ref 0.05–1.2)
MONOCYTES NFR BLD: 9 % (ref 3–10)
NEUTS SEG # BLD: 10.2 K/UL (ref 1.8–8)
NEUTS SEG NFR BLD: 81 % (ref 40–73)
P-R INTERVAL, ECG05: 160 MS
PLATELET # BLD AUTO: 341 K/UL (ref 135–420)
PMV BLD AUTO: 9.4 FL (ref 9.2–11.8)
POTASSIUM SERPL-SCNC: 3.2 MMOL/L (ref 3.5–5.5)
PROT SERPL-MCNC: 8 G/DL (ref 6.4–8.2)
Q-T INTERVAL, ECG07: 530 MS
QRS DURATION, ECG06: 94 MS
QTC CALCULATION (BEZET), ECG08: 604 MS
RBC # BLD AUTO: 4.65 M/UL (ref 4.35–5.65)
SODIUM SERPL-SCNC: 139 MMOL/L (ref 136–145)
VENTRICULAR RATE, ECG03: 78 BPM
WBC # BLD AUTO: 12.6 K/UL (ref 4.6–13.2)

## 2021-05-11 PROCEDURE — 96374 THER/PROPH/DIAG INJ IV PUSH: CPT

## 2021-05-11 PROCEDURE — 71045 X-RAY EXAM CHEST 1 VIEW: CPT

## 2021-05-11 PROCEDURE — 74011250636 HC RX REV CODE- 250/636: Performed by: EMERGENCY MEDICINE

## 2021-05-11 PROCEDURE — 99283 EMERGENCY DEPT VISIT LOW MDM: CPT

## 2021-05-11 PROCEDURE — 80053 COMPREHEN METABOLIC PANEL: CPT

## 2021-05-11 PROCEDURE — 85025 COMPLETE CBC W/AUTO DIFF WBC: CPT

## 2021-05-11 PROCEDURE — 93005 ELECTROCARDIOGRAM TRACING: CPT

## 2021-05-11 RX ADMIN — GLUCAGON HYDROCHLORIDE 2 MG: KIT at 04:52

## 2021-05-11 NOTE — DISCHARGE INSTRUCTIONS
Return immediately for any worsening sensation of foreign body any chest pain or shortness of breath.   Otherwise follow-up at the outpatient with your PCP with referral to GI and cardiology

## 2021-05-11 NOTE — ED PROVIDER NOTES
EMERGENCY DEPARTMENT HISTORY AND PHYSICAL EXAM  This was created with voice recognition software and transcription errors may be present. 4:34 AM  Date: 5/11/2021  Patient Name: Jackie Madison. History of Presenting Illness     Chief Complaint:    History Provided By:     HPI: Jackie Madison. is a 63 y.o. male past medical history of hypertension gout obesity sarcoid neuropathy myopathy who presents with foreign body sensation. Patient states he feels like there is something stuck in his chest from last night feels like it may be a chicken bone.   Associate with nausea no chest pain no vomiting no fever no chills no cough other aggravating alleviating factors no other associated symptoms    PCP: Nathan Dumont MD      Past History     Past Medical History:  Past Medical History:   Diagnosis Date    Essential hypertension     Essential hypertension, benign     Gout     Hypogonadism male     Impotence     Microscopic hematuria     Myopathy     Obesity, unspecified     Obstructive sleep apnea     on bipap    Other chronic pulmonary heart diseases     Peripheral neuropathy 10/25/2012    Sarcoid 10/25/2012    Sarcoidosis        Past Surgical History:  Past Surgical History:   Procedure Laterality Date    COLONOSCOPY N/A 4/21/2017    COLONOSCOPY with polypectomy performed by Eligio Curran MD at [address] HX APPENDECTOMY      HX ENDOSCOPY  03/2017    HX HEART CATHETERIZATION      HX HEMORRHOIDECTOMY         Family History:  Family History   Problem Relation Age of Onset    Hypertension Father     Lung Disease Father     Heart Attack Father 61    Colon Cancer Sister 62       Social History:  Social History     Tobacco Use    Smoking status: Never Smoker    Smokeless tobacco: Never Used   Substance Use Topics    Alcohol use: Yes     Comment: daily wine at dinner or beer    Drug use: No       Allergies:  No Known Allergies    Review of Systems     Review of Systems   All other systems reviewed and are negative. 10 point review of systems otherwise negative unless noted in HPI. Physical Exam       Physical Exam  Constitutional:       Appearance: He is well-developed. HENT:      Head: Normocephalic and atraumatic. Eyes:      Pupils: Pupils are equal, round, and reactive to light. Neck:      Musculoskeletal: Normal range of motion and neck supple. Cardiovascular:      Rate and Rhythm: Normal rate and regular rhythm. Heart sounds: Normal heart sounds. No murmur. No friction rub. Pulmonary:      Effort: Pulmonary effort is normal. No respiratory distress. Breath sounds: Normal breath sounds. No wheezing. Abdominal:      General: There is no distension. Palpations: Abdomen is soft. Tenderness: There is no abdominal tenderness. There is no guarding or rebound. Musculoskeletal: Normal range of motion. Skin:     General: Skin is warm and dry. Neurological:      Mental Status: He is alert and oriented to person, place, and time. Comments: Generalized weakness not thoroughly tested patient does appear to be able to move both legs but is weak from a steroid/sarcoid myopathy   Psychiatric:         Behavior: Behavior normal.         Thought Content: Thought content normal.         Diagnostic Study Results     Vital Signs  EKG: EKG shows sinus at 78 with a normal axis normal intervals there is no ST elevation or depression no hypertrophy  Labs:   Imaging:     Medical Decision Making     ED Course: Progress Notes, Reevaluation, and Consults:      Provider Notes (Medical Decision Making):   Presents with foreign body sensation will check a chest x-ray to see if we can see it is likely a chicken bones over there is low probability. Will try glucagon with limitations noted and see if we are successful    She is feeling somewhat better although not completely resolved. He still nauseated.   Discussing whether or not we should have him stay for GI evaluation in the morning as he states that it was yesterday afternoon around 3-4 that he ate the chicken wings and then woke up in the [place] feeling nauseated. He is feeling better requesting to go home he denies any chest pain. Some slight changes to the EKG laterally over the think the overall etiology today is cardiac he recognizes that nausea can certainly be a cardiac symptom but that generally had a sensation of a foreign body we will discharge her home follow-up with PCP GI and cardiology    Patient is tolerating liquids and solids without difficulty  Diagnosis     Clinical Impression: No diagnosis found. Disposition:    Patient's Medications   Start Taking    No medications on file   Continue Taking    ALBUTEROL (PROVENTIL HFA, VENTOLIN HFA, PROAIR HFA) 90 MCG/ACTUATION INHALER    Take 2 Puffs by inhalation every four (4) hours as needed for Wheezing. ALBUTEROL (PROVENTIL VENTOLIN) 2.5 MG /3 ML (0.083 %) NEBU    3 mL by Nebulization route every six (6) hours as needed for Wheezing. ALBUTEROL (PROVENTIL VENTOLIN) 2.5 MG /3 ML (0.083 %) NEBU    3 mL by Nebulization route every four (4) hours as needed for Wheezing or Shortness of Breath. Diag. Code: J45.909    AMLODIPINE (NORVASC) 10 MG TABLET    TAKE 1 TABLET BY MOUTH ONCE DAILY    ASPIRIN DELAYED-RELEASE 81 MG TABLET    TAKE 1 TABLET BY MOUTH ONCE DAILY FOR 30 DAYS    BIPAP MACHINE KIT    by Does Not Apply route nightly. Indications: DME: [facility]    DOXAZOSIN (CARDURA) 8 MG TABLET    TAKE 1 TABLET BY MOUTH BEFORE BEDTIME FOR 30 DAYS    FERROUS SULFATE (FEROSUL) 325 MG (65 MG IRON) TABLET    Take 1 Tab by mouth Daily (before breakfast). Indications: IRON DEFICIENCY ANEMIA    FLUTICASONE PROPION-SALMETEROL (ADVAIR DISKUS) 250-50 MCG/DOSE DISKUS INHALER    Take 1 Puff by inhalation two (2) times a day. HYDROCODONE-ACETAMINOPHEN (NORCO) 7.5-325 MG PER TABLET    Take 1 Tab by mouth four (4) times daily as needed.     IBUPROFEN (MOTRIN) 600 MG TABLET    Take 1 Tab by mouth every six (6) hours as needed for Pain. LINACLOTIDE (LINZESS) 290 MCG CAP CAPSULE    Take 290 mcg by mouth Daily (before breakfast). LORATADINE (CLARITIN) 10 MG TABLET    Take 10 mg by mouth daily. MODAFINIL (PROVIGIL) 200 MG TABLET    TAKE 1 TABLET BY MOUTH TWICE A DAY    ONDANSETRON (ZOFRAN ODT) 4 MG DISINTEGRATING TABLET    Take 4 mg by mouth every eight (8) hours as needed. OXYGEN-AIR DELIVERY SYSTEMS    by Does Not Apply route. 3 liters hs with cpap   1 liter with activity  [address]    PANTOPRAZOLE (PROTONIX) 40 MG TABLET    Take 1 Tab by mouth Before breakfast and dinner. Indications: Upper GI Bleed    SILDENAFIL CITRATE (VIAGRA) 100 MG TABLET    Take 1 Tab by mouth daily as needed for Erectile Dysfunction. SUCRALFATE (CARAFATE) 1 GRAM TABLET    Take 1 Tab by mouth Before breakfast, lunch, dinner and at bedtime. THERAPEUTIC-M 9 MG IRON-400 MCG TAB TABLET    Take 1 Tab by mouth daily. TRIAMTERENE-HYDROCHLOROTHIAZIDE (MAXZIDE) 37.5-25 MG PER TABLET    Take 1 Tab by mouth daily.    These Medications have changed    No medications on file   Stop Taking    No medications on file

## 2021-05-11 NOTE — ED NOTES
6:10 AM  05/11/21     Discharge instructions given to patient (name) with verbalization of understanding. Patient accompanied by family memebr. Patient discharged with the following prescriptions none. Patient discharged to home (destination).       Amelia Watt RN

## 2021-05-25 RX ORDER — ALBUTEROL SULFATE 90 UG/1
AEROSOL, METERED RESPIRATORY (INHALATION)
Qty: 8.5 G | Refills: 3 | Status: SHIPPED | OUTPATIENT
Start: 2021-05-25 | End: 2021-09-28 | Stop reason: SDUPTHER

## 2021-09-16 ENCOUNTER — TELEPHONE (OUTPATIENT)
Dept: SURGERY | Age: 61
End: 2021-09-16

## 2021-09-16 NOTE — TELEPHONE ENCOUNTER
I called this patient to confirm his colonoscopy on 9/24/2021. He stated he called a few weeks ago to cancel it because he is not due for recall colonoscopy until April 2022. I told him I would reach out to him early 2022 to get him rescheduled. I gave him my direction phone number to call if he has not heard from me.

## 2021-09-20 RX ORDER — PENICILLIN V POTASSIUM 500 MG/1
TABLET, FILM COATED ORAL
COMMUNITY
End: 2022-05-24

## 2021-09-20 RX ORDER — NITROGLYCERIN 0.4 MG/1
0.4 TABLET SUBLINGUAL
COMMUNITY

## 2021-09-20 RX ORDER — ASPIRIN 325 MG
TABLET, DELAYED RELEASE (ENTERIC COATED) ORAL
COMMUNITY
End: 2022-05-24

## 2021-09-20 RX ORDER — DICLOFENAC SODIUM 10 MG/G
GEL TOPICAL
COMMUNITY

## 2021-09-20 RX ORDER — GUAIFENESIN/DEXTROMETHORPHAN 100-10MG/5
SYRUP ORAL
COMMUNITY
End: 2022-05-24

## 2021-09-28 ENCOUNTER — OFFICE VISIT (OUTPATIENT)
Dept: PULMONOLOGY | Age: 61
End: 2021-09-28
Payer: MEDICAID

## 2021-09-28 VITALS
BODY MASS INDEX: 29.25 KG/M2 | RESPIRATION RATE: 18 BRPM | DIASTOLIC BLOOD PRESSURE: 64 MMHG | HEART RATE: 112 BPM | SYSTOLIC BLOOD PRESSURE: 124 MMHG | OXYGEN SATURATION: 95 % | TEMPERATURE: 98.5 F | HEIGHT: 66 IN | WEIGHT: 182 LBS

## 2021-09-28 DIAGNOSIS — E85.4 AMYLOIDOSIS OF LUNG (HCC): ICD-10-CM

## 2021-09-28 DIAGNOSIS — G47.33 OSA (OBSTRUCTIVE SLEEP APNEA): ICD-10-CM

## 2021-09-28 DIAGNOSIS — D86.9 SARCOIDOSIS: Primary | ICD-10-CM

## 2021-09-28 DIAGNOSIS — J99 AMYLOIDOSIS OF LUNG (HCC): ICD-10-CM

## 2021-09-28 PROCEDURE — 99214 OFFICE O/P EST MOD 30 MIN: CPT | Performed by: INTERNAL MEDICINE

## 2021-09-28 RX ORDER — ALBUTEROL SULFATE 90 UG/1
AEROSOL, METERED RESPIRATORY (INHALATION)
Qty: 8.5 G | Refills: 3 | Status: SHIPPED | OUTPATIENT
Start: 2021-09-28 | End: 2022-01-03 | Stop reason: CLARIF

## 2021-09-28 RX ORDER — ALBUTEROL SULFATE 0.83 MG/ML
2.5 SOLUTION RESPIRATORY (INHALATION)
Qty: 30 NEBULE | Refills: 3 | Status: SHIPPED | OUTPATIENT
Start: 2021-09-28

## 2021-09-28 RX ORDER — FLUTICASONE PROPIONATE AND SALMETEROL 250; 50 UG/1; UG/1
1 POWDER RESPIRATORY (INHALATION) 2 TIMES DAILY
Qty: 1 EACH | Refills: 5 | Status: SHIPPED | OUTPATIENT
Start: 2021-09-28 | End: 2022-02-02

## 2021-09-28 NOTE — PROGRESS NOTES
HISTORY OF PRESENT ILLNESS  Ric Mon is a 63 y.o. male following up for Sarcoidosis and Amyloid. Pt was followed by Dr. Amisha George for above issues, along with TOBY on CPAP. Pt has severe neuropathy of the upper and lower extremities rendering him unable to ambulate and is now wheelchair dependent. As for his respiratory issues, he denies shortness of breath at rest and has not been admitted nor seen in ED for pulmonary problems. He has no new respiratory complaints. In 2019, patient had a CT guided biopsy of a lung mass, results of which were consistent with Amyloidosis. Review of Systems   Constitutional: Negative for chills, diaphoresis, fever, malaise/fatigue and weight loss. HENT: Negative for congestion, ear discharge, ear pain, hearing loss, nosebleeds, sinus pain, sore throat and tinnitus. Eyes: Negative for blurred vision, double vision, photophobia, pain, discharge and redness. Respiratory: Negative for cough, hemoptysis, sputum production, shortness of breath, wheezing and stridor. Cardiovascular: Negative for chest pain, palpitations, orthopnea, claudication and PND. Leg swelling: intermittent. Gastrointestinal: Negative for abdominal pain, blood in stool, constipation, diarrhea, heartburn, melena, nausea and vomiting. Genitourinary: Negative for dysuria, flank pain, frequency, hematuria and urgency. Musculoskeletal: Positive for joint pain and myalgias. Negative for back pain, falls and neck pain. Skin: Negative for itching and rash. Neurological: Positive for tingling and sensory change. Negative for dizziness, tremors, speech change, focal weakness, seizures, loss of consciousness and headaches. Endo/Heme/Allergies: Negative for environmental allergies and polydipsia. Does not bruise/bleed easily. Psychiatric/Behavioral: Negative for depression, hallucinations, memory loss, substance abuse and suicidal ideas.  The patient is not nervous/anxious and does not have insomnia. Past Medical History:   Diagnosis Date    Essential hypertension     Essential hypertension, benign     Gout     Hypogonadism male     Impotence     Microscopic hematuria     Myopathy     Obesity, unspecified     Obstructive sleep apnea     on bipap    Other chronic pulmonary heart diseases     Peripheral neuropathy 10/25/2012    Sarcoid 10/25/2012    Sarcoidosis      Past Surgical History:   Procedure Laterality Date    COLONOSCOPY N/A 4/21/2017    COLONOSCOPY with polypectomy performed by Jeanne Ochoa MD at [address] HX APPENDECTOMY      HX ENDOSCOPY  03/2017    HX HEART CATHETERIZATION      HX HEMORRHOIDECTOMY       Current Outpatient Medications on File Prior to Visit   Medication Sig Dispense Refill    cholecalciferol (VITAMIN D3) (50,000 UNITS /1250 MCG) capsule cholecalciferol (vitamin D3) 1,250 mcg (50,000 unit) capsule      diclofenac (VOLTAREN) 1 % gel diclofenac 1 % topical gel      guaiFENesin-dextromethorphan (ROBITUSSIN DM) 100-10 mg/5 mL syrup Q-Tussin DM 10 mg-100 mg/5 mL oral syrup      nitroglycerin (NITROSTAT) 0.4 mg SL tablet 0.4 mg by SubLINGual route.  penicillin v potassium (VEETID) 500 mg tablet penicillin V potassium 500 mg tablet   TAKE 1 TABLET BY MOUTH 4 TIMES A DAY      sildenafil citrate (VIAGRA) 100 mg tablet Take 1 Tablet by mouth daily as needed for Erectile Dysfunction. 6 Tablet 0    ondansetron (ZOFRAN ODT) 4 mg disintegrating tablet Take 4 mg by mouth every eight (8) hours as needed.  linaCLOtide (LINZESS) 290 mcg cap capsule Take 290 mcg by mouth Daily (before breakfast).  ibuprofen (MOTRIN) 600 mg tablet Take 1 Tab by mouth every six (6) hours as needed for Pain. 20 Tab 0    HYDROcodone-acetaminophen (NORCO) 7.5-325 mg per tablet Take 1 Tab by mouth four (4) times daily as needed.   0    amLODIPine (NORVASC) 10 mg tablet TAKE 1 TABLET BY MOUTH ONCE DAILY  6    aspirin delayed-release 81 mg tablet TAKE 1 TABLET BY MOUTH ONCE DAILY FOR 30 DAYS  3    doxazosin (CARDURA) 8 mg tablet TAKE 1 TABLET BY MOUTH BEFORE BEDTIME FOR 30 DAYS  3    triamterene-hydroCHLOROthiazide (MAXZIDE) 37.5-25 mg per tablet Take 1 Tab by mouth daily. 2    pantoprazole (PROTONIX) 40 mg tablet Take 1 Tab by mouth Before breakfast and dinner. Indications: Upper GI Bleed 100 Tab 1    sucralfate (CARAFATE) 1 gram tablet Take 1 Tab by mouth Before breakfast, lunch, dinner and at bedtime. 100 Tab 1    ferrous sulfate (FEROSUL) 325 mg (65 mg iron) tablet Take 1 Tab by mouth Daily (before breakfast). Indications: IRON DEFICIENCY ANEMIA 100 Tab 0    bipap machine kit by Does Not Apply route nightly. Indications: DME: [facility]      modafinil (PROVIGIL) 200 mg tablet TAKE 1 TABLET BY MOUTH TWICE A DAY  3    loratadine (CLARITIN) 10 mg tablet Take 10 mg by mouth daily.  THERAPEUTIC-M 9 mg iron-400 mcg tab tablet Take 1 Tab by mouth daily.  OXYGEN-AIR DELIVERY SYSTEMS by Does Not Apply route. 3 liters hs with cpap   1 liter with activity  [address]       No current facility-administered medications on file prior to visit.      No Known Allergies  Family History   Problem Relation Age of Onset    Hypertension Father     Lung Disease Father     Heart Attack Father 61    Colon Cancer Sister 62     Social History     Socioeconomic History    Marital status: SINGLE     Spouse name: Not on file    Number of children: Not on file    Years of education: Not on file    Highest education level: Not on file   Occupational History    Not on file   Tobacco Use    Smoking status: Never Smoker    Smokeless tobacco: Never Used   Vaping Use    Vaping Use: Never used   Substance and Sexual Activity    Alcohol use: Not Currently     Comment: daily wine at dinner or beer    Drug use: No    Sexual activity: Yes   Other Topics Concern    Not on file   Social History Narrative    Not on file     Social Determinants of Health     Financial Resource Strain:     Difficulty of Paying Living Expenses:    Food Insecurity:     Worried About Running Out of Food in the Last Year:     [address] in the Last Year:    Transportation Needs:     Lack of Transportation (Medical):  Lack of Transportation (Non-Medical):    Physical Activity:     Days of Exercise per Week:     Minutes of Exercise per Session:    Stress:     Feeling of Stress :    Social Connections:     Frequency of Communication with Friends and Family:     Frequency of Social Gatherings with Friends and Family:     Attends Taoist Services:     Active Member of Clubs or Organizations:     Attends Club or Organization Meetings:     Marital Status:    Intimate Partner Violence:     Fear of Current or Ex-Partner:     Emotionally Abused:     Physically Abused:     Sexually Abused:      Blood pressure 124/64, pulse (!) 112, temperature 98.5 °F (36.9 °C), temperature source Temporal, resp. rate 18, height 5' 6\" (1.676 m), weight 82.6 kg (182 lb), SpO2 95 %. Physical Exam  Constitutional:       General: He is not in acute distress. Appearance: He is not ill-appearing, toxic-appearing or diaphoretic. Comments: Wheelchair bound   HENT:      Head: Normocephalic and atraumatic. Left Ear: External ear normal.      Nose:      Comments: Deferred      Mouth/Throat:      Comments: Deferred   Eyes:      General: No scleral icterus. Right eye: No discharge. Left eye: No discharge. Extraocular Movements: Extraocular movements intact. Conjunctiva/sclera: Conjunctivae normal.      Pupils: Pupils are equal, round, and reactive to light. Neck:      Vascular: No carotid bruit. Cardiovascular:      Rate and Rhythm: Normal rate and regular rhythm. Pulses: Normal pulses. Heart sounds: No murmur heard. No gallop. Pulmonary:      Effort: Pulmonary effort is normal. No respiratory distress. Breath sounds: Normal breath sounds.  No stridor. No wheezing, rhonchi or rales. Chest:      Chest wall: No tenderness. Abdominal:      General: There is no distension. Palpations: Abdomen is soft. There is no mass. Tenderness: There is no abdominal tenderness. Musculoskeletal:         General: No swelling, tenderness, deformity or signs of injury. Cervical back: No rigidity or tenderness. Right lower leg: Right lower leg edema: trace. Left lower leg: Left lower leg edema: trace. Lymphadenopathy:      Cervical: No cervical adenopathy. Skin:     General: Skin is warm and dry. Coloration: Skin is not jaundiced or pale. Findings: No bruising, erythema or lesion. Neurological:      Mental Status: He is alert and oriented to person, place, and time. Cranial Nerves: No cranial nerve deficit. Psychiatric:         Mood and Affect: Mood normal.         Behavior: Behavior normal.         Thought Content: Thought content normal.         Judgment: Judgment normal.         ASSESSMENT and PLAN  Encounter Diagnoses   Name Primary?  Sarcoidosis Yes    Amyloidosis of lung ([facility] [address]     TOBY (obstructive sleep apnea)        Pt with sarcoidosis and lung amyloidosis, symptomatically stable. Will need to follow lung function as well as imaging beatris of sarcoidosis (parenchymal and mediastinal changes) as well as size of amyloid lung mass. CT chest without contrast ordered, will call pt with results. PFT's ordered. Pt encouraged CPAP use. Pt is well maintained on Advair and prn Albuterol, refills written. RTC after CT.

## 2021-09-28 NOTE — PROGRESS NOTES
Betsey Schlatter presents today for   Chief Complaint   Patient presents with    Medication Refill    Follow-up       Is someone accompanying this pt? no    Is the patient using any DME equipment during OV? Yes, wheelchair    Coordination of Care:  1. Have you been to the ER, urgent care clinic since your last visit? Hospitalized since your last visit? no    2. Have you seen or consulted any other health care providers outside of the [address] [place] [address] since your last visit? Include any pap smears or colon screening.  no

## 2021-12-28 ENCOUNTER — HOSPITAL ENCOUNTER (OUTPATIENT)
Dept: CT IMAGING | Age: 61
Discharge: HOME | End: 2021-12-28
Attending: INTERNAL MEDICINE
Payer: MEDICAID

## 2021-12-28 DIAGNOSIS — E85.4 AMYLOIDOSIS OF LUNG (HCC): ICD-10-CM

## 2021-12-28 DIAGNOSIS — J99 AMYLOIDOSIS OF LUNG (HCC): ICD-10-CM

## 2021-12-28 DIAGNOSIS — D86.9 SARCOIDOSIS: ICD-10-CM

## 2021-12-28 PROCEDURE — 71250 CT THORAX DX C-: CPT

## 2022-01-03 ENCOUNTER — TELEPHONE (OUTPATIENT)
Dept: PULMONOLOGY | Age: 62
End: 2022-01-03

## 2022-01-04 RX ORDER — ALBUTEROL SULFATE 90 UG/1
2 AEROSOL, METERED RESPIRATORY (INHALATION)
Qty: 1 EACH | Refills: 3 | Status: SHIPPED | OUTPATIENT
Start: 2022-01-04 | End: 2022-05-16

## 2022-01-19 ENCOUNTER — TELEPHONE (OUTPATIENT)
Dept: SURGERY | Age: 62
End: 2022-01-19

## 2022-01-19 NOTE — TELEPHONE ENCOUNTER
I spoke to Mr. Hernandez on 1/19/2022 at 11:36am. He is due for a colonoscopy in April 2022- he stated he will call me back in a week or two to get this scheduled. I did inform him that at this time we are booked out to June.

## 2022-01-20 ENCOUNTER — TELEPHONE (OUTPATIENT)
Dept: SURGERY | Age: 62
End: 2022-01-20

## 2022-01-20 NOTE — TELEPHONE ENCOUNTER
. Yumi Mendieta called ma on 1/19/2022 and informed me that he is having his colonoscopy with [facility]. He has been there in the past and wishes to continue with them. I told him I would document this information and if he ever needs our services in the future to please call.

## 2022-02-02 ENCOUNTER — OFFICE VISIT (OUTPATIENT)
Dept: PULMONOLOGY | Age: 62
End: 2022-02-02

## 2022-02-02 VITALS
BODY MASS INDEX: 29.25 KG/M2 | TEMPERATURE: 98.2 F | OXYGEN SATURATION: 98 % | HEIGHT: 66 IN | DIASTOLIC BLOOD PRESSURE: 82 MMHG | RESPIRATION RATE: 14 BRPM | WEIGHT: 182 LBS | SYSTOLIC BLOOD PRESSURE: 154 MMHG | HEART RATE: 80 BPM

## 2022-02-02 DIAGNOSIS — J99 PULMONARY NODULAR AMYLOIDOSIS (HCC): ICD-10-CM

## 2022-02-02 DIAGNOSIS — G47.33 OSA ON CPAP: ICD-10-CM

## 2022-02-02 DIAGNOSIS — Z99.89 OSA ON CPAP: ICD-10-CM

## 2022-02-02 DIAGNOSIS — J44.9 COPD WITH ASTHMA (HCC): Primary | ICD-10-CM

## 2022-02-02 DIAGNOSIS — E85.4 PULMONARY NODULAR AMYLOIDOSIS (HCC): ICD-10-CM

## 2022-02-02 DIAGNOSIS — D86.9 SARCOIDOSIS: ICD-10-CM

## 2022-02-02 PROCEDURE — 94729 DIFFUSING CAPACITY: CPT | Performed by: INTERNAL MEDICINE

## 2022-02-02 PROCEDURE — 99214 OFFICE O/P EST MOD 30 MIN: CPT | Performed by: INTERNAL MEDICINE

## 2022-02-02 PROCEDURE — 94060 EVALUATION OF WHEEZING: CPT | Performed by: INTERNAL MEDICINE

## 2022-02-02 PROCEDURE — 94727 GAS DIL/WSHOT DETER LNG VOL: CPT | Performed by: INTERNAL MEDICINE

## 2022-02-02 RX ORDER — UMECLIDINIUM BROMIDE AND VILANTEROL TRIFENATATE 62.5; 25 UG/1; UG/1
1 POWDER RESPIRATORY (INHALATION) DAILY
Qty: 60 EACH | Refills: 0 | Status: SHIPPED | COMMUNITY
Start: 2022-02-02 | End: 2022-07-16

## 2022-02-02 RX ORDER — UMECLIDINIUM BROMIDE AND VILANTEROL TRIFENATATE 62.5; 25 UG/1; UG/1
1 POWDER RESPIRATORY (INHALATION) DAILY
Qty: 60 EACH | Refills: 5 | Status: SHIPPED | OUTPATIENT
Start: 2022-02-02 | End: 2022-05-24

## 2022-02-02 NOTE — PROGRESS NOTES
HISTORY OF PRESENT ILLNESS  Sahil Lemon is a 57 y.o. 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 Marga Lara for above issues, along with TOBY on CPAP. Pt has severe neuropathy of the upper and lower extremities rendering him unable to ambulate and is now wheelchair dependent. As for his respiratory issues, he complains of episodic shortness of breath and wheezing relieved by Albuterol and has not been admitted nor seen in ED for pulmonary problems. he  In 2019, patient had a CT guided biopsy of a lung mass, results of which were consistent with Amyloidosis. Review of Systems   Constitutional: Negative for chills, diaphoresis, fever, malaise/fatigue and weight loss. HENT: Negative for congestion, ear discharge, ear pain, hearing loss, nosebleeds, sinus pain, sore throat and tinnitus. Eyes: Negative for blurred vision, double vision, photophobia, pain, discharge and redness. Respiratory: Negative for cough, hemoptysis, sputum production, shortness of breath, wheezing and stridor. Cardiovascular: Negative for chest pain, palpitations, orthopnea, claudication and PND. Leg swelling: intermittent. Gastrointestinal: Negative for abdominal pain, blood in stool, constipation, diarrhea, heartburn, melena, nausea and vomiting. Genitourinary: Negative for dysuria, flank pain, frequency, hematuria and urgency. Musculoskeletal: Positive for joint pain and myalgias. Negative for back pain, falls and neck pain. Skin: Negative for itching and rash. Neurological: Positive for tingling, sensory change and weakness. Negative for dizziness, tremors, speech change, focal weakness, seizures, loss of consciousness and headaches. Endo/Heme/Allergies: Negative for environmental allergies and polydipsia. Does not bruise/bleed easily. Psychiatric/Behavioral: Negative for depression, hallucinations, memory loss, substance abuse and suicidal ideas.  The patient is not nervous/anxious and does not have insomnia. Past Medical History:   Diagnosis Date    Essential hypertension     Essential hypertension, benign     Gout     Hypogonadism male     Impotence     Microscopic hematuria     Myopathy     Obesity, unspecified     Obstructive sleep apnea     on bipap    Other chronic pulmonary heart diseases     Peripheral neuropathy 10/25/2012    Sarcoid 10/25/2012    Sarcoidosis      Past Surgical History:   Procedure Laterality Date    COLONOSCOPY N/A 4/21/2017    COLONOSCOPY with polypectomy performed by Lars Palacios MD at [address] HX APPENDECTOMY      HX ENDOSCOPY  03/2017    HX HEART CATHETERIZATION      HX HEMORRHOIDECTOMY       Current Outpatient Medications on File Prior to Visit   Medication Sig Dispense Refill    albuterol (PROVENTIL HFA, VENTOLIN HFA, PROAIR HFA) 90 mcg/actuation inhaler Take 2 Puffs by inhalation every four (4) hours as needed for Wheezing. 1 Each 3    albuterol (PROVENTIL VENTOLIN) 2.5 mg /3 mL (0.083 %) nebu 3 mL by Nebulization route every six (6) hours as needed for Wheezing. 30 Nebule 3    cholecalciferol (VITAMIN D3) (50,000 UNITS /1250 MCG) capsule cholecalciferol (vitamin D3) 1,250 mcg (50,000 unit) capsule      diclofenac (VOLTAREN) 1 % gel diclofenac 1 % topical gel      guaiFENesin-dextromethorphan (ROBITUSSIN DM) 100-10 mg/5 mL syrup Q-Tussin DM 10 mg-100 mg/5 mL oral syrup      nitroglycerin (NITROSTAT) 0.4 mg SL tablet 0.4 mg by SubLINGual route.  penicillin v potassium (VEETID) 500 mg tablet penicillin V potassium 500 mg tablet   TAKE 1 TABLET BY MOUTH 4 TIMES A DAY      sildenafil citrate (VIAGRA) 100 mg tablet Take 1 Tablet by mouth daily as needed for Erectile Dysfunction. 6 Tablet 0    ondansetron (ZOFRAN ODT) 4 mg disintegrating tablet Take 4 mg by mouth every eight (8) hours as needed.  linaCLOtide (LINZESS) 290 mcg cap capsule Take 290 mcg by mouth Daily (before breakfast).  ibuprofen (MOTRIN) 600 mg tablet Take 1 Tab by mouth every six (6) hours as needed for Pain. 20 Tab 0    HYDROcodone-acetaminophen (NORCO) 7.5-325 mg per tablet Take 1 Tab by mouth four (4) times daily as needed. 0    amLODIPine (NORVASC) 10 mg tablet TAKE 1 TABLET BY MOUTH ONCE DAILY  6    aspirin delayed-release 81 mg tablet TAKE 1 TABLET BY MOUTH ONCE DAILY FOR 30 DAYS  3    doxazosin (CARDURA) 8 mg tablet TAKE 1 TABLET BY MOUTH BEFORE BEDTIME FOR 30 DAYS  3    triamterene-hydroCHLOROthiazide (MAXZIDE) 37.5-25 mg per tablet Take 1 Tab by mouth daily. 2    pantoprazole (PROTONIX) 40 mg tablet Take 1 Tab by mouth Before breakfast and dinner. Indications: Upper GI Bleed 100 Tab 1    sucralfate (CARAFATE) 1 gram tablet Take 1 Tab by mouth Before breakfast, lunch, dinner and at bedtime. 100 Tab 1    ferrous sulfate (FEROSUL) 325 mg (65 mg iron) tablet Take 1 Tab by mouth Daily (before breakfast). Indications: IRON DEFICIENCY ANEMIA 100 Tab 0    bipap machine kit by Does Not Apply route nightly. Indications: DME: [employer]      modafinil (PROVIGIL) 200 mg tablet TAKE 1 TABLET BY MOUTH TWICE A DAY  3    loratadine (CLARITIN) 10 mg tablet Take 10 mg by mouth daily.  THERAPEUTIC-M 9 mg iron-400 mcg tab tablet Take 1 Tab by mouth daily.  OXYGEN-AIR DELIVERY SYSTEMS by Does Not Apply route. 3 liters hs with cpap   1 liter with activity  [id number]       No current facility-administered medications on file prior to visit.      No Known Allergies  Family History   Problem Relation Age of Onset    Hypertension Father     Lung Disease Father     Heart Attack Father 61    Colon Cancer Sister 62     Social History     Socioeconomic History    Marital status: SINGLE     Spouse name: Not on file    Number of children: Not on file    Years of education: Not on file    Highest education level: Not on file   Occupational History    Not on file   Tobacco Use    Smoking status: Never Smoker    Smokeless tobacco: Never Used   Vaping Use    Vaping Use: Never used   Substance and Sexual Activity    Alcohol use: Not Currently     Comment: daily wine at dinner or beer    Drug use: No    Sexual activity: Yes   Other Topics Concern    Not on file   Social History Narrative    Not on file     Social Determinants of Health     Financial Resource Strain:     Difficulty of Paying Living Expenses: Not on file   Food Insecurity:     Worried About Running Out of Food in the Last Year: Not on file    Emanuel of Food in the Last Year: Not on file   Transportation Needs:     Lack of Transportation (Medical): Not on file    Lack of Transportation (Non-Medical): Not on file   Physical Activity:     Days of Exercise per Week: Not on file    Minutes of Exercise per Session: Not on file   Stress:     Feeling of Stress : Not on file   Social Connections:     Frequency of Communication with Friends and Family: Not on file    Frequency of Social Gatherings with Friends and Family: Not on file    Attends [employer] Services: Not on file    Active Member of [address] [place] [address] or Organizations: Not on file    Attends Club or Organization Meetings: Not on file    Marital Status: Not on file   Intimate Partner Violence:     Fear of Current or Ex-Partner: Not on file    Emotionally Abused: Not on file    Physically Abused: Not on file    Sexually Abused: Not on file   Housing Stability:     Unable to Pay for Housing in the Last Year: Not on file    Number of [place] in the Last Year: Not on file    Unstable Housing in the Last Year: Not on file     Blood pressure (!) 154/82, pulse 80, temperature 98.2 °F (36.8 °C), temperature source Oral, resp. rate 14, height 5' 6\" (1.676 m), weight 82.6 kg (182 lb), SpO2 98 %. Physical Exam  Constitutional:       General: He is not in acute distress. Appearance: He is not ill-appearing, toxic-appearing or diaphoretic.       Comments: Wheelchair bound   HENT:      Head: Normocephalic and atraumatic. Right Ear: External ear normal.      Left Ear: External ear normal.      Nose:      Comments: Deferred      Mouth/Throat:      Comments: Deferred   Eyes:      General: No scleral icterus. Right eye: No discharge. Left eye: No discharge. Extraocular Movements: Extraocular movements intact. Conjunctiva/sclera: Conjunctivae normal.      Pupils: Pupils are equal, round, and reactive to light. Neck:      Vascular: No carotid bruit. Cardiovascular:      Rate and Rhythm: Normal rate and regular rhythm. Pulses: Normal pulses. Heart sounds: Normal heart sounds. No murmur heard. No gallop. Pulmonary:      Effort: Pulmonary effort is normal. No respiratory distress. Breath sounds: Normal breath sounds. No stridor. No wheezing, rhonchi or rales. Chest:      Chest wall: No tenderness. Abdominal:      Palpations: Abdomen is soft. There is no mass. Tenderness: There is no abdominal tenderness. Musculoskeletal:         General: No swelling, tenderness, deformity or signs of injury. Cervical back: No rigidity or tenderness. Right lower leg: Right lower leg edema: trace. Left lower leg: Left lower leg edema: trace. Lymphadenopathy:      Cervical: No cervical adenopathy. Skin:     General: Skin is warm and dry. Coloration: Skin is not jaundiced or pale. Findings: No bruising, erythema, lesion or rash. Neurological:      Mental Status: He is alert and oriented to person, place, and time. Cranial Nerves: No cranial nerve deficit. Psychiatric:         Mood and Affect: Mood normal.         Behavior: Behavior normal.         Thought Content: Thought content normal.         Judgment: Judgment normal.     CT Results (most recent):  Results from Hospital Encounter encounter on 12/28/21    CT CHEST WO CONT    Narrative  EXAM: CT of the chest without IV contrast.    INDICATION:  Sarcoidosis.  Prior biopsy demonstrating amyloidosis. TECHNIQUE: CT of the chest without IV contrast. Sagittal and coronal  reformations obtained. All CT scans at this facility are performed using dose optimization technique as  appropriate to a performed exam, to include automated exposure control,  adjustment of the mA and/or kV according to patient size (including appropriate  matching for site specific examination) or use of iterative reconstruction  technique. IV contrast: None    Enteric contrast: None    COMPARISON: CT-guided biopsy dated 1/21/2019 and CT of the chest dated  12/28/2018    FINDINGS:  Visualized thyroid: Unremarkable    Mediastinum: No adenopathy appreciated. No fluid collection appreciated. No  mass lesion seen. Heart and pericardium: Unremarkable    Coronary Arteries: Extensive coronary calcifications are noted. Thoracic aorta: Unremarkable    Pulmonary arteries: Due to lack of IV contrast, evaluation is limited. Trachea and major bronchi: Unremarkable    Lungs: Centrilobular emphysematous changes are noted. The left upper lobe best seen on axial image 17 of series 3, there is a 2.4 x  2.5 x 2.2 cm. This is relatively unchanged since most recent prior. In the  lingula best seen on axial image 39 of series 3, there is a 1 x 1.1 x 1.2 cm  nodule which is minimally increased since prior imaging. Pleura: No pneumothorax seen. No pleural effusions present. Axilla/Chest Wall: Unremarkable    Visualized upper abdomen: Cholelithiasis of the gallbladder is present. Cysts  are noted in the kidneys. Musculoskeletal: No acute pathology. Impression  1. Left upper lobe nodule and lingular nodule which are relatively similar to  prior imaging. 2.  COPD changes. 3.  Cholelithiasis without cholecystitis. PFT: moderate to severe obstruction with reversible component. Air trapping, reduced DLCO    ASSESSMENT and PLAN  Encounter Diagnoses   Name Primary?    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 Reviewed PFT with pt. In light of obstruction on PFT will start LABA/LAMA Anoro and continue prn Albuterol. Sample and inhaler instructions given. Encouraged continued CPAP use. Repeat CT in 1 year.   Follow up after CT

## 2022-02-02 NOTE — PROGRESS NOTES
Hilaria Rust presents today for   Chief Complaint   Patient presents with    Sarcoidosis       Is someone accompanying this pt? No    Is the patient using any DME equipment during OV? Wheelchair    -DME Company    Depression Screening:  3 most recent PHQ Screens 2/2/2022   PHQ Not Done -   Little interest or pleasure in doing things Not at all   Feeling down, depressed, irritable, or hopeless Not at all   Total Score PHQ 2 0       Learning Assessment:  Learning Assessment 1/18/2017   PRIMARY LEARNER Patient   HIGHEST LEVEL OF EDUCATION - PRIMARY LEARNER  2 YEARS [employer] LEARNER -   PRIMARY LANGUAGE ENGLISH   LEARNER PREFERENCE PRIMARY READING   ANSWERED BY patient Devante Olivares     bpsps   RELATIONSHIP SELF       Abuse Screening:  Abuse Screening Questionnaire 6/3/2019   Do you ever feel afraid of your partner? N   Are you in a relationship with someone who physically or mentally threatens you? N   Is it safe for you to go home? Y       Fall Risk  Fall Risk Assessment, last 12 mths 1/20/2020   Able to walk? No   Fall in past 12 months? -         Coordination of Care:  1. Have you been to the ER, urgent care clinic since your last visit? Hospitalized since your last visit? No    2. Have you seen or consulted any other health care providers outside of the [address] [place] [address] since your last visit? Include any pap smears or colon screening.

## 2022-02-05 ENCOUNTER — HOSPITAL ENCOUNTER (OUTPATIENT)
Dept: LAB | Age: 62
Discharge: HOME | End: 2022-02-05

## 2022-02-05 LAB
XX-LABCORP SPECIMEN COL,LCBCF: NORMAL
XX-LABCORP SPECIMEN COL,LCBCF: NORMAL

## 2022-02-05 PROCEDURE — 99001 SPECIMEN HANDLING PT-LAB: CPT

## 2022-03-18 PROBLEM — K92.2 GI BLEED: Status: ACTIVE | Noted: 2017-03-17

## 2022-03-19 PROBLEM — D50.9 IRON DEFICIENCY ANEMIA: Status: ACTIVE | Noted: 2017-03-18

## 2022-03-19 PROBLEM — E29.1 HYPOGONADISM IN MALE: Status: ACTIVE | Noted: 2018-05-01

## 2022-03-19 PROBLEM — I10 HTN (HYPERTENSION): Status: ACTIVE | Noted: 2017-03-18

## 2022-03-19 PROBLEM — E29.1 HYPOGONADISM MALE: Status: ACTIVE | Noted: 2017-03-18

## 2022-03-19 PROBLEM — E87.6 HYPOKALEMIA: Status: ACTIVE | Noted: 2017-03-18

## 2022-03-19 PROBLEM — D64.9 SYMPTOMATIC ANEMIA: Status: ACTIVE | Noted: 2017-03-17

## 2022-03-20 PROBLEM — N52.9 IMPOTENCE OF ORGANIC ORIGIN: Status: ACTIVE | Noted: 2018-05-01

## 2022-03-20 PROBLEM — K31.7 GASTRIC POLYP: Status: ACTIVE | Noted: 2017-03-18

## 2022-05-16 RX ORDER — ALBUTEROL SULFATE 90 UG/1
AEROSOL, METERED RESPIRATORY (INHALATION)
Qty: 8.5 G | Refills: 3 | Status: SHIPPED | OUTPATIENT
Start: 2022-05-16 | End: 2022-09-08

## 2022-05-24 NOTE — PERIOP NOTES
PRE-SURGICAL INSTRUCTIONS        Patient's Name:  Claudia HOUGH Date:  5/24/2022            Covid Testing Date and Time:    Surgery Date:  5/27/2022                1. Do NOT eat or drink anything, including candy, gum, or ice chips after midnight on 5/27, unless you have specific instructions from your surgeon or anesthesia provider to do so.  2. You may brush your teeth before coming to the hospital.  3. No smoking 24 hours prior to the day of surgery. 4. No alcohol 24 hours prior to the day of surgery. 5. No recreational drugs for one week prior to the day of surgery. 6. Leave all valuables, including money/purse, at home. 7. Remove all jewelry, nail polish, acrylic nails, and makeup (including mascara); no lotions powders, deodorant, or perfume/cologne/after shave on the skin. 8. Follow instruction for Hibiclens washes and CHG wipes from surgeon's office. 9. Glasses/contact lenses and dentures may be worn to the hospital.  They will be removed prior to surgery. 10. Call your doctor if symptoms of a cold or illness develop within 24-48 hours prior to your surgery. 11.  If you are having an outpatient procedure, please make arrangements for a responsible ADULT TO [address] [place] [address] and stay with you for 24 hours after your surgery. 12. ONE VISITOR in the hospital at this time for outpatient procedures. Exceptions may be made for surgical admissions, per nursing unit guidelines      Special Instructions:      Bring list of CURRENT medications. Bring inhaler. Bring any pertinent legal medical records. Take these medications the morning of surgery with a sip of water:  Per office  . Complete bowel prep per MD instructions. On the day of surgery, come in the main entrance of [facility] [place]. Let the  at the desk know you are there for surgery. A staff member will come escort you to the surgical area on the second floor.     If you have any questions or concerns, please do not hesitate to call:     (Prior to the day of surgery) [facility] department:  [phone]   (Day of surgery) Pre-Op department:  [phone]    These surgical instructions were reviewed with the patient during the PAT phone call.

## 2022-05-26 ENCOUNTER — ANESTHESIA EVENT (OUTPATIENT)
Dept: ENDOSCOPY | Age: 62
End: 2022-05-26
Payer: MEDICAID

## 2022-05-27 ENCOUNTER — HOSPITAL ENCOUNTER (OUTPATIENT)
Age: 62
Setting detail: OUTPATIENT SURGERY
Discharge: HOME | End: 2022-05-27
Attending: INTERNAL MEDICINE | Admitting: INTERNAL MEDICINE
Payer: MEDICAID

## 2022-05-27 ENCOUNTER — ANESTHESIA (OUTPATIENT)
Dept: ENDOSCOPY | Age: 62
End: 2022-05-27
Payer: MEDICAID

## 2022-05-27 VITALS
OXYGEN SATURATION: 97 % | TEMPERATURE: 97.8 F | DIASTOLIC BLOOD PRESSURE: 74 MMHG | BODY MASS INDEX: 29.25 KG/M2 | HEART RATE: 86 BPM | RESPIRATION RATE: 14 BRPM | WEIGHT: 182 LBS | SYSTOLIC BLOOD PRESSURE: 118 MMHG | HEIGHT: 66 IN

## 2022-05-27 PROCEDURE — 77030013992 HC SNR POLYP ENDOSC BSC -B: Performed by: INTERNAL MEDICINE

## 2022-05-27 PROCEDURE — 74011250636 HC RX REV CODE- 250/636: Performed by: NURSE ANESTHETIST, CERTIFIED REGISTERED

## 2022-05-27 PROCEDURE — 77030008565 HC TBNG SUC IRR ERBE -B: Performed by: INTERNAL MEDICINE

## 2022-05-27 PROCEDURE — 2709999900 HC NON-CHARGEABLE SUPPLY: Performed by: INTERNAL MEDICINE

## 2022-05-27 PROCEDURE — 76040000007: Performed by: INTERNAL MEDICINE

## 2022-05-27 PROCEDURE — 76060000032 HC ANESTHESIA 0.5 TO 1 HR: Performed by: INTERNAL MEDICINE

## 2022-05-27 PROCEDURE — 74011000250 HC RX REV CODE- 250: Performed by: NURSE ANESTHETIST, CERTIFIED REGISTERED

## 2022-05-27 PROCEDURE — 00812 ANES LWR INTST SCR COLSC: CPT | Performed by: ANESTHESIOLOGY

## 2022-05-27 PROCEDURE — 00812 ANES LWR INTST SCR COLSC: CPT | Performed by: NURSE ANESTHETIST, CERTIFIED REGISTERED

## 2022-05-27 PROCEDURE — 88305 TISSUE EXAM BY PATHOLOGIST: CPT

## 2022-05-27 RX ORDER — PHENYLEPHRINE HCL IN 0.9% NACL 1 MG/10 ML
SYRINGE (ML) INTRAVENOUS AS NEEDED
Status: DISCONTINUED | OUTPATIENT
Start: 2022-05-27 | End: 2022-05-27 | Stop reason: HOSPADM

## 2022-05-27 RX ORDER — SODIUM CHLORIDE 0.9 % (FLUSH) 0.9 %
5-40 SYRINGE (ML) INJECTION EVERY 8 HOURS
Status: DISCONTINUED | OUTPATIENT
Start: 2022-05-27 | End: 2022-05-27 | Stop reason: HOSPADM

## 2022-05-27 RX ORDER — SODIUM CHLORIDE 0.9 % (FLUSH) 0.9 %
5-40 SYRINGE (ML) INJECTION AS NEEDED
Status: DISCONTINUED | OUTPATIENT
Start: 2022-05-27 | End: 2022-05-27 | Stop reason: HOSPADM

## 2022-05-27 RX ORDER — PROPOFOL 10 MG/ML
INJECTION, EMULSION INTRAVENOUS AS NEEDED
Status: DISCONTINUED | OUTPATIENT
Start: 2022-05-27 | End: 2022-05-27 | Stop reason: HOSPADM

## 2022-05-27 RX ORDER — ONDANSETRON 2 MG/ML
4 INJECTION INTRAMUSCULAR; INTRAVENOUS
Status: CANCELLED | OUTPATIENT
Start: 2022-05-27 | End: 2022-05-28

## 2022-05-27 RX ORDER — SODIUM CHLORIDE 0.9 % (FLUSH) 0.9 %
5-40 SYRINGE (ML) INJECTION AS NEEDED
Status: CANCELLED | OUTPATIENT
Start: 2022-05-27

## 2022-05-27 RX ORDER — DIPHENHYDRAMINE HYDROCHLORIDE 50 MG/ML
12.5 INJECTION, SOLUTION INTRAMUSCULAR; INTRAVENOUS
Status: CANCELLED | OUTPATIENT
Start: 2022-05-27

## 2022-05-27 RX ORDER — SODIUM CHLORIDE, SODIUM LACTATE, POTASSIUM CHLORIDE, CALCIUM CHLORIDE 600; 310; 30; 20 MG/100ML; MG/100ML; MG/100ML; MG/100ML
75 INJECTION, SOLUTION INTRAVENOUS CONTINUOUS
Status: DISCONTINUED | OUTPATIENT
Start: 2022-05-27 | End: 2022-05-27 | Stop reason: HOSPADM

## 2022-05-27 RX ORDER — SODIUM CHLORIDE, SODIUM LACTATE, POTASSIUM CHLORIDE, CALCIUM CHLORIDE 600; 310; 30; 20 MG/100ML; MG/100ML; MG/100ML; MG/100ML
100 INJECTION, SOLUTION INTRAVENOUS CONTINUOUS
Status: CANCELLED | OUTPATIENT
Start: 2022-05-27

## 2022-05-27 RX ORDER — SODIUM CHLORIDE 0.9 % (FLUSH) 0.9 %
5-40 SYRINGE (ML) INJECTION EVERY 8 HOURS
Status: CANCELLED | OUTPATIENT
Start: 2022-05-27

## 2022-05-27 RX ADMIN — PROPOFOL 25 MG: 10 INJECTION, EMULSION INTRAVENOUS at 10:08

## 2022-05-27 RX ADMIN — PROPOFOL 50 MG: 10 INJECTION, EMULSION INTRAVENOUS at 10:04

## 2022-05-27 RX ADMIN — Medication 200 MCG: at 10:17

## 2022-05-27 RX ADMIN — PROPOFOL 25 MG: 10 INJECTION, EMULSION INTRAVENOUS at 10:11

## 2022-05-27 RX ADMIN — PROPOFOL 100 MG: 10 INJECTION, EMULSION INTRAVENOUS at 10:02

## 2022-05-27 RX ADMIN — SODIUM CHLORIDE, SODIUM LACTATE, POTASSIUM CHLORIDE, AND CALCIUM CHLORIDE 75 ML/HR: 600; 310; 30; 20 INJECTION, SOLUTION INTRAVENOUS at 09:42

## 2022-05-27 RX ADMIN — Medication 100 MCG: at 10:12

## 2022-05-27 RX ADMIN — Medication 200 MCG: at 10:08

## 2022-05-27 RX ADMIN — FAMOTIDINE 20 MG: 10 INJECTION INTRAVENOUS at 09:42

## 2022-05-27 NOTE — ANESTHESIA POSTPROCEDURE EVALUATION
Procedure(s):  COLONOSCOPY with polypectomies.     MAC    Anesthesia Post Evaluation      Multimodal analgesia: multimodal analgesia used between 6 hours prior to anesthesia start to PACU discharge  Patient location during evaluation: bedside  Patient participation: complete - patient participated  Level of consciousness: awake  Pain management: adequate  Airway patency: patent  Anesthetic complications: no  Cardiovascular status: stable  Respiratory status: acceptable  Hydration status: acceptable  Post anesthesia nausea and vomiting:  controlled      INITIAL Post-op Vital signs:   Vitals Value Taken Time   /74 05/27/22 1107   Temp 36.6 °C (97.8 °F) 05/27/22 1107   Pulse 86 05/27/22 1107   Resp 14 05/27/22 1107   SpO2 97 % 05/27/22 1107

## 2022-05-27 NOTE — H&P
[email or web address]. [address]  [place], [address]        Impression:   1.hx polyps      Plan:     1. [place] mac all risks benefits and alt discussed       Chief Complaint: hx polyps      HPI:  Hx of polyps. PMH:   Past Medical History:   Diagnosis Date    Essential hypertension     Essential hypertension, benign     Gout     Hypogonadism male     Impotence     Microscopic hematuria     Myopathy     Obesity, unspecified     Obstructive sleep apnea     on bipap    Other chronic pulmonary heart diseases     Peripheral neuropathy 10/25/2012    Sarcoid 10/25/2012    Sarcoidosis        PSH:   Past Surgical History:   Procedure Laterality Date    COLONOSCOPY N/A 4/21/2017    COLONOSCOPY with polypectomy performed by Mallory Dior MD at [address] HX APPENDECTOMY      HX ENDOSCOPY  03/2017    [address] [facility] [address]  2002?    negative per pt    HX HEMORRHOIDECTOMY         Social HX:   Social History     Socioeconomic History    Marital status: SINGLE     Spouse name: Not on file    Number of children: Not on file    Years of education: Not on file    Highest education level: Not on file   Occupational History    Not on file   Tobacco Use    Smoking status: Never Smoker    Smokeless tobacco: Never Used   Vaping Use    Vaping Use: Never used   Substance and Sexual Activity    Alcohol use: Yes     Comment: occ    Drug use: No    Sexual activity: Yes   Other Topics Concern    Not on file   Social History Narrative    Not on file     Social Determinants of Health     Financial Resource Strain:     Difficulty of Paying Living Expenses: Not on file   Food Insecurity:     Worried About [address] [employer] in the Last Year: Not on file    Emanuel of Food in the Last Year: Not on file   Transportation Needs:     Lack of Transportation (Medical): Not on file    Lack of Transportation (Non-Medical):  Not on file   Physical Activity:     Days of Exercise per Week: Not on file    Minutes of Exercise per Session: Not on file   Stress:     Feeling of Stress : Not on file   Social Connections:     Frequency of Communication with Friends and Family: Not on file    Frequency of Social Gatherings with Friends and Family: Not on file    Attends Hinduism Services: Not on file    Active Member of [address] [place] [address] [employer] or Organizations: Not on file    Attends Club or Organization Meetings: Not on file    Marital Status: Not on file   Intimate Partner Violence:     Fear of Current or Ex-Partner: Not on file    Emotionally Abused: Not on file    Physically Abused: Not on file    Sexually Abused: Not on file   Housing Stability:     Unable to Pay for Housing in the Last Year: Not on file    Number of [place] in the Last Year: Not on file    Unstable Housing in the Last Year: Not on file       FHX:   Family History   Problem Relation Age of Onset    Hypertension Father     Lung Disease Father     Heart Attack Father 61    Colon Cancer Sister 62       Allergy:   No Known Allergies    Home Medications:     Medications Prior to Admission   Medication Sig    ProAir HFA 90 mcg/actuation inhaler TAKE 2 PUFFS BY INHALATION EVERY FOUR (4) HOURS AS NEEDED FOR WHEEZING - SHAKE WELL & RINSE UNIT IN WARM WATER EVERY 7 DAYS    umeclidinium-vilanteroL (Anoro Ellipta) 62.5-25 mcg/actuation inhaler Take 1 Puff by inhalation daily.  albuterol (PROVENTIL VENTOLIN) 2.5 mg /3 mL (0.083 %) nebu 3 mL by Nebulization route every six (6) hours as needed for Wheezing.  sildenafil citrate (VIAGRA) 100 mg tablet Take 1 Tablet by mouth daily as needed for Erectile Dysfunction.  linaCLOtide (LINZESS) 290 mcg cap capsule Take 290 mcg by mouth Daily (before breakfast).  HYDROcodone-acetaminophen (NORCO) 7.5-325 mg per tablet Take 1 Tab by mouth four (4) times daily as needed.     amLODIPine (NORVASC) 10 mg tablet TAKE 1 TABLET BY MOUTH ONCE DAILY    aspirin delayed-release 81 mg tablet TAKE 1 TABLET BY MOUTH ONCE DAILY FOR 30 DAYS    triamterene-hydroCHLOROthiazide (MAXZIDE) 37.5-25 mg per tablet Take 1 Tab by mouth daily.  pantoprazole (PROTONIX) 40 mg tablet Take 1 Tab by mouth Before breakfast and dinner. Indications: Upper GI Bleed (Patient taking differently: Take 40 mg by mouth daily. Indications: bleeding from stomach, esophagus or duodenum)    bipap machine kit by Does Not Apply route nightly. Indications: DME: [employer]    modafinil (PROVIGIL) 200 mg tablet TAKE 1 TABLET BY MOUTH TWICE A DAY    loratadine (CLARITIN) 10 mg tablet Take 10 mg by mouth daily.  THERAPEUTIC-M 9 mg iron-400 mcg tab tablet Take 1 Tab by mouth daily.  OXYGEN-AIR DELIVERY SYSTEMS by Does Not Apply route. 3 liters hs with cpap   1 liter with activity  [employer]    diclofenac (VOLTAREN) 1 % gel diclofenac 1 % topical gel    nitroglycerin (NITROSTAT) 0.4 mg SL tablet 0.4 mg by SubLINGual route. (Patient not taking: Reported on 5/24/2022)    ibuprofen (MOTRIN) 600 mg tablet Take 1 Tab by mouth every six (6) hours as needed for Pain. (Patient not taking: Reported on 5/24/2022)       Review of Systems:     Constitutional: No fevers, chills, weight loss, fatigue. Skin: No rashes, pruritis, jaundice, ulcerations, erythema. HENT: No headaches, nosebleeds, sinus pressure, rhinorrhea, sore throat. Eyes: No visual changes, blurred vision, eye pain, photophobia, jaundice. Cardiovascular: No chest pain, heart palpitations. Respiratory: No cough, SOB, wheezing, chest discomfort, orthopnea. Gastrointestinal: neg   Genitourinary: No dysuria, bleeding, discharge, pyuria. Musculoskeletal: No weakness, arthralgias, wasting. Endo: No sweats. Heme: No bruising, easy bleeding. Allergies: As noted. Neurological: Cranial nerves intact. Alert and oriented. Gait not assessed. Psychiatric:  No anxiety, depression, hallucinations.                  There were no vitals taken for this visit. Physical Assessment:     constitutional: appearance: well developed, well nourished, normal habitus, no deformities, in no acute distress. skin: inspection: no rashes, ulcers, icterus or other lesions; no clubbing or telangiectasias. palpation: no induration or subcutaneos nodules. eyes: inspection: normal conjunctivae and lids; no jaundice pupils: symmetrical, normoreactive to light, normal accommodation and size. ENMT: mouth: normal oral mucosa,lips and gums; good dentition. oropharynx: normal tongue, hard and soft palate; posterior pharynx without erythema, exudate or lesions. neck: no masses organomegaly or tenderness. respiratory: effort: normal chest excursion; no intercostal retraction or accessory muscle use. cardiovascular: abdominal aorta: normal size and position; no bruits. palpation: PMI of normal size and position; normal rhythm; no thrill or murmurs. abdominal: abdomen: normal consistency; no tenderness or masses. hernias: no hernias appreciated. liver: normal size and consistency. spleen: not palpable. rectal: hemoccult/guaiac: not performed. musculoskeletal: lower ext weakness    lymphatic: axilae: not palpable. groin: not palpable. neck: within normal limits. other: not palpable. neurologic: cranial nerves: II-XII normal.   psychiatric: judgement/insight: within normal limits. memory: within normal limits for recent and remote events. mood and affect: no evidence of depression, anxiety or agitation. orientation: oriented to time, space and person. Basic Metabolic Profile   No results for input(s): NA, K, CL, CO2, BUN, GLU, CA, MG, PHOS in the last 72 hours. No lab exists for component: CREAT      CBC w/Diff    No results for input(s): WBC, RBC, HGB, HCT, MCV, MCH, MCHC, RDW, PLT, HGBEXT, HCTEXT, PLTEXT in the last 72 hours.     No lab exists for component: MPV No results for input(s): GRANS, LYMPH, EOS, PRO, MYELO, METAS, BLAST in the last 72 hours. No lab exists for component: MONO, BASO     Hepatic Function   No results for input(s): ALB, TP, TBILI, AP, AML, LPSE in the last 72 hours. No lab exists for component: DBILI, GPT, SGOT       Lion Akins MD, M.D. [facility], [address] [place] [address]  [email or web address]. [facility]

## 2022-05-27 NOTE — DISCHARGE INSTRUCTIONS
Patient Education        Colonoscopy: What to Expect at Home  Your Recovery  After a colonoscopy, you'll stay at the clinic until you wake up. Then you can go home. But you'll need to arrange for a ride. Your doctor will tell you when you can eat and do your other usual activities. Your doctor will talk to you about when you'll need your next colonoscopy. Your doctor can help you decide how often you need to be checked. This will depend on the results of your test and your risk for colorectal cancer. After the test, you may be bloated or have gas pains. You may need to pass gas. If a biopsy was done or a polyp was removed, you may have streaks of blood in your stool (feces) for a few days. Problems such as heavy rectal bleeding may not occur until several weeks after the test. This isn't common. But it can happen after polyps are removed. This care sheet gives you a general idea about how long it will take for you to recover. But each person recovers at a different pace. Follow the steps below to get better as quickly as possible. How can you care for yourself at home? Activity    · Rest when you feel tired.     · You can do your normal activities when it feels okay to do so. Diet    · Follow your doctor's directions for eating.     · Unless your doctor has told you not to, drink plenty of fluids. This helps to replace the fluids that were lost during the colon prep.     · Do not drink alcohol. Medicines    · Your doctor will tell you if and when you can restart your medicines. You will also be given instructions about taking any new medicines.     · If you take aspirin or some other blood thinner, ask your doctor if and when to start taking it again. Make sure that you understand exactly what your doctor wants you to do.     · If polyps were removed or a biopsy was done during the test, your doctor may tell you not to take aspirin or other anti-inflammatory medicines for a few days.  These include ibuprofen (Advil, Motrin) and naproxen (Aleve). Other instructions    · For your safety, do not drive or operate machinery until the medicine wears off and you can think clearly. Your doctor may tell you not to drive or operate machinery until the day after your test.     · Do not sign legal documents or make major decisions until the medicine wears off and you can think clearly. The anesthesia can make it hard for you to fully understand what you are agreeing to. Follow-up care is a key part of your treatment and safety. Be sure to make and go to all appointments, and call your doctor if you are having problems. It's also a good idea to know your test results and keep a list of the medicines you take. When should you call for help? Call 911 anytime you think you may need emergency care. For example, call if:    · You passed out (lost consciousness).     · You pass maroon or bloody stools.     · You have trouble breathing. Call your doctor now or seek immediate medical care if:    · You have pain that does not get better after you take pain medicine.     · You are sick to your stomach or cannot drink fluids.     · You have new or worse belly pain.     · You have blood in your stools.     · You have a fever.     · You cannot pass stools or gas. Watch closely for changes in your health, and be sure to contact your doctor if you have any problems. Where can you learn more? Go to http://www.gray.com/  Enter E264 in the search box to learn more about \"Colonoscopy: What to Expect at Home. \"  Current as of: September 8, 2021               Content Version: 13.2  © 9397-5343 [employer]. Care instructions adapted under license by [employer] (which disclaims liability or warranty for this information).  If you have questions about a medical condition or this instruction, always ask your healthcare professional. [employer] disclaims any warranty or liability for your use of this information. Patient Education        Colon Polyps: Care Instructions  Your Care Instructions     Colon polyps are growths in the colon or the rectum. The cause of most colon polyps is not known, and most people who get them do not have any problems. But a certain kind can turn into cancer. For this reason, regular testing for colon polyps is important for people as they get older. It is also important for anyone who has an increased risk for colon cancer. Polyps are usually found through routine colon cancer screening tests. Although most colon polyps are not cancerous, they are usually removed and then tested for cancer. Screening for colon cancer saves lives because the cancer can usually be cured if it is caught early. If you have a polyp that is the type that can turn into cancer, you may need more tests to examine your entire colon. The doctor will remove any other polyps that he or she finds, and you will be tested more often. Follow-up care is a key part of your treatment and safety. Be sure to make and go to all appointments, and call your doctor if you are having problems. It's also a good idea to know your test results and keep a list of the medicines you take. How can you care for yourself at home? Regular exams to look for colon polyps are the best way to prevent polyps from turning into colon cancer. These can include stool tests, sigmoidoscopy, colonoscopy, and CT colonography. Talk with your doctor about a testing schedule that is right for you. To prevent polyps  There is no home treatment that can prevent colon polyps. But these steps may help lower your risk for cancer. · Stay active. Being active can help you get to and stay at a healthy weight. Try to exercise on most days of the week. Walking is a good choice. · Eat well. Choose a variety of vegetables, fruits, legumes (such as peas and beans), fish, poultry, and whole grains. · Do not smoke.  If you need help quitting, talk to your doctor about stop-smoking programs and medicines. These can increase your chances of quitting for good. · If you drink alcohol, limit how much you drink. Limit alcohol to 2 drinks a day for men and 1 drink a day for women. When should you call for help? Call your doctor now or seek immediate medical care if:    · You have severe belly pain.     · Your stools are maroon or very bloody. Watch closely for changes in your health, and be sure to contact your doctor if:    · You have a fever.     · You have nausea or vomiting.     · You have a change in bowel habits (new constipation or diarrhea).     · Your symptoms get worse or are not improving as expected. Where can you learn more? Go to http://www.jenkins.com/  Enter C571 in the search box to learn more about \"Colon Polyps: Care Instructions. \"  Current as of: September 8, 2021               Content Version: 13.2  © 2006-2022 [employer]. Care instructions adapted under license by [employer] (which disclaims liability or warranty for this information). If you have questions about a medical condition or this instruction, always ask your healthcare professional. [place] [address] any warranty or liability for your use of this information. DISCHARGE SUMMARY from Nurse    PATIENT INSTRUCTIONS:    After general anesthesia or intravenous sedation, for 24 hours or while taking prescription Narcotics:  · Limit your activities  · Do not drive and operate hazardous machinery  · Do not make important personal or business decisions  · Do  not drink alcoholic beverages  · If you have not urinated within 8 hours after discharge, please contact your surgeon on call.     Report the following to your surgeon:  · Excessive pain, swelling, redness or odor of or around the surgical area  · Temperature over 100.5  · Nausea and vomiting lasting longer than 4 hours or if unable to take medications  · Any signs of decreased circulation or nerve impairment to extremity: change in color, persistent  numbness, tingling, coldness or increase pain  · Any questions      These are general instructions for a healthy lifestyle:    No smoking/ No tobacco products/ Avoid exposure to second hand smoke  Surgeon General's Warning:  Quitting smoking now greatly reduces serious risk to your health. Obesity, smoking, and sedentary lifestyle greatly increases your risk for illness    A healthy diet, regular physical exercise & weight monitoring are important for maintaining a healthy lifestyle    You may be retaining fluid if you have a history of heart failure or if you experience any of the following symptoms:  Weight gain of 3 pounds or more overnight or 5 pounds in a week, increased swelling in our hands or feet or shortness of breath while lying flat in bed. Please call your doctor as soon as you notice any of these symptoms; do not wait until your next office visit. The discharge information has been reviewed with the patient. The patient verbalized understanding. Discharge medications reviewed with the patient and appropriate educational materials and side effects teaching were provided.   ___________________________________________________________________________________________________________________________________

## 2022-05-27 NOTE — ANESTHESIA PREPROCEDURE EVALUATION
Relevant Problems   No relevant active problems       Anesthetic History               Review of Systems / Medical History  Patient summary reviewed and pertinent labs reviewed    Pulmonary        Sleep apnea: CPAP    Asthma : well controlled       Neuro/Psych   Within defined limits           Cardiovascular    Hypertension: well controlled              Exercise tolerance: >4 METS     GI/Hepatic/Renal                Endo/Other        Morbid obesity     Other Findings              Physical Exam    Airway  Mallampati: III  TM Distance: 4 - 6 cm  Neck ROM: decreased range of motion   Mouth opening: Normal     Cardiovascular    Rhythm: regular  Rate: normal         Dental  No notable dental hx       Pulmonary  Breath sounds clear to auscultation               Abdominal  GI exam deferred       Other Findings            Anesthetic Plan    ASA: 3  Anesthesia type: MAC            Anesthetic plan and risks discussed with: Patient

## 2022-07-16 RX ORDER — UMECLIDINIUM BROMIDE AND VILANTEROL TRIFENATATE 62.5; 25 UG/1; UG/1
POWDER RESPIRATORY (INHALATION)
Qty: 1 EACH | Refills: 5 | Status: SHIPPED | OUTPATIENT
Start: 2022-07-16

## 2022-09-08 RX ORDER — ALBUTEROL SULFATE 90 UG/1
AEROSOL, METERED RESPIRATORY (INHALATION)
Qty: 8.5 G | Refills: 3 | Status: SHIPPED | OUTPATIENT
Start: 2022-09-08 | End: 2022-10-07 | Stop reason: ALTCHOICE

## 2022-10-07 ENCOUNTER — TELEPHONE (OUTPATIENT)
Dept: PULMONOLOGY | Age: 62
End: 2022-10-07

## 2022-10-07 RX ORDER — ALBUTEROL SULFATE 90 UG/1
2 AEROSOL, METERED RESPIRATORY (INHALATION)
Qty: 1 EACH | Refills: 4 | Status: SHIPPED | OUTPATIENT
Start: 2022-10-07

## 2022-11-15 ENCOUNTER — HOSPITAL ENCOUNTER (OUTPATIENT)
Dept: LAB | Age: 62
Discharge: HOME | End: 2022-11-15

## 2022-11-15 LAB — XX-LABCORP SPECIMEN COL,LCBCF: NORMAL

## 2022-11-15 PROCEDURE — 99001 SPECIMEN HANDLING PT-LAB: CPT

## 2022-12-26 ENCOUNTER — APPOINTMENT (OUTPATIENT)
Dept: GENERAL RADIOLOGY | Age: 62
End: 2022-12-26
Attending: PHYSICIAN ASSISTANT
Payer: MEDICAID

## 2022-12-26 ENCOUNTER — HOSPITAL ENCOUNTER (EMERGENCY)
Age: 62
Discharge: HOME | End: 2022-12-26
Attending: EMERGENCY MEDICINE
Payer: MEDICAID

## 2022-12-26 ENCOUNTER — APPOINTMENT (OUTPATIENT)
Dept: CT IMAGING | Age: 62
End: 2022-12-26
Attending: PHYSICIAN ASSISTANT
Payer: MEDICAID

## 2022-12-26 VITALS
DIASTOLIC BLOOD PRESSURE: 88 MMHG | RESPIRATION RATE: 16 BRPM | OXYGEN SATURATION: 98 % | TEMPERATURE: 98.2 F | SYSTOLIC BLOOD PRESSURE: 135 MMHG | HEART RATE: 85 BPM

## 2022-12-26 DIAGNOSIS — R17 SERUM TOTAL BILIRUBIN ELEVATED: ICD-10-CM

## 2022-12-26 DIAGNOSIS — K59.00 CONSTIPATION, UNSPECIFIED CONSTIPATION TYPE: Primary | ICD-10-CM

## 2022-12-26 LAB
ALBUMIN SERPL-MCNC: 3.7 G/DL (ref 3.4–5)
ALBUMIN/GLOB SERPL: 0.8 {RATIO} (ref 0.8–1.7)
ALP SERPL-CCNC: 81 U/L (ref 45–117)
ALT SERPL-CCNC: 59 U/L (ref 16–61)
ANION GAP SERPL CALC-SCNC: 6 MMOL/L (ref 3–18)
AST SERPL-CCNC: 52 U/L (ref 10–38)
BASOPHILS # BLD: 0 K/UL (ref 0–0.1)
BASOPHILS NFR BLD: 0 % (ref 0–2)
BILIRUB SERPL-MCNC: 1.2 MG/DL (ref 0.2–1)
BUN SERPL-MCNC: 13 MG/DL (ref 7–18)
BUN/CREAT SERPL: 25 (ref 12–20)
CALCIUM SERPL-MCNC: 9.7 MG/DL (ref 8.5–10.1)
CHLORIDE SERPL-SCNC: 101 MMOL/L (ref 100–111)
CO2 SERPL-SCNC: 32 MMOL/L (ref 21–32)
CREAT SERPL-MCNC: 0.53 MG/DL (ref 0.6–1.3)
DIFFERENTIAL METHOD BLD: ABNORMAL
EOSINOPHIL # BLD: 0 K/UL (ref 0–0.4)
EOSINOPHIL NFR BLD: 0 % (ref 0–5)
ERYTHROCYTE [DISTWIDTH] IN BLOOD BY AUTOMATED COUNT: 18.9 % (ref 11.6–14.5)
GLOBULIN SER CALC-MCNC: 4.6 G/DL (ref 2–4)
GLUCOSE SERPL-MCNC: 102 MG/DL (ref 74–99)
HCT VFR BLD AUTO: 37.8 % (ref 36–48)
HGB BLD-MCNC: 11.7 G/DL (ref 13–16)
IMM GRANULOCYTES # BLD AUTO: 0 K/UL (ref 0–0.04)
IMM GRANULOCYTES NFR BLD AUTO: 0 % (ref 0–0.5)
LIPASE SERPL-CCNC: 184 U/L (ref 73–393)
LYMPHOCYTES # BLD: 0.9 K/UL (ref 0.9–3.6)
LYMPHOCYTES NFR BLD: 10 % (ref 21–52)
MCH RBC QN AUTO: 24.3 PG (ref 24–34)
MCHC RBC AUTO-ENTMCNC: 31 G/DL (ref 31–37)
MCV RBC AUTO: 78.6 FL (ref 78–100)
MONOCYTES # BLD: 0.7 K/UL (ref 0.05–1.2)
MONOCYTES NFR BLD: 7 % (ref 3–10)
NEUTS SEG # BLD: 7.5 K/UL (ref 1.8–8)
NEUTS SEG NFR BLD: 82 % (ref 40–73)
NRBC # BLD: 0 K/UL (ref 0–0.01)
NRBC BLD-RTO: 0 PER 100 WBC
PLATELET # BLD AUTO: 415 K/UL (ref 135–420)
PMV BLD AUTO: 9.6 FL (ref 9.2–11.8)
POTASSIUM SERPL-SCNC: 3.7 MMOL/L (ref 3.5–5.5)
PROT SERPL-MCNC: 8.3 G/DL (ref 6.4–8.2)
RBC # BLD AUTO: 4.81 M/UL (ref 4.35–5.65)
SODIUM SERPL-SCNC: 139 MMOL/L (ref 136–145)
WBC # BLD AUTO: 9.2 K/UL (ref 4.6–13.2)

## 2022-12-26 PROCEDURE — 74177 CT ABD & PELVIS W/CONTRAST: CPT

## 2022-12-26 PROCEDURE — 74011000636 HC RX REV CODE- 636: Performed by: EMERGENCY MEDICINE

## 2022-12-26 PROCEDURE — 99285 EMERGENCY DEPT VISIT HI MDM: CPT

## 2022-12-26 PROCEDURE — 83690 ASSAY OF LIPASE: CPT

## 2022-12-26 PROCEDURE — 74018 RADEX ABDOMEN 1 VIEW: CPT

## 2022-12-26 PROCEDURE — 80053 COMPREHEN METABOLIC PANEL: CPT

## 2022-12-26 PROCEDURE — 85025 COMPLETE CBC W/AUTO DIFF WBC: CPT

## 2022-12-26 RX ORDER — DOCUSATE SODIUM 100 MG/1
100 CAPSULE, LIQUID FILLED ORAL 2 TIMES DAILY
Qty: 60 CAPSULE | Refills: 2 | Status: SHIPPED | OUTPATIENT
Start: 2022-12-26 | End: 2023-03-26

## 2022-12-26 RX ADMIN — IOPAMIDOL 100 ML: 612 INJECTION, SOLUTION INTRAVENOUS at 17:02

## 2022-12-26 NOTE — Clinical Note
[address] [place] [address] [facility] EMERGENCY DEPT  [id number] [address] [phone]    Work/School Note    Date: 12/26/2022    To Whom It May concern:    Shirley Navarro was seen and treated today in the emergency room by the following provider(s):  Attending Provider: Asher Ruiz MD  Physician Assistant: Cristy Espinosa. Shirley Navarro is excused from work/school on 12/26/22 and 12/27/22. He is medically clear to return to work/school on 12/28/2022.        Sincerely,          JONO Najera

## 2022-12-26 NOTE — ED PROVIDER NOTES
EMERGENCY DEPARTMENT HISTORY AND PHYSICAL EXAM    4:18 PM      Date: 12/26/2022  Patient Name: Vikas Castro    History of Presenting Illness     Chief Complaint   Patient presents with    Constipation         History Provided By: Patient    Additional History (Context): Vikas Castro is a 60 y.o. male with  myopathy, gout, and other noted PMH  who presents with complaint of constipation x7 days. Patient notes mild left upper quadrant abdominal pain. Notes he has tried Colace, MiraLAX, magnesium citrate for symptoms without relief. Patient denies fever or chills, chest pain, shortness of breath, nausea or vomiting, dysuria, hematuria, melena, bright red blood per rectum, diarrhea. Denies exacerbating or alleviating factors. Notes hx of appendectomy. Notes hx of colonoscopy in 2022. PCP: Wynne Hammans, MD    Current Outpatient Medications   Medication Sig Dispense Refill    albuterol (PROVENTIL HFA, VENTOLIN HFA, PROAIR HFA) 90 mcg/actuation inhaler Take 2 Puffs by inhalation every four (4) hours as needed for Wheezing. 1 Each 4    Anoro Ellipta 62.5-25 mcg/actuation inhaler INHALE 1 PUFF BY MOUTH DAILY 1 Each 5    albuterol (PROVENTIL VENTOLIN) 2.5 mg /3 mL (0.083 %) nebu 3 mL by Nebulization route every six (6) hours as needed for Wheezing. 30 Nebule 3    diclofenac (VOLTAREN) 1 % gel diclofenac 1 % topical gel      nitroglycerin (NITROSTAT) 0.4 mg SL tablet 0.4 mg by SubLINGual route. (Patient not taking: Reported on 5/24/2022)      sildenafil citrate (VIAGRA) 100 mg tablet Take 1 Tablet by mouth daily as needed for Erectile Dysfunction. 6 Tablet 0    linaCLOtide (LINZESS) 290 mcg cap capsule Take 290 mcg by mouth Daily (before breakfast). ibuprofen (MOTRIN) 600 mg tablet Take 1 Tab by mouth every six (6) hours as needed for Pain. (Patient not taking: Reported on 5/24/2022) 20 Tab 0    HYDROcodone-acetaminophen (NORCO) 7.5-325 mg per tablet Take 1 Tab by mouth four (4) times daily as needed.   0 amLODIPine (NORVASC) 10 mg tablet TAKE 1 TABLET BY MOUTH ONCE DAILY  6    aspirin delayed-release 81 mg tablet TAKE 1 TABLET BY MOUTH ONCE DAILY FOR 30 DAYS  3    triamterene-hydroCHLOROthiazide (MAXZIDE) 37.5-25 mg per tablet Take 1 Tab by mouth daily. 2    pantoprazole (PROTONIX) 40 mg tablet Take 1 Tab by mouth Before breakfast and dinner. Indications: Upper GI Bleed (Patient taking differently: Take 40 mg by mouth daily. Indications: bleeding from stomach, esophagus or duodenum) 100 Tab 1    bipap machine kit by Does Not Apply route nightly. Indications: DME: [employer]      modafinil (PROVIGIL) 200 mg tablet TAKE 1 TABLET BY MOUTH TWICE A DAY  3    loratadine (CLARITIN) 10 mg tablet Take 10 mg by mouth daily. THERAPEUTIC-M 9 mg iron-400 mcg tab tablet Take 1 Tab by mouth daily. OXYGEN-AIR DELIVERY SYSTEMS by Does Not Apply route.  3 liters hs with cpap   1 liter with activity  [address]         Past History     Past Medical History:  Past Medical History:   Diagnosis Date    Essential hypertension     Essential hypertension, benign     Gout     Hypogonadism male     Impotence     Microscopic hematuria     Myopathy     Obesity, unspecified     Obstructive sleep apnea     on bipap    Other chronic pulmonary heart diseases     Peripheral neuropathy 10/25/2012    Sarcoid 10/25/2012    Sarcoidosis        Past Surgical History:  Past Surgical History:   Procedure Laterality Date    COLONOSCOPY N/A 4/21/2017    COLONOSCOPY with polypectomy performed by Samantha Padgett MD at [facility] N/A 5/27/2022    COLONOSCOPY with polypectomies performed by Kacie Johnson MD at [facility]    HX APPENDECTOMY      HX ENDOSCOPY  03/2017    HX HEART CATHETERIZATION  2002?    negative per pt    HX HEMORRHOIDECTOMY         Family History:  Family History   Problem Relation Age of Onset    Hypertension Father     Lung Disease Father     Heart Attack Father 61    Colon Cancer Sister 62       Social History:  Social History     Tobacco Use    Smoking status: Never    Smokeless tobacco: Never   Vaping Use    Vaping Use: Never used   Substance Use Topics    Alcohol use: Yes     Comment: occ    Drug use: No       Allergies:  No Known Allergies      Review of Systems       Review of Systems   Constitutional:  Negative for chills and fever. Respiratory:  Negative for shortness of breath. Cardiovascular:  Negative for chest pain. Gastrointestinal:  Positive for abdominal pain and constipation. Negative for nausea and vomiting. Skin:  Negative for rash. Neurological:  Negative for weakness. All other systems reviewed and are negative. Physical Exam   Visit Vitals  BP (!) 130/114 (BP 1 Location: Left upper arm, BP Patient Position: At rest)   Pulse 87   Temp 98.2 °F (36.8 °C)   Resp 20   SpO2 96%         Physical Exam  Vitals and nursing note reviewed. Constitutional:       General: He is not in acute distress. Appearance: Normal appearance. He is well-developed. He is not ill-appearing, toxic-appearing or diaphoretic. HENT:      Head: Normocephalic and atraumatic. Cardiovascular:      Rate and Rhythm: Normal rate and regular rhythm. Heart sounds: Normal heart sounds. No murmur heard. No friction rub. No gallop. Pulmonary:      Effort: Pulmonary effort is normal. No respiratory distress. Breath sounds: Normal breath sounds. No wheezing or rales. Abdominal:      General: Abdomen is flat. There is no distension. Palpations: Abdomen is soft. Tenderness: There is abdominal tenderness (mild LUQ). There is no right CVA tenderness, left CVA tenderness, guarding or rebound. Negative signs include Darby's sign, Rovsing's sign and McBurney's sign. Musculoskeletal:         General: Normal range of motion. Cervical back: Normal range of motion and neck supple. Skin:     General: Skin is warm. Findings: No rash. Neurological:      Mental Status: He is alert. Diagnostic Study Results     Labs -  Recent Results (from the past 12 hour(s))   CBC WITH AUTOMATED DIFF    Collection Time: 12/26/22  3:51 PM   Result Value Ref Range    WBC 9.2 4.6 - 13.2 K/uL    RBC 4.81 4.35 - 5.65 M/uL    HGB 11.7 (L) 13.0 - 16.0 g/dL    HCT 37.8 36.0 - 48.0 %    MCV 78.6 78.0 - 100.0 FL    MCH 24.3 24.0 - 34.0 PG    MCHC 31.0 31.0 - 37.0 g/dL    RDW 18.9 (H) 11.6 - 14.5 %    PLATELET 250 001 - 736 K/uL    MPV 9.6 9.2 - 11.8 FL    NRBC 0.0 0  WBC    ABSOLUTE NRBC 0.00 0.00 - 0.01 K/uL    NEUTROPHILS 82 (H) 40 - 73 %    LYMPHOCYTES 10 (L) 21 - 52 %    MONOCYTES 7 3 - 10 %    EOSINOPHILS 0 0 - 5 %    BASOPHILS 0 0 - 2 %    IMMATURE GRANULOCYTES 0 0.0 - 0.5 %    ABS. NEUTROPHILS 7.5 1.8 - 8.0 K/UL    ABS. LYMPHOCYTES 0.9 0.9 - 3.6 K/UL    ABS. MONOCYTES 0.7 0.05 - 1.2 K/UL    ABS. EOSINOPHILS 0.0 0.0 - 0.4 K/UL    ABS. BASOPHILS 0.0 0.0 - 0.1 K/UL    ABS. IMM. GRANS. 0.0 0.00 - 0.04 K/UL    DF AUTOMATED     METABOLIC PANEL, COMPREHENSIVE    Collection Time: 12/26/22  3:51 PM   Result Value Ref Range    Sodium 139 136 - 145 mmol/L    Potassium 3.7 3.5 - 5.5 mmol/L    Chloride 101 100 - 111 mmol/L    CO2 PENDING mmol/L    Anion gap PENDING mmol/L    Glucose PENDING mg/dL    BUN PENDING MG/DL    Creatinine PENDING MG/DL    BUN/Creatinine ratio PENDING     eGFR PENDING ml/min/1.73m2    Calcium PENDING MG/DL    Bilirubin, total PENDING MG/DL    ALT (SGPT) PENDING U/L    AST (SGOT) PENDING U/L    Alk. phosphatase PENDING U/L    Protein, total PENDING g/dL    Albumin PENDING g/dL    Globulin PENDING g/dL    A-G Ratio PENDING         Radiologic Studies -   XR ABD (KUB)   Final Result      Nonspecific nonobstructive bowel gas pattern. Moderate to large stool retention   in the pelvis. CT ABD PELV W CONT    (Results Pending)   1. Moderate degree of stool within the rectum and distal colon. 2.  Mild increased gas within the stomach and within nondistended small bowel  loops.  No evidence of small bowel obstruction. 3.  Cholelithiasis and mild gallbladder distention without definite CT evidence  of acute osseous. 4.  Severe diffuse muscle atrophy, most pronounced within the paraspinal  musculature, abdominal wall musculature, and visualized thighs. 5.  Emphysema. Grossly unchanged nodule within the medial lingula, since 2018. Medical Decision Making   I am the first provider for this patient. I reviewed the vital signs, available nursing notes, past medical history, past surgical history, family history and social history. Vital Signs-Reviewed the patient's vital signs. Records Reviewed: Nursing Notes, Old Medical Records, Previous Radiology Studies, and Previous Laboratory Studies (Time of Review: 4:18 PM)    ED Course: Progress Notes, Reevaluation, and Consults:  6:00 PM: Reviewed CT results with patient, pt without RUQ tenderness, negative Stockholm sign. Discussed enema, US to evaluate for gallbladder pathology. Pt would prefer to go home. Discussed strict return precautions including fever, right upper quadrant/epigastric abdominal pain, vomiting, inability to have a bowel movement, or any other medical concerns. Patient in agreement with plan, appreciates care. Provider Notes (Medical Decision Making): 76-year-old male who presents the ED due to constipation x7 days. Afebrile, nontoxic-appearing, looks well. Abdomen with mild tenderness without rebound or guarding. Labs essentially unremarkable except for mildly elevated T bilirubin. CT demonstrates moderate degree of stool without evidence of obstruction. Does demonstrate cholelithiasis and mild gallbladder distention without definitive evidence of cholecystitis. Patient is afebrile, no evidence of leukocytosis, no right upper quadrant tenderness, negative Darby sign. Discussed ultrasound and further assessment with patient, declines.   Provided with strict return precautions for any new or worsening symptoms, close follow-up with general surgeon for further assessment. Diagnosis     Clinical Impression: No diagnosis found. Disposition: home     Follow-up Information    None          Patient's Medications   Start Taking    No medications on file   Continue Taking    ALBUTEROL (PROVENTIL HFA, VENTOLIN HFA, PROAIR HFA) 90 MCG/ACTUATION INHALER    Take 2 Puffs by inhalation every four (4) hours as needed for Wheezing. ALBUTEROL (PROVENTIL VENTOLIN) 2.5 MG /3 ML (0.083 %) NEBU    3 mL by Nebulization route every six (6) hours as needed for Wheezing. AMLODIPINE (NORVASC) 10 MG TABLET    TAKE 1 TABLET BY MOUTH ONCE DAILY    ANORO ELLIPTA 62.5-25 MCG/ACTUATION INHALER    INHALE 1 PUFF BY MOUTH DAILY    ASPIRIN DELAYED-RELEASE 81 MG TABLET    TAKE 1 TABLET BY MOUTH ONCE DAILY FOR 30 DAYS    BIPAP MACHINE KIT    by Does Not Apply route nightly. Indications: DME: [employer]    DICLOFENAC (VOLTAREN) 1 % GEL    diclofenac 1 % topical gel    HYDROCODONE-ACETAMINOPHEN (NORCO) 7.5-325 MG PER TABLET    Take 1 Tab by mouth four (4) times daily as needed. IBUPROFEN (MOTRIN) 600 MG TABLET    Take 1 Tab by mouth every six (6) hours as needed for Pain. LINACLOTIDE (LINZESS) 290 MCG CAP CAPSULE    Take 290 mcg by mouth Daily (before breakfast). LORATADINE (CLARITIN) 10 MG TABLET    Take 10 mg by mouth daily. MODAFINIL (PROVIGIL) 200 MG TABLET    TAKE 1 TABLET BY MOUTH TWICE A DAY    NITROGLYCERIN (NITROSTAT) 0.4 MG SL TABLET    0.4 mg by SubLINGual route. OXYGEN-AIR DELIVERY SYSTEMS    by Does Not Apply route. 3 liters hs with cpap   1 liter with activity  [address]    PANTOPRAZOLE (PROTONIX) 40 MG TABLET    Take 1 Tab by mouth Before breakfast and dinner. Indications: Upper GI Bleed    SILDENAFIL CITRATE (VIAGRA) 100 MG TABLET    Take 1 Tablet by mouth daily as needed for Erectile Dysfunction. THERAPEUTIC-M 9 MG IRON-400 MCG TAB TABLET    Take 1 Tab by mouth daily.     TRIAMTERENE-HYDROCHLOROTHIAZIDE (MAXZIDE) 37.5-25 MG PER TABLET    Take 1 Tab by mouth daily. These Medications have changed    No medications on file   Stop Taking    No medications on file       Dictation disclaimer:  Please note that this dictation was completed with [email or web address], the computer voice recognition software. Quite often unanticipated grammatical, syntax, homophones, and other interpretive errors are inadvertently transcribed by the computer software. Please disregard these errors. Please excuse any errors that have escaped final proofreading.

## 2022-12-26 NOTE — Clinical Note
[address] [place] [address] [facility] EMERGENCY DEPT  [id number] [address] [phone]    Work/School Note    Date: 12/26/2022    To Whom It May concern:    Juliana Morton was seen and treated today in the emergency room by the following provider(s):  Attending Provider: Kendell Cortez MD  Physician Assistant: Ashwini Clancy, [address] [place] [address] [place]. Juliana Morton is excused from work/school on 12/26/22 and 12/27/22. He is medically clear to return to work/school on 12/28/2022.        Sincerely,          JONO Reeves

## 2022-12-26 NOTE — ED TRIAGE NOTES
Client reports having constipation, reports 7 days since last bm. Client states he's tried OTC meds with minor success. NAD. AXOX4.

## 2022-12-27 NOTE — ED NOTES
Patient is requesting to take the enema home to self administer. States that he has a 24 hour nurse at home with him, provider ok with this plan. Patient provided with supplies and educated on use and positive results. All discharge paperwork reviewed with the patient and he denies any need for further explanation. He is discharged to the waiting room to await his ride home.

## 2022-12-27 NOTE — DISCHARGE INSTRUCTIONS
Take medication as prescribed. Follow-up with your primary care physician within 2 days for reassessment. Bring the results from this visit with you for their review. Return to the ED immediately for any new, worsening, or persistent symptoms, including fever, vomiting, upper abdominal pain, or any other medical concerns.

## 2023-01-01 NOTE — ED TRIAGE NOTES
Patient comes in stating that he swallowed chicken bone last night and he can still feel it stuck in his throat. 31

## 2023-01-03 RX ORDER — UMECLIDINIUM BROMIDE AND VILANTEROL TRIFENATATE 62.5; 25 UG/1; UG/1
POWDER RESPIRATORY (INHALATION)
Qty: 1 EACH | Refills: 5 | Status: SHIPPED | OUTPATIENT
Start: 2023-01-03

## 2023-01-05 ENCOUNTER — OFFICE VISIT (OUTPATIENT)
Dept: SURGERY | Age: 63
End: 2023-01-05
Payer: MEDICAID

## 2023-01-05 VITALS
BODY MASS INDEX: 29.25 KG/M2 | WEIGHT: 182 LBS | HEART RATE: 84 BPM | OXYGEN SATURATION: 96 % | HEIGHT: 66 IN | SYSTOLIC BLOOD PRESSURE: 128 MMHG | DIASTOLIC BLOOD PRESSURE: 70 MMHG

## 2023-01-05 DIAGNOSIS — K80.20 GALLSTONES: Primary | ICD-10-CM

## 2023-01-05 DIAGNOSIS — R10.13 EPIGASTRIC ABDOMINAL PAIN: ICD-10-CM

## 2023-01-05 DIAGNOSIS — R10.12 LEFT UPPER QUADRANT ABDOMINAL PAIN: ICD-10-CM

## 2023-01-05 DIAGNOSIS — R10.11 RIGHT UPPER QUADRANT ABDOMINAL PAIN: ICD-10-CM

## 2023-01-05 PROCEDURE — 3074F SYST BP LT 130 MM HG: CPT | Performed by: SURGERY

## 2023-01-05 PROCEDURE — 99214 OFFICE O/P EST MOD 30 MIN: CPT | Performed by: SURGERY

## 2023-01-05 PROCEDURE — 3078F DIAST BP <80 MM HG: CPT | Performed by: SURGERY

## 2023-01-05 NOTE — PROGRESS NOTES
Juma Kowalski is a 60 y.o. male (: 1960) presenting to address:    Chief Complaint   Patient presents with    New Patient     Cholelithiasis/ abd pain/referred by [facility]       Medication list and allergies have been reviewed with Juma Kowalski and updated as of today's date. I have gone over all Medical, Surgical and Social History with Juma Kowalski and updated/added the information accordingly.

## 2023-01-05 NOTE — PROGRESS NOTES
General Surgery Consult      Taylor Calloway  Admit date: (Not on file)    MRN: [id number]     : 1960     Age: 60 y.o. Attending Physician: Arabella Garrison MD, FACS      Subjective: Senthil Garcia is a 60 y.o. male who was referred to me by the emergency room for evaluation of abdominal pain and constipation and a CT scan finding of cholelithiasis and distended gallbladder. When the patient presented to the emergency room his major complaint was constipation and abdominal pain but it seems according to the notes his pain was localized in the left upper quadrant. The patient is stating today that his pain is in the epigastric area and sometimes radiates to the left side and sometimes is on the right side as well. He was not sure if eating makes the pain worse. He stated that since he was discharged from the emergency room he had few episodes of mild pain but not significant. He has myopathy and he cannot work. He recently had a colonoscopy few months ago that according to him was normal but they needed to repeat it in 1 year. The patient  has not had jaundice, acholic stools or dark urine and has not had a history of pancreatitis or hepatitis.     Patient Active Problem List    Diagnosis Date Noted    Impotence of organic origin 2018    Hypogonadism in male 2018    Iron deficiency anemia 2017    Gastric polyp 2017    Hypokalemia 2017    HTN (hypertension) 2017    Hypogonadism male 2017    GI bleed 2017    Symptomatic anemia 2017    Sarcoid     Peripheral neuropathy     Myopathy     Pulmonary artery hypertension (HCC)     Asthma     Obstructive apnea     Shortness of breath      Past Medical History:   Diagnosis Date    Essential hypertension     Essential hypertension, benign     Gout     Hypogonadism male     Impotence     Microscopic hematuria     Myopathy     Obesity, unspecified     Obstructive sleep apnea     on bipap    Other chronic pulmonary heart diseases     Peripheral neuropathy 10/25/2012    Sarcoid 10/25/2012    Sarcoidosis       Past Surgical History:   Procedure Laterality Date    COLONOSCOPY N/A 4/21/2017    COLONOSCOPY with polypectomy performed by Marine Carnes MD at [facility]    COLONOSCOPY N/A 5/27/2022    COLONOSCOPY with polypectomies performed by Ray Cash MD at [facility]    HX APPENDECTOMY      HX ENDOSCOPY  03/2017    HX HEART CATHETERIZATION  2002?    negative per pt    HX HEMORRHOIDECTOMY        Social History     Tobacco Use    Smoking status: Never    Smokeless tobacco: Never   Substance Use Topics    Alcohol use: Yes     Comment: occ      Social History     Tobacco Use   Smoking Status Never   Smokeless Tobacco Never     Family History   Problem Relation Age of Onset    Hypertension Father     Lung Disease Father     Heart Attack Father 61    Colon Cancer Sister 62      Current Outpatient Medications   Medication Sig    Anoro Ellipta 62.5-25 mcg/actuation inhaler INHALE 1 PUFF BY MOUTH DAILY    docusate sodium (Colace) 100 mg capsule Take 1 Capsule by mouth two (2) times a day for 90 days. albuterol (PROVENTIL HFA, VENTOLIN HFA, PROAIR HFA) 90 mcg/actuation inhaler Take 2 Puffs by inhalation every four (4) hours as needed for Wheezing. albuterol (PROVENTIL VENTOLIN) 2.5 mg /3 mL (0.083 %) nebu 3 mL by Nebulization route every six (6) hours as needed for Wheezing. diclofenac (VOLTAREN) 1 % gel diclofenac 1 % topical gel    sildenafil citrate (VIAGRA) 100 mg tablet Take 1 Tablet by mouth daily as needed for Erectile Dysfunction. linaCLOtide (LINZESS) 290 mcg cap capsule Take 290 mcg by mouth Daily (before breakfast). amLODIPine (NORVASC) 10 mg tablet TAKE 1 TABLET BY MOUTH ONCE DAILY    aspirin delayed-release 81 mg tablet TAKE 1 TABLET BY MOUTH ONCE DAILY FOR 30 DAYS    triamterene-hydroCHLOROthiazide (MAXZIDE) 37.5-25 mg per tablet Take 1 Tab by mouth daily.     pantoprazole (PROTONIX) 40 mg tablet Take 1 Tab by mouth Before breakfast and dinner. Indications: Upper GI Bleed (Patient taking differently: Take 40 mg by mouth daily. Indications: bleeding from stomach, esophagus or duodenum)    bipap machine kit by Does Not Apply route nightly. Indications: DME: [employer]    modafinil (PROVIGIL) 200 mg tablet TAKE 1 TABLET BY MOUTH TWICE A DAY    loratadine (CLARITIN) 10 mg tablet Take 10 mg by mouth daily. OXYGEN-AIR DELIVERY SYSTEMS by Does Not Apply route. 3 liters hs with cpap   1 liter with activity  [employer]    nitroglycerin (NITROSTAT) 0.4 mg SL tablet 0.4 mg by SubLINGual route. (Patient not taking: No sig reported)    ibuprofen (MOTRIN) 600 mg tablet Take 1 Tab by mouth every six (6) hours as needed for Pain. (Patient not taking: No sig reported)    HYDROcodone-acetaminophen (NORCO) 7.5-325 mg per tablet Take 1 Tab by mouth four (4) times daily as needed. THERAPEUTIC-M 9 mg iron-400 mcg tab tablet Take 1 Tab by mouth daily. (Patient not taking: Reported on 1/5/2023)     No current facility-administered medications for this visit.       No Known Allergies     Review of Systems:  Constitutional: negative  Eyes: negative  Ears, Nose, Mouth, Throat, and Face: negative  Respiratory: negative  Cardiovascular: negative  Gastrointestinal: positive for constipation and abdominal pain  Genitourinary:negative  Integument/Breast: negative  Hematologic/Lymphatic: negative  Musculoskeletal:positive for muscle weakness  Neurological: negative  Behavioral/Psychiatric: negative  Endocrine: negative  Allergic/Immunologic: negative    Objective:     Visit Vitals  /70 (BP 1 Location: Left upper arm, BP Patient Position: Sitting, BP Cuff Size: Large adult)   Pulse 84   Ht 5' 6\" (1.676 m)   Wt 82.6 kg (182 lb)   SpO2 96%   BMI 29.38 kg/m²       Physical Exam:      General:  in no apparent distress, alert, and oriented times 3   Eyes:  conjunctivae and sclerae normal, pupils equal, round, reactive to light   Throat & Neck: no erythema or exudates noted and neck supple and symmetrical; no palpable masses   Lungs:   clear to auscultation bilaterally   Heart:  Regular rate and rhythm   Abdomen:   rounded, soft, nontender, nondistended, no masses or organomegaly. No abdominal wall hernias. Extremities: The patient is on a wheelchair because he cannot walk secondary to his myositis   Skin: Normal.         Imaging and Lab Review:     CBC:   Lab Results   Component Value Date/Time    WBC 9.2 12/26/2022 03:51 PM    RBC 4.81 12/26/2022 03:51 PM    HGB 11.7 (L) 12/26/2022 03:51 PM    HCT 37.8 12/26/2022 03:51 PM    PLATELET 631 70/68/2111 03:51 PM     BMP:   Lab Results   Component Value Date/Time    Glucose 102 (H) 12/26/2022 03:51 PM    Sodium 139 12/26/2022 03:51 PM    Potassium 3.7 12/26/2022 03:51 PM    Chloride 101 12/26/2022 03:51 PM    CO2 32 12/26/2022 03:51 PM    BUN 13 12/26/2022 03:51 PM    Creatinine 0.53 (L) 12/26/2022 03:51 PM    Calcium 9.7 12/26/2022 03:51 PM     CMP:  Lab Results   Component Value Date/Time    Glucose 102 (H) 12/26/2022 03:51 PM    Sodium 139 12/26/2022 03:51 PM    Potassium 3.7 12/26/2022 03:51 PM    Chloride 101 12/26/2022 03:51 PM    CO2 32 12/26/2022 03:51 PM    BUN 13 12/26/2022 03:51 PM    Creatinine 0.53 (L) 12/26/2022 03:51 PM    Calcium 9.7 12/26/2022 03:51 PM    Anion gap 6 12/26/2022 03:51 PM    BUN/Creatinine ratio 25 (H) 12/26/2022 03:51 PM    Alk. phosphatase 81 12/26/2022 03:51 PM    Protein, total 8.3 (H) 12/26/2022 03:51 PM    Albumin 3.7 12/26/2022 03:51 PM    Globulin 4.6 (H) 12/26/2022 03:51 PM    A-G Ratio 0.8 12/26/2022 03:51 PM       No results found for this or any previous visit (from the past 24 hour(s)). images and reports reviewed    Assessment:   Pérez Oconnor is a 60 y.o. male who is presenting with abdominal pain that are not specific at all and localized in the upper abdomen with a CT scan finding of cholelithiasis and distended gallbladder.   I had a long discussion with the patient and explained to him that I am not sure if his symptoms are related to his gallstones and if they are secondary to chronic cholecystitis and biliary colic. I explained to him the symptoms and signs of chronic cholecystitis and he stated that many times his pain is in the right upper quadrant and he is concerned about his distended gallbladder and gallstones. I did explain to him that there is a possibility that his symptoms are related to that but also there is a possibility that they are not and I gave him the option of getting a HIDA scan before proceeding with the cholecystectomy but the patient was very clear that he would like to proceed with the cholecystectomy which it is an option as well. I explained the indications for robotic cholecystectomy as well as the alternatives. I discussed the potential risks, including but not limited to bleeding, wound infection, trocar injuries, bile duct injury and leak, and also the possible need for conversion to open procedure. I also explained the firefly technology and how it allow us to visualize the biliary tree to avoid bile duct injury or leak. He indicates that he understands the risks, accepts and wishes to proceed. I told him that Tucker Tyson will call him to schedule his surgery. Plan: Will schedule for robotic cholecystectomy.      Please call me if you have any questions (cell phone: [phone])     Signed By: Anna Marques MD     January 5, 2023

## 2023-01-06 ENCOUNTER — HOSPITAL ENCOUNTER (OUTPATIENT)
Dept: CT IMAGING | Age: 63
End: 2023-01-06
Attending: INTERNAL MEDICINE
Payer: MEDICAID

## 2023-01-06 DIAGNOSIS — J99 PULMONARY NODULAR AMYLOIDOSIS (HCC): ICD-10-CM

## 2023-01-06 DIAGNOSIS — E85.4 PULMONARY NODULAR AMYLOIDOSIS (HCC): ICD-10-CM

## 2023-01-06 PROCEDURE — 71250 CT THORAX DX C-: CPT

## 2023-01-06 NOTE — PERIOP NOTES
PRE-SURGICAL INSTRUCTIONS        Patient's Name:  Dottie Ron      [id number] Date:  1/6/2023            Covid Testing Date and Time:    Surgery Date:  1/11/2023                Do NOT eat or drink anything, including candy, gum, or ice chips after midnight on 1/10/2023, unless you have specific instructions from your surgeon or anesthesia provider to do so. You may brush your teeth before coming to the hospital.  No smoking 24 hours prior to the day of surgery. No alcohol 24 hours prior to the day of surgery. No recreational drugs for one week prior to the day of surgery. Leave all valuables, including money/purse, at home. Remove all jewelry, nail polish, acrylic nails, and makeup (including mascara); no lotions powders, deodorant, or perfume/cologne/after shave on the skin. Follow instruction for Hibiclens washes and CHG wipes from surgeon's office. Glasses/contact lenses and dentures may be worn to the hospital.  They will be removed prior to surgery. Call your doctor if symptoms of a cold or illness develop within 24-48 hours prior to your surgery. 11.  If you are having an outpatient procedure, please make arrangements for a responsible ADULT TO [address] [place] [address] and stay with you for 24 hours after your surgery. 12. ONE VISITOR in the hospital at this time for outpatient procedures. Exceptions may be made for surgical admissions, per nursing unit guidelines      Special Instructions:      Bring list of CURRENT medications. Bring inhaler. Bring CPAP machine. Bring any pertinent legal medical records. Take these medications the morning of surgery with a sip of water:  per MD  Follow physician instructions about insulin. Follow physician instructions about stopping anticoagulants. Complete bowel prep per MD instructions. On the day of surgery, come in the main entrance of [facility]. Let the  at the desk know you are there for surgery.   A staff member will come escort you to the surgical area on the second floor. If you have any questions or concerns, please do not hesitate to call:     (Prior to the day of surgery) PAT department:  [phone]   (Day of surgery) Pre-Op department:  [phone]    These surgical instructions were reviewed with patient during the PAT phone call.

## 2023-01-09 ENCOUNTER — ANESTHESIA EVENT (OUTPATIENT)
Dept: SURGERY | Age: 63
End: 2023-01-09
Payer: MEDICAID

## 2023-01-09 ENCOUNTER — TELEPHONE (OUTPATIENT)
Dept: SURGERY | Age: 63
End: 2023-01-09

## 2023-01-09 NOTE — TELEPHONE ENCOUNTER
Mr. Capo Kerr called stating he believe that he passed the gallstones on Friday, Jan 6, 2023 and Saturday, Jan 7, 2023 during his bowel movements. Mr. Capo Kerr states he's not experiencing any abdominal pain and indicate he did have a hard stool over the weekend but since passing the stones/stool he feel fine  and his stool is now soff and is thinking he should probably hold off on the surgery for now but if the symptoms resume then he'll call back to discuss scheduling. I told Mr. Chavishaider Veliz would relay his message to Dr. Kathy Reaves.

## 2023-01-10 ENCOUNTER — TELEPHONE (OUTPATIENT)
Dept: SURGERY | Age: 63
End: 2023-01-10

## 2023-01-10 NOTE — TELEPHONE ENCOUNTER
Left voicemail message for Mr. Hernandez to contact our office to reschedule surgery with Dr. Josse Cruz. Per Dr. Josse Cruz discussion with  today, Mr. Steven Bennett is scheduled for a dental procedure tomorrow, Wednesday, January 11, 2023 and therefore wish to reschedule the gallbladder surgery.

## 2023-01-11 ENCOUNTER — ANESTHESIA (OUTPATIENT)
Dept: SURGERY | Age: 63
End: 2023-01-11
Payer: MEDICAID

## 2023-01-25 ENCOUNTER — HOSPITAL ENCOUNTER (OUTPATIENT)
Age: 63
Setting detail: OUTPATIENT SURGERY
Discharge: HOME | End: 2023-01-25
Attending: SURGERY | Admitting: SURGERY
Payer: MEDICAID

## 2023-01-25 VITALS
TEMPERATURE: 97.4 F | WEIGHT: 182 LBS | HEIGHT: 66 IN | BODY MASS INDEX: 29.25 KG/M2 | DIASTOLIC BLOOD PRESSURE: 60 MMHG | OXYGEN SATURATION: 98 % | HEART RATE: 67 BPM | SYSTOLIC BLOOD PRESSURE: 100 MMHG | RESPIRATION RATE: 17 BRPM

## 2023-01-25 DIAGNOSIS — Z90.49 S/P CHOLECYSTECTOMY: Primary | ICD-10-CM

## 2023-01-25 PROCEDURE — 74011250636 HC RX REV CODE- 250/636: Performed by: SURGERY

## 2023-01-25 PROCEDURE — 2709999900 HC NON-CHARGEABLE SUPPLY: Performed by: SURGERY

## 2023-01-25 PROCEDURE — 77030013079 HC BLNKT BAIR HGGR 3M -A: Performed by: ANESTHESIOLOGY

## 2023-01-25 PROCEDURE — 74011000250 HC RX REV CODE- 250: Performed by: SURGERY

## 2023-01-25 PROCEDURE — 74011000250 HC RX REV CODE- 250: Performed by: NURSE ANESTHETIST, CERTIFIED REGISTERED

## 2023-01-25 PROCEDURE — 77030040922 HC BLNKT HYPOTHRM STRY -A: Performed by: SURGERY

## 2023-01-25 PROCEDURE — 74011000258 HC RX REV CODE- 258: Performed by: NURSE ANESTHETIST, CERTIFIED REGISTERED

## 2023-01-25 PROCEDURE — 77030020703 HC SEAL CANN DISP INTU -B: Performed by: SURGERY

## 2023-01-25 PROCEDURE — 76060000032 HC ANESTHESIA 0.5 TO 1 HR: Performed by: SURGERY

## 2023-01-25 PROCEDURE — 74011250636 HC RX REV CODE- 250/636: Performed by: NURSE ANESTHETIST, CERTIFIED REGISTERED

## 2023-01-25 PROCEDURE — 77030010507 HC ADH SKN DERMBND J&J -B: Performed by: SURGERY

## 2023-01-25 PROCEDURE — 88304 TISSUE EXAM BY PATHOLOGIST: CPT

## 2023-01-25 PROCEDURE — 77030008606 HC TRCR ENDOSC KII AMR -B: Performed by: SURGERY

## 2023-01-25 PROCEDURE — 77030009851 HC PCH RTVR ENDOSC AMR -B: Performed by: SURGERY

## 2023-01-25 PROCEDURE — 76210000021 HC REC RM PH II 0.5 TO 1 HR: Performed by: SURGERY

## 2023-01-25 PROCEDURE — 77030040361 HC SLV COMPR DVT MDII -B: Performed by: SURGERY

## 2023-01-25 PROCEDURE — 77030002933 HC SUT MCRYL J&J -A: Performed by: SURGERY

## 2023-01-25 PROCEDURE — 76010000933 HC OR TIME 0.5 TO 1HR INTENSV - TIER 2: Performed by: SURGERY

## 2023-01-25 PROCEDURE — 77030026438 HC STYL ET INTUB CARD -A: Performed by: ANESTHESIOLOGY

## 2023-01-25 PROCEDURE — 76210000016 HC OR PH I REC 1 TO 1.5 HR: Performed by: SURGERY

## 2023-01-25 PROCEDURE — 77030008683 HC TU ET CUF COVD -A: Performed by: ANESTHESIOLOGY

## 2023-01-25 PROCEDURE — 77030035277 HC OBTRTR BLDELSS DISP INTU -B: Performed by: SURGERY

## 2023-01-25 PROCEDURE — 74011250637 HC RX REV CODE- 250/637: Performed by: NURSE ANESTHETIST, CERTIFIED REGISTERED

## 2023-01-25 RX ORDER — SODIUM CHLORIDE, SODIUM LACTATE, POTASSIUM CHLORIDE, CALCIUM CHLORIDE 600; 310; 30; 20 MG/100ML; MG/100ML; MG/100ML; MG/100ML
50 INJECTION, SOLUTION INTRAVENOUS CONTINUOUS
Status: DISCONTINUED | OUTPATIENT
Start: 2023-01-25 | End: 2023-01-25 | Stop reason: HOSPADM

## 2023-01-25 RX ORDER — HYDROMORPHONE HYDROCHLORIDE 2 MG/ML
0.5 INJECTION, SOLUTION INTRAMUSCULAR; INTRAVENOUS; SUBCUTANEOUS
Status: DISCONTINUED | OUTPATIENT
Start: 2023-01-25 | End: 2023-01-25 | Stop reason: HOSPADM

## 2023-01-25 RX ORDER — GLYCOPYRROLATE 0.2 MG/ML
INJECTION INTRAMUSCULAR; INTRAVENOUS AS NEEDED
Status: DISCONTINUED | OUTPATIENT
Start: 2023-01-25 | End: 2023-01-25 | Stop reason: HOSPADM

## 2023-01-25 RX ORDER — FENTANYL CITRATE 50 UG/ML
25 INJECTION, SOLUTION INTRAMUSCULAR; INTRAVENOUS
Status: DISCONTINUED | OUTPATIENT
Start: 2023-01-25 | End: 2023-01-25 | Stop reason: HOSPADM

## 2023-01-25 RX ORDER — LIDOCAINE HYDROCHLORIDE 20 MG/ML
INJECTION, SOLUTION EPIDURAL; INFILTRATION; INTRACAUDAL; PERINEURAL AS NEEDED
Status: DISCONTINUED | OUTPATIENT
Start: 2023-01-25 | End: 2023-01-25 | Stop reason: HOSPADM

## 2023-01-25 RX ORDER — INSULIN LISPRO 100 [IU]/ML
INJECTION, SOLUTION INTRAVENOUS; SUBCUTANEOUS ONCE
Status: DISCONTINUED | OUTPATIENT
Start: 2023-01-25 | End: 2023-01-25 | Stop reason: HOSPADM

## 2023-01-25 RX ORDER — FENTANYL CITRATE 50 UG/ML
INJECTION, SOLUTION INTRAMUSCULAR; INTRAVENOUS AS NEEDED
Status: DISCONTINUED | OUTPATIENT
Start: 2023-01-25 | End: 2023-01-25 | Stop reason: HOSPADM

## 2023-01-25 RX ORDER — FAMOTIDINE 20 MG/1
20 TABLET, FILM COATED ORAL ONCE
Status: COMPLETED | OUTPATIENT
Start: 2023-01-25 | End: 2023-01-25

## 2023-01-25 RX ORDER — DIPHENHYDRAMINE HYDROCHLORIDE 50 MG/ML
12.5 INJECTION, SOLUTION INTRAMUSCULAR; INTRAVENOUS
Status: DISCONTINUED | OUTPATIENT
Start: 2023-01-25 | End: 2023-01-25 | Stop reason: HOSPADM

## 2023-01-25 RX ORDER — SODIUM CHLORIDE, SODIUM LACTATE, POTASSIUM CHLORIDE, CALCIUM CHLORIDE 600; 310; 30; 20 MG/100ML; MG/100ML; MG/100ML; MG/100ML
100 INJECTION, SOLUTION INTRAVENOUS CONTINUOUS
Status: DISCONTINUED | OUTPATIENT
Start: 2023-01-25 | End: 2023-01-25 | Stop reason: HOSPADM

## 2023-01-25 RX ORDER — PHENYLEPHRINE HCL IN 0.9% NACL 1 MG/10 ML
SYRINGE (ML) INTRAVENOUS AS NEEDED
Status: DISCONTINUED | OUTPATIENT
Start: 2023-01-25 | End: 2023-01-25 | Stop reason: HOSPADM

## 2023-01-25 RX ORDER — NEOSTIGMINE METHYLSULFATE 1 MG/ML
INJECTION, SOLUTION INTRAVENOUS AS NEEDED
Status: DISCONTINUED | OUTPATIENT
Start: 2023-01-25 | End: 2023-01-25 | Stop reason: HOSPADM

## 2023-01-25 RX ORDER — ALBUTEROL SULFATE 0.83 MG/ML
2.5 SOLUTION RESPIRATORY (INHALATION) AS NEEDED
Status: DISCONTINUED | OUTPATIENT
Start: 2023-01-25 | End: 2023-01-25 | Stop reason: HOSPADM

## 2023-01-25 RX ORDER — OXYCODONE AND ACETAMINOPHEN 5; 325 MG/1; MG/1
1 TABLET ORAL
Qty: 12 TABLET | Refills: 0 | Status: SHIPPED | OUTPATIENT
Start: 2023-01-25 | End: 2023-01-25

## 2023-01-25 RX ORDER — ONDANSETRON 2 MG/ML
INJECTION INTRAMUSCULAR; INTRAVENOUS AS NEEDED
Status: DISCONTINUED | OUTPATIENT
Start: 2023-01-25 | End: 2023-01-25 | Stop reason: HOSPADM

## 2023-01-25 RX ORDER — SUCCINYLCHOLINE CHLORIDE 20 MG/ML
INJECTION INTRAMUSCULAR; INTRAVENOUS AS NEEDED
Status: DISCONTINUED | OUTPATIENT
Start: 2023-01-25 | End: 2023-01-25 | Stop reason: HOSPADM

## 2023-01-25 RX ORDER — INDOCYANINE GREEN AND WATER 25 MG
2.5 KIT INJECTION
Status: DISCONTINUED | OUTPATIENT
Start: 2023-01-25 | End: 2023-01-25 | Stop reason: HOSPADM

## 2023-01-25 RX ORDER — PROPOFOL 10 MG/ML
INJECTION, EMULSION INTRAVENOUS AS NEEDED
Status: DISCONTINUED | OUTPATIENT
Start: 2023-01-25 | End: 2023-01-25 | Stop reason: HOSPADM

## 2023-01-25 RX ORDER — OXYCODONE AND ACETAMINOPHEN 5; 325 MG/1; MG/1
1 TABLET ORAL
Qty: 24 TABLET | Refills: 0 | Status: SHIPPED | OUTPATIENT
Start: 2023-01-25 | End: 2023-01-28

## 2023-01-25 RX ORDER — ALBUTEROL SULFATE 0.83 MG/ML
2.5 SOLUTION RESPIRATORY (INHALATION)
Status: DISCONTINUED | OUTPATIENT
Start: 2023-01-25 | End: 2023-01-25 | Stop reason: HOSPADM

## 2023-01-25 RX ORDER — DEXAMETHASONE SODIUM PHOSPHATE 4 MG/ML
INJECTION, SOLUTION INTRA-ARTICULAR; INTRALESIONAL; INTRAMUSCULAR; INTRAVENOUS; SOFT TISSUE AS NEEDED
Status: DISCONTINUED | OUTPATIENT
Start: 2023-01-25 | End: 2023-01-25 | Stop reason: HOSPADM

## 2023-01-25 RX ORDER — ROCURONIUM BROMIDE 10 MG/ML
INJECTION, SOLUTION INTRAVENOUS AS NEEDED
Status: DISCONTINUED | OUTPATIENT
Start: 2023-01-25 | End: 2023-01-25 | Stop reason: HOSPADM

## 2023-01-25 RX ADMIN — GLYCOPYRROLATE 0.2 MG: 0.2 INJECTION INTRAMUSCULAR; INTRAVENOUS at 07:26

## 2023-01-25 RX ADMIN — PROPOFOL 100 MG: 10 INJECTION, EMULSION INTRAVENOUS at 07:34

## 2023-01-25 RX ADMIN — LIDOCAINE HYDROCHLORIDE 100 MG: 20 INJECTION, SOLUTION EPIDURAL; INFILTRATION; INTRACAUDAL; PERINEURAL at 07:33

## 2023-01-25 RX ADMIN — GLYCOPYRROLATE 0.4 MG: 0.2 INJECTION INTRAMUSCULAR; INTRAVENOUS at 08:01

## 2023-01-25 RX ADMIN — FENTANYL CITRATE 50 MCG: 50 INJECTION, SOLUTION INTRAMUSCULAR; INTRAVENOUS at 07:33

## 2023-01-25 RX ADMIN — SUCCINYLCHOLINE CHLORIDE 160 MG: 20 INJECTION, SOLUTION INTRAMUSCULAR; INTRAVENOUS at 07:35

## 2023-01-25 RX ADMIN — ALBUTEROL SULFATE 2.5 MG: 2.5 SOLUTION RESPIRATORY (INHALATION) at 08:49

## 2023-01-25 RX ADMIN — DEXMEDETOMIDINE HYDROCHLORIDE 10 MCG: 100 INJECTION, SOLUTION, CONCENTRATE INTRAVENOUS at 07:31

## 2023-01-25 RX ADMIN — ONDANSETRON 4 MG: 2 INJECTION INTRAMUSCULAR; INTRAVENOUS at 07:49

## 2023-01-25 RX ADMIN — NEOSTIGMINE METHYLSULFATE 3 MG: 1 INJECTION, SOLUTION INTRAVENOUS at 08:01

## 2023-01-25 RX ADMIN — DEXAMETHASONE SODIUM PHOSPHATE 4 MG: 4 INJECTION, SOLUTION INTRAMUSCULAR; INTRAVENOUS at 07:45

## 2023-01-25 RX ADMIN — CEFAZOLIN SODIUM 2 G: 1 INJECTION, POWDER, FOR SOLUTION INTRAMUSCULAR; INTRAVENOUS at 07:39

## 2023-01-25 RX ADMIN — Medication 100 MCG: at 07:47

## 2023-01-25 RX ADMIN — FAMOTIDINE 20 MG: 20 TABLET, FILM COATED ORAL at 06:25

## 2023-01-25 RX ADMIN — SODIUM CHLORIDE, POTASSIUM CHLORIDE, SODIUM LACTATE AND CALCIUM CHLORIDE 50 ML/HR: 600; 310; 30; 20 INJECTION, SOLUTION INTRAVENOUS at 06:25

## 2023-01-25 RX ADMIN — ROCURONIUM BROMIDE 10 MG: 50 INJECTION INTRAVENOUS at 07:35

## 2023-01-25 RX ADMIN — Medication 150 MCG: at 07:40

## 2023-01-25 RX ADMIN — FENTANYL CITRATE 50 MCG: 50 INJECTION, SOLUTION INTRAMUSCULAR; INTRAVENOUS at 07:38

## 2023-01-25 RX ADMIN — Medication 100 MCG: at 07:55

## 2023-01-25 NOTE — PROGRESS NOTES
Date of Surgery Update:  Maria Ines Garza was seen and examined. History and physical has been reviewed. The patient has been examined. There have been no significant clinical changes since the completion of the originally dated History and Physical. Will proceed with robotic cholecystectomy.     Signed By: Chevy Pelayo MD     January 25, 2023 6:36 AM

## 2023-01-25 NOTE — PERIOP NOTES
Assumed care of pt from OR via stretcher. Attached to monitor. VSS. OR, MAR and anesthesia report appreciated. Will cont to monitor.   [address]

## 2023-01-25 NOTE — ANESTHESIA POSTPROCEDURE EVALUATION
Procedure(s):  ROBOTIC ASSISTED CHOLECYSTECTOMY. general    Anesthesia Post Evaluation      Multimodal analgesia: multimodal analgesia used between 6 hours prior to anesthesia start to PACU discharge  Patient location during evaluation: bedside  Patient participation: complete - patient participated  Level of consciousness: awake  Pain score: 2  Pain management: adequate  Airway patency: patent  Anesthetic complications: no  Cardiovascular status: stable  Respiratory status: acceptable  Hydration status: acceptable  Post anesthesia nausea and vomiting:  none  Final Post Anesthesia Temperature Assessment:  Normothermia (36.0-37.5 degrees C)      INITIAL Post-op Vital signs:   Vitals Value Taken Time   BP 90/59 01/25/23 0916   Temp 36.3 °C (97.4 °F) 01/25/23 0821   Pulse 66 01/25/23 0920   Resp 19 01/25/23 0920   SpO2 98 % 01/25/23 0920   Vitals shown include unvalidated device data.

## 2023-01-25 NOTE — ANESTHESIA PREPROCEDURE EVALUATION
Relevant Problems   No relevant active problems       Anesthetic History   No history of anesthetic complications            Review of Systems / Medical History  Patient summary reviewed and pertinent labs reviewed    Pulmonary        Sleep apnea: No treatment    Asthma        Neuro/Psych   Within defined limits           Cardiovascular    Hypertension              Exercise tolerance: >4 METS     GI/Hepatic/Renal  Within defined limits              Endo/Other  Within defined limits           Other Findings              Physical Exam    Airway  Mallampati: II  TM Distance: 4 - 6 cm  Neck ROM: normal range of motion   Mouth opening: Normal     Cardiovascular  Regular rate and rhythm,  S1 and S2 normal,  no murmur, click, rub, or gallop  Rhythm: regular  Rate: normal         Dental    Dentition: Lower dentition intact and Upper dentition intact     Pulmonary  Breath sounds clear to auscultation               Abdominal  GI exam deferred       Other Findings            Anesthetic Plan    ASA: 2  Anesthesia type: general          Induction: Intravenous  Anesthetic plan and risks discussed with: Patient

## 2023-01-25 NOTE — BRIEF OP NOTE
Brief Postoperative Note    Patient: Pérez Oconnor  YOB: 1960  MRN: [id number]    Date of Procedure: 1/25/2023     Pre-Op Diagnosis: Gallstones [K80.20]    Post-Op Diagnosis: Same as preoperative diagnosis. Procedure(s):  ROBOTIC ASSISTED CHOLECYSTECTOMY    Surgeon(s):  Sue Bergman MD    Surgical Assistant: Surg Asst-1: Ray Rollins    Anesthesia: General     Estimated Blood Loss (mL): Minimal    Complications: None    Specimens:   ID Type Source Tests Collected by Time Destination   1 : Gallbladder and contents Preservative Gallbladder  Sue Bergman MD 1/25/2023 4799 Pathology        Implants: * No implants in log *    Drains: * No LDAs found *    Findings: Chronic cholecystitis.      Electronically Signed by Melani Geronimo MD on 1/25/2023 at 8:08 AM

## 2023-01-25 NOTE — DISCHARGE INSTRUCTIONS
Discharge Instructions Following Surgery    Patient: Paulino Baxter MRN: [id number]  SSN: [id number]    YOB: 1960  Age: 60 y.o. Sex: male      Activity  As tolerated, walking encourage, stairs are okay. Avoid strenuous activities - no lifting anything heavier than 15 pounds till seen in the clinic. You may shower at home after 24 hours. Diet  Regular diet after nausea from the anesthetic has passed. Pain  Take pain medication as directed by your doctor. Call your doctor if pain is NOT relieved by medication. Wound and Dressing Care  There is glue on the wounds. No need for any dressing care. Apply ice packs to the area of the surgery for the first 1 to 2 days  Apply warm compresses after 2 days for pain relieve if needed    After Anesthesia  For the first 24 hours: DO NOT Drive, Drink alcoholic beverages, or Make important decisions. Be aware of dizziness following anesthesia and while taking pain medication. Call your doctor if  Excessive bleeding that does not stop after holding mild pressure over the area. Temperature of 101 degrees F or above. Redness,excessive swelling or bruising, and/or green or yellow, smelly discharge from incision. If nausea and vomiting continues. Appointment date/time Follow-Up Phone Calls    Call the office at [phone] to make your follow-up appointment in 2 weeks after the surgery (if not already set up) . Dr. Kaylie Salvador cell phone number is [phone]. Please call me if you have any concerns or questions. DISCHARGE SUMMARY from Nurse    PATIENT INSTRUCTIONS:    After general anesthesia or intravenous sedation, for 24 hours or while taking prescription Narcotics:  Limit your activities  Do not drive and operate hazardous machinery  Do not make important personal or business decisions  Do  not drink alcoholic beverages  If you have not urinated within 8 hours after discharge, please contact your surgeon on call.     Report the following to your surgeon:  Excessive pain, swelling, redness or odor of or around the surgical area  Temperature over 100.5  Nausea and vomiting lasting longer than 4 hours or if unable to take medications  Any signs of decreased circulation or nerve impairment to extremity: change in color, persistent  numbness, tingling, coldness or increase pain  Any questions      These are general instructions for a healthy lifestyle:    No smoking/ No tobacco products/ Avoid exposure to second hand smoke  Surgeon General's Warning:  Quitting smoking now greatly reduces serious risk to your health. Obesity, smoking, and sedentary lifestyle greatly increases your risk for illness    A healthy diet, regular physical exercise & weight monitoring are important for maintaining a healthy lifestyle    You may be retaining fluid if you have a history of heart failure or if you experience any of the following symptoms:  Weight gain of 3 pounds or more overnight or 5 pounds in a week, increased swelling in our hands or feet or shortness of breath while lying flat in bed. Please call your doctor as soon as you notice any of these symptoms; do not wait until your next office visit. The discharge information has been reviewed with the patient and fiance. The patient and fiance verbalized understanding. Discharge medications reviewed with the patient and fiance and appropriate educational materials and side effects teaching were provided.   ___________________________________________________________________________________________________________________________________

## 2023-01-29 NOTE — ROUTINE PROCESS
TRANSFER - IN REPORT:    Verbal report received from [facility]) on Valery Gilbert  being received from [facility](unit) for ordered procedure      Report consisted of patients Situation, Background, Assessment and   Recommendations(SBAR). Information from the following report(s) SBAR was reviewed with the receiving nurse. Opportunity for questions and clarification was provided. Assessment completed upon patients arrival to unit and care assumed. (4) excellent

## 2023-02-06 ENCOUNTER — OFFICE VISIT (OUTPATIENT)
Dept: SURGERY | Age: 63
End: 2023-02-06
Payer: MEDICAID

## 2023-02-06 VITALS
SYSTOLIC BLOOD PRESSURE: 134 MMHG | TEMPERATURE: 97.8 F | HEART RATE: 104 BPM | DIASTOLIC BLOOD PRESSURE: 69 MMHG | OXYGEN SATURATION: 99 %

## 2023-02-06 DIAGNOSIS — Z90.49 S/P CHOLECYSTECTOMY: Primary | ICD-10-CM

## 2023-02-06 PROCEDURE — 99024 POSTOP FOLLOW-UP VISIT: CPT | Performed by: SURGERY

## 2023-02-06 NOTE — PROGRESS NOTES
Patient seen and examined. He is doing great. His abdomen is soft and nontender and his wounds are healing well. His pathology showed mild chronic cholecystitis. Follow-up as needed.

## 2023-02-06 NOTE — PROGRESS NOTES
Allan Gabriela is a 60 y.o. male (: 1960) presenting to address:    Chief Complaint   Patient presents with    Surgical Follow-up     Cholecystectomy 23       Medication list and allergies have been reviewed with Allan Later and updated as of today's date. I have gone over all Medical, Surgical and Social History with Allan Ochoa and updated/added the information accordingly. 1. Have you been to the ER, Urgent Care or Hospitalized since your last visit? NO      2. Have you followed up with your PCP or any other Physicians since your procedure/ last office visit? NO    Unable to attain patient's weight due to wheel chair bound.

## 2023-02-16 ENCOUNTER — OFFICE VISIT (OUTPATIENT)
Age: 63
End: 2023-02-16
Payer: MEDICARE

## 2023-02-16 VITALS
HEIGHT: 66 IN | SYSTOLIC BLOOD PRESSURE: 138 MMHG | BODY MASS INDEX: 29.25 KG/M2 | OXYGEN SATURATION: 98 % | TEMPERATURE: 98.2 F | RESPIRATION RATE: 18 BRPM | DIASTOLIC BLOOD PRESSURE: 76 MMHG | WEIGHT: 182 LBS | HEART RATE: 99 BPM

## 2023-02-16 DIAGNOSIS — J99 PULMONARY NODULAR AMYLOIDOSIS (HCC): ICD-10-CM

## 2023-02-16 DIAGNOSIS — J44.9 COPD WITH ASTHMA (HCC): ICD-10-CM

## 2023-02-16 DIAGNOSIS — E85.4 PULMONARY NODULAR AMYLOIDOSIS (HCC): ICD-10-CM

## 2023-02-16 DIAGNOSIS — D86.9 SARCOIDOSIS: Primary | ICD-10-CM

## 2023-02-16 DIAGNOSIS — Z99.89 OSA ON CPAP: ICD-10-CM

## 2023-02-16 DIAGNOSIS — G47.33 OSA ON CPAP: ICD-10-CM

## 2023-02-16 PROCEDURE — 99214 OFFICE O/P EST MOD 30 MIN: CPT | Performed by: INTERNAL MEDICINE

## 2023-02-16 PROCEDURE — 3078F DIAST BP <80 MM HG: CPT | Performed by: INTERNAL MEDICINE

## 2023-02-16 PROCEDURE — 3075F SYST BP GE 130 - 139MM HG: CPT | Performed by: INTERNAL MEDICINE

## 2023-02-16 RX ORDER — ONDANSETRON 4 MG/1
TABLET, ORALLY DISINTEGRATING ORAL
COMMUNITY

## 2023-02-16 RX ORDER — LORATADINE 10 MG/1
TABLET ORAL
COMMUNITY
Start: 2016-04-18

## 2023-02-16 RX ORDER — PENICILLIN V POTASSIUM 500 MG/1
TABLET ORAL
COMMUNITY

## 2023-02-16 RX ORDER — MODAFINIL 200 MG/1
TABLET ORAL
COMMUNITY
Start: 2016-07-27

## 2023-02-16 RX ORDER — AVANAFIL 200 MG/1
TABLET ORAL
COMMUNITY

## 2023-02-16 RX ORDER — NITROGLYCERIN 0.4 MG/1
0.4 TABLET SUBLINGUAL
COMMUNITY

## 2023-02-16 RX ORDER — RANITIDINE 150 MG/1
TABLET ORAL
COMMUNITY

## 2023-02-16 RX ORDER — AMLODIPINE BESYLATE 10 MG/1
TABLET ORAL
COMMUNITY
Start: 2017-05-15

## 2023-02-16 RX ORDER — DOXAZOSIN MESYLATE 1 MG/1
1 TABLET ORAL DAILY
COMMUNITY

## 2023-02-16 RX ORDER — LANOLIN ALCOHOL/MO/W.PET/CERES
CREAM (GRAM) TOPICAL
COMMUNITY

## 2023-02-16 RX ORDER — CEPHALEXIN 500 MG/1
CAPSULE ORAL
COMMUNITY

## 2023-02-16 RX ORDER — TESTOSTERONE 16.2 MG/G
GEL TRANSDERMAL
COMMUNITY

## 2023-02-16 RX ORDER — PREDNISONE 10 MG/1
10 TABLET ORAL DAILY
COMMUNITY

## 2023-02-16 RX ORDER — ASPIRIN 81 MG/1
TABLET ORAL
COMMUNITY
Start: 2017-05-15

## 2023-02-16 RX ORDER — BOSENTAN 125 MG/1
TABLET, FILM COATED ORAL
COMMUNITY

## 2023-02-16 RX ORDER — DOXAZOSIN 8 MG/1
TABLET ORAL
COMMUNITY

## 2023-02-16 RX ORDER — HYDROCODONE BITARTRATE AND ACETAMINOPHEN 5; 325 MG/1; MG/1
TABLET ORAL
COMMUNITY

## 2023-02-16 RX ORDER — PSEUDOEPHEDRINE HCL 30 MG
TABLET ORAL
COMMUNITY
Start: 2022-12-26

## 2023-02-16 RX ORDER — UMECLIDINIUM BROMIDE AND VILANTEROL TRIFENATATE 62.5; 25 UG/1; UG/1
POWDER RESPIRATORY (INHALATION)
COMMUNITY
Start: 2023-02-01

## 2023-02-16 RX ORDER — LINACLOTIDE 290 UG/1
CAPSULE, GELATIN COATED ORAL
COMMUNITY

## 2023-02-16 RX ORDER — INDOMETHACIN 50 MG/1
CAPSULE ORAL
COMMUNITY

## 2023-02-16 RX ORDER — PANTOPRAZOLE SODIUM 20 MG/1
TABLET, DELAYED RELEASE ORAL
COMMUNITY

## 2023-02-16 RX ORDER — TRIAMTERENE AND HYDROCHLOROTHIAZIDE 37.5; 25 MG/1; MG/1
TABLET ORAL
COMMUNITY
Start: 2017-05-15

## 2023-02-16 RX ORDER — POLYETHYLENE GLYCOL 3350, SODIUM CHLORIDE, SODIUM BICARBONATE, POTASSIUM CHLORIDE 420; 11.2; 5.72; 1.48 G/4L; G/4L; G/4L; G/4L
POWDER, FOR SOLUTION ORAL
COMMUNITY

## 2023-02-16 ASSESSMENT — ENCOUNTER SYMPTOMS
PHOTOPHOBIA: 0
TROUBLE SWALLOWING: 0
VOICE CHANGE: 0
EYE REDNESS: 0
SINUS PAIN: 0
NAUSEA: 0
COUGH: 0
EYE DISCHARGE: 0
EYE ITCHING: 0
BACK PAIN: 0
ABDOMINAL DISTENTION: 0
VOMITING: 0
DIARRHEA: 0
COLOR CHANGE: 0
STRIDOR: 0
CHEST TIGHTNESS: 0
EYE PAIN: 0

## 2023-02-16 NOTE — PROGRESS NOTES
Anibal Lisa (:  1960) is a 60 y.o. male,Established patient, here for evaluation of the following chief complaint(s):  Sarcoidosis and COPD         ASSESSMENT/PLAN:  1. Sarcoidosis  2. Pulmonary nodular amyloidosis ([facility] [address]  3. COPD with asthma ([facility] [address]  4. DOUG on CPAP    Pt with excellent symptomatic response to Anoro. Will continue this along with prn Albuterol. Reviewed CT results with pt, continue to monitor CT and PFT's. Note sent to PCP in re: pt request for shower chair. Encouraged nightly CPAP use. RTC 6 months  PFTs on return visit. Subjective   SUBJECTIVE/OBJECTIVE:  57 y.o. male with Sarcoidosis and Nodular pulmonary amyloidosis  Pt was previously followed by Dr. Amari Chatterjee for above issues, along with DOUG on CPAP. Pt has severe neuropathy of the upper and lower extremities rendering him unable to ambulate and is now wheelchair dependent. As for his respiratory issues, he complains of episodic shortness of breath and wheezing relieved by Albuterol and has not been admitted nor seen in ED for pulmonary problems. He was started on Anoro on a prior visit due to recurrent episodes of SOB and wheezing. Pt notes dramatic reduction in SOB and wheezing with Anoro. Has not used rescue inhaler in months. Pt was seen in the ED Dec 2022 for constipation, discharged home with diagnosis of cholelithiasis. 2023, pt had a robotic assisted cholecystectomy. In 2019, patient had a CT guided biopsy of a lung mass, results of which were consistent with nodular Amyloidosis. Review of Systems   Constitutional:  Negative for activity change, appetite change, chills, diaphoresis, fatigue, fever and unexpected weight change. HENT:  Positive for dental problem (required extraction of all mandibular teeth 2023). Negative for congestion, ear pain, hearing loss, postnasal drip, sinus pain, tinnitus, trouble swallowing and voice change.     Eyes:  Negative for photophobia, pain, discharge, redness, itching and visual disturbance. Respiratory:  Negative for cough, chest tightness and stridor. Apnea: on CPAP. Shortness of breath: resolved. Wheezing: rare. Cardiovascular:  Negative for chest pain, palpitations and leg swelling. Gastrointestinal:  Negative for abdominal distention, diarrhea, nausea and vomiting. Abdominal pain: resolved. Constipation: resolved. Endocrine: Negative for cold intolerance, heat intolerance, polydipsia, polyphagia and polyuria. Genitourinary:  Negative for difficulty urinating, flank pain, frequency and hematuria. Musculoskeletal:  Negative for arthralgias, back pain, myalgias, neck pain and neck stiffness. Gait problem: wheelchair use. Skin:  Negative for color change, pallor, rash and wound. Allergic/Immunologic: Negative for environmental allergies, food allergies and immunocompromised state. Neurological:  Positive for weakness. Negative for dizziness, seizures, speech difficulty, light-headedness and headaches. Hematological:  Does not bruise/bleed easily. Psychiatric/Behavioral:  Negative for agitation, confusion, hallucinations, sleep disturbance and suicidal ideas. The patient is not nervous/anxious.        Past Medical History:   Diagnosis Date    Essential hypertension     Essential hypertension, benign     Gout     Hypogonadism male     Impotence     Microscopic hematuria     Myopathy     Obesity, unspecified     Obstructive sleep apnea     on bipap    Other chronic pulmonary heart diseases     Peripheral neuropathy 10/25/2012    Sarcoid 10/25/2012    Sarcoidosis      Past Surgical History:   Procedure Laterality Date    APPENDECTOMY      CARDIAC CATHETERIZATION  2002?    negative per pt    CHOLECYSTECTOMY  01/25/2023    Robotic cholecystectomy    COLONOSCOPY N/A 04/21/2017    COLONOSCOPY with polypectomy performed by Viky Sandoval MD at [address] [place] [address] N/A 05/27/2022    COLONOSCOPY with polypectomies performed by Molly Slaughter MD at [address] [facility] ENDOSCOPY    CT NEEDLE BIOPSY LUNG PERCUTANEOUS  1/21/2019    CT NEEDLE BIOPSY LUNG PERCUTANEOUS 1/21/2019 [facility] [address]    HEMORRHOID SURGERY      UPPER GASTROINTESTINAL ENDOSCOPY  03/2017     Current Outpatient Medications on File Prior to Visit   Medication Sig Dispense Refill    amLODIPine (NORVASC) 10 MG tablet amlodipine 10 mg tablet   TAKE 1 TABLET BY MOUTH ONCE DAILY      aspirin 81 MG EC tablet aspirin 81 mg tablet,delayed release   TAKE 1 TABLET BY MOUTH ONCE DAILY FOR 90 DAYS      bosentan (TRACLEER) 125 MG tablet Tracleer 125 mg tablet      vitamin D (CHOLECALCIFEROL) 80077 UNIT CAPS cholecalciferol (vitamin D3) 1,250 mcg (50,000 unit) capsule   TAKE 1 CAPSULE BY MOUTH ONCE WEEKLY      diclofenac sodium (VOLTAREN) 1 % GEL diclofenac 1 % topical gel      docusate (COLACE, DULCOLAX) 100 MG CAPS docusate sodium 100 mg capsule   TAKE 1 CAPSULE BY MOUTH 2 TIMES A DAY      HYDROcodone-acetaminophen (NORCO) 5-325 MG per tablet hydrocodone 5 mg-acetaminophen 325 mg tablet      linaclotide (LINZESS) 290 MCG CAPS capsule Linzess 290 mcg capsule   TAKE 1 CAPSULE BY MOUTH DAILY, 30 MINUTES BEFORE BREAKFAST.      loratadine (CLARITIN) 10 MG tablet loratadine 10 mg tablet   TAKE 1 TABLET BY MOUTH DAILY      modafinil (PROVIGIL) 200 MG tablet modafinil 200 mg tablet   TAKE 1 TABLET BY MOUTH 2 TIMES A DAY      pantoprazole (PROTONIX) 20 MG tablet pantoprazole 20 mg tablet,delayed release      polyethylene glycol-electrolytes (NULYTELY) 420 g solution GaviLyte-N 420 gram oral solution      penicillin v potassium (VEETID) 500 MG tablet penicillin V potassium 500 mg tablet   TAKE 1 TABLET BY MOUTH EVERY SIX HOURS      raNITIdine (ZANTAC) 150 MG tablet ranitidine 150 mg tablet      silver sulfADIAZINE (SILVADENE) 1 % cream silver sulfadiazine 1 % topical cream      Testosterone (ANDROGEL) 20.25 MG/ACT (1.62%) GEL gel AndroGel 20.25 mg/1.25 gram per pump act.  (1.62 %) transdermal gel      triamterene-hydroCHLOROthiazide (MAXZIDE-25) 37.5-25 MG per tablet triamterene 37.5 mg-hydrochlorothiazide 25 mg tablet   TAKE 1 TABLET BY MOUTH ONCE DAILY      ANORO ELLIPTA 62.5-25 MCG/ACT inhaler INHALE 1 PUFF BY MOUTH ONCE DAILY      Avanafil (STENDRA) 200 MG TABS Stendra 200 mg tablet   TAKE 1/2 TABLET BY MOUTH AS NEEDED FOR OTHER (Patient not taking: Reported on 2/16/2023)      cephALEXin (KEFLEX) 500 MG capsule cephalexin 500 mg capsule (Patient not taking: Reported on 2/16/2023)      doxazosin (CARDURA) 1 MG tablet Take 1 mg by mouth daily (Patient not taking: Reported on 2/16/2023)      doxazosin (CARDURA) 8 MG tablet doxazosin 8 mg tablet (Patient not taking: Reported on 2/16/2023)      ferrous sulfate (FE TABS 325) 325 (65 Fe) MG EC tablet ferrous sulfate 325 mg (65 mg iron) tablet,delayed release   TAKE 1 TABLET BY MOUTH ONCE DAILY (Patient not taking: Reported on 2/16/2023)      indomethacin (INDOCIN) 50 MG capsule indomethacin 50 mg capsule   TAKE 1 CAPSULE BY MOUTH 2 TIMES A DAY (Patient not taking: Reported on 2/16/2023)      nitroGLYCERIN (NITROSTAT) 0.4 MG SL tablet Place 0.4 mg under the tongue (Patient not taking: Reported on 2/16/2023)      ondansetron (ZOFRAN-ODT) 4 MG disintegrating tablet ondansetron 4 mg disintegrating tablet   TAKE 1-2 TABLETS DISSOLVED UNDER THE TONGUE AS NEEDED BEFORE THE BOWEL PREP IF YOU GET NAUSEOUS (Patient not taking: Reported on 2/16/2023)      predniSONE (DELTASONE) 10 MG tablet Take 10 mg by mouth daily (Patient not taking: Reported on 2/16/2023)       No current facility-administered medications on file prior to visit.      No Known Allergies  Family History   Problem Relation Age of Onset    Hypertension Father     Lung Disease Father     Heart Attack Father 61    Colon Cancer Sister 62     Social History     Socioeconomic History    Marital status: Single     Spouse name: Not on file    Number of children: Not on file    Years of education: Not on file    Highest education level: Not on file Occupational History    Not on file   Tobacco Use    Smoking status: Never    Smokeless tobacco: Never   Substance and Sexual Activity    Alcohol use: Yes    Drug use: Never    Sexual activity: Not on file   Other Topics Concern    Not on file   Social History Narrative    Not on file     Social Determinants of Health     Financial Resource Strain: Not on file   Food Insecurity: Not on file   Transportation Needs: Not on file   Physical Activity: Not on file   Stress: Not on file   Social Connections: Not on file   Intimate Partner Violence: Not on file   Housing Stability: Not on file         Objective     Blood pressure 138/76, pulse 99, temperature 98.2 °F (36.8 °C), temperature source Temporal, resp. rate 18, height 5' 6\" (1.676 m), weight 182 lb (82.6 kg), SpO2 98 %. Physical Exam  Constitutional:       General: He is not in acute distress. Appearance: He is not ill-appearing, toxic-appearing or diaphoretic. HENT:      Head: Normocephalic and atraumatic. Right Ear: External ear normal.      Left Ear: External ear normal.      Nose: Nose normal. No congestion. Mouth/Throat:      Mouth: Mucous membranes are moist.      Pharynx: No oropharyngeal exudate. Eyes:      General: No scleral icterus. Right eye: No discharge. Left eye: No discharge. Conjunctiva/sclera: Conjunctivae normal.      Pupils: Pupils are equal, round, and reactive to light. Neck:      Vascular: No carotid bruit. Cardiovascular:      Rate and Rhythm: Normal rate and regular rhythm. Pulses: Normal pulses. Heart sounds: Normal heart sounds. No murmur heard. No gallop. Pulmonary:      Effort: Pulmonary effort is normal. No respiratory distress. Breath sounds: Normal breath sounds. No stridor. No wheezing, rhonchi or rales. Chest:      Chest wall: No tenderness. Abdominal:      Palpations: Abdomen is soft. There is no mass. Tenderness: There is no abdominal tenderness. Musculoskeletal:         General: No swelling, tenderness or signs of injury. Cervical back: No rigidity or tenderness. Right lower leg: No edema. Left lower leg: No edema. Lymphadenopathy:      Cervical: No cervical adenopathy. Skin:     General: Skin is warm and dry. Coloration: Skin is not jaundiced or pale. Findings: No bruising, erythema, lesion or rash. Neurological:      Mental Status: He is alert and oriented to person, place, and time. Gait: Gait abnormal (wheelchair use). Psychiatric:         Mood and Affect: Mood normal.         Behavior: Behavior normal.         Thought Content: Thought content normal.         Judgment: Judgment normal.        PFT 2/2/2022: severe obstruction FEV1 47% with reversible component, air trapping and reduced DLCO 44%. CT Result (most recent):  CT CHEST WO CONTRAST 01/06/2023    Narrative  EXAMINATION: CT chest without contrast    INDICATION: Pulmonary nodule, amyloidosis    COMPARISON: CT 12/20/2022    TECHNIQUE: CT of the chest without contrast. All CT scans at this facility are  performed using dose optimization technique as appropriate to a performed exam,  to include automated exposure control, adjustment of the mA and/or kV according  to patient size (including appropriate matching first site specific  examinations), or use of iterative reconstruction technique. FINDINGS:    Streak artifact from arms at side limits evaluation. Soft tissues and vessels  suboptimal evaluated without vascular enhancement. Cardiovascular: Pulmonary artery trunk 3.5 cm caliber. Thoracic aorta normal in  course and caliber. A few coronary calcifications. Heart size overall normal.    Mediastinum: Imaged thyroid unremarkable. No adenopathy by size criteria. Esophagus nondistended. Pleura: No pleural effusion. No pneumothorax. Lungs/airways: Mild centrilobular emphysema.  Predominantly lower lobe and  dependent reticular and groundglass densities. Additional likely streaky  atelectasis and scarring in the lingula and right middle lobe. Left upper lobe  nodule with some peripheral calcifications, measuring roughly 2.3 x 2.3 cm, and  additional anterior lingular nodule measuring 1.2 x 0.9 cm. Upper abdomen: Likely incompletely imaged left upper pole renal cyst.    Miscellaneous: Superficial soft tissues unremarkable. Bones: No acute osseous findings. Impression  Large left upper lobe nodule and smaller but sizable anterior lingula nodule,  not significantly change since prior. Mild centrilobular emphysema. Groundglass and reticular densities, likely  combination of atelectasis and mild scarring/fibrosis. Prominent pulmonary artery trunk could reflect pulmonary hypertension. See additional details above. No clearly acute findings. An electronic signature was used to authenticate this note.     --Brianna Bland MD

## 2023-02-16 NOTE — PROGRESS NOTES
Naveen Frank presents today for   Chief Complaint   Patient presents with    Sarcoidosis    Sleep Apnea    Follow-up       Is someone accompanying this pt? No    Is the patient using any DME equipment during OV? BIPAP    -[facility]:    1. Have you been to the ER, urgent care clinic since your last visit? Hospitalized since your last visit? 12/2022-Constipation    2. Have you seen or consulted any other health care providers outside of the [address] [place] [address] since your last visit? Include any pap smears or colon screening.  PCP

## 2023-03-03 RX ORDER — ALBUTEROL SULFATE 90 UG/1
AEROSOL, METERED RESPIRATORY (INHALATION)
Qty: 18 G | Refills: 4 | Status: SHIPPED | OUTPATIENT
Start: 2023-03-03

## 2023-04-26 ENCOUNTER — OFFICE VISIT (OUTPATIENT)
Age: 63
End: 2023-04-26
Payer: MEDICARE

## 2023-04-26 VITALS — TEMPERATURE: 98.6 F | WEIGHT: 182 LBS | BODY MASS INDEX: 29.25 KG/M2 | HEIGHT: 66 IN

## 2023-04-26 DIAGNOSIS — M71.22 POPLITEAL CYST, LEFT: ICD-10-CM

## 2023-04-26 DIAGNOSIS — G72.9 MYOPATHY: ICD-10-CM

## 2023-04-26 DIAGNOSIS — R26.2 CANNOT WALK: Primary | ICD-10-CM

## 2023-04-26 DIAGNOSIS — M62.81 CALF MUSCLE WEAKNESS: ICD-10-CM

## 2023-04-26 DIAGNOSIS — M25.662 DECREASED RANGE OF MOTION (ROM) OF LEFT KNEE: ICD-10-CM

## 2023-04-26 DIAGNOSIS — M79.662 PAIN OF LEFT CALF: ICD-10-CM

## 2023-04-26 PROCEDURE — 99213 OFFICE O/P EST LOW 20 MIN: CPT | Performed by: PHYSICIAN ASSISTANT

## 2023-04-26 NOTE — PROGRESS NOTES
changes. Musculoskeletal pain per HPI. Pain is exacerbated positionally. PHYSICAL EXAM:    Temp 98.6 °F (37 °C) (Temporal)     Constitutional: Appears well-developed and well-nourished. No distress. Sitting comfortably in the exam room, interacting with conversation with pleasant affect in wheelchair. . No focal neurological deficit noted. No facial droop, slurred speech, or evidence of altered mentation noted on exam.   Skin: Skin over the head, neck, bilateral limbs, and trunk is warm and dry. No rash or erythema noted. Cranial Nerves II-XII grossly intact  HENT: NC/AT. Normal symmetry, bulk and tone of facial and neck musculature. Trachea midline. No discernible thyromegaly or masses. No involuntary movements. Lymphatic: No preauricular, submandibuar, anterior or posterior cervical lymphadenopathy. Psychiatric: The patient is awake, alert, and oriented to person, place and time. Behavior is normal. Thought content normal.   Cardiovascular: No clubbing, cyanosis. No edema bilateral lower extremities. Pulmonary: No tripoding nor accessory muscle recruitment. Breathing normally, no distress, no audible wheezing. Distal cap refill intact at 2/2 Patrick UE / LE. Neuro intact Patrick UE/LE to noxious stimuli      Ortho Specific exam:    Patient examined in wheelchair with left lower extremity below the knee skin intact no warmth, erythema, edema, or ecchymosis. Trace effusion noted to the left knee. The lesions or masses nor abnormalities palpable from the proximal left lower leg below the knee to the ankle up. Pain on palpation deep to the popliteal space and lateral proximal tibia. Distal sensation and noxious stimuli noted left lower extremity. IMPRESSION:      ICD-10-CM    1. Cannot walk  R26.2       2. Myopathy  G72.9       3. Calf muscle weakness  M62.81       4. Decreased range of motion (ROM) of left knee  M25.662       5.   Popliteal cyst left    PLAN:  Today we discussed

## 2023-06-01 RX ORDER — ALBUTEROL SULFATE 2.5 MG/3ML
2.5 SOLUTION RESPIRATORY (INHALATION) EVERY 6 HOURS PRN
Qty: 120 EACH | Refills: 3 | Status: SHIPPED | OUTPATIENT
Start: 2023-06-01

## 2023-06-01 NOTE — TELEPHONE ENCOUNTER
Pt would like to refill his albuterol to be sent to [facility]. Pt stated that he is currently out. Please call [phone] for further information.

## 2023-07-03 ENCOUNTER — TELEPHONE (OUTPATIENT)
Age: 63
End: 2023-07-03

## 2023-07-03 NOTE — TELEPHONE ENCOUNTER
Pt would like refill on his anoro to be filled at [place]. Pt does not have any left.  For further information please call [phone]

## 2023-07-04 RX ORDER — UMECLIDINIUM BROMIDE AND VILANTEROL TRIFENATATE 62.5; 25 UG/1; UG/1
POWDER RESPIRATORY (INHALATION)
Qty: 1 EACH | Refills: 3 | Status: SHIPPED | OUTPATIENT
Start: 2023-07-04 | End: 2023-07-05 | Stop reason: SDUPTHER

## 2023-07-05 RX ORDER — UMECLIDINIUM BROMIDE AND VILANTEROL TRIFENATATE 62.5; 25 UG/1; UG/1
1 POWDER RESPIRATORY (INHALATION) DAILY
Qty: 1 EACH | Refills: 5 | Status: SHIPPED | OUTPATIENT
Start: 2023-07-05

## 2023-07-05 NOTE — TELEPHONE ENCOUNTER
Patient is requesting Rx refill, previous request was on 7.1.23. Pt last seen 2.16.23.   Pt stated he is completely out and requesting the Rx be called in so it can filled quickly      [facility] 62.5-25 MCG/ACT inhaler [[id number]]    Pharmacy:  [facility], [place], operated [facility]

## 2023-07-31 RX ORDER — ALBUTEROL SULFATE 90 UG/1
AEROSOL, METERED RESPIRATORY (INHALATION)
Qty: 6.7 G | Refills: 4 | Status: SHIPPED | OUTPATIENT
Start: 2023-07-31

## 2023-10-04 ENCOUNTER — OFFICE VISIT (OUTPATIENT)
Age: 63
End: 2023-10-04
Payer: MEDICARE

## 2023-10-04 VITALS
SYSTOLIC BLOOD PRESSURE: 128 MMHG | RESPIRATION RATE: 16 BRPM | BODY MASS INDEX: 27.8 KG/M2 | DIASTOLIC BLOOD PRESSURE: 76 MMHG | WEIGHT: 173 LBS | TEMPERATURE: 98.1 F | OXYGEN SATURATION: 97 % | HEART RATE: 95 BPM | HEIGHT: 66 IN

## 2023-10-04 DIAGNOSIS — J99 PULMONARY NODULAR AMYLOIDOSIS (HCC): ICD-10-CM

## 2023-10-04 DIAGNOSIS — J44.9 COPD, SEVERE (HCC): ICD-10-CM

## 2023-10-04 DIAGNOSIS — G47.33 OSA ON CPAP: ICD-10-CM

## 2023-10-04 DIAGNOSIS — E85.4 PULMONARY NODULAR AMYLOIDOSIS (HCC): ICD-10-CM

## 2023-10-04 DIAGNOSIS — D86.9 SARCOIDOSIS: Primary | ICD-10-CM

## 2023-10-04 PROCEDURE — 99214 OFFICE O/P EST MOD 30 MIN: CPT | Performed by: INTERNAL MEDICINE

## 2023-10-04 PROCEDURE — 3078F DIAST BP <80 MM HG: CPT | Performed by: INTERNAL MEDICINE

## 2023-10-04 PROCEDURE — 3074F SYST BP LT 130 MM HG: CPT | Performed by: INTERNAL MEDICINE

## 2023-10-04 RX ORDER — UMECLIDINIUM BROMIDE AND VILANTEROL TRIFENATATE 62.5; 25 UG/1; UG/1
1 POWDER RESPIRATORY (INHALATION) DAILY
Qty: 1 EACH | Refills: 5 | Status: SHIPPED | OUTPATIENT
Start: 2023-10-04

## 2023-10-04 RX ORDER — ALBUTEROL SULFATE 2.5 MG/3ML
2.5 SOLUTION RESPIRATORY (INHALATION) EVERY 6 HOURS PRN
Qty: 120 EACH | Refills: 5 | Status: SHIPPED | OUTPATIENT
Start: 2023-10-04

## 2023-10-04 RX ORDER — ALBUTEROL SULFATE 2.5 MG/3ML
SOLUTION RESPIRATORY (INHALATION)
COMMUNITY
Start: 2023-08-26 | End: 2023-10-04 | Stop reason: SDUPTHER

## 2023-10-04 RX ORDER — RANITIDINE 150 MG/1
TABLET ORAL
COMMUNITY

## 2023-10-04 RX ORDER — INDOMETHACIN 50 MG/1
1 CAPSULE ORAL 2 TIMES DAILY
COMMUNITY

## 2023-10-04 RX ORDER — PANTOPRAZOLE SODIUM 40 MG/1
40 TABLET, DELAYED RELEASE ORAL DAILY
COMMUNITY
Start: 2023-09-29

## 2023-10-04 RX ORDER — DOXAZOSIN 8 MG/1
1 TABLET ORAL NIGHTLY
COMMUNITY

## 2023-10-04 RX ORDER — AVANAFIL 200 MG/1
TABLET ORAL
COMMUNITY

## 2023-10-04 ASSESSMENT — ENCOUNTER SYMPTOMS
CHEST TIGHTNESS: 0
EYE REDNESS: 0
PHOTOPHOBIA: 0
VOMITING: 0
ABDOMINAL DISTENTION: 0
VOICE CHANGE: 0
TROUBLE SWALLOWING: 0
DIARRHEA: 0
STRIDOR: 0
COLOR CHANGE: 0
NAUSEA: 0
SINUS PAIN: 0
BACK PAIN: 0
EYE ITCHING: 0
EYE DISCHARGE: 0
COUGH: 0
EYE PAIN: 0

## 2023-10-04 NOTE — PROGRESS NOTES
Niranjan Henry (:  1960) is a 60 y.o. male,Established patient, here for evaluation of the following chief complaint(s):  Sarcoidosis and COPD         ASSESSMENT/PLAN:  1. Sarcoidosis  -     CT CHEST WO CONTRAST; Future  2. Pulmonary nodular amyloidosis ([address])  -     CT CHEST WO CONTRAST; Future  3. COPD, severe ([address])  4. DOUG on CPAP      Pt with excellent symptomatic response to Anoro. Will continue this along with prn Albuterol. Refills sent to local pharmacy  Reviewed CT results with pt, continue to monitor CT and PFT's. CT ordered for 2023  Encouraged nightly CPAP use. RTC 6 months  PFTs on return visit. Subjective   SUBJECTIVE/OBJECTIVE:  57 y.o. male with Sarcoidosis and Nodular pulmonary amyloidosis  Pt was previously followed by Dr. Kamaljit East for above issues, along with DOUG on CPAP. Pt has severe neuropathy of the upper and lower extremities rendering him unable to ambulate and is now wheelchair dependent. As for his respiratory issues, he complains of episodic shortness of breath and wheezing relieved by Albuterol and has not been admitted nor seen in ED for pulmonary problems. He was started on Anoro on a prior visit due to recurrent episodes of SOB and wheezing. Pt notes dramatic reduction in SOB and wheezing with Anoro. Has not used rescue inhaler in months. Pt was seen in the ED Dec 2022 for constipation, discharged home with diagnosis of cholelithiasis. 2023, pt had a robotic assisted cholecystectomy. In 2019, patient had a CT guided biopsy of a lung mass, results of which were consistent with nodular Amyloidosis. Review of Systems   Constitutional:  Negative for activity change, appetite change, chills, diaphoresis, fatigue, fever and unexpected weight change. HENT:  Positive for dental problem (required extraction of all mandibular teeth 2023).  Negative for congestion, ear pain, hearing loss, postnasal drip, sinus pain, tinnitus, trouble swallowing

## 2023-10-04 NOTE — PROGRESS NOTES
Deana Smith presents today for   Chief Complaint   Patient presents with    Follow-up     Sarcoidosis    Asthma    COPD    Sleep Apnea       Is someone accompanying this pt? No    Is the patient using any DME equipment during OV? BIPAP    -[employer]    Depression Screenin/2/2022     3:04 PM   PHQ-9 Questionaire   Little interest or pleasure in doing things 0   Feeling down, depressed, or hopeless 0   PHQ-9 Total Score 0       Learning Needs Questionnaire:     Who is the primary learner? Patient    What is the preferred language for health care of the primary learner? ENGLISH    How does the primary learner prefer to learn new concepts? DEMONSTRATION    Answered By Patient    Relationship to Learner SELF          Fall Risk:          No data to display                 Abuse Screening:          No data to display                  Coordination of Care:    1. Have you been to the ER, urgent care clinic since your last visit? Hospitalized since your last visit? No    2. Have you seen or consulted any other health care providers outside of the [address] [place] [address] since your last visit? Include any pap smears or colon screening. PCP    Medication list has been update per patient.

## 2023-12-28 RX ORDER — ALBUTEROL SULFATE 90 UG/1
AEROSOL, METERED RESPIRATORY (INHALATION)
Qty: 6.7 G | Refills: 4 | Status: SHIPPED | OUTPATIENT
Start: 2023-12-28

## 2024-01-11 ENCOUNTER — TELEPHONE (OUTPATIENT)
Age: 64
End: 2024-01-11

## 2024-01-17 NOTE — PROGRESS NOTES
[facility] Hospitalist Preoperative Risk Assessment Note         Planned Procedure: LAPAROSCOPIC LOW ANTERIOR RESECTION and FLEXIBLE SIGMOIDOSCOPY           Date of Procedure: 1/22/24    Surgeon: Dr Heath Leblanc    PCP: Cyndie Ortiz MD    History of present illness:     64 y.o. female with PMH of Recurrent diverticulitis, HTN, CVA, DVT legs,HO polycystic kidney disease, hereditary hemochromatosis, insomnia, polyarticular arthritis, anemia, anxiety, infrarenal abdominal aortic aneurysm and COPD that comes in due to preoperative evaluation. Pt has been diagnosed with recurrent diverticulitis. This has been happening for a few years.Pt was seen by colorectal surgery and was recommended to have this procedure above described.     Primary care physician note completed 1/8/24 refers to clearance for operation. Pt had abnormal stress test in 2019 and ever since has seen cardiologist for preop evaluation for bronchoscopy on 8/18/23. Note states: \"Elevated coronary calcium score and abnormal stress test - no current sxs. Discussed in detail prior. Given no anginal sxs, we are purusuing conservative strategy.-conitnue ASA, statin\".     Pt currently denies any chest pain, SOB, lightheadedness upon rest or exertion. Pt is able to complete 4 METS in her activities of daily living.       Past medical history:  Past Medical History:   Diagnosis Date    AAA (abdominal aortic aneurysm) (CMD)     3.3 cm per 7/20/23 CT    Anxiety     Arthritis     Cerebral infarction (CMD)     COPD (chronic obstructive pulmonary disease) (CMD)     Diverticulitis of large intestine     DVT (deep venous thrombosis) (CMD)     arm, approx 2004    Gout     Hereditary hemochromatosis (CMD)     High cholesterol     Kidney stones     Mass of left lung     Mass of left lung     Polycystic kidney disease     PONV (postoperative nausea and vomiting)     Urinary tract infection        Past surgical history:  Past Surgical History:   Procedure Laterality Date     Transthoracic echo complete. Report to follow. Enlarge breast  1991    Fine needle aspiration      kidney cyst    Ligation /strip long saphen Right     Tonsillectomy         Family history:  HTN in several family members    Social history:  Social History     Tobacco Use    Smoking status: Former     Types: Cigarettes    Smokeless tobacco: Never   Vaping Use    Vaping Use: never used   Substance Use Topics    Alcohol use: Yes     Alcohol/week: 14.0 standard drinks of alcohol     Types: 14 Glasses of wine per week    Drug use: Never       Alcohol  Details: Alcohol Abuse in Household: No.  Use: None.  Substance Abuse  Details: Substance Abuse in Household: No.  Use: None.  Tobacco  Details: Smoker in [place]: No.  Smoked/Smokeless Tobacco Last 30 Days: No.  Smoking Tobacco Use: Never smoker.  Smokeless Tobacco Use Never.    Home Medications  Prior to Admission medications    Medication Sig Start Date End Date Taking? Authorizing Provider   Eszopiclone 3 MG tablet Take 3 mg by mouth at bedtime as needed for Sleep.    Provider, Outside   allopurinol (ZYLOPRIM) 300 MG tablet Take 300 mg by mouth daily.    Provider, Outside   colchicine (COLCRYS) 0.6 MG tablet Take 0.6 mg by mouth daily as needed.    Provider, Outside   atorvastatin (LIPITOR) 40 MG tablet Take 40 mg by mouth every evening.    Provider, Outside   irbesartan-hydrochlorothiazide (AVALIDE) 150-12.5 MG per tablet Take 1 tablet by mouth daily.    Provider, Outside   metoPROLOL tartrate (LOPRESSOR) 25 MG tablet Take 37.5 mg by mouth in the morning and 37.5 mg in the evening.    Provider, Outside   Umeclidinium-Vilanterol (ANORO ELLIPTA IN) Inhale into the lungs daily.    Provider, Outside       Allergies  ALLERGIES:   Allergen Reactions    Iodine Other (See Comments)     Iodine mumps    Sulfa Antibiotics PRURITUS         Review of Systems:  Review of Systems   Constitutional:  Negative for chills and fever.   HENT:  Negative for congestion, rhinorrhea and sore throat.    Eyes:  Negative for discharge and  visual disturbance.   Respiratory:  Negative for cough, shortness of breath and wheezing.    Cardiovascular:  Negative for chest pain and palpitations.   Gastrointestinal:  Negative for abdominal pain, diarrhea and nausea.   Endocrine: Negative for polydipsia, polyphagia and polyuria.   Genitourinary:  Negative for dysuria, frequency and hematuria.   Musculoskeletal:  Negative for back pain, joint swelling and myalgias.   Skin:  Negative for pallor and rash.   Neurological:  Negative for facial asymmetry, weakness and numbness.   Psychiatric/Behavioral:  Negative for agitation, confusion, hallucinations and suicidal ideas.        No complications with previous surgeries such as:  No anesthetic complications except for nasuea    No wound healing issues or wound infections     No unexpected postoperative bleeding, need for blood transfusions    No postoperative delirium, No history of urine retention      Code Status:  Full code    Physical Exam:  Blood pressure 134/72, pulse (!) 58, temperature 97.9 °F (36.6 °C), resp. rate 16, height 5' 4.96\" (1.65 m), weight 60.3 kg (132 lb 15 oz), SpO2 98 %.    Physical Exam  Constitutional:       Appearance: Normal appearance.   HENT:      Head: Normocephalic and atraumatic.      Mouth/Throat:      Mouth: Mucous membranes are moist.      Neck: Normal range of motion.   Eyes:      Extraocular Movements: Extraocular movements intact.      Pupils: Pupils are equal, round, and reactive to light.   Cardiovascular:      Rate and Rhythm: Regular rhythm.      Heart sounds: No murmur heard.     No friction rub.   Pulmonary:      Breath sounds: Normal breath sounds.   Abdominal:      General: Bowel sounds are normal.      Palpations: Abdomen is soft.      Tenderness: There is no abdominal tenderness.   Musculoskeletal:         General: No swelling or tenderness. Normal range of motion.   Skin:     General: Skin is warm and dry.   Neurological:      General: No focal deficit present.       Mental Status: She is alert.   Psychiatric:         Mood and Affect: Mood normal.           Laboratory:    Order: [id number]  Component  Ref Range & Units 9 d ago   Color  Straw, Yellow, Dark Yellow, Tomasa Straw   Clarity  Clear, Slightly Cloudy Clear   Glucose  Negative Negative   Bilirubin  Negative Negative   Ketones  Negative Negative   Specific Gravity  1.005 - 1.030 1.011   Blood  Negative 1+ Abnormal    pH  5.0 - 8.0 6.0   Protein  Negative 2+ Abnormal    Urobilinogen  <2.0 mg/dL <2.0   Nitrite  Negative Negative   Leukocytes  Negative Negative   RBC  None, 0-2 0-2   WBC  None, 0-2, 2-5 0-2   Bacteria  None None   Squam Epithel  1+, None 1+   Mucus  None - 4+ 1+   Resulting Agency [facility]     Specimen Collected: 01/08/24 15:38    Performed by: [facility] Last Resulted: 01/08/24 18:08   Received From: [facility]  Result Received: 01/15/24 09:59     Order: [id number]  Component  Ref Range & Units 9 d ago   Total Protein  5.7 - 8.2 g/dL 6.8   Albumin  3.3 - 5.2 g/dL 3.9   Globulin  1.9 - 3.5 g/dL 2.9   Comment: Please note new reference range.   A/G Ratio  1.0 - 2.6 1.3   Total Bilirubin  0.1 - 1.2 mg/dL 1.0   Alkaline Phosphatase  46 - 116 U/L 123 High    AST  13 - 40 U/L 28   ALT  10 - 49 U/L 18   BUN  9 - 23 mg/dL 27 High    Creatinine  0.40 - 1.20 mg/dL 1.29 High    BUN/Creatinine Ratio  10.0 - 20.0 20.9 High    Calcium  8.7 - 10.4 mg/dL 9.5   Glucose  70 - 99 mg/dL 85   Sodium  136 - 145 mmol/L 138   Potassium  3.5 - 5.1 mmol/L 4.6   Chloride  98 - 107 mmol/L 108 High    CO2  21 - 32 mmol/L 26   Anion Gap  5 - 15 4 Low    GFR  >60 mL/min/1.73 sq.m 46 Low    Comment: Calculation based on the Chronic Kidney Disease Epidemiology Collaboration (CKD-EPI) equation refit without adjustment for race.   Resulting Agency [facility]     Specimen Collected: 01/08/24 14:23    Performed by: [facility] Last Resulted: 01/08/24 18:11   Received From:  [facility]  Result Received: 01/15/24       CBC with Differential  Order: [id number]  Component  Ref Range & Units 9 d ago   Auto WBC  3.9 - 11.0 10*3 /uL 11.0   NRBCs Absolute Count  <=0.01 10*3 /uL 0.00   NRBC Relative percent  <=0 /100 WBC 0   RBC  3.85 - 5.12 10*6 /uL 3.82 Low    Hemoglobin  11.3 - 15.2 g/dL 12.3   Hematocrit  35.0 - 44.8 % 38.3   MCV  79.9 - 98.4 fL 100.3 High    MCH  26.9 - 32.2 pg 32.2   MCHC  30.9 - 35.4 g/dL 32.1   RDW  11.2 - 15.9 % 14.5   Platelets  131 - 340 10*3/uL 198   MPV  9.1 - 12.5 fL 10.1   Neutrophils Relative  % 76.2   Immature Granulocytes Relative  % 0.5   Lymphocytes Relative  % 13.9   Monocytes Relative  % 8.0   Eosinophils Relative  % 0.9   Basophils Relative  % 0.5   Neutrophils Absolute  1.6 - 6.4 10*3/uL 8.4 High    Immature Granulocytes Absolute  0.0 - 0.1 10*3/uL 0.1   Lymphocytes Absolute  0.6 - 3.3 10*3/uL 1.5   Monocytes Absolute  0.2 - 1.2 10*3/uL 0.9   Eosinophils Absolute  0.0 - 0.4 10*3/uL 0.1   Basophils Absolute  0.0 - 0.1 10*3/uL 0.1   Resulting Agency [facility]     Specimen Collected: 01/08/24 14:23    Performed by: [facility] Last Resulted: 01/08/24 17:58   Received From: [facility]  Result Received: 01/15/24 09:59           Additional Screening tools:    Revised Cardiac Risk Index    [ ] 0 pt      3.9% risk  [ *] 1 pt      6.0% risk  [ ] 2 pt      10.1% risk  [ ] 3 or greater  15.0% risk      Based on the above:    [*] No further testing is indicated   [ ] Further testing is indicated:_________.  [ ] An elevated level of care such as telemetry is warranted      Postoperative Respiratory Failure:    This patient is NOT at increased risk for postoperative respiratory failure using the [place] Postoperative Respiratory Failure Risk Calculator.      Perioperative DVT Risk (Caprini Tool)     This patient has been recognized to be high risk for DVT based on the Caprini DVT risk screening tool.   Mechanical prophylaxis will be instituted perioperatively and chemoprophylaxis is recommended once risk of bleeding/hematoma formation diminishes according to the operating surgeon      Diabetes (Type 1 and type 2)    N/A       Obstructive sleep apnea (DESTINY)    Based on the STOP BANG screening questionnaire, this patient is recognized as being at low risk for DESTINY.    Alcohol Use Risk    The patient does not consume a significant quantity of alcohol and I do not anticipate perioperative withdrawal issues or cardiotoxicity/irritability related to alcohol.      Pain management:    The patient does not take preoperative opioids and I do not anticipate significant postoperative pain control issues.        Smoking Risk Screening/Management    N/A      Dementia/Delirium Risk: Mini-cog 5/5    Other risk algorithms:     PONV score = 39 % 24-hour risk of PONV    Odom Fall risk score =  15      Assessment:    1- Recurrent Diverticulitis  2- Elevated Creatinine/HO Polycystic kidney disease  3- HO previous DVT  4-PONV      Plan:       HPI, physical exam, labs and imaging were reviewed with pt at bedside. Pt is medically optimized pending final recs from cardiology.     Cr 1.29 today previous Cr 1.36 -2.25. Previous imaging of abdomen shows bilateral renal cysts and HO polycystic renal disease listed. Recommend against NSAIDS post op. Will need to follow as outpt with nephrologist    High risk for DVT in light of previous DVT     Appreciate recs from anesthesia in order to tailor medication in light of PONV.     Whit Grace MD  [facility]   1/17/20248:18 AM

## 2024-04-29 ENCOUNTER — OFFICE VISIT (OUTPATIENT)
Age: 64
End: 2024-04-29
Payer: MEDICAID

## 2024-04-29 VITALS — TEMPERATURE: 97 F | BODY MASS INDEX: 26.52 KG/M2 | WEIGHT: 165 LBS | HEIGHT: 66 IN

## 2024-04-29 DIAGNOSIS — M85.861 OSTEOPENIA OF RIGHT LOWER LEG: ICD-10-CM

## 2024-04-29 DIAGNOSIS — G89.29 CHRONIC PAIN OF RIGHT KNEE: ICD-10-CM

## 2024-04-29 DIAGNOSIS — S80.01XA CONTUSION OF RIGHT KNEE, INITIAL ENCOUNTER: ICD-10-CM

## 2024-04-29 DIAGNOSIS — M17.11 UNILATERAL PRIMARY OSTEOARTHRITIS, RIGHT KNEE: Primary | ICD-10-CM

## 2024-04-29 DIAGNOSIS — M25.561 CHRONIC PAIN OF RIGHT KNEE: ICD-10-CM

## 2024-04-29 PROCEDURE — 73562 X-RAY EXAM OF KNEE 3: CPT | Performed by: SPECIALIST

## 2024-04-29 PROCEDURE — 99213 OFFICE O/P EST LOW 20 MIN: CPT | Performed by: SPECIALIST

## 2024-04-29 PROCEDURE — 20610 DRAIN/INJ JOINT/BURSA W/O US: CPT | Performed by: SPECIALIST

## 2024-04-29 RX ORDER — BUPIVACAINE HYDROCHLORIDE 5 MG/ML
4 INJECTION, SOLUTION PERINEURAL ONCE
Status: COMPLETED | OUTPATIENT
Start: 2024-04-29 | End: 2024-04-29

## 2024-04-29 RX ORDER — BETAMETHASONE SODIUM PHOSPHATE AND BETAMETHASONE ACETATE 3; 3 MG/ML; MG/ML
3 INJECTION, SUSPENSION INTRA-ARTICULAR; INTRALESIONAL; INTRAMUSCULAR; SOFT TISSUE ONCE
Status: COMPLETED | OUTPATIENT
Start: 2024-04-29 | End: 2024-04-29

## 2024-04-29 RX ADMIN — BUPIVACAINE HYDROCHLORIDE 20 MG: 5 INJECTION, SOLUTION PERINEURAL at 14:26

## 2024-04-29 RX ADMIN — BETAMETHASONE SODIUM PHOSPHATE AND BETAMETHASONE ACETATE 3 MG: 3; 3 INJECTION, SUSPENSION INTRA-ARTICULAR; INTRALESIONAL; INTRAMUSCULAR; SOFT TISSUE at 14:23

## 2024-04-29 NOTE — PROGRESS NOTES
Patient: Farhad Rogers                MRN: [id number]       SSN: [id number]  YOB: 1960        AGE: 63 y.o.        SEX: male      PCP: Teo Tong MD  04/29/24    Chief Complaint   Patient presents with    Knee Pain     RIGHT KNEE      HISTORY:  Farhad Rogers is a 63 y.o. male seen for few year history of right knee pain.  He denies any recent injury. He does not ambulate. He feels constant right knee pain. He struck his right knee with his car door recently.     He was recently seen by ROGER Krause for left calf pain.  Left tib-fib x-rays were negative for anything acute.    He was previously seen for left shoulder pain.  He is right handed.        He was diagnosed with autoimmune myelopathy and sarcodosis by Dr. Salinas. He believes prednisone caused his muscle weakness. His myelopathy affects his right side more than his left. He is unable to raise his right arm very high. He is in pain management  with Dr. Haji. He takes Norco for his pain.     Occupation, etc: Mr. Rogers previously worked as a [employer].  He is now on disability.  He lives in [place] with his [facility]. He has a 51 yo son. He has 6 grandchildren and 2 great grandchildren.  He is in a wheelchair since 1998 because of myelopathy. He is paraplegic--unable to walk or stand. He is denies any back pain or sores from being in a wheelchair. He is self-suffcient.  He enjoys watching old [employer] movies. Mr. Rogers weighs  165 lbs and is 5'6\" tall. He does upper body exercises. He is currently taking Aspirin.  Wt Readings from Last 3 Encounters:   04/29/24 74.8 kg (165 lb)   10/04/23 78.5 kg (173 lb)   08/11/23 81.6 kg (180 lb)      Body mass index is 26.63 kg/m².    Patient Active Problem List   Diagnosis    Myopathy    Peripheral neuropathy    Asthma    Sarcoid    Pulmonary artery hypertension (HCC)    GI bleed    HTN (hypertension)    Shortness of breath    Iron deficiency anemia    Hypogonadism in male

## 2024-05-28 RX ORDER — ALBUTEROL SULFATE 90 UG/1
AEROSOL, METERED RESPIRATORY (INHALATION)
Qty: 8.5 G | Refills: 4 | Status: SHIPPED | OUTPATIENT
Start: 2024-05-28

## 2024-07-29 RX ORDER — UMECLIDINIUM BROMIDE AND VILANTEROL TRIFENATATE 62.5; 25 UG/1; UG/1
1 POWDER RESPIRATORY (INHALATION) DAILY
Qty: 1 EACH | Refills: 5 | Status: SHIPPED | OUTPATIENT
Start: 2024-07-29

## 2024-07-29 RX ORDER — UMECLIDINIUM BROMIDE AND VILANTEROL TRIFENATATE 62.5; 25 UG/1; UG/1
1 POWDER RESPIRATORY (INHALATION) DAILY
Refills: 5 | OUTPATIENT
Start: 2024-07-29

## 2024-07-29 NOTE — TELEPHONE ENCOUNTER
Spoke with pharmacist who stated that the rx for Anoro  on 24 and a new rx is needed to fill this for pt. Informed pt of new rx and to call pharmacy for pickup. Refill pended. Please Advise.

## 2024-07-29 NOTE — TELEPHONE ENCOUNTER
Pt called in stating [facility] told him his rx auth has ran out on 7/4/24 for his anero inhaler. Pt states he has refills, just no auth. Pls advise pt states his last dose was Thursday am. Pls call [phone]

## 2024-09-29 NOTE — OP NOTES
[address] [place] [address]   OPERATIVE REPORT    Name:  Deidra Hawley  MR#:   [id number]  :  1960  ACCOUNT #:  [de-identified]  DATE OF SERVICE:  2023    PREOPERATIVE DIAGNOSIS:  Chronic cholecystitis. POSTOPERATIVE DIAGNOSIS:  Chronic cholecystitis. PROCEDURE PERFORMED:  Robotic cholecystectomy. SURGEON:  Tasha Do. Melissa Morocho MD.    ASSISTANTEricka Calero CSA. ANESTHESIA:  General.    COMPLICATIONS:  None. SPECIMENS REMOVED:  Gallbladder. IMPLANTS:  None. ESTIMATED BLOOD LOSS:  Minimal.    DETAILS OF PROCEDURE:  The patient was brought to the operating room. Anesthesia was induced. Scrubbing and draping of the abdomen were done in the usual manner. A time-out was performed. A skin incision in the supraumbilical area was performed. Veress needle was inserted. Saline drop test was performed. Abdomen was insufflated. A 12 mm port was inserted. Abdomen was explored. There was no injury from the Veress needle or port placement. Under direct visualization, two other 8 mm ports were placed on the right and left sides of the abdominal wall. At this point, TAP block was performed bilaterally. So, a total of four ports were placed, one in the supraumbilical area, two on the right side, and one on the left side. The patient was placed in reverse Trendelenburg position and the robot was docked. The gallbladder was very distended, but not inflamed or thickened. It was retracted superiorly. The cystic duct and cystic artery were identified. The Firefly was used to identify the junction of the cystic duct with the common bile duct as well as the cystic duct with the gallbladder itself. At this point, we dissected the Calot triangle. We dissected the cystic duct and the cystic artery. The critical view was seen by seeing the cystic duct going into the gallbladder, and again, we used the Firefly to identify the anatomy.   We clipped the cystic duct with three clips on the staying side and one clip toward the gallbladder side and this was divided with scissors. Then, we clipped the artery proximally with one clip and cauterized it distally with hook cautery and we took the gallbladder out of the liver bed using electrocautery. Hemostasis was secured. An Endo Catch was inserted and the gallbladder was taken out through the 12 mm incision. At this point, I did scrub back in and we felt the fascia that should be closed of the 12 mm incision, which we did close it with #0 Vicryl sutures. Then, we re-insufflated the abdomen and we explored the abdomen. There was no bowel entrapment at the closure site and there was good hemostasis. So, at this point, the abdomen was desufflated and the skin incisions were closed with 4-0 Monocryl and glue.       Jorge A Navarro MD      [id number]  D:  01/25/2023 8:13  T:  01/25/2023 14:27  JOB #:  [id number] No

## 2024-10-28 RX ORDER — ALBUTEROL SULFATE 90 UG/1
INHALANT RESPIRATORY (INHALATION)
Qty: 8.5 G | Refills: 3 | Status: SHIPPED | OUTPATIENT
Start: 2024-10-28

## 2025-01-13 ENCOUNTER — APPOINTMENT (OUTPATIENT)
Facility: HOSPITAL | Age: 65
End: 2025-01-13
Payer: MEDICAID

## 2025-01-13 ENCOUNTER — HOSPITAL ENCOUNTER (EMERGENCY)
Facility: HOSPITAL | Age: 65
Discharge: HOME | End: 2025-01-13
Attending: STUDENT IN AN ORGANIZED HEALTH CARE EDUCATION/TRAINING PROGRAM
Payer: MEDICAID

## 2025-01-13 VITALS
BODY MASS INDEX: 26.52 KG/M2 | DIASTOLIC BLOOD PRESSURE: 57 MMHG | HEART RATE: 69 BPM | SYSTOLIC BLOOD PRESSURE: 101 MMHG | TEMPERATURE: 93.5 F | RESPIRATION RATE: 16 BRPM | HEIGHT: 66 IN | WEIGHT: 165 LBS | OXYGEN SATURATION: 100 %

## 2025-01-13 DIAGNOSIS — R10.32 LEFT LOWER QUADRANT ABDOMINAL PAIN: Primary | ICD-10-CM

## 2025-01-13 DIAGNOSIS — D64.9 NORMOCYTIC ANEMIA: ICD-10-CM

## 2025-01-13 DIAGNOSIS — G72.9 MYOPATHY: ICD-10-CM

## 2025-01-13 DIAGNOSIS — T68.XXXA HYPOTHERMIA, INITIAL ENCOUNTER: ICD-10-CM

## 2025-01-13 DIAGNOSIS — K59.00 CONSTIPATION, UNSPECIFIED CONSTIPATION TYPE: ICD-10-CM

## 2025-01-13 DIAGNOSIS — R74.8 ELEVATED LIVER ENZYMES: ICD-10-CM

## 2025-01-13 LAB
ALBUMIN SERPL-MCNC: 3.5 G/DL (ref 3.4–5)
ALBUMIN/GLOB SERPL: 1 (ref 0.8–1.7)
ALP SERPL-CCNC: 90 U/L (ref 45–117)
ALT SERPL-CCNC: 89 U/L (ref 16–61)
ANION GAP SERPL CALC-SCNC: 5 MMOL/L (ref 3–18)
AST SERPL-CCNC: 104 U/L (ref 10–38)
BASOPHILS # BLD: 0.01 K/UL (ref 0–0.1)
BASOPHILS NFR BLD: 0.2 % (ref 0–2)
BILIRUB DIRECT SERPL-MCNC: 0.2 MG/DL (ref 0–0.2)
BILIRUB SERPL-MCNC: 0.8 MG/DL (ref 0.2–1)
BUN SERPL-MCNC: 24 MG/DL (ref 7–18)
BUN/CREAT SERPL: 36 (ref 12–20)
CALCIUM SERPL-MCNC: 9.4 MG/DL (ref 8.5–10.1)
CHLORIDE SERPL-SCNC: 103 MMOL/L (ref 100–111)
CO2 SERPL-SCNC: 36 MMOL/L (ref 21–32)
CREAT SERPL-MCNC: 0.66 MG/DL (ref 0.6–1.3)
DIFFERENTIAL METHOD BLD: ABNORMAL
EOSINOPHIL # BLD: 0.01 K/UL (ref 0–0.4)
EOSINOPHIL NFR BLD: 0.2 % (ref 0–5)
ERYTHROCYTE [DISTWIDTH] IN BLOOD BY AUTOMATED COUNT: 17 % (ref 11.6–14.5)
GLOBULIN SER CALC-MCNC: 3.4 G/DL (ref 2–4)
GLUCOSE SERPL-MCNC: 106 MG/DL (ref 74–99)
HCT VFR BLD AUTO: 38.4 % (ref 36–48)
HGB BLD-MCNC: 12.3 G/DL (ref 13–16)
IMM GRANULOCYTES # BLD AUTO: 0 K/UL (ref 0–0.04)
IMM GRANULOCYTES NFR BLD AUTO: 0 % (ref 0–0.5)
LIPASE SERPL-CCNC: 60 U/L (ref 13–75)
LYMPHOCYTES # BLD: 0.61 K/UL (ref 0.9–3.6)
LYMPHOCYTES NFR BLD: 11.4 % (ref 21–52)
MAGNESIUM SERPL-MCNC: 2.6 MG/DL (ref 1.6–2.6)
MCH RBC QN AUTO: 28.3 PG (ref 24–34)
MCHC RBC AUTO-ENTMCNC: 32 G/DL (ref 31–37)
MCV RBC AUTO: 88.3 FL (ref 78–100)
MONOCYTES # BLD: 0.36 K/UL (ref 0.05–1.2)
MONOCYTES NFR BLD: 6.7 % (ref 3–10)
NEUTS SEG # BLD: 4.37 K/UL (ref 1.8–8)
NEUTS SEG NFR BLD: 81.5 % (ref 40–73)
NRBC # BLD: 0 K/UL (ref 0–0.01)
NRBC BLD-RTO: 0 PER 100 WBC
PLATELET # BLD AUTO: 184 K/UL (ref 135–420)
PMV BLD AUTO: 11 FL (ref 9.2–11.8)
POTASSIUM SERPL-SCNC: 4.1 MMOL/L (ref 3.5–5.5)
PROT SERPL-MCNC: 6.9 G/DL (ref 6.4–8.2)
RBC # BLD AUTO: 4.35 M/UL (ref 4.35–5.65)
SODIUM SERPL-SCNC: 144 MMOL/L (ref 136–145)
T4 FREE SERPL-MCNC: 0.9 NG/DL (ref 0.7–1.5)
TSH SERPL DL<=0.05 MIU/L-ACNC: 1.84 UIU/ML (ref 0.36–3.74)
WBC # BLD AUTO: 5.4 K/UL (ref 4.6–13.2)

## 2025-01-13 PROCEDURE — 74177 CT ABD & PELVIS W/CONTRAST: CPT

## 2025-01-13 PROCEDURE — 96361 HYDRATE IV INFUSION ADD-ON: CPT

## 2025-01-13 PROCEDURE — 80076 HEPATIC FUNCTION PANEL: CPT

## 2025-01-13 PROCEDURE — 85025 COMPLETE CBC W/AUTO DIFF WBC: CPT

## 2025-01-13 PROCEDURE — 83735 ASSAY OF MAGNESIUM: CPT

## 2025-01-13 PROCEDURE — 96360 HYDRATION IV INFUSION INIT: CPT

## 2025-01-13 PROCEDURE — 84439 ASSAY OF FREE THYROXINE: CPT

## 2025-01-13 PROCEDURE — 99285 EMERGENCY DEPT VISIT HI MDM: CPT

## 2025-01-13 PROCEDURE — 84443 ASSAY THYROID STIM HORMONE: CPT

## 2025-01-13 PROCEDURE — 6360000004 HC RX CONTRAST MEDICATION: Performed by: STUDENT IN AN ORGANIZED HEALTH CARE EDUCATION/TRAINING PROGRAM

## 2025-01-13 PROCEDURE — 80048 BASIC METABOLIC PNL TOTAL CA: CPT

## 2025-01-13 PROCEDURE — 2580000003 HC RX 258: Performed by: STUDENT IN AN ORGANIZED HEALTH CARE EDUCATION/TRAINING PROGRAM

## 2025-01-13 PROCEDURE — 6370000000 HC RX 637 (ALT 250 FOR IP): Performed by: STUDENT IN AN ORGANIZED HEALTH CARE EDUCATION/TRAINING PROGRAM

## 2025-01-13 PROCEDURE — 83690 ASSAY OF LIPASE: CPT

## 2025-01-13 RX ORDER — 0.9 % SODIUM CHLORIDE 0.9 %
1000 INTRAVENOUS SOLUTION INTRAVENOUS ONCE
Status: COMPLETED | OUTPATIENT
Start: 2025-01-13 | End: 2025-01-13

## 2025-01-13 RX ORDER — IOPAMIDOL 612 MG/ML
100 INJECTION, SOLUTION INTRAVASCULAR
Status: COMPLETED | OUTPATIENT
Start: 2025-01-13 | End: 2025-01-13

## 2025-01-13 RX ORDER — POLYETHYLENE GLYCOL 3350 17 G/17G
17 POWDER, FOR SOLUTION ORAL DAILY
Qty: 510 G | Refills: 0 | Status: SHIPPED | OUTPATIENT
Start: 2025-01-13 | End: 2025-02-12

## 2025-01-13 RX ADMIN — SODIUM CHLORIDE 1000 ML: 9 INJECTION, SOLUTION INTRAVENOUS at 05:19

## 2025-01-13 RX ADMIN — MINERAL OIL 1 ENEMA: 100 ENEMA RECTAL at 06:32

## 2025-01-13 RX ADMIN — IOPAMIDOL 100 ML: 612 INJECTION, SOLUTION INTRAVENOUS at 05:04

## 2025-01-13 RX ADMIN — SODIUM CHLORIDE 1000 ML: 9 INJECTION, SOLUTION INTRAVENOUS at 09:07

## 2025-01-13 ASSESSMENT — PAIN - FUNCTIONAL ASSESSMENT: PAIN_FUNCTIONAL_ASSESSMENT: 0-10

## 2025-01-13 ASSESSMENT — PAIN DESCRIPTION - DESCRIPTORS: DESCRIPTORS: CRAMPING

## 2025-01-13 ASSESSMENT — PAIN SCALES - GENERAL: PAINLEVEL_OUTOF10: 5

## 2025-01-13 ASSESSMENT — PAIN DESCRIPTION - LOCATION: LOCATION: ABDOMEN

## 2025-01-13 ASSESSMENT — PAIN DESCRIPTION - ORIENTATION: ORIENTATION: LOWER

## 2025-01-13 NOTE — ED TRIAGE NOTES
Patient presents with lower abdominal cramping and constipation x4 days.     Rectal temp attempted x3. 91.3F / 91.4 F / 91.4F. Informed Dr. Back. Warm blankets placed on patient while locating [facility] [employer]

## 2025-01-13 NOTE — ED NOTES
Patient resting comfortably on stretcher, repositioned. Patient provided with an update on plan of care, verbalized understanding.

## 2025-01-13 NOTE — ED NOTES
I have reviewed discharge instruction and prescriptions with pt.  Pt verbalized understanding and has no further questions at this time.  Education taught and patient verbalized understanding of education.  Teach back method used.     20G IV removed, catheter tip intact on removal.  Armband removed and shredded per patients request.    Patients pain 0/10.  Belongings given to are with pt.  Patient discharged with self and significant other to home.

## 2025-01-13 NOTE — DISCHARGE INSTRUCTIONS
You have been evaluated in the Emergency Department today for abdominal pain. Your evaluation did not show evidence of medical conditions requiring emergent intervention at this time.    Please schedule an appointment with your primary care physician.    Return to the Emergency Department if you experience worsening or uncontrolled pain, fevers 100.4°F or greater, recurrent vomiting, inability to tolerate food or fluids by mouth, bloody stools or vomit, black or tarry stools, or any other concerning symptoms.

## 2025-01-13 NOTE — ED NOTES
Girlfriend at the bedside helping pt get dressed and into wheelchair. All stickers have been removed.

## 2025-01-14 NOTE — ED PROVIDER NOTES
prescribed for you. Read the directions carefully, and ask your doctor or other care provider to review them with you.                   Where to Get Your Medications        These medications were sent to [facility] - [place], [place] - [address] - P [phone] - F [phone] [address] [address]      Phone: [phone]   polyethylene glycol 17 GM/SCOOP powder           DISCONTINUED MEDICATIONS:  Discharge Medication List as of 1/13/2025 11:10 AM          I am the Primary Clinician of Record.   FRIEDA BACK MD (electronically signed)      (Please note that parts of this dictation were completed with voice recognition software. Quite often unanticipated grammatical, syntax, homophones, and other interpretive errors are inadvertently transcribed by the computer software. Please disregards these errors. Please excuse any errors that have escaped final proofreading.)          Frieda Back MD  01/14/25 0657

## 2025-01-28 ENCOUNTER — APPOINTMENT (OUTPATIENT)
Facility: HOSPITAL | Age: 65
DRG: 720 | End: 2025-01-28
Payer: MEDICAID

## 2025-01-28 ENCOUNTER — HOSPITAL ENCOUNTER (INPATIENT)
Facility: HOSPITAL | Age: 65
LOS: 22 days | Discharge: LEFT BEFORE DISCHARGE | DRG: 720 | End: 2025-02-19
Attending: FAMILY MEDICINE | Admitting: INTERNAL MEDICINE
Payer: MEDICAID

## 2025-01-28 DIAGNOSIS — J96.01 ACUTE HYPOXIC RESPIRATORY FAILURE (HCC): ICD-10-CM

## 2025-01-28 DIAGNOSIS — E87.0 HYPERNATREMIA: Primary | ICD-10-CM

## 2025-01-28 DIAGNOSIS — R47.01 APHASIA: ICD-10-CM

## 2025-01-28 DIAGNOSIS — E88.09 HYPOALBUMINEMIA: ICD-10-CM

## 2025-01-28 DIAGNOSIS — G93.41 METABOLIC ENCEPHALOPATHY: ICD-10-CM

## 2025-01-28 LAB
ALBUMIN SERPL-MCNC: 2.8 G/DL (ref 3.4–5)
ALBUMIN SERPL-MCNC: 2.8 G/DL (ref 3.4–5)
ALBUMIN/GLOB SERPL: 0.7 (ref 0.8–1.7)
ALBUMIN/GLOB SERPL: 0.9 (ref 0.8–1.7)
ALP SERPL-CCNC: 152 U/L (ref 45–117)
ALP SERPL-CCNC: 233 U/L (ref 45–117)
ALT SERPL-CCNC: 105 U/L (ref 16–61)
ALT SERPL-CCNC: 147 U/L (ref 16–61)
AMMONIA PLAS-SCNC: 14 UMOL/L (ref 11–32)
ANION GAP SERPL CALC-SCNC: 5 MMOL/L (ref 3–18)
ANION GAP SERPL CALC-SCNC: 7 MMOL/L (ref 3–18)
AST SERPL-CCNC: 122 U/L (ref 10–38)
AST SERPL-CCNC: 178 U/L (ref 10–38)
BASOPHILS # BLD: 0.01 K/UL (ref 0–0.1)
BASOPHILS NFR BLD: 0.1 % (ref 0–2)
BILIRUB SERPL-MCNC: 0.9 MG/DL (ref 0.2–1)
BILIRUB SERPL-MCNC: 1 MG/DL (ref 0.2–1)
BUN SERPL-MCNC: 47 MG/DL (ref 7–18)
BUN SERPL-MCNC: 47 MG/DL (ref 7–18)
BUN/CREAT SERPL: 49 (ref 12–20)
BUN/CREAT SERPL: 51 (ref 12–20)
CALCIUM SERPL-MCNC: 9.1 MG/DL (ref 8.5–10.1)
CALCIUM SERPL-MCNC: 9.7 MG/DL (ref 8.5–10.1)
CHLORIDE SERPL-SCNC: 108 MMOL/L (ref 100–111)
CHLORIDE SERPL-SCNC: 111 MMOL/L (ref 100–111)
CO2 SERPL-SCNC: 35 MMOL/L (ref 21–32)
CO2 SERPL-SCNC: 36 MMOL/L (ref 21–32)
CREAT SERPL-MCNC: 0.93 MG/DL (ref 0.6–1.3)
CREAT SERPL-MCNC: 0.96 MG/DL (ref 0.6–1.3)
DIFFERENTIAL METHOD BLD: ABNORMAL
EOSINOPHIL # BLD: 0.04 K/UL (ref 0–0.4)
EOSINOPHIL NFR BLD: 0.3 % (ref 0–5)
ERYTHROCYTE [DISTWIDTH] IN BLOOD BY AUTOMATED COUNT: 18.4 % (ref 11.6–14.5)
ETHANOL SERPL-MCNC: <3 MG/DL (ref 0–3)
GLOBULIN SER CALC-MCNC: 3.1 G/DL (ref 2–4)
GLOBULIN SER CALC-MCNC: 3.8 G/DL (ref 2–4)
GLUCOSE BLD STRIP.AUTO-MCNC: 201 MG/DL (ref 70–110)
GLUCOSE BLD STRIP.AUTO-MCNC: 63 MG/DL (ref 70–110)
GLUCOSE BLD STRIP.AUTO-MCNC: 69 MG/DL (ref 70–110)
GLUCOSE SERPL-MCNC: 66 MG/DL (ref 74–99)
GLUCOSE SERPL-MCNC: 78 MG/DL (ref 74–99)
HCT VFR BLD AUTO: 38.4 % (ref 36–48)
HGB BLD-MCNC: 12.1 G/DL (ref 13–16)
IMM GRANULOCYTES # BLD AUTO: 0.03 K/UL (ref 0–0.04)
IMM GRANULOCYTES NFR BLD AUTO: 0.3 % (ref 0–0.5)
LACTATE SERPL-SCNC: 1.7 MMOL/L (ref 0.4–2)
LYMPHOCYTES # BLD: 0.25 K/UL (ref 0.9–3.6)
LYMPHOCYTES NFR BLD: 2.1 % (ref 21–52)
MAGNESIUM SERPL-MCNC: 2.7 MG/DL (ref 1.6–2.6)
MCH RBC QN AUTO: 27.9 PG (ref 24–34)
MCHC RBC AUTO-ENTMCNC: 31.5 G/DL (ref 31–37)
MCV RBC AUTO: 88.5 FL (ref 78–100)
MONOCYTES # BLD: 0.89 K/UL (ref 0.05–1.2)
MONOCYTES NFR BLD: 7.4 % (ref 3–10)
NEUTS SEG # BLD: 10.77 K/UL (ref 1.8–8)
NEUTS SEG NFR BLD: 89.8 % (ref 40–73)
NRBC # BLD: 0.09 K/UL (ref 0–0.01)
NRBC BLD-RTO: 0.8 PER 100 WBC
PLATELET # BLD AUTO: 129 K/UL (ref 135–420)
POTASSIUM SERPL-SCNC: 3.8 MMOL/L (ref 3.5–5.5)
POTASSIUM SERPL-SCNC: 4.5 MMOL/L (ref 3.5–5.5)
PROT SERPL-MCNC: 5.9 G/DL (ref 6.4–8.2)
PROT SERPL-MCNC: 6.6 G/DL (ref 6.4–8.2)
RBC # BLD AUTO: 4.34 M/UL (ref 4.35–5.65)
SODIUM SERPL-SCNC: 150 MMOL/L (ref 136–145)
SODIUM SERPL-SCNC: 152 MMOL/L (ref 136–145)
T4 FREE SERPL-MCNC: 0.9 NG/DL (ref 0.7–1.5)
TROPONIN I SERPL HS-MCNC: 10 NG/L (ref 0–78)
TROPONIN I SERPL HS-MCNC: 9 NG/L (ref 0–78)
TSH SERPL DL<=0.05 MIU/L-ACNC: 8.4 UIU/ML (ref 0.36–3.74)
WBC # BLD AUTO: 12 K/UL (ref 4.6–13.2)

## 2025-01-28 PROCEDURE — G0378 HOSPITAL OBSERVATION PER HR: HCPCS

## 2025-01-28 PROCEDURE — 96365 THER/PROPH/DIAG IV INF INIT: CPT

## 2025-01-28 PROCEDURE — 83605 ASSAY OF LACTIC ACID: CPT

## 2025-01-28 PROCEDURE — 93005 ELECTROCARDIOGRAM TRACING: CPT | Performed by: EMERGENCY MEDICINE

## 2025-01-28 PROCEDURE — 94761 N-INVAS EAR/PLS OXIMETRY MLT: CPT

## 2025-01-28 PROCEDURE — 94002 VENT MGMT INPAT INIT DAY: CPT

## 2025-01-28 PROCEDURE — 71045 X-RAY EXAM CHEST 1 VIEW: CPT

## 2025-01-28 PROCEDURE — 85025 COMPLETE CBC W/AUTO DIFF WBC: CPT

## 2025-01-28 PROCEDURE — 84443 ASSAY THYROID STIM HORMONE: CPT

## 2025-01-28 PROCEDURE — 82140 ASSAY OF AMMONIA: CPT

## 2025-01-28 PROCEDURE — 84484 ASSAY OF TROPONIN QUANT: CPT

## 2025-01-28 PROCEDURE — 2580000003 HC RX 258: Performed by: EMERGENCY MEDICINE

## 2025-01-28 PROCEDURE — 6360000002 HC RX W HCPCS: Performed by: PHYSICIAN ASSISTANT

## 2025-01-28 PROCEDURE — 2500000003 HC RX 250 WO HCPCS: Performed by: EMERGENCY MEDICINE

## 2025-01-28 PROCEDURE — 87040 BLOOD CULTURE FOR BACTERIA: CPT

## 2025-01-28 PROCEDURE — 2580000003 HC RX 258: Performed by: PHYSICIAN ASSISTANT

## 2025-01-28 PROCEDURE — 99223 1ST HOSP IP/OBS HIGH 75: CPT | Performed by: FAMILY MEDICINE

## 2025-01-28 PROCEDURE — 84439 ASSAY OF FREE THYROXINE: CPT

## 2025-01-28 PROCEDURE — 6370000000 HC RX 637 (ALT 250 FOR IP): Performed by: PHYSICIAN ASSISTANT

## 2025-01-28 PROCEDURE — 3E033XZ INTRODUCTION OF VASOPRESSOR INTO PERIPHERAL VEIN, PERCUTANEOUS APPROACH: ICD-10-PCS | Performed by: EMERGENCY MEDICINE

## 2025-01-28 PROCEDURE — 5A1945Z RESPIRATORY VENTILATION, 24-96 CONSECUTIVE HOURS: ICD-10-PCS | Performed by: HOSPITALIST

## 2025-01-28 PROCEDURE — 96367 TX/PROPH/DG ADDL SEQ IV INF: CPT

## 2025-01-28 PROCEDURE — 31500 INSERT EMERGENCY AIRWAY: CPT

## 2025-01-28 PROCEDURE — 82077 ASSAY SPEC XCP UR&BREATH IA: CPT

## 2025-01-28 PROCEDURE — 99291 CRITICAL CARE FIRST HOUR: CPT

## 2025-01-28 PROCEDURE — P9047 ALBUMIN (HUMAN), 25%, 50ML: HCPCS | Performed by: PHYSICIAN ASSISTANT

## 2025-01-28 PROCEDURE — 83735 ASSAY OF MAGNESIUM: CPT

## 2025-01-28 PROCEDURE — 6360000002 HC RX W HCPCS

## 2025-01-28 PROCEDURE — 80053 COMPREHEN METABOLIC PANEL: CPT

## 2025-01-28 PROCEDURE — 0BH17EZ INSERTION OF ENDOTRACHEAL AIRWAY INTO TRACHEA, VIA NATURAL OR ARTIFICIAL OPENING: ICD-10-PCS | Performed by: EMERGENCY MEDICINE

## 2025-01-28 PROCEDURE — 70450 CT HEAD/BRAIN W/O DYE: CPT

## 2025-01-28 PROCEDURE — 82962 GLUCOSE BLOOD TEST: CPT

## 2025-01-28 PROCEDURE — 93005 ELECTROCARDIOGRAM TRACING: CPT | Performed by: PHYSICIAN ASSISTANT

## 2025-01-28 PROCEDURE — 96375 TX/PRO/DX INJ NEW DRUG ADDON: CPT

## 2025-01-28 PROCEDURE — 96366 THER/PROPH/DIAG IV INF ADDON: CPT

## 2025-01-28 PROCEDURE — 1100000000 HC RM PRIVATE

## 2025-01-28 RX ORDER — SODIUM CHLORIDE, SODIUM LACTATE, POTASSIUM CHLORIDE, CALCIUM CHLORIDE 600; 310; 30; 20 MG/100ML; MG/100ML; MG/100ML; MG/100ML
INJECTION, SOLUTION INTRAVENOUS CONTINUOUS
Status: DISCONTINUED | OUTPATIENT
Start: 2025-01-28 | End: 2025-01-29

## 2025-01-28 RX ORDER — ACETAMINOPHEN 650 MG/1
650 SUPPOSITORY RECTAL EVERY 6 HOURS PRN
Status: DISCONTINUED | OUTPATIENT
Start: 2025-01-28 | End: 2025-01-31

## 2025-01-28 RX ORDER — MIDAZOLAM HYDROCHLORIDE 1 MG/ML
2 INJECTION, SOLUTION INTRAMUSCULAR; INTRAVENOUS
Status: DISCONTINUED | OUTPATIENT
Start: 2025-01-28 | End: 2025-01-30

## 2025-01-28 RX ORDER — ASPIRIN 81 MG/1
81 TABLET, CHEWABLE ORAL ONCE
Status: COMPLETED | OUTPATIENT
Start: 2025-01-28 | End: 2025-01-28

## 2025-01-28 RX ORDER — ACETAMINOPHEN 325 MG/1
650 TABLET ORAL EVERY 6 HOURS PRN
Status: DISCONTINUED | OUTPATIENT
Start: 2025-01-28 | End: 2025-01-31

## 2025-01-28 RX ORDER — MIDAZOLAM HYDROCHLORIDE 1 MG/ML
INJECTION, SOLUTION INTRAMUSCULAR; INTRAVENOUS
Status: COMPLETED
Start: 2025-01-28 | End: 2025-01-28

## 2025-01-28 RX ORDER — 0.9 % SODIUM CHLORIDE 0.9 %
500 INTRAVENOUS SOLUTION INTRAVENOUS ONCE
Status: COMPLETED | OUTPATIENT
Start: 2025-01-28 | End: 2025-01-28

## 2025-01-28 RX ORDER — ENOXAPARIN SODIUM 100 MG/ML
40 INJECTION SUBCUTANEOUS DAILY
Status: DISCONTINUED | OUTPATIENT
Start: 2025-01-29 | End: 2025-01-29

## 2025-01-28 RX ORDER — MIDAZOLAM HYDROCHLORIDE 1 MG/ML
5 INJECTION, SOLUTION INTRAMUSCULAR; INTRAVENOUS ONCE
Status: COMPLETED | OUTPATIENT
Start: 2025-01-29 | End: 2025-01-28

## 2025-01-28 RX ORDER — FENTANYL CITRATE 50 UG/ML
100 INJECTION, SOLUTION INTRAMUSCULAR; INTRAVENOUS
Status: DISCONTINUED | OUTPATIENT
Start: 2025-01-28 | End: 2025-01-30

## 2025-01-28 RX ORDER — MAGNESIUM SULFATE IN WATER 40 MG/ML
2000 INJECTION, SOLUTION INTRAVENOUS PRN
Status: DISCONTINUED | OUTPATIENT
Start: 2025-01-28 | End: 2025-02-19 | Stop reason: HOSPADM

## 2025-01-28 RX ORDER — SODIUM CHLORIDE 0.9 % (FLUSH) 0.9 %
5-40 SYRINGE (ML) INJECTION EVERY 12 HOURS SCHEDULED
Status: DISCONTINUED | OUTPATIENT
Start: 2025-01-29 | End: 2025-02-19 | Stop reason: HOSPADM

## 2025-01-28 RX ORDER — NOREPINEPHRINE BITARTRATE 0.06 MG/ML
1-100 INJECTION, SOLUTION INTRAVENOUS CONTINUOUS
Status: DISCONTINUED | OUTPATIENT
Start: 2025-01-28 | End: 2025-01-31

## 2025-01-28 RX ORDER — SODIUM CHLORIDE 0.9 % (FLUSH) 0.9 %
5-40 SYRINGE (ML) INJECTION PRN
Status: DISCONTINUED | OUTPATIENT
Start: 2025-01-28 | End: 2025-02-19 | Stop reason: HOSPADM

## 2025-01-28 RX ORDER — POTASSIUM CHLORIDE 1500 MG/1
40 TABLET, EXTENDED RELEASE ORAL PRN
Status: DISCONTINUED | OUTPATIENT
Start: 2025-01-28 | End: 2025-02-19 | Stop reason: HOSPADM

## 2025-01-28 RX ORDER — ALBUMIN (HUMAN) 12.5 G/50ML
25 SOLUTION INTRAVENOUS ONCE
Status: COMPLETED | OUTPATIENT
Start: 2025-01-28 | End: 2025-01-28

## 2025-01-28 RX ORDER — POTASSIUM CHLORIDE 7.45 MG/ML
10 INJECTION INTRAVENOUS PRN
Status: DISCONTINUED | OUTPATIENT
Start: 2025-01-28 | End: 2025-02-19 | Stop reason: HOSPADM

## 2025-01-28 RX ADMIN — Medication 10 MCG/MIN: at 21:39

## 2025-01-28 RX ADMIN — SODIUM CHLORIDE 500 ML: 9 INJECTION, SOLUTION INTRAVENOUS at 15:15

## 2025-01-28 RX ADMIN — ASPIRIN 81 MG CHEWABLE TABLET 81 MG: 81 TABLET CHEWABLE at 17:59

## 2025-01-28 RX ADMIN — MIDAZOLAM HYDROCHLORIDE 5 MG: 1 INJECTION, SOLUTION INTRAMUSCULAR; INTRAVENOUS at 23:40

## 2025-01-28 RX ADMIN — MIDAZOLAM 5 MG: 1 INJECTION INTRAMUSCULAR; INTRAVENOUS at 23:40

## 2025-01-28 RX ADMIN — SODIUM CHLORIDE 500 ML: 9 INJECTION, SOLUTION INTRAVENOUS at 21:33

## 2025-01-28 RX ADMIN — ALBUMIN (HUMAN) 25 G: 0.25 INJECTION, SOLUTION INTRAVENOUS at 15:38

## 2025-01-28 RX ADMIN — SODIUM CHLORIDE, POTASSIUM CHLORIDE, SODIUM LACTATE AND CALCIUM CHLORIDE: 600; 310; 30; 20 INJECTION, SOLUTION INTRAVENOUS at 15:57

## 2025-01-28 RX ADMIN — DEXTROSE MONOHYDRATE 250 ML: 100 INJECTION, SOLUTION INTRAVENOUS at 21:50

## 2025-01-28 ASSESSMENT — PULMONARY FUNCTION TESTS: PIF_VALUE: 18

## 2025-01-28 NOTE — H&P
NAME: Farhad Rogers   :  1960   MRN:  [id number]     Date:  2025     Patient PCP: Teo Tong MD  ________________________________________________________________________    My assessment of this patient's clinical condition and my plan of care is as follows.    Assessment / Plan:  Altered mental status, concern for CVA  Hyponatremia  Elevated LFTs  Hypoalbuminemia    Admit to medical bed with telemetry.  MRI to evaluate for possible subacute CVA.  Usual stroke orders initiated.  IV fluids, monitor electrolytes - sodium levels in particular.  SLP evaluation.  Will maintain n.p.o. status in interim.  PT/OT evaluations.  Patient is nonambulatory at baseline and may need further care depending on results of workup.  Disposition to be determined.    Code Status: Full  DVT Prophylaxis: Lovenox          Subjective:   CHIEF COMPLAINT: AMS    HISTORY OF PRESENT ILLNESS:     Farhad Rogers is a 64 y.o.   male who presented to the ED for evaluation of altered mental status and change in usual behavior.  Patient is unable to provide any history as he does not appear to be able to speak currently.  Caregiver, who is at bedside, provides majority of history.  Per her report, patient \"started getting weak\" approximately a week ago.  She states that he was seen at [facility] emergency department for constipation issues and was given an enema and discharged home.  Over the past few days, however, he has lost the ability to to feed himself and has not been verbal per his usual.  CT scan done on arrival to the ED showed no obvious acute ischemic change or hemorrhage but there was a focus in the right lenticular capsule that was concerning for possible small infarct.  Patient is being referred for hospital admission for further evaluation and treatment.    Past Medical History:   Diagnosis Date    Essential hypertension     Essential hypertension, benign     Gout     Hypogonadism male

## 2025-01-28 NOTE — ED TRIAGE NOTES
Pt in ED with c/o not acting right per family . Pt not talking in triage for this nurse. Per family he usually cooks his own food, take meds . Pt was admitted in this facility 1/13. Am he has been like this since discharge

## 2025-01-28 NOTE — ED PROVIDER NOTES
EMERGENCY DEPARTMENT HISTORY AND PHYSICAL EXAM      Date: 1/28/2025  Patient Name: Farhad Rogers    History of Presenting Illness     Chief Complaint   Patient presents with    Altered Mental Status       History (Context): Farhad Rogers is a 64 y.o. male with significant past medical history of sleep apnea, hypertension, sarcoidosis, myopathy, gout presents in wheelchair with caregiver. She states patient is wheelchair-bound at baseline due to history of myopathy. She reports since about 1/15 he has been more weak in his upper arms than normal, not talking and drooling. She states he has not been able to take his medication. Denies known trauma.  She states typically at baseline patient is able to talk and make his own food. Caregiver denies known trauma or injury.      PCP: Teo Tong MD    Current Facility-Administered Medications   Medication Dose Route Frequency Provider Last Rate Last Admin    lactated ringers infusion   IntraVENous Continuous Nanci Babin  mL/hr at 01/28/25 1557 New Bag at 01/28/25 1557    aspirin chewable tablet 81 mg  81 mg Oral Once Nanci Babin PA         Current Outpatient Medications   Medication Sig Dispense Refill    polyethylene glycol (GLYCOLAX) 17 GM/SCOOP powder Take 17 g by mouth daily 510 g 0    albuterol sulfate HFA (PROVENTIL;VENTOLIN;PROAIR) 108 (90 Base) MCG/ACT inhaler INHALE 2 PUFFS BY MOUTH EVERY FOUR HOURS AS NEEDED FOR WHEEZING 8.5 g 3    umeclidinium-vilanterol (ANORO ELLIPTA) 62.5-25 MCG/ACT inhaler Inhale 1 puff into the lungs daily 1 each 5    pantoprazole (PROTONIX) 40 MG tablet Take 1 tablet by mouth daily      albuterol (PROVENTIL) (2.5 MG/3ML) 0.083% nebulizer solution Take 3 mLs by nebulization every 6 hours as needed for Wheezing or Shortness of Breath 120 each 5    amLODIPine (NORVASC) 10 MG tablet amlodipine 10 mg tablet   TAKE 1 TABLET BY MOUTH ONCE DAILY      vitamin D (CHOLECALCIFEROL) 22515 UNIT CAPS cholecalciferol

## 2025-01-29 ENCOUNTER — APPOINTMENT (OUTPATIENT)
Facility: HOSPITAL | Age: 65
DRG: 720 | End: 2025-01-29
Payer: MEDICAID

## 2025-01-29 ENCOUNTER — HOSPITAL ENCOUNTER (INPATIENT)
Facility: HOSPITAL | Age: 65
Discharge: HOME | DRG: 720 | End: 2025-02-01
Payer: MEDICAID

## 2025-01-29 PROBLEM — E87.0 HYPERNATREMIA: Status: ACTIVE | Noted: 2025-01-29

## 2025-01-29 PROBLEM — G93.41 METABOLIC ENCEPHALOPATHY: Status: ACTIVE | Noted: 2025-01-29

## 2025-01-29 LAB
ALBUMIN SERPL-MCNC: 2.9 G/DL (ref 3.4–5)
ALBUMIN/GLOB SERPL: 1.1 (ref 0.8–1.7)
ALP SERPL-CCNC: 196 U/L (ref 45–117)
ALT SERPL-CCNC: 113 U/L (ref 16–61)
AMPHET UR QL SCN: NEGATIVE
ANION GAP SERPL CALC-SCNC: 4 MMOL/L (ref 3–18)
APAP SERPL-MCNC: <2 UG/ML (ref 10–30)
APPEARANCE UR: CLEAR
ARTERIAL PATENCY WRIST A: ABNORMAL
ARTERIAL PATENCY WRIST A: POSITIVE
AST SERPL-CCNC: 145 U/L (ref 10–38)
B PERT DNA SPEC QL NAA+PROBE: NOT DETECTED
BACTERIA URNS QL MICRO: NEGATIVE /HPF
BARBITURATES UR QL SCN: NEGATIVE
BASE EXCESS BLD CALC-SCNC: 10 MMOL/L
BASE EXCESS BLD CALC-SCNC: 14.2 MMOL/L
BASOPHILS # BLD: 0 K/UL (ref 0–0.1)
BASOPHILS NFR BLD: 0 % (ref 0–2)
BDY SITE: ABNORMAL
BDY SITE: ABNORMAL
BENZODIAZ UR QL: NEGATIVE
BILIRUB DIRECT SERPL-MCNC: 0.4 MG/DL (ref 0–0.2)
BILIRUB SERPL-MCNC: 1.4 MG/DL (ref 0.2–1)
BILIRUB UR QL: NEGATIVE
BORDETELLA PARAPERTUSSIS BY PCR: NOT DETECTED
BUN SERPL-MCNC: 52 MG/DL (ref 7–18)
BUN/CREAT SERPL: 49 (ref 12–20)
C PNEUM DNA SPEC QL NAA+PROBE: NOT DETECTED
CALCIUM SERPL-MCNC: 8.7 MG/DL (ref 8.5–10.1)
CANNABINOIDS UR QL SCN: NEGATIVE
CHLORIDE SERPL-SCNC: 109 MMOL/L (ref 100–111)
CK SERPL-CCNC: 472 U/L (ref 39–308)
CO2 SERPL-SCNC: 34 MMOL/L (ref 21–32)
COCAINE UR QL SCN: NEGATIVE
COLOR UR: ABNORMAL
CREAT SERPL-MCNC: 1.07 MG/DL (ref 0.6–1.3)
DIFFERENTIAL METHOD BLD: ABNORMAL
ECHO AO ROOT DIAM: 3.4 CM
ECHO AO ROOT INDEX: 1.87 CM/M2
ECHO AV AREA PEAK VELOCITY: 2.8 CM2
ECHO AV AREA/BSA PEAK VELOCITY: 1.5 CM2/M2
ECHO AV PEAK GRADIENT: 3 MMHG
ECHO AV PEAK VELOCITY: 0.9 M/S
ECHO AV VELOCITY RATIO: 0.78
ECHO BSA: 1.84 M2
ECHO LV E' LATERAL VELOCITY: 5.57 CM/S
ECHO LV E' SEPTAL VELOCITY: 6 CM/S
ECHO LV EF PHYSICIAN: 60 %
ECHO LV FRACTIONAL SHORTENING: 18 % (ref 28–44)
ECHO LV INTERNAL DIMENSION DIASTOLE INDEX: 1.81 CM/M2
ECHO LV INTERNAL DIMENSION DIASTOLIC: 3.3 CM (ref 4.2–5.9)
ECHO LV INTERNAL DIMENSION SYSTOLIC INDEX: 1.48 CM/M2
ECHO LV INTERNAL DIMENSION SYSTOLIC: 2.7 CM
ECHO LV IVSD: 0.7 CM (ref 0.6–1)
ECHO LV MASS 2D: 57.1 G (ref 88–224)
ECHO LV MASS INDEX 2D: 31.4 G/M2 (ref 49–115)
ECHO LV POSTERIOR WALL DIASTOLIC: 0.7 CM (ref 0.6–1)
ECHO LV RELATIVE WALL THICKNESS RATIO: 0.42
ECHO LVOT AREA: 3.8 CM2
ECHO LVOT DIAM: 2.2 CM
ECHO LVOT MEAN GRADIENT: 1 MMHG
ECHO LVOT PEAK GRADIENT: 2 MMHG
ECHO LVOT PEAK VELOCITY: 0.7 M/S
ECHO LVOT STROKE VOLUME INDEX: 24.4 ML/M2
ECHO LVOT SV: 44.5 ML
ECHO LVOT VTI: 11.7 CM
ECHO MV A VELOCITY: 0.56 M/S
ECHO MV E DECELERATION TIME (DT): 228 MS
ECHO MV E VELOCITY: 0.55 M/S
ECHO MV E/A RATIO: 0.98
ECHO MV E/E' LATERAL: 9.87
ECHO MV E/E' RATIO (AVERAGED): 9.52
ECHO MV E/E' SEPTAL: 9.17
ECHO TV REGURGITANT MAX VELOCITY: 2.85 M/S
ECHO TV REGURGITANT PEAK GRADIENT: 33 MMHG
EKG ATRIAL RATE: 57 BPM
EKG ATRIAL RATE: 58 BPM
EKG DIAGNOSIS: NORMAL
EKG DIAGNOSIS: NORMAL
EKG P AXIS: 52 DEGREES
EKG P AXIS: 80 DEGREES
EKG P-R INTERVAL: 186 MS
EKG P-R INTERVAL: 196 MS
EKG Q-T INTERVAL: 470 MS
EKG Q-T INTERVAL: 502 MS
EKG QRS DURATION: 100 MS
EKG QRS DURATION: 106 MS
EKG QTC CALCULATION (BAZETT): 457 MS
EKG QTC CALCULATION (BAZETT): 492 MS
EKG R AXIS: 68 DEGREES
EKG R AXIS: 82 DEGREES
EKG T AXIS: -69 DEGREES
EKG T AXIS: 91 DEGREES
EKG VENTRICULAR RATE: 57 BPM
EKG VENTRICULAR RATE: 58 BPM
EOSINOPHIL # BLD: 0.02 K/UL (ref 0–0.4)
EOSINOPHIL NFR BLD: 0.2 % (ref 0–5)
EPITH CASTS URNS QL MICRO: NORMAL /LPF (ref 0–5)
ERYTHROCYTE [DISTWIDTH] IN BLOOD BY AUTOMATED COUNT: 18.6 % (ref 11.6–14.5)
FLUAV SUBTYP SPEC NAA+PROBE: NOT DETECTED
FLUBV RNA SPEC QL NAA+PROBE: NOT DETECTED
GAS FLOW.O2 O2 DELIVERY SYS: ABNORMAL
GAS FLOW.O2 O2 DELIVERY SYS: ABNORMAL
GAS FLOW.O2 SETTING OXYMISER: 16 BPM
GAS FLOW.O2 SETTING OXYMISER: 1818 BPM
GLOBULIN SER CALC-MCNC: 2.6 G/DL (ref 2–4)
GLUCOSE BLD STRIP.AUTO-MCNC: 107 MG/DL (ref 70–110)
GLUCOSE BLD STRIP.AUTO-MCNC: 117 MG/DL (ref 70–110)
GLUCOSE BLD STRIP.AUTO-MCNC: 180 MG/DL (ref 70–110)
GLUCOSE BLD STRIP.AUTO-MCNC: 195 MG/DL (ref 70–110)
GLUCOSE BLD STRIP.AUTO-MCNC: 77 MG/DL (ref 70–110)
GLUCOSE SERPL-MCNC: 118 MG/DL (ref 74–99)
GLUCOSE UR STRIP.AUTO-MCNC: NEGATIVE MG/DL
HADV DNA SPEC QL NAA+PROBE: NOT DETECTED
HCO3 BLD-SCNC: 35.2 MMOL/L (ref 21–28)
HCO3 BLD-SCNC: 39.7 MMOL/L (ref 21–28)
HCOV 229E RNA SPEC QL NAA+PROBE: NOT DETECTED
HCOV HKU1 RNA SPEC QL NAA+PROBE: NOT DETECTED
HCOV NL63 RNA SPEC QL NAA+PROBE: NOT DETECTED
HCOV OC43 RNA SPEC QL NAA+PROBE: NOT DETECTED
HCT VFR BLD AUTO: 31.8 % (ref 36–48)
HGB BLD-MCNC: 10.1 G/DL (ref 13–16)
HGB UR QL STRIP: NEGATIVE
HMPV RNA SPEC QL NAA+PROBE: NOT DETECTED
HPIV1 RNA SPEC QL NAA+PROBE: NOT DETECTED
HPIV2 RNA SPEC QL NAA+PROBE: NOT DETECTED
HPIV3 RNA SPEC QL NAA+PROBE: NOT DETECTED
HPIV4 RNA SPEC QL NAA+PROBE: NOT DETECTED
IMM GRANULOCYTES # BLD AUTO: 0.03 K/UL (ref 0–0.04)
IMM GRANULOCYTES NFR BLD AUTO: 0.3 % (ref 0–0.5)
INR PPP: 1 (ref 0.9–1.1)
IPAP/PIP/HIGH PEEP: 17
IPAP/PIP/HIGH PEEP: 18
KETONES UR QL STRIP.AUTO: NEGATIVE MG/DL
LACTATE SERPL-SCNC: 1.5 MMOL/L (ref 0.4–2)
LEUKOCYTE ESTERASE UR QL STRIP.AUTO: ABNORMAL
LIPASE SERPL-CCNC: 38 U/L (ref 13–75)
LYMPHOCYTES # BLD: 0.37 K/UL (ref 0.9–3.6)
LYMPHOCYTES NFR BLD: 4.1 % (ref 21–52)
Lab: NORMAL
M PNEUMO DNA SPEC QL NAA+PROBE: NOT DETECTED
MAGNESIUM SERPL-MCNC: 1.9 MG/DL (ref 1.6–2.6)
MCH RBC QN AUTO: 27.8 PG (ref 24–34)
MCHC RBC AUTO-ENTMCNC: 31.8 G/DL (ref 31–37)
MCV RBC AUTO: 87.6 FL (ref 78–100)
METHADONE UR QL: NEGATIVE
MONOCYTES # BLD: 0.45 K/UL (ref 0.05–1.2)
MONOCYTES NFR BLD: 5 % (ref 3–10)
NEUTS SEG # BLD: 8.19 K/UL (ref 1.8–8)
NEUTS SEG NFR BLD: 90.4 % (ref 40–73)
NITRITE UR QL STRIP.AUTO: NEGATIVE
NRBC # BLD: 0.15 K/UL (ref 0–0.01)
NRBC BLD-RTO: 1.7 PER 100 WBC
O2/TOTAL GAS SETTING VFR VENT: 35 %
O2/TOTAL GAS SETTING VFR VENT: 50 %
OPIATES UR QL: NEGATIVE
PCO2 BLD: 49.2 MMHG (ref 35–48)
PCO2 BLD: 50.3 MMHG (ref 35–48)
PCP UR QL: NEGATIVE
PEEP RESPIRATORY: 8 CMH2O
PEEP RESPIRATORY: 8 CMH2O
PH BLD: 7.46 (ref 7.35–7.45)
PH BLD: 7.51 (ref 7.35–7.45)
PH UR STRIP: 5 (ref 5–8)
PHOSPHATE SERPL-MCNC: 3.9 MG/DL (ref 2.5–4.9)
PLATELET # BLD AUTO: 123 K/UL (ref 135–420)
PO2 BLD: 118 MMHG (ref 83–108)
PO2 BLD: 211 MMHG (ref 83–108)
POTASSIUM SERPL-SCNC: 3.5 MMOL/L (ref 3.5–5.5)
PROCALCITONIN SERPL-MCNC: 0.05 NG/ML
PROT SERPL-MCNC: 5.5 G/DL (ref 6.4–8.2)
PROT UR STRIP-MCNC: 30 MG/DL
PROTHROMBIN TIME: 12.9 SEC (ref 11.9–14.9)
RBC # BLD AUTO: 3.63 M/UL (ref 4.35–5.65)
RBC #/AREA URNS HPF: NORMAL /HPF (ref 0–5)
RESPIRATORY RATE, POC: 16 (ref 5–40)
RESPIRATORY RATE, POC: 18 (ref 5–40)
RSV RNA SPEC QL NAA+PROBE: NOT DETECTED
RV+EV RNA SPEC QL NAA+PROBE: NOT DETECTED
SALICYLATES SERPL-MCNC: <1.7 MG/DL (ref 2.8–20)
SAO2 % BLD: 98.8 % (ref 92–97)
SAO2 % BLD: 99.8 % (ref 92–97)
SARS-COV-2 RNA RESP QL NAA+PROBE: NOT DETECTED
SERVICE CMNT-IMP: ABNORMAL
SERVICE CMNT-IMP: ABNORMAL
SODIUM SERPL-SCNC: 147 MMOL/L (ref 136–145)
SP GR UR REFRACTOMETRY: 1.01 (ref 1–1.03)
SPECIMEN TYPE: ABNORMAL
SPECIMEN TYPE: ABNORMAL
TROPONIN I SERPL HS-MCNC: 12 NG/L (ref 0–78)
UROBILINOGEN UR QL STRIP.AUTO: 1 EU/DL (ref 0.2–1)
VENTILATION MODE VENT: ABNORMAL
VENTILATION MODE VENT: ABNORMAL
VT SETTING VENT: 450 ML
VT SETTING VENT: 450 ML
WBC # BLD AUTO: 9.1 K/UL (ref 4.6–13.2)
WBC URNS QL MICRO: NORMAL /HPF (ref 0–5)

## 2025-01-29 PROCEDURE — 80076 HEPATIC FUNCTION PANEL: CPT

## 2025-01-29 PROCEDURE — 36556 INSERT NON-TUNNEL CV CATH: CPT

## 2025-01-29 PROCEDURE — 93306 TTE W/DOPPLER COMPLETE: CPT | Performed by: INTERNAL MEDICINE

## 2025-01-29 PROCEDURE — 82962 GLUCOSE BLOOD TEST: CPT

## 2025-01-29 PROCEDURE — 87070 CULTURE OTHR SPECIMN AEROBIC: CPT

## 2025-01-29 PROCEDURE — 36600 WITHDRAWAL OF ARTERIAL BLOOD: CPT

## 2025-01-29 PROCEDURE — 84484 ASSAY OF TROPONIN QUANT: CPT

## 2025-01-29 PROCEDURE — 2580000003 HC RX 258: Performed by: PHYSICIAN ASSISTANT

## 2025-01-29 PROCEDURE — 2580000003 HC RX 258: Performed by: INTERNAL MEDICINE

## 2025-01-29 PROCEDURE — 84145 PROCALCITONIN (PCT): CPT

## 2025-01-29 PROCEDURE — 99221 1ST HOSP IP/OBS SF/LOW 40: CPT | Performed by: STUDENT IN AN ORGANIZED HEALTH CARE EDUCATION/TRAINING PROGRAM

## 2025-01-29 PROCEDURE — 71045 X-RAY EXAM CHEST 1 VIEW: CPT

## 2025-01-29 PROCEDURE — 87205 SMEAR GRAM STAIN: CPT

## 2025-01-29 PROCEDURE — 95706 EEG WO VID 2-12HR INTMT MNTR: CPT

## 2025-01-29 PROCEDURE — 70551 MRI BRAIN STEM W/O DYE: CPT

## 2025-01-29 PROCEDURE — 83605 ASSAY OF LACTIC ACID: CPT

## 2025-01-29 PROCEDURE — 6360000002 HC RX W HCPCS: Performed by: PHYSICIAN ASSISTANT

## 2025-01-29 PROCEDURE — 2500000003 HC RX 250 WO HCPCS: Performed by: PHYSICIAN ASSISTANT

## 2025-01-29 PROCEDURE — 80143 DRUG ASSAY ACETAMINOPHEN: CPT

## 2025-01-29 PROCEDURE — 81001 URINALYSIS AUTO W/SCOPE: CPT

## 2025-01-29 PROCEDURE — 85610 PROTHROMBIN TIME: CPT

## 2025-01-29 PROCEDURE — 99291 CRITICAL CARE FIRST HOUR: CPT | Performed by: INTERNAL MEDICINE

## 2025-01-29 PROCEDURE — 94761 N-INVAS EAR/PLS OXIMETRY MLT: CPT

## 2025-01-29 PROCEDURE — 94003 VENT MGMT INPAT SUBQ DAY: CPT

## 2025-01-29 PROCEDURE — 93306 TTE W/DOPPLER COMPLETE: CPT

## 2025-01-29 PROCEDURE — 80048 BASIC METABOLIC PNL TOTAL CA: CPT

## 2025-01-29 PROCEDURE — 51702 INSERT TEMP BLADDER CATH: CPT

## 2025-01-29 PROCEDURE — 36415 COLL VENOUS BLD VENIPUNCTURE: CPT

## 2025-01-29 PROCEDURE — 74176 CT ABD & PELVIS W/O CONTRAST: CPT

## 2025-01-29 PROCEDURE — 82550 ASSAY OF CK (CPK): CPT

## 2025-01-29 PROCEDURE — 80320 DRUG SCREEN QUANTALCOHOLS: CPT

## 2025-01-29 PROCEDURE — 6370000000 HC RX 637 (ALT 250 FOR IP): Performed by: PHYSICIAN ASSISTANT

## 2025-01-29 PROCEDURE — 94640 AIRWAY INHALATION TREATMENT: CPT

## 2025-01-29 PROCEDURE — 85025 COMPLETE CBC W/AUTO DIFF WBC: CPT

## 2025-01-29 PROCEDURE — 0202U NFCT DS 22 TRGT SARS-COV-2: CPT

## 2025-01-29 PROCEDURE — 2700000000 HC OXYGEN THERAPY PER DAY

## 2025-01-29 PROCEDURE — 82803 BLOOD GASES ANY COMBINATION: CPT

## 2025-01-29 PROCEDURE — 84100 ASSAY OF PHOSPHORUS: CPT

## 2025-01-29 PROCEDURE — 2720000010 HC SURG SUPPLY STERILE

## 2025-01-29 PROCEDURE — 93010 ELECTROCARDIOGRAM REPORT: CPT | Performed by: INTERNAL MEDICINE

## 2025-01-29 PROCEDURE — 2000000000 HC ICU R&B

## 2025-01-29 PROCEDURE — 80307 DRUG TEST PRSMV CHEM ANLYZR: CPT

## 2025-01-29 PROCEDURE — 76705 ECHO EXAM OF ABDOMEN: CPT

## 2025-01-29 PROCEDURE — 80179 DRUG ASSAY SALICYLATE: CPT

## 2025-01-29 PROCEDURE — 83690 ASSAY OF LIPASE: CPT

## 2025-01-29 PROCEDURE — 87086 URINE CULTURE/COLONY COUNT: CPT

## 2025-01-29 PROCEDURE — 83735 ASSAY OF MAGNESIUM: CPT

## 2025-01-29 RX ORDER — DEXTROSE MONOHYDRATE 100 MG/ML
INJECTION, SOLUTION INTRAVENOUS CONTINUOUS
Status: DISCONTINUED | OUTPATIENT
Start: 2025-01-29 | End: 2025-01-29

## 2025-01-29 RX ORDER — VANCOMYCIN 1.5 G/300ML
1500 INJECTION, SOLUTION INTRAVENOUS EVERY 24 HOURS
Status: DISCONTINUED | OUTPATIENT
Start: 2025-01-30 | End: 2025-01-30

## 2025-01-29 RX ORDER — VANCOMYCIN 1.25 G/250ML
25 INJECTION, SOLUTION INTRAVENOUS ONCE
Status: COMPLETED | OUTPATIENT
Start: 2025-01-29 | End: 2025-01-29

## 2025-01-29 RX ORDER — HYDROCORTISONE SODIUM SUCCINATE 100 MG/2ML
100 INJECTION INTRAMUSCULAR; INTRAVENOUS EVERY 8 HOURS
Status: DISCONTINUED | OUTPATIENT
Start: 2025-01-29 | End: 2025-01-31

## 2025-01-29 RX ORDER — SODIUM CHLORIDE, SODIUM LACTATE, POTASSIUM CHLORIDE, AND CALCIUM CHLORIDE .6; .31; .03; .02 G/100ML; G/100ML; G/100ML; G/100ML
500 INJECTION, SOLUTION INTRAVENOUS ONCE
Status: COMPLETED | OUTPATIENT
Start: 2025-01-29 | End: 2025-01-29

## 2025-01-29 RX ORDER — IPRATROPIUM BROMIDE AND ALBUTEROL SULFATE 2.5; .5 MG/3ML; MG/3ML
1 SOLUTION RESPIRATORY (INHALATION) EVERY 4 HOURS PRN
Status: DISCONTINUED | OUTPATIENT
Start: 2025-01-29 | End: 2025-02-19 | Stop reason: HOSPADM

## 2025-01-29 RX ORDER — ARFORMOTEROL TARTRATE 15 UG/2ML
15 SOLUTION RESPIRATORY (INHALATION)
Status: DISCONTINUED | OUTPATIENT
Start: 2025-01-29 | End: 2025-02-19 | Stop reason: HOSPADM

## 2025-01-29 RX ORDER — INSULIN LISPRO 100 [IU]/ML
0-4 INJECTION, SOLUTION INTRAVENOUS; SUBCUTANEOUS EVERY 6 HOURS
Status: DISCONTINUED | OUTPATIENT
Start: 2025-01-30 | End: 2025-02-11

## 2025-01-29 RX ORDER — DEXTROSE MONOHYDRATE AND SODIUM CHLORIDE 5; .45 G/100ML; G/100ML
INJECTION, SOLUTION INTRAVENOUS CONTINUOUS
Status: DISCONTINUED | OUTPATIENT
Start: 2025-01-29 | End: 2025-01-30

## 2025-01-29 RX ORDER — CHLORHEXIDINE GLUCONATE ORAL RINSE 1.2 MG/ML
15 SOLUTION DENTAL 2 TIMES DAILY
Status: DISCONTINUED | OUTPATIENT
Start: 2025-01-29 | End: 2025-01-31

## 2025-01-29 RX ORDER — BUDESONIDE 1 MG/2ML
1 INHALANT ORAL 2 TIMES DAILY
Status: DISCONTINUED | OUTPATIENT
Start: 2025-01-29 | End: 2025-01-30

## 2025-01-29 RX ADMIN — INSULIN LISPRO 1 UNITS: 100 INJECTION, SOLUTION INTRAVENOUS; SUBCUTANEOUS at 23:55

## 2025-01-29 RX ADMIN — CHLORHEXIDINE GLUCONATE 15 ML: 1.2 RINSE ORAL at 10:57

## 2025-01-29 RX ADMIN — ARFORMOTEROL TARTRATE 15 MCG: 15 SOLUTION RESPIRATORY (INHALATION) at 20:17

## 2025-01-29 RX ADMIN — FENTANYL CITRATE 100 MCG: 0.05 INJECTION, SOLUTION INTRAMUSCULAR; INTRAVENOUS at 12:15

## 2025-01-29 RX ADMIN — MIDAZOLAM 2 MG: 1 INJECTION INTRAMUSCULAR; INTRAVENOUS at 12:35

## 2025-01-29 RX ADMIN — FAMOTIDINE 20 MG: 10 INJECTION, SOLUTION INTRAVENOUS at 10:57

## 2025-01-29 RX ADMIN — ARFORMOTEROL TARTRATE 15 MCG: 15 SOLUTION RESPIRATORY (INHALATION) at 13:03

## 2025-01-29 RX ADMIN — FAMOTIDINE 20 MG: 10 INJECTION, SOLUTION INTRAVENOUS at 20:40

## 2025-01-29 RX ADMIN — BUDESONIDE 1 MG: 1 SUSPENSION RESPIRATORY (INHALATION) at 20:17

## 2025-01-29 RX ADMIN — HYDROCORTISONE SODIUM SUCCINATE 100 MG: 100 INJECTION, POWDER, FOR SOLUTION INTRAMUSCULAR; INTRAVENOUS at 13:15

## 2025-01-29 RX ADMIN — PIPERACILLIN AND TAZOBACTAM 3375 MG: 3; .375 INJECTION, POWDER, LYOPHILIZED, FOR SOLUTION INTRAVENOUS at 01:37

## 2025-01-29 RX ADMIN — FENTANYL CITRATE 100 MCG: 0.05 INJECTION, SOLUTION INTRAMUSCULAR; INTRAVENOUS at 06:59

## 2025-01-29 RX ADMIN — DEXTROSE MONOHYDRATE AND SODIUM CHLORIDE: 5; .45 INJECTION, SOLUTION INTRAVENOUS at 06:45

## 2025-01-29 RX ADMIN — SODIUM CHLORIDE, PRESERVATIVE FREE 10 ML: 5 INJECTION INTRAVENOUS at 10:58

## 2025-01-29 RX ADMIN — VANCOMYCIN 1750 MG: 1.25 INJECTION, SOLUTION INTRAVENOUS at 01:40

## 2025-01-29 RX ADMIN — CHLORHEXIDINE GLUCONATE 15 ML: 1.2 RINSE ORAL at 20:40

## 2025-01-29 RX ADMIN — HYDROCORTISONE SODIUM SUCCINATE 100 MG: 100 INJECTION, POWDER, FOR SOLUTION INTRAMUSCULAR; INTRAVENOUS at 04:47

## 2025-01-29 RX ADMIN — BUDESONIDE 1 MG: 1 SUSPENSION RESPIRATORY (INHALATION) at 13:03

## 2025-01-29 RX ADMIN — SODIUM CHLORIDE, PRESERVATIVE FREE 10 ML: 5 INJECTION INTRAVENOUS at 20:40

## 2025-01-29 RX ADMIN — PIPERACILLIN AND TAZOBACTAM 3375 MG: 3; .375 INJECTION, POWDER, LYOPHILIZED, FOR SOLUTION INTRAVENOUS at 18:02

## 2025-01-29 RX ADMIN — DEXTROSE MONOHYDRATE AND SODIUM CHLORIDE: 5; .45 INJECTION, SOLUTION INTRAVENOUS at 18:35

## 2025-01-29 RX ADMIN — PIPERACILLIN AND TAZOBACTAM 3375 MG: 3; .375 INJECTION, POWDER, LYOPHILIZED, FOR SOLUTION INTRAVENOUS at 10:04

## 2025-01-29 RX ADMIN — MIDAZOLAM 2 MG: 1 INJECTION INTRAMUSCULAR; INTRAVENOUS at 07:00

## 2025-01-29 RX ADMIN — VASOPRESSIN 0.03 UNITS/MIN: 0.2 SOLUTION INTRAVENOUS at 03:47

## 2025-01-29 RX ADMIN — HYDROCORTISONE SODIUM SUCCINATE 100 MG: 100 INJECTION, POWDER, FOR SOLUTION INTRAMUSCULAR; INTRAVENOUS at 20:40

## 2025-01-29 RX ADMIN — SODIUM CHLORIDE, POTASSIUM CHLORIDE, SODIUM LACTATE AND CALCIUM CHLORIDE 500 ML: 600; 310; 30; 20 INJECTION, SOLUTION INTRAVENOUS at 05:08

## 2025-01-29 ASSESSMENT — PULMONARY FUNCTION TESTS
PIF_VALUE: 17
PIF_VALUE: 18
PIF_VALUE: 19

## 2025-01-29 NOTE — ED NOTES
Dr. Wharton to intubate d/t Pt being obtunded. Dr. Wharton ordered 20 mg of etomidate and 70 mg of rocuronium. 7.5 ETT placed at 25 at the gum. OG tube and temp gerardo also placed.

## 2025-01-29 NOTE — CARE COORDINATION
01/29/25 1102   Service Assessment   Patient Orientation Unable to Assess;Other (see comment)  (Intubated)   History Provided By Child/Family  (Mother)   Primary Caregiver Other (Comment)  (Friend assist with needs)   Support Systems Spouse/Significant Other   Patient's Healthcare Decision Maker is: Legal Next of Kin   PCP Verified by CM   (Pt mother was unsure)   Prior Functional Level Assistance with the following:;Bathing;Dressing;Toileting;Feeding;Cooking;Housework;Shopping;Mobility   Current Functional Level Mobility;Shopping;Housework;Cooking;Feeding;Toileting;Dressing;Bathing;Assistance with the following:   Can patient return to prior living arrangement Yes   Ability to make needs known: Unable  (intubated)   Family able to assist with home care needs: Other (comment)  (Pt friend is able to accept)   Financial Resources Medicaid   Community Resources None   Social/Functional History   Lives With Significant other   Type of Home Apartment   Home Layout One level   Home Access Level entry   Bathroom Toilet Handicap height   Bathroom Accessibility Accessible   Home Equipment Wheelchair - Manual   Receives Help From Other (Comment)  (Significant other)   Prior Level of Assist for ADLs Needs assistance   Toileting Needs assistance   Prior Level of Assist for Homemaking Needs assistance   Ambulation Assistance Needs assistance   Prior Level of Assist for Transfers Needs assistance   Active  No   Occupation On disability   Discharge Planning   Type of Residence House   Living Arrangements   (Significant other)   Current Services Prior To Admission C-pap;Oxygen Therapy  (Mother is unsure of the 02 liters.)   Potential Assistance Needed N/A   DME Ordered? No   Potential Assistance Purchasing Medications No   Type of Home Care Services Aide Services   Patient expects to be discharged to: House   One/Two Story Residence One story   History of falls? 0   Services At/After Discharge   Transition of Care Consult

## 2025-01-29 NOTE — ED NOTES
Dr. Wharton attempting central line placement. Confirmed plan to use PRN Fentanyl 100 mcg and versed 2 mg prior to placement.

## 2025-01-29 NOTE — ED NOTES
TRANSFER - OUT REPORT:    Verbal report given to JONE Cristina on Farhad Rogers  being transferred to ICU for routine progression of patient care       Report consisted of patient's Situation, Background, Assessment and   Recommendations(SBAR).     Information from the following report(s) Nurse Handoff Report, ED SBAR, Intake/Output, MAR, Recent Results, and Neuro Assessment was reviewed with the receiving nurse.    Napavine Fall Assessment:    Presents to emergency department  because of falls (Syncope, seizure, or loss of consciousness): No  Age > 70: No  Altered Mental Status, Intoxication with alcohol or substance confusion (Disorientation, impaired judgment, poor safety awaremess, or inability to follow instructions): Yes  Impaired Mobility: Ambulates or transfers with assistive devices or assistance; Unable to ambulate or transer.: Yes  Nursing Judgement: Yes          Lines:   Peripheral IV Left;Proximal Forearm (Active)       Peripheral IV 01/28/25 Right;Dorsal Hand (Active)       Peripheral IV 01/28/25 Left Antecubital (Active)        CRITICAL LABS:     Verbally repeated the following test results Lab/POC: BG 53, Na 152, Albumin 2.8, BUN 47, pH 7.51, pCO2 50.3, PO2 39.7; possible lacunar infarct per head CT to JONE Cristina.    Orders for critical lab are ordered.        Additional comments: No sedation but not responsive to even pain as before    Elly Ghosh RN     Opportunity for questions and clarification was provided.      Patient transported with:  Monitor, Patient's medications from home, and Registered Nurse and ventilation with FiO2 35%

## 2025-01-29 NOTE — ED NOTES
Nurse called CT scan to see if they are ready for pt to come and called RT to make him aware pt is ready to be transported to CT scan.

## 2025-01-29 NOTE — ED NOTES
This nurse and respiratory HILARY accompanied pt tp CT scan and then to ICU room 312. Bedside shift report given to JONE Moss.

## 2025-01-29 NOTE — ED PROVIDER NOTES
I was called emergently to see the patient by our charge RN.  The patient was noted to be Hyp who tensive and hypothermic.  His blood pressures were in the 70s over 40s.  He was under the Christian hugger and had a temperature of 93.4.    The patient was initially supposed to be admitted to the hospitalist under Dr. Beckham.  The nurses had attempted to reach the hospitalist but were unable to.  I assumed care of the patient and ordered peripheral Levophed and another 500 mL bolus of fluid.    He is currently on 10 of Levophed.    I consulted the ICU.  The ICU came down and evaluated the patient.  They are requesting intubation at this time given the fact that the patient is somewhat altered.  I did intubate the patient.  Please refer to procedure note.    I have also placed a CVL.  Please refer to the procedure note.      Intubation    Date/Time: 1/28/2025 11:51 PM    Performed by: Ciera Wharton MD  Authorized by: Ciera Wharton MD    Consent:     Consent obtained:  Emergent situation  Universal protocol:     Patient identity confirmed:  Arm band  Pre-procedure details:     Indications: altered consciousness      Patient status:  Altered mental status    Look externally: no concerns      Mouth opening - incisor distance:  3 or more finger widths    Hyoid-mental distance: 3 or more finger widths      Hyoid-thyroid distance: 2 or more finger widths      Mallampati score:  II    Obstruction: none      Neck mobility: reduced      Pharmacologic strategy: RSI      Induction agents:  Etomidate    Paralytics:  Rocuronium  Procedure details:     Preoxygenation:  Bag valve mask    CPR in progress: no      Number of attempts:  1  Successful intubation attempt details:     Intubation technique: video assisted      Laryngoscope blade:  Mac 3    Bougie used: no      Grade view: I      Tube size (mm):  7.5    Tube type:  Cuffed    Tube visualized through cords: yes    Placement assessment:     ETT at teeth/gumline (cm):  25    Tube

## 2025-01-29 NOTE — ED NOTES
Report received from JONE Sanabria. Central line noted to be in use before verifying placement or absence of pnuemo by chest xray. Provider gave off going nurse verbal to use line before verification. This nurse placed order for stat chest xray. Chest Xray obtained and declared line adequate for use.

## 2025-01-29 NOTE — ED NOTES
Pt became profoundly hypotensive and hypothermic and was moved to ED room 4. He arrived with levophed started and jacqueline gisselgger on.    Family states Pt was recently discharged from [facility] for constipation. Family notes Pt has been spitting out his medications recently. He arrived altered to the ED, only responsive to pain, keeping his eyes closed, and not speaking even when prompted.    Received report and assumed care of patient from JONE Watson.

## 2025-01-29 NOTE — ED NOTES
Rapid EEG applied at this time. Pt is resting on the ED stretcher in NAD. Sedation is not being administered at this time. Pt is not responsive to painful stimuli, unlike neuro exam prior to intubation. Caregiver is at bedside.

## 2025-01-30 ENCOUNTER — APPOINTMENT (OUTPATIENT)
Facility: HOSPITAL | Age: 65
DRG: 720 | End: 2025-01-30
Payer: MEDICAID

## 2025-01-30 LAB
ANION GAP SERPL CALC-SCNC: 4 MMOL/L (ref 3–18)
ANION GAP SERPL CALC-SCNC: 5 MMOL/L (ref 3–18)
ARTERIAL PATENCY WRIST A: POSITIVE
BACTERIA SPEC CULT: NORMAL
BASE EXCESS BLD CALC-SCNC: 7.3 MMOL/L
BASOPHILS # BLD: 0.01 K/UL (ref 0–0.1)
BASOPHILS NFR BLD: 0.1 % (ref 0–2)
BDY SITE: ABNORMAL
BUN SERPL-MCNC: 44 MG/DL (ref 7–18)
BUN SERPL-MCNC: 47 MG/DL (ref 7–18)
BUN/CREAT SERPL: 31 (ref 12–20)
BUN/CREAT SERPL: 33 (ref 12–20)
CALCIUM SERPL-MCNC: 8.5 MG/DL (ref 8.5–10.1)
CALCIUM SERPL-MCNC: 8.5 MG/DL (ref 8.5–10.1)
CHLORIDE SERPL-SCNC: 112 MMOL/L (ref 100–111)
CHLORIDE SERPL-SCNC: 115 MMOL/L (ref 100–111)
CO2 SERPL-SCNC: 30 MMOL/L (ref 21–32)
CO2 SERPL-SCNC: 31 MMOL/L (ref 21–32)
CREAT SERPL-MCNC: 1.41 MG/DL (ref 0.6–1.3)
CREAT SERPL-MCNC: 1.43 MG/DL (ref 0.6–1.3)
DIFFERENTIAL METHOD BLD: ABNORMAL
EOSINOPHIL # BLD: 0 K/UL (ref 0–0.4)
EOSINOPHIL NFR BLD: 0 % (ref 0–5)
ERYTHROCYTE [DISTWIDTH] IN BLOOD BY AUTOMATED COUNT: 18.2 % (ref 11.6–14.5)
GAS FLOW.O2 O2 DELIVERY SYS: ABNORMAL
GAS FLOW.O2 SETTING OXYMISER: 18 BPM
GLUCOSE BLD STRIP.AUTO-MCNC: 145 MG/DL (ref 70–110)
GLUCOSE BLD STRIP.AUTO-MCNC: 173 MG/DL (ref 70–110)
GLUCOSE SERPL-MCNC: 121 MG/DL (ref 74–99)
GLUCOSE SERPL-MCNC: 149 MG/DL (ref 74–99)
HCO3 BLD-SCNC: 31.9 MMOL/L (ref 21–28)
HCT VFR BLD AUTO: 26.6 % (ref 36–48)
HGB BLD-MCNC: 8.6 G/DL (ref 13–16)
IMM GRANULOCYTES # BLD AUTO: 0.05 K/UL (ref 0–0.04)
IMM GRANULOCYTES NFR BLD AUTO: 0.6 % (ref 0–0.5)
LYMPHOCYTES # BLD: 0.16 K/UL (ref 0.9–3.6)
LYMPHOCYTES NFR BLD: 1.9 % (ref 21–52)
MAGNESIUM SERPL-MCNC: 1.9 MG/DL (ref 1.6–2.6)
MAGNESIUM SERPL-MCNC: 2.6 MG/DL (ref 1.6–2.6)
MCH RBC QN AUTO: 28.2 PG (ref 24–34)
MCHC RBC AUTO-ENTMCNC: 32.3 G/DL (ref 31–37)
MCV RBC AUTO: 87.2 FL (ref 78–100)
MONOCYTES # BLD: 0.49 K/UL (ref 0.05–1.2)
MONOCYTES NFR BLD: 5.7 % (ref 3–10)
NEUTS SEG # BLD: 7.89 K/UL (ref 1.8–8)
NEUTS SEG NFR BLD: 91.7 % (ref 40–73)
NRBC # BLD: 0.15 K/UL (ref 0–0.01)
NRBC BLD-RTO: 1.7 PER 100 WBC
O2/TOTAL GAS SETTING VFR VENT: 35 %
PCO2 BLD: 45 MMHG (ref 35–48)
PEEP RESPIRATORY: 8 CMH2O
PH BLD: 7.46 (ref 7.35–7.45)
PHOSPHATE SERPL-MCNC: 3.1 MG/DL (ref 2.5–4.9)
PLATELET # BLD AUTO: 126 K/UL (ref 135–420)
PMV BLD AUTO: 12.2 FL (ref 9.2–11.8)
PO2 BLD: 156 MMHG (ref 83–108)
POTASSIUM SERPL-SCNC: 2.9 MMOL/L (ref 3.5–5.5)
POTASSIUM SERPL-SCNC: 3.5 MMOL/L (ref 3.5–5.5)
POTASSIUM SERPL-SCNC: 3.6 MMOL/L (ref 3.5–5.5)
RBC # BLD AUTO: 3.05 M/UL (ref 4.35–5.65)
RESPIRATORY RATE, POC: 18 (ref 5–40)
SAO2 % BLD: 99.5 % (ref 92–97)
SERVICE CMNT-IMP: ABNORMAL
SERVICE CMNT-IMP: ABNORMAL
SERVICE CMNT-IMP: NORMAL
SODIUM SERPL-SCNC: 147 MMOL/L (ref 136–145)
SODIUM SERPL-SCNC: 147 MMOL/L (ref 136–145)
SODIUM SERPL-SCNC: 150 MMOL/L (ref 136–145)
SPECIMEN TYPE: ABNORMAL
VENTILATION MODE VENT: ABNORMAL
VT SETTING VENT: 450 ML
WBC # BLD AUTO: 8.6 K/UL (ref 4.6–13.2)

## 2025-01-30 PROCEDURE — 6360000002 HC RX W HCPCS

## 2025-01-30 PROCEDURE — 94003 VENT MGMT INPAT SUBQ DAY: CPT

## 2025-01-30 PROCEDURE — 99291 CRITICAL CARE FIRST HOUR: CPT | Performed by: INTERNAL MEDICINE

## 2025-01-30 PROCEDURE — 2000000000 HC ICU R&B

## 2025-01-30 PROCEDURE — 0DH67UZ INSERTION OF FEEDING DEVICE INTO STOMACH, VIA NATURAL OR ARTIFICIAL OPENING: ICD-10-PCS | Performed by: INTERNAL MEDICINE

## 2025-01-30 PROCEDURE — 6360000002 HC RX W HCPCS: Performed by: PHYSICIAN ASSISTANT

## 2025-01-30 PROCEDURE — 6360000002 HC RX W HCPCS: Performed by: INTERNAL MEDICINE

## 2025-01-30 PROCEDURE — 2500000003 HC RX 250 WO HCPCS: Performed by: PHYSICIAN ASSISTANT

## 2025-01-30 PROCEDURE — 80048 BASIC METABOLIC PNL TOTAL CA: CPT

## 2025-01-30 PROCEDURE — 6370000000 HC RX 637 (ALT 250 FOR IP): Performed by: INTERNAL MEDICINE

## 2025-01-30 PROCEDURE — 84100 ASSAY OF PHOSPHORUS: CPT

## 2025-01-30 PROCEDURE — 2580000003 HC RX 258: Performed by: INTERNAL MEDICINE

## 2025-01-30 PROCEDURE — 51702 INSERT TEMP BLADDER CATH: CPT

## 2025-01-30 PROCEDURE — 89220 SPUTUM SPECIMEN COLLECTION: CPT

## 2025-01-30 PROCEDURE — 82803 BLOOD GASES ANY COMBINATION: CPT

## 2025-01-30 PROCEDURE — 2580000003 HC RX 258

## 2025-01-30 PROCEDURE — 82962 GLUCOSE BLOOD TEST: CPT

## 2025-01-30 PROCEDURE — 3E0G76Z INTRODUCTION OF NUTRITIONAL SUBSTANCE INTO UPPER GI, VIA NATURAL OR ARTIFICIAL OPENING: ICD-10-PCS | Performed by: INTERNAL MEDICINE

## 2025-01-30 PROCEDURE — XX20X89 MONITORING OF BRAIN ELECTRICAL ACTIVITY, COMPUTER-AIDED DETECTION AND NOTIFICATION, NEW TECHNOLOGY GROUP 9: ICD-10-PCS | Performed by: STUDENT IN AN ORGANIZED HEALTH CARE EDUCATION/TRAINING PROGRAM

## 2025-01-30 PROCEDURE — 93005 ELECTROCARDIOGRAM TRACING: CPT | Performed by: PHYSICIAN ASSISTANT

## 2025-01-30 PROCEDURE — 36600 WITHDRAWAL OF ARTERIAL BLOOD: CPT

## 2025-01-30 PROCEDURE — 95717 EEG PHYS/QHP 2-12 HR W/O VID: CPT | Performed by: STUDENT IN AN ORGANIZED HEALTH CARE EDUCATION/TRAINING PROGRAM

## 2025-01-30 PROCEDURE — 84295 ASSAY OF SERUM SODIUM: CPT

## 2025-01-30 PROCEDURE — 83735 ASSAY OF MAGNESIUM: CPT

## 2025-01-30 PROCEDURE — 71045 X-RAY EXAM CHEST 1 VIEW: CPT

## 2025-01-30 PROCEDURE — 2580000003 HC RX 258: Performed by: PHYSICIAN ASSISTANT

## 2025-01-30 PROCEDURE — 6370000000 HC RX 637 (ALT 250 FOR IP): Performed by: PHYSICIAN ASSISTANT

## 2025-01-30 PROCEDURE — 2700000000 HC OXYGEN THERAPY PER DAY

## 2025-01-30 PROCEDURE — 94761 N-INVAS EAR/PLS OXIMETRY MLT: CPT

## 2025-01-30 PROCEDURE — 94640 AIRWAY INHALATION TREATMENT: CPT

## 2025-01-30 PROCEDURE — 84132 ASSAY OF SERUM POTASSIUM: CPT

## 2025-01-30 PROCEDURE — P9047 ALBUMIN (HUMAN), 25%, 50ML: HCPCS

## 2025-01-30 PROCEDURE — 36592 COLLECT BLOOD FROM PICC: CPT

## 2025-01-30 PROCEDURE — 85025 COMPLETE CBC W/AUTO DIFF WBC: CPT

## 2025-01-30 RX ORDER — KETOROLAC TROMETHAMINE 15 MG/ML
15 INJECTION, SOLUTION INTRAMUSCULAR; INTRAVENOUS ONCE
Status: COMPLETED | OUTPATIENT
Start: 2025-01-30 | End: 2025-01-30

## 2025-01-30 RX ORDER — SPIRONOLACTONE 25 MG/1
25 TABLET ORAL DAILY
Status: DISCONTINUED | OUTPATIENT
Start: 2025-01-30 | End: 2025-01-30

## 2025-01-30 RX ORDER — ALBUMIN (HUMAN) 12.5 G/50ML
25 SOLUTION INTRAVENOUS ONCE
Status: DISCONTINUED | OUTPATIENT
Start: 2025-01-30 | End: 2025-01-30

## 2025-01-30 RX ORDER — MIDODRINE HYDROCHLORIDE 10 MG/1
10 TABLET ORAL
Status: DISCONTINUED | OUTPATIENT
Start: 2025-01-30 | End: 2025-02-01

## 2025-01-30 RX ORDER — KETOROLAC TROMETHAMINE 15 MG/ML
INJECTION, SOLUTION INTRAMUSCULAR; INTRAVENOUS
Status: COMPLETED
Start: 2025-01-30 | End: 2025-01-30

## 2025-01-30 RX ORDER — BUDESONIDE 1 MG/2ML
1 INHALANT ORAL
Status: DISCONTINUED | OUTPATIENT
Start: 2025-01-30 | End: 2025-02-19 | Stop reason: HOSPADM

## 2025-01-30 RX ORDER — MAGNESIUM SULFATE IN WATER 40 MG/ML
2000 INJECTION, SOLUTION INTRAVENOUS ONCE
Status: COMPLETED | OUTPATIENT
Start: 2025-01-30 | End: 2025-01-30

## 2025-01-30 RX ORDER — POTASSIUM CHLORIDE 29.8 MG/ML
20 INJECTION INTRAVENOUS
Status: COMPLETED | OUTPATIENT
Start: 2025-01-30 | End: 2025-01-30

## 2025-01-30 RX ORDER — HEPARIN SODIUM 5000 [USP'U]/ML
5000 INJECTION, SOLUTION INTRAVENOUS; SUBCUTANEOUS EVERY 8 HOURS SCHEDULED
Status: DISCONTINUED | OUTPATIENT
Start: 2025-01-30 | End: 2025-02-19 | Stop reason: HOSPADM

## 2025-01-30 RX ORDER — ALBUMIN (HUMAN) 12.5 G/50ML
25 SOLUTION INTRAVENOUS EVERY 6 HOURS
Status: COMPLETED | OUTPATIENT
Start: 2025-01-30 | End: 2025-01-30

## 2025-01-30 RX ORDER — DEXTROSE MONOHYDRATE 50 MG/ML
INJECTION, SOLUTION INTRAVENOUS CONTINUOUS
Status: DISCONTINUED | OUTPATIENT
Start: 2025-01-30 | End: 2025-01-31

## 2025-01-30 RX ORDER — MORPHINE SULFATE 2 MG/ML
1 INJECTION, SOLUTION INTRAMUSCULAR; INTRAVENOUS ONCE
Status: COMPLETED | OUTPATIENT
Start: 2025-01-30 | End: 2025-01-30

## 2025-01-30 RX ADMIN — HEPARIN SODIUM 5000 UNITS: 5000 INJECTION INTRAVENOUS; SUBCUTANEOUS at 21:42

## 2025-01-30 RX ADMIN — ALBUMIN (HUMAN) 25 G: 0.25 INJECTION, SOLUTION INTRAVENOUS at 11:44

## 2025-01-30 RX ADMIN — MAGNESIUM SULFATE HEPTAHYDRATE 2000 MG: 40 INJECTION, SOLUTION INTRAVENOUS at 04:11

## 2025-01-30 RX ADMIN — BUDESONIDE 1 MG: 1 SUSPENSION RESPIRATORY (INHALATION) at 19:14

## 2025-01-30 RX ADMIN — POTASSIUM CHLORIDE 20 MEQ: 29.8 INJECTION, SOLUTION INTRAVENOUS at 04:58

## 2025-01-30 RX ADMIN — ARFORMOTEROL TARTRATE 15 MCG: 15 SOLUTION RESPIRATORY (INHALATION) at 08:36

## 2025-01-30 RX ADMIN — POTASSIUM CHLORIDE 20 MEQ: 29.8 INJECTION, SOLUTION INTRAVENOUS at 06:01

## 2025-01-30 RX ADMIN — FAMOTIDINE 20 MG: 10 INJECTION, SOLUTION INTRAVENOUS at 09:10

## 2025-01-30 RX ADMIN — MORPHINE SULFATE 1 MG: 2 INJECTION, SOLUTION INTRAMUSCULAR; INTRAVENOUS at 23:18

## 2025-01-30 RX ADMIN — KETOROLAC TROMETHAMINE 15 MG: 15 INJECTION, SOLUTION INTRAMUSCULAR; INTRAVENOUS at 17:41

## 2025-01-30 RX ADMIN — PIPERACILLIN AND TAZOBACTAM 3375 MG: 3; .375 INJECTION, POWDER, LYOPHILIZED, FOR SOLUTION INTRAVENOUS at 17:25

## 2025-01-30 RX ADMIN — CHLORHEXIDINE GLUCONATE 15 ML: 1.2 RINSE ORAL at 09:00

## 2025-01-30 RX ADMIN — SODIUM CHLORIDE, PRESERVATIVE FREE 10 ML: 5 INJECTION INTRAVENOUS at 09:17

## 2025-01-30 RX ADMIN — DEXTROSE MONOHYDRATE AND SODIUM CHLORIDE: 5; .45 INJECTION, SOLUTION INTRAVENOUS at 02:52

## 2025-01-30 RX ADMIN — ARFORMOTEROL TARTRATE 15 MCG: 15 SOLUTION RESPIRATORY (INHALATION) at 20:12

## 2025-01-30 RX ADMIN — PIPERACILLIN AND TAZOBACTAM 3375 MG: 3; .375 INJECTION, POWDER, LYOPHILIZED, FOR SOLUTION INTRAVENOUS at 09:26

## 2025-01-30 RX ADMIN — SODIUM CHLORIDE, PRESERVATIVE FREE 10 ML: 5 INJECTION INTRAVENOUS at 21:53

## 2025-01-30 RX ADMIN — HYDROCORTISONE SODIUM SUCCINATE 100 MG: 100 INJECTION, POWDER, FOR SOLUTION INTRAMUSCULAR; INTRAVENOUS at 12:13

## 2025-01-30 RX ADMIN — HEPARIN SODIUM 5000 UNITS: 5000 INJECTION INTRAVENOUS; SUBCUTANEOUS at 14:19

## 2025-01-30 RX ADMIN — HYDROCORTISONE SODIUM SUCCINATE 100 MG: 100 INJECTION, POWDER, FOR SOLUTION INTRAMUSCULAR; INTRAVENOUS at 04:20

## 2025-01-30 RX ADMIN — PIPERACILLIN AND TAZOBACTAM 3375 MG: 3; .375 INJECTION, POWDER, LYOPHILIZED, FOR SOLUTION INTRAVENOUS at 02:03

## 2025-01-30 RX ADMIN — ALBUMIN (HUMAN) 25 G: 0.25 INJECTION, SOLUTION INTRAVENOUS at 17:18

## 2025-01-30 RX ADMIN — DEXTROSE MONOHYDRATE: 50 INJECTION, SOLUTION INTRAVENOUS at 11:47

## 2025-01-30 RX ADMIN — BUDESONIDE 1 MG: 1 SUSPENSION RESPIRATORY (INHALATION) at 08:36

## 2025-01-30 RX ADMIN — POTASSIUM CHLORIDE 20 MEQ: 29.8 INJECTION, SOLUTION INTRAVENOUS at 04:13

## 2025-01-30 RX ADMIN — HEPARIN SODIUM 5000 UNITS: 5000 INJECTION INTRAVENOUS; SUBCUTANEOUS at 06:01

## 2025-01-30 RX ADMIN — POTASSIUM BICARBONATE 40 MEQ: 782 TABLET, EFFERVESCENT ORAL at 12:13

## 2025-01-30 RX ADMIN — VANCOMYCIN 1500 MG: 1.5 INJECTION, SOLUTION INTRAVENOUS at 06:06

## 2025-01-30 RX ADMIN — MIDODRINE HYDROCHLORIDE 10 MG: 10 TABLET ORAL at 12:13

## 2025-01-30 RX ADMIN — HYDROCORTISONE SODIUM SUCCINATE 100 MG: 100 INJECTION, POWDER, FOR SOLUTION INTRAMUSCULAR; INTRAVENOUS at 21:43

## 2025-01-30 ASSESSMENT — PAIN DESCRIPTION - DESCRIPTORS: DESCRIPTORS: ACHING;SORE

## 2025-01-30 ASSESSMENT — PAIN SCALES - GENERAL
PAINLEVEL_OUTOF10: 0
PAINLEVEL_OUTOF10: 8
PAINLEVEL_OUTOF10: 0

## 2025-01-30 ASSESSMENT — PULMONARY FUNCTION TESTS
PIF_VALUE: 13
PIF_VALUE: 16
PIF_VALUE: 16

## 2025-01-30 ASSESSMENT — PAIN DESCRIPTION - LOCATION
LOCATION: CHEST
LOCATION: RIB CAGE;CHEST

## 2025-01-30 ASSESSMENT — PAIN DESCRIPTION - ORIENTATION: ORIENTATION: ANTERIOR;POSTERIOR;LEFT;RIGHT

## 2025-01-30 NOTE — PROCEDURES
PROCEDURE NOTE  Date: 1/30/2025   Name: Farhad Rogers  YOB: 1960    Procedures: EEG RAPID    ELECTROENCEPHALOGRAM REPORT     Test Date:  01/29/25      History: Patient admitted for progressive changes in his mental status and generalized weakness.     Medications: Vancomycin, Zosyn, steroids, insulin, as needed Versed.     Patient consent: Correct patient identified     Description of procedure: This EEG was obtained using a 10 lead, 8 channel system positioned circumferentially without any parasagittal coverage (rapid EEG). Computer selected EEG is reviewed as well as background features and all clinically significant events. Neurala algorithm was utilized and implemented to provide analysis of underlying activity and seizure/status detection used to facilitate reading.  Patient intubated; off sedation.  Total duration of the recording was 2 hours and 22 minutes.     Description of recording: Background consists of diffuse theta and delta range slowing; symmetric.  No spikes or sharp wave discharge; no electrographic seizure                   Impression: Abnormal due to the presence of diffuse slowing; does not support nonconvulsive status.     Comment: Diffuse slowing is seen in toxic/metabolic encephalopathy or diffuse neurodegenerative process.

## 2025-01-31 LAB
ACETONE BLOOD: NOT DETECTED MG/L
ANION GAP SERPL CALC-SCNC: 5 MMOL/L (ref 3–18)
BACTERIA SPEC CULT: NORMAL
BACTERIA SPEC CULT: NORMAL
BASOPHILS # BLD: 0 K/UL (ref 0–0.1)
BASOPHILS NFR BLD: 0 % (ref 0–2)
BUN SERPL-MCNC: 39 MG/DL (ref 7–18)
BUN/CREAT SERPL: 30 (ref 12–20)
CALCIUM SERPL-MCNC: 8.7 MG/DL (ref 8.5–10.1)
CHAIN OF CUSTODY?: NO
CHLORIDE SERPL-SCNC: 112 MMOL/L (ref 100–111)
CO2 SERPL-SCNC: 30 MMOL/L (ref 21–32)
CREAT SERPL-MCNC: 1.29 MG/DL (ref 0.6–1.3)
DIFFERENTIAL METHOD BLD: ABNORMAL
EKG ATRIAL RATE: 67 BPM
EKG DIAGNOSIS: NORMAL
EKG P AXIS: 74 DEGREES
EKG P-R INTERVAL: 164 MS
EKG Q-T INTERVAL: 416 MS
EKG QRS DURATION: 100 MS
EKG QTC CALCULATION (BAZETT): 439 MS
EKG R AXIS: 66 DEGREES
EKG T AXIS: 19 DEGREES
EKG VENTRICULAR RATE: 67 BPM
EOSINOPHIL # BLD: 0 K/UL (ref 0–0.4)
EOSINOPHIL NFR BLD: 0 % (ref 0–5)
ERYTHROCYTE [DISTWIDTH] IN BLOOD BY AUTOMATED COUNT: 18.2 % (ref 11.6–14.5)
ETHANOL: <10 MG/L (ref 0–0.08)
ETHYLENE GLYCOL: NOT DETECTED MG/L
GLUCOSE BLD STRIP.AUTO-MCNC: 110 MG/DL (ref 70–110)
GLUCOSE BLD STRIP.AUTO-MCNC: 152 MG/DL (ref 70–110)
GLUCOSE BLD STRIP.AUTO-MCNC: 160 MG/DL (ref 70–110)
GLUCOSE BLD STRIP.AUTO-MCNC: 180 MG/DL (ref 70–110)
GLUCOSE SERPL-MCNC: 147 MG/DL (ref 74–99)
GLUCOSE SERPL-MCNC: 149 MG/DL (ref 74–99)
HCT VFR BLD AUTO: 22.8 % (ref 36–48)
HGB BLD-MCNC: 7.3 G/DL (ref 13–16)
IMM GRANULOCYTES # BLD AUTO: 0.13 K/UL (ref 0–0.04)
IMM GRANULOCYTES NFR BLD AUTO: 1.3 % (ref 0–0.5)
ISOPROPANOL: NOT DETECTED MG/L
LYMPHOCYTES # BLD: 0.47 K/UL (ref 0.9–3.6)
LYMPHOCYTES NFR BLD: 4.5 % (ref 21–52)
MAGNESIUM SERPL-MCNC: 2.4 MG/DL (ref 1.6–2.6)
MCH RBC QN AUTO: 27.9 PG (ref 24–34)
MCHC RBC AUTO-ENTMCNC: 32 G/DL (ref 31–37)
MCV RBC AUTO: 87 FL (ref 78–100)
METHANOL: NOT DETECTED MG/L
MONOCYTES # BLD: 0.66 K/UL (ref 0.05–1.2)
MONOCYTES NFR BLD: 6.4 % (ref 3–10)
NEUTS SEG # BLD: 9.08 K/UL (ref 1.8–8)
NEUTS SEG NFR BLD: 87.8 % (ref 40–73)
NRBC # BLD: 0.14 K/UL (ref 0–0.01)
NRBC BLD-RTO: 1.4 PER 100 WBC
PHOSPHATE SERPL-MCNC: 2.8 MG/DL (ref 2.5–4.9)
PLATELET # BLD AUTO: 118 K/UL (ref 135–420)
PMV BLD AUTO: 13 FL (ref 9.2–11.8)
POTASSIUM SERPL-SCNC: 3.2 MMOL/L (ref 3.5–5.5)
POTASSIUM SERPL-SCNC: 3.9 MMOL/L (ref 3.5–5.5)
RBC # BLD AUTO: 2.62 M/UL (ref 4.35–5.65)
REPORT STATUS: ABNORMAL
SERVICE CMNT-IMP: NORMAL
SODIUM SERPL-SCNC: 147 MMOL/L (ref 136–145)
SPECIMEN SOURCE: ABNORMAL
VANCOMYCIN SERPL-MCNC: 28.6 UG/ML (ref 5–40)
WBC # BLD AUTO: 10.3 K/UL (ref 4.6–13.2)

## 2025-01-31 PROCEDURE — 84165 PROTEIN E-PHORESIS SERUM: CPT

## 2025-01-31 PROCEDURE — 2500000003 HC RX 250 WO HCPCS: Performed by: PHYSICIAN ASSISTANT

## 2025-01-31 PROCEDURE — 6360000002 HC RX W HCPCS: Performed by: PHYSICIAN ASSISTANT

## 2025-01-31 PROCEDURE — 94640 AIRWAY INHALATION TREATMENT: CPT

## 2025-01-31 PROCEDURE — 97110 THERAPEUTIC EXERCISES: CPT

## 2025-01-31 PROCEDURE — 2000000000 HC ICU R&B

## 2025-01-31 PROCEDURE — 92610 EVALUATE SWALLOWING FUNCTION: CPT

## 2025-01-31 PROCEDURE — 94761 N-INVAS EAR/PLS OXIMETRY MLT: CPT

## 2025-01-31 PROCEDURE — 84132 ASSAY OF SERUM POTASSIUM: CPT

## 2025-01-31 PROCEDURE — 99233 SBSQ HOSP IP/OBS HIGH 50: CPT | Performed by: STUDENT IN AN ORGANIZED HEALTH CARE EDUCATION/TRAINING PROGRAM

## 2025-01-31 PROCEDURE — 80048 BASIC METABOLIC PNL TOTAL CA: CPT

## 2025-01-31 PROCEDURE — 84155 ASSAY OF PROTEIN SERUM: CPT

## 2025-01-31 PROCEDURE — 2580000003 HC RX 258

## 2025-01-31 PROCEDURE — 2580000003 HC RX 258: Performed by: INTERNAL MEDICINE

## 2025-01-31 PROCEDURE — 84100 ASSAY OF PHOSPHORUS: CPT

## 2025-01-31 PROCEDURE — 82962 GLUCOSE BLOOD TEST: CPT

## 2025-01-31 PROCEDURE — 6360000002 HC RX W HCPCS: Performed by: INTERNAL MEDICINE

## 2025-01-31 PROCEDURE — 92526 ORAL FUNCTION THERAPY: CPT

## 2025-01-31 PROCEDURE — 6370000000 HC RX 637 (ALT 250 FOR IP): Performed by: INTERNAL MEDICINE

## 2025-01-31 PROCEDURE — 93010 ELECTROCARDIOGRAM REPORT: CPT | Performed by: INTERNAL MEDICINE

## 2025-01-31 PROCEDURE — 2580000003 HC RX 258: Performed by: PHYSICIAN ASSISTANT

## 2025-01-31 PROCEDURE — 2700000000 HC OXYGEN THERAPY PER DAY

## 2025-01-31 PROCEDURE — 97535 SELF CARE MNGMENT TRAINING: CPT

## 2025-01-31 PROCEDURE — 82947 ASSAY GLUCOSE BLOOD QUANT: CPT

## 2025-01-31 PROCEDURE — 83735 ASSAY OF MAGNESIUM: CPT

## 2025-01-31 PROCEDURE — 94664 DEMO&/EVAL PT USE INHALER: CPT

## 2025-01-31 PROCEDURE — 1100000003 HC PRIVATE W/ TELEMETRY

## 2025-01-31 PROCEDURE — 97166 OT EVAL MOD COMPLEX 45 MIN: CPT

## 2025-01-31 PROCEDURE — 2500000003 HC RX 250 WO HCPCS: Performed by: INTERNAL MEDICINE

## 2025-01-31 PROCEDURE — 36415 COLL VENOUS BLD VENIPUNCTURE: CPT

## 2025-01-31 PROCEDURE — 97162 PT EVAL MOD COMPLEX 30 MIN: CPT

## 2025-01-31 PROCEDURE — 83521 IG LIGHT CHAINS FREE EACH: CPT

## 2025-01-31 PROCEDURE — 6360000002 HC RX W HCPCS: Performed by: STUDENT IN AN ORGANIZED HEALTH CARE EDUCATION/TRAINING PROGRAM

## 2025-01-31 PROCEDURE — 85025 COMPLETE CBC W/AUTO DIFF WBC: CPT

## 2025-01-31 PROCEDURE — 80202 ASSAY OF VANCOMYCIN: CPT

## 2025-01-31 RX ORDER — POTASSIUM CHLORIDE 7.45 MG/ML
10 INJECTION INTRAVENOUS
Status: DISCONTINUED | OUTPATIENT
Start: 2025-01-31 | End: 2025-01-31

## 2025-01-31 RX ORDER — DEXTROSE MONOHYDRATE 50 MG/ML
INJECTION, SOLUTION INTRAVENOUS CONTINUOUS
Status: DISCONTINUED | OUTPATIENT
Start: 2025-01-31 | End: 2025-01-31

## 2025-01-31 RX ORDER — POTASSIUM CHLORIDE, DEXTROSE MONOHYDRATE 150; 5 MG/100ML; G/100ML
INJECTION, SOLUTION INTRAVENOUS CONTINUOUS
Status: DISCONTINUED | OUTPATIENT
Start: 2025-01-31 | End: 2025-02-02

## 2025-01-31 RX ORDER — HYDROCORTISONE SODIUM SUCCINATE 100 MG/2ML
50 INJECTION INTRAMUSCULAR; INTRAVENOUS EVERY 12 HOURS
Status: DISCONTINUED | OUTPATIENT
Start: 2025-01-31 | End: 2025-02-03

## 2025-01-31 RX ORDER — POTASSIUM CHLORIDE, DEXTROSE MONOHYDRATE 150; 5 MG/100ML; G/100ML
INJECTION, SOLUTION INTRAVENOUS CONTINUOUS
Status: DISCONTINUED | OUTPATIENT
Start: 2025-01-31 | End: 2025-01-31

## 2025-01-31 RX ORDER — ACETAMINOPHEN 650 MG/1
650 SUPPOSITORY RECTAL EVERY 6 HOURS PRN
Status: DISCONTINUED | OUTPATIENT
Start: 2025-01-31 | End: 2025-02-11

## 2025-01-31 RX ORDER — ACETAMINOPHEN 325 MG/1
650 TABLET ORAL EVERY 6 HOURS PRN
Status: DISCONTINUED | OUTPATIENT
Start: 2025-01-31 | End: 2025-02-11

## 2025-01-31 RX ADMIN — POTASSIUM CHLORIDE 10 MEQ: 7.46 INJECTION, SOLUTION INTRAVENOUS at 12:54

## 2025-01-31 RX ADMIN — POTASSIUM CHLORIDE 10 MEQ: 7.45 INJECTION INTRAVENOUS at 12:45

## 2025-01-31 RX ADMIN — POTASSIUM CHLORIDE 10 MEQ: 7.45 INJECTION INTRAVENOUS at 11:45

## 2025-01-31 RX ADMIN — BUDESONIDE 1 MG: 1 SUSPENSION RESPIRATORY (INHALATION) at 09:01

## 2025-01-31 RX ADMIN — HEPARIN SODIUM 5000 UNITS: 5000 INJECTION INTRAVENOUS; SUBCUTANEOUS at 13:30

## 2025-01-31 RX ADMIN — HEPARIN SODIUM 5000 UNITS: 5000 INJECTION INTRAVENOUS; SUBCUTANEOUS at 21:53

## 2025-01-31 RX ADMIN — PIPERACILLIN AND TAZOBACTAM 3375 MG: 3; .375 INJECTION, POWDER, LYOPHILIZED, FOR SOLUTION INTRAVENOUS at 18:50

## 2025-01-31 RX ADMIN — SODIUM CHLORIDE, PRESERVATIVE FREE 10 ML: 5 INJECTION INTRAVENOUS at 21:53

## 2025-01-31 RX ADMIN — POTASSIUM CHLORIDE 10 MEQ: 7.45 INJECTION INTRAVENOUS at 09:46

## 2025-01-31 RX ADMIN — BUDESONIDE 1 MG: 1 SUSPENSION RESPIRATORY (INHALATION) at 20:21

## 2025-01-31 RX ADMIN — POTASSIUM CHLORIDE AND DEXTROSE MONOHYDRATE: 150; 5 INJECTION, SOLUTION INTRAVENOUS at 16:54

## 2025-01-31 RX ADMIN — PIPERACILLIN AND TAZOBACTAM 3375 MG: 3; .375 INJECTION, POWDER, LYOPHILIZED, FOR SOLUTION INTRAVENOUS at 09:47

## 2025-01-31 RX ADMIN — SODIUM CHLORIDE, PRESERVATIVE FREE 10 ML: 5 INJECTION INTRAVENOUS at 09:48

## 2025-01-31 RX ADMIN — MIDODRINE HYDROCHLORIDE 10 MG: 10 TABLET ORAL at 09:48

## 2025-01-31 RX ADMIN — DEXTROSE MONOHYDRATE: 50 INJECTION, SOLUTION INTRAVENOUS at 00:17

## 2025-01-31 RX ADMIN — PIPERACILLIN AND TAZOBACTAM 3375 MG: 3; .375 INJECTION, POWDER, LYOPHILIZED, FOR SOLUTION INTRAVENOUS at 02:09

## 2025-01-31 RX ADMIN — MIDODRINE HYDROCHLORIDE 10 MG: 10 TABLET ORAL at 13:30

## 2025-01-31 RX ADMIN — FAMOTIDINE 20 MG: 10 INJECTION, SOLUTION INTRAVENOUS at 09:48

## 2025-01-31 RX ADMIN — HYDROCORTISONE SODIUM SUCCINATE 50 MG: 100 INJECTION, POWDER, FOR SOLUTION INTRAMUSCULAR; INTRAVENOUS at 09:48

## 2025-01-31 RX ADMIN — POTASSIUM CHLORIDE 10 MEQ: 7.45 INJECTION INTRAVENOUS at 10:46

## 2025-01-31 RX ADMIN — HEPARIN SODIUM 5000 UNITS: 5000 INJECTION INTRAVENOUS; SUBCUTANEOUS at 06:29

## 2025-01-31 RX ADMIN — MIDODRINE HYDROCHLORIDE 10 MG: 10 TABLET ORAL at 18:50

## 2025-01-31 RX ADMIN — ARFORMOTEROL TARTRATE 15 MCG: 15 SOLUTION RESPIRATORY (INHALATION) at 20:21

## 2025-01-31 RX ADMIN — ARFORMOTEROL TARTRATE 15 MCG: 15 SOLUTION RESPIRATORY (INHALATION) at 09:01

## 2025-01-31 RX ADMIN — HYDROCORTISONE SODIUM SUCCINATE 50 MG: 100 INJECTION, POWDER, FOR SOLUTION INTRAMUSCULAR; INTRAVENOUS at 20:32

## 2025-01-31 ASSESSMENT — PAIN SCALES - GENERAL
PAINLEVEL_OUTOF10: 0

## 2025-01-31 NOTE — CARE COORDINATION
When medically ready dispo. home with significant other, will need medical transport at discharge.      SYD Elkins

## 2025-02-01 ENCOUNTER — APPOINTMENT (OUTPATIENT)
Facility: HOSPITAL | Age: 65
DRG: 720 | End: 2025-02-01
Payer: MEDICAID

## 2025-02-01 PROBLEM — R00.1 BRADYCARDIA: Status: ACTIVE | Noted: 2025-02-01

## 2025-02-01 PROBLEM — I07.1 MODERATE TRICUSPID VALVE REGURGITATION: Status: ACTIVE | Noted: 2025-02-01

## 2025-02-01 LAB
ANION GAP SERPL CALC-SCNC: 4 MMOL/L (ref 3–18)
BACTERIA SPEC CULT: NORMAL
BASOPHILS # BLD: 0.01 K/UL (ref 0–0.1)
BASOPHILS NFR BLD: 0.1 % (ref 0–2)
BUN SERPL-MCNC: 38 MG/DL (ref 7–18)
BUN/CREAT SERPL: 32 (ref 12–20)
CALCIUM SERPL-MCNC: 8.7 MG/DL (ref 8.5–10.1)
CHLORIDE SERPL-SCNC: 109 MMOL/L (ref 100–111)
CO2 SERPL-SCNC: 29 MMOL/L (ref 21–32)
CREAT SERPL-MCNC: 1.2 MG/DL (ref 0.6–1.3)
CREAT UR-MCNC: 27 MG/DL (ref 30–125)
DIFFERENTIAL METHOD BLD: ABNORMAL
EKG ATRIAL RATE: 46 BPM
EKG ATRIAL RATE: 53 BPM
EKG DIAGNOSIS: NORMAL
EKG DIAGNOSIS: NORMAL
EKG P AXIS: 110 DEGREES
EKG P AXIS: 68 DEGREES
EKG P-R INTERVAL: 168 MS
EKG P-R INTERVAL: 178 MS
EKG Q-T INTERVAL: 448 MS
EKG Q-T INTERVAL: 472 MS
EKG QRS DURATION: 90 MS
EKG QRS DURATION: 94 MS
EKG QTC CALCULATION (BAZETT): 392 MS
EKG QTC CALCULATION (BAZETT): 413 MS
EKG R AXIS: 118 DEGREES
EKG R AXIS: 67 DEGREES
EKG T AXIS: 132 DEGREES
EKG T AXIS: 53 DEGREES
EKG VENTRICULAR RATE: 46 BPM
EKG VENTRICULAR RATE: 46 BPM
EOSINOPHIL # BLD: 0 K/UL (ref 0–0.4)
EOSINOPHIL NFR BLD: 0 % (ref 0–5)
ERYTHROCYTE [DISTWIDTH] IN BLOOD BY AUTOMATED COUNT: 18.2 % (ref 11.6–14.5)
GLUCOSE BLD STRIP.AUTO-MCNC: 123 MG/DL (ref 70–110)
GLUCOSE BLD STRIP.AUTO-MCNC: 135 MG/DL (ref 70–110)
GLUCOSE BLD STRIP.AUTO-MCNC: 140 MG/DL (ref 70–110)
GLUCOSE BLD STRIP.AUTO-MCNC: 150 MG/DL (ref 70–110)
GLUCOSE BLD STRIP.AUTO-MCNC: 178 MG/DL (ref 70–110)
GLUCOSE SERPL-MCNC: 128 MG/DL (ref 74–99)
GRAM STN SPEC: NORMAL
HCT VFR BLD AUTO: 26.1 % (ref 36–48)
HGB BLD-MCNC: 8.2 G/DL (ref 13–16)
IMM GRANULOCYTES # BLD AUTO: 0.14 K/UL (ref 0–0.04)
IMM GRANULOCYTES NFR BLD AUTO: 1.8 % (ref 0–0.5)
LYMPHOCYTES # BLD: 0.44 K/UL (ref 0.9–3.6)
LYMPHOCYTES NFR BLD: 5.7 % (ref 21–52)
MAGNESIUM SERPL-MCNC: 2.4 MG/DL (ref 1.6–2.6)
MCH RBC QN AUTO: 27.6 PG (ref 24–34)
MCHC RBC AUTO-ENTMCNC: 31.4 G/DL (ref 31–37)
MCV RBC AUTO: 87.9 FL (ref 78–100)
MONOCYTES # BLD: 0.43 K/UL (ref 0.05–1.2)
MONOCYTES NFR BLD: 5.6 % (ref 3–10)
NEUTS SEG # BLD: 6.69 K/UL (ref 1.8–8)
NEUTS SEG NFR BLD: 86.8 % (ref 40–73)
NRBC # BLD: 0.07 K/UL (ref 0–0.01)
NRBC BLD-RTO: 0.9 PER 100 WBC
PHOSPHATE SERPL-MCNC: 3.4 MG/DL (ref 2.5–4.9)
PLATELET # BLD AUTO: 147 K/UL (ref 135–420)
PMV BLD AUTO: 12.1 FL (ref 9.2–11.8)
POTASSIUM SERPL-SCNC: 3.7 MMOL/L (ref 3.5–5.5)
PROT UR-MCNC: 15 MG/DL
RBC # BLD AUTO: 2.97 M/UL (ref 4.35–5.65)
SERVICE CMNT-IMP: NORMAL
SODIUM SERPL-SCNC: 142 MMOL/L (ref 136–145)
WBC # BLD AUTO: 7.7 K/UL (ref 4.6–13.2)

## 2025-02-01 PROCEDURE — 99223 1ST HOSP IP/OBS HIGH 75: CPT | Performed by: INTERNAL MEDICINE

## 2025-02-01 PROCEDURE — 2580000003 HC RX 258: Performed by: HOSPITALIST

## 2025-02-01 PROCEDURE — 93010 ELECTROCARDIOGRAM REPORT: CPT | Performed by: INTERNAL MEDICINE

## 2025-02-01 PROCEDURE — 94640 AIRWAY INHALATION TREATMENT: CPT

## 2025-02-01 PROCEDURE — 93005 ELECTROCARDIOGRAM TRACING: CPT | Performed by: STUDENT IN AN ORGANIZED HEALTH CARE EDUCATION/TRAINING PROGRAM

## 2025-02-01 PROCEDURE — 6360000002 HC RX W HCPCS: Performed by: INTERNAL MEDICINE

## 2025-02-01 PROCEDURE — 94664 DEMO&/EVAL PT USE INHALER: CPT

## 2025-02-01 PROCEDURE — 2500000003 HC RX 250 WO HCPCS: Performed by: INTERNAL MEDICINE

## 2025-02-01 PROCEDURE — 93005 ELECTROCARDIOGRAM TRACING: CPT | Performed by: EMERGENCY MEDICINE

## 2025-02-01 PROCEDURE — 83735 ASSAY OF MAGNESIUM: CPT

## 2025-02-01 PROCEDURE — 2700000000 HC OXYGEN THERAPY PER DAY

## 2025-02-01 PROCEDURE — 85025 COMPLETE CBC W/AUTO DIFF WBC: CPT

## 2025-02-01 PROCEDURE — 2500000003 HC RX 250 WO HCPCS: Performed by: HOSPITALIST

## 2025-02-01 PROCEDURE — 84156 ASSAY OF PROTEIN URINE: CPT

## 2025-02-01 PROCEDURE — 2000000000 HC ICU R&B

## 2025-02-01 PROCEDURE — 6370000000 HC RX 637 (ALT 250 FOR IP): Performed by: INTERNAL MEDICINE

## 2025-02-01 PROCEDURE — 36415 COLL VENOUS BLD VENIPUNCTURE: CPT

## 2025-02-01 PROCEDURE — 82962 GLUCOSE BLOOD TEST: CPT

## 2025-02-01 PROCEDURE — 74018 RADEX ABDOMEN 1 VIEW: CPT

## 2025-02-01 PROCEDURE — 2580000003 HC RX 258: Performed by: INTERNAL MEDICINE

## 2025-02-01 PROCEDURE — 82570 ASSAY OF URINE CREATININE: CPT

## 2025-02-01 PROCEDURE — 2580000003 HC RX 258: Performed by: PHYSICIAN ASSISTANT

## 2025-02-01 PROCEDURE — 80048 BASIC METABOLIC PNL TOTAL CA: CPT

## 2025-02-01 PROCEDURE — 1100000003 HC PRIVATE W/ TELEMETRY

## 2025-02-01 PROCEDURE — 94761 N-INVAS EAR/PLS OXIMETRY MLT: CPT

## 2025-02-01 PROCEDURE — 6360000002 HC RX W HCPCS: Performed by: PHYSICIAN ASSISTANT

## 2025-02-01 PROCEDURE — 99232 SBSQ HOSP IP/OBS MODERATE 35: CPT | Performed by: HOSPITALIST

## 2025-02-01 PROCEDURE — 2500000003 HC RX 250 WO HCPCS: Performed by: PHYSICIAN ASSISTANT

## 2025-02-01 PROCEDURE — 84100 ASSAY OF PHOSPHORUS: CPT

## 2025-02-01 PROCEDURE — 6360000002 HC RX W HCPCS: Performed by: STUDENT IN AN ORGANIZED HEALTH CARE EDUCATION/TRAINING PROGRAM

## 2025-02-01 PROCEDURE — 99232 SBSQ HOSP IP/OBS MODERATE 35: CPT | Performed by: STUDENT IN AN ORGANIZED HEALTH CARE EDUCATION/TRAINING PROGRAM

## 2025-02-01 RX ORDER — MIDODRINE HYDROCHLORIDE 5 MG/1
5 TABLET ORAL
Status: DISCONTINUED | OUTPATIENT
Start: 2025-02-01 | End: 2025-02-04

## 2025-02-01 RX ADMIN — MIDODRINE HYDROCHLORIDE 5 MG: 5 TABLET ORAL at 18:16

## 2025-02-01 RX ADMIN — HEPARIN SODIUM 5000 UNITS: 5000 INJECTION INTRAVENOUS; SUBCUTANEOUS at 22:15

## 2025-02-01 RX ADMIN — BUDESONIDE 1 MG: 1 SUSPENSION RESPIRATORY (INHALATION) at 19:52

## 2025-02-01 RX ADMIN — MIDODRINE HYDROCHLORIDE 10 MG: 10 TABLET ORAL at 08:32

## 2025-02-01 RX ADMIN — ARFORMOTEROL TARTRATE 15 MCG: 15 SOLUTION RESPIRATORY (INHALATION) at 19:52

## 2025-02-01 RX ADMIN — BUDESONIDE 1 MG: 1 SUSPENSION RESPIRATORY (INHALATION) at 08:37

## 2025-02-01 RX ADMIN — MIDODRINE HYDROCHLORIDE 5 MG: 5 TABLET ORAL at 14:22

## 2025-02-01 RX ADMIN — POTASSIUM CHLORIDE AND DEXTROSE MONOHYDRATE: 150; 5 INJECTION, SOLUTION INTRAVENOUS at 07:50

## 2025-02-01 RX ADMIN — FAMOTIDINE 20 MG: 10 INJECTION, SOLUTION INTRAVENOUS at 08:32

## 2025-02-01 RX ADMIN — SODIUM CHLORIDE, PRESERVATIVE FREE 10 ML: 5 INJECTION INTRAVENOUS at 08:32

## 2025-02-01 RX ADMIN — SODIUM CHLORIDE, PRESERVATIVE FREE 10 ML: 5 INJECTION INTRAVENOUS at 21:08

## 2025-02-01 RX ADMIN — HYDROCORTISONE SODIUM SUCCINATE 50 MG: 100 INJECTION, POWDER, FOR SOLUTION INTRAMUSCULAR; INTRAVENOUS at 21:07

## 2025-02-01 RX ADMIN — ARFORMOTEROL TARTRATE 15 MCG: 15 SOLUTION RESPIRATORY (INHALATION) at 08:37

## 2025-02-01 RX ADMIN — PIPERACILLIN AND TAZOBACTAM 3375 MG: 3; .375 INJECTION, POWDER, LYOPHILIZED, FOR SOLUTION INTRAVENOUS at 09:06

## 2025-02-01 RX ADMIN — HYDROCORTISONE SODIUM SUCCINATE 50 MG: 100 INJECTION, POWDER, FOR SOLUTION INTRAMUSCULAR; INTRAVENOUS at 08:32

## 2025-02-01 RX ADMIN — SODIUM CHLORIDE, PRESERVATIVE FREE 20 MG: 5 INJECTION INTRAVENOUS at 21:07

## 2025-02-01 RX ADMIN — HEPARIN SODIUM 5000 UNITS: 5000 INJECTION INTRAVENOUS; SUBCUTANEOUS at 06:50

## 2025-02-01 RX ADMIN — POTASSIUM CHLORIDE AND DEXTROSE MONOHYDRATE: 150; 5 INJECTION, SOLUTION INTRAVENOUS at 21:57

## 2025-02-01 RX ADMIN — PIPERACILLIN AND TAZOBACTAM 3375 MG: 3; .375 INJECTION, POWDER, LYOPHILIZED, FOR SOLUTION INTRAVENOUS at 18:16

## 2025-02-01 RX ADMIN — PIPERACILLIN AND TAZOBACTAM 3375 MG: 3; .375 INJECTION, POWDER, LYOPHILIZED, FOR SOLUTION INTRAVENOUS at 01:09

## 2025-02-01 RX ADMIN — HEPARIN SODIUM 5000 UNITS: 5000 INJECTION INTRAVENOUS; SUBCUTANEOUS at 14:22

## 2025-02-01 ASSESSMENT — PAIN SCALES - GENERAL
PAINLEVEL_OUTOF10: 0

## 2025-02-02 ENCOUNTER — APPOINTMENT (OUTPATIENT)
Facility: HOSPITAL | Age: 65
DRG: 720 | End: 2025-02-02
Payer: MEDICAID

## 2025-02-02 LAB
ANION GAP BLD CALC-SCNC: ABNORMAL MMOL/L (ref 10–20)
ANION GAP SERPL CALC-SCNC: 5 MMOL/L (ref 3–18)
BASE EXCESS BLD CALC-SCNC: 7.7 MMOL/L
BDY SITE: ABNORMAL
BUN SERPL-MCNC: 28 MG/DL (ref 7–18)
BUN/CREAT SERPL: 32 (ref 12–20)
CA-I BLD-MCNC: 1.24 MMOL/L (ref 1.15–1.33)
CALCIUM SERPL-MCNC: 8.7 MG/DL (ref 8.5–10.1)
CHLORIDE BLD-SCNC: 105 MMOL/L (ref 98–107)
CHLORIDE SERPL-SCNC: 111 MMOL/L (ref 100–111)
CO2 BLD-SCNC: 31 MMOL/L (ref 22–29)
CO2 SERPL-SCNC: 29 MMOL/L (ref 21–32)
CREAT BLD-MCNC: 1.06 MG/DL (ref 0.6–1.3)
CREAT SERPL-MCNC: 0.88 MG/DL (ref 0.6–1.3)
FIO2 ON VENT: 45 %
GAS FLOW.O2 O2 DELIVERY SYS: ABNORMAL
GLUCOSE BLD STRIP.AUTO-MCNC: 100 MG/DL (ref 70–110)
GLUCOSE BLD STRIP.AUTO-MCNC: 117 MG/DL (ref 70–110)
GLUCOSE BLD STRIP.AUTO-MCNC: 119 MG/DL (ref 70–110)
GLUCOSE BLD STRIP.AUTO-MCNC: 159 MG/DL (ref 70–110)
GLUCOSE BLD-MCNC: 151 MG/DL (ref 74–99)
GLUCOSE SERPL-MCNC: 165 MG/DL (ref 74–99)
HCO3 BLD-SCNC: 32.2 MMOL/L (ref 21–28)
LACTATE BLD-SCNC: 1.64 MMOL/L (ref 0.4–2)
NT PRO BNP: 1300 PG/ML (ref 0–900)
PCO2 BLDV: 44.2 MMHG (ref 41–51)
PH BLDV: 7.47 (ref 7.32–7.42)
PO2 BLDV: 45 MMHG (ref 25–40)
POTASSIUM BLD-SCNC: 3.8 MMOL/L (ref 3.5–5.1)
POTASSIUM SERPL-SCNC: 3.2 MMOL/L (ref 3.5–5.5)
SAO2 % BLD: 83 % (ref 94–98)
SERVICE CMNT-IMP: ABNORMAL
SODIUM BLD-SCNC: 145 MMOL/L (ref 136–145)
SODIUM SERPL-SCNC: 145 MMOL/L (ref 136–145)
SPECIMEN SITE: ABNORMAL

## 2025-02-02 PROCEDURE — 94660 CPAP INITIATION&MGMT: CPT

## 2025-02-02 PROCEDURE — 1100000003 HC PRIVATE W/ TELEMETRY

## 2025-02-02 PROCEDURE — 6360000002 HC RX W HCPCS: Performed by: INTERNAL MEDICINE

## 2025-02-02 PROCEDURE — 2500000003 HC RX 250 WO HCPCS: Performed by: HOSPITALIST

## 2025-02-02 PROCEDURE — 6360000002 HC RX W HCPCS: Performed by: PHYSICIAN ASSISTANT

## 2025-02-02 PROCEDURE — 6370000000 HC RX 637 (ALT 250 FOR IP): Performed by: PHYSICIAN ASSISTANT

## 2025-02-02 PROCEDURE — 82962 GLUCOSE BLOOD TEST: CPT

## 2025-02-02 PROCEDURE — 82947 ASSAY GLUCOSE BLOOD QUANT: CPT

## 2025-02-02 PROCEDURE — 85014 HEMATOCRIT: CPT

## 2025-02-02 PROCEDURE — 80048 BASIC METABOLIC PNL TOTAL CA: CPT

## 2025-02-02 PROCEDURE — 2580000003 HC RX 258: Performed by: PHYSICIAN ASSISTANT

## 2025-02-02 PROCEDURE — 2500000003 HC RX 250 WO HCPCS: Performed by: PHYSICIAN ASSISTANT

## 2025-02-02 PROCEDURE — 82803 BLOOD GASES ANY COMBINATION: CPT

## 2025-02-02 PROCEDURE — 99232 SBSQ HOSP IP/OBS MODERATE 35: CPT | Performed by: STUDENT IN AN ORGANIZED HEALTH CARE EDUCATION/TRAINING PROGRAM

## 2025-02-02 PROCEDURE — 99232 SBSQ HOSP IP/OBS MODERATE 35: CPT | Performed by: HOSPITALIST

## 2025-02-02 PROCEDURE — 84295 ASSAY OF SERUM SODIUM: CPT

## 2025-02-02 PROCEDURE — 83605 ASSAY OF LACTIC ACID: CPT

## 2025-02-02 PROCEDURE — 94761 N-INVAS EAR/PLS OXIMETRY MLT: CPT

## 2025-02-02 PROCEDURE — 71045 X-RAY EXAM CHEST 1 VIEW: CPT

## 2025-02-02 PROCEDURE — 6370000000 HC RX 637 (ALT 250 FOR IP): Performed by: INTERNAL MEDICINE

## 2025-02-02 PROCEDURE — 83880 ASSAY OF NATRIURETIC PEPTIDE: CPT

## 2025-02-02 PROCEDURE — 2580000003 HC RX 258: Performed by: HOSPITALIST

## 2025-02-02 PROCEDURE — 84132 ASSAY OF SERUM POTASSIUM: CPT

## 2025-02-02 PROCEDURE — 36600 WITHDRAWAL OF ARTERIAL BLOOD: CPT

## 2025-02-02 PROCEDURE — 2700000000 HC OXYGEN THERAPY PER DAY

## 2025-02-02 PROCEDURE — 82330 ASSAY OF CALCIUM: CPT

## 2025-02-02 PROCEDURE — 94640 AIRWAY INHALATION TREATMENT: CPT

## 2025-02-02 PROCEDURE — 36415 COLL VENOUS BLD VENIPUNCTURE: CPT

## 2025-02-02 PROCEDURE — 99232 SBSQ HOSP IP/OBS MODERATE 35: CPT | Performed by: INTERNAL MEDICINE

## 2025-02-02 PROCEDURE — 6360000002 HC RX W HCPCS: Performed by: NURSE PRACTITIONER

## 2025-02-02 RX ORDER — FUROSEMIDE 10 MG/ML
20 INJECTION INTRAMUSCULAR; INTRAVENOUS ONCE
Status: COMPLETED | OUTPATIENT
Start: 2025-02-02 | End: 2025-02-02

## 2025-02-02 RX ORDER — POTASSIUM CHLORIDE 7.45 MG/ML
10 INJECTION INTRAVENOUS
Status: COMPLETED | OUTPATIENT
Start: 2025-02-02 | End: 2025-02-02

## 2025-02-02 RX ADMIN — SODIUM CHLORIDE, PRESERVATIVE FREE 20 MG: 5 INJECTION INTRAVENOUS at 22:54

## 2025-02-02 RX ADMIN — HYDROCORTISONE SODIUM SUCCINATE 50 MG: 100 INJECTION, POWDER, FOR SOLUTION INTRAMUSCULAR; INTRAVENOUS at 22:54

## 2025-02-02 RX ADMIN — POTASSIUM BICARBONATE 40 MEQ: 782 TABLET, EFFERVESCENT ORAL at 05:56

## 2025-02-02 RX ADMIN — PIPERACILLIN AND TAZOBACTAM 3375 MG: 3; .375 INJECTION, POWDER, LYOPHILIZED, FOR SOLUTION INTRAVENOUS at 01:33

## 2025-02-02 RX ADMIN — POTASSIUM CHLORIDE 10 MEQ: 7.46 INJECTION, SOLUTION INTRAVENOUS at 09:12

## 2025-02-02 RX ADMIN — HEPARIN SODIUM 5000 UNITS: 5000 INJECTION INTRAVENOUS; SUBCUTANEOUS at 22:54

## 2025-02-02 RX ADMIN — POTASSIUM CHLORIDE 10 MEQ: 7.46 INJECTION, SOLUTION INTRAVENOUS at 10:23

## 2025-02-02 RX ADMIN — FUROSEMIDE 20 MG: 10 INJECTION, SOLUTION INTRAMUSCULAR; INTRAVENOUS at 12:08

## 2025-02-02 RX ADMIN — HEPARIN SODIUM 5000 UNITS: 5000 INJECTION INTRAVENOUS; SUBCUTANEOUS at 13:50

## 2025-02-02 RX ADMIN — ARFORMOTEROL TARTRATE 15 MCG: 15 SOLUTION RESPIRATORY (INHALATION) at 07:58

## 2025-02-02 RX ADMIN — PIPERACILLIN AND TAZOBACTAM 3375 MG: 3; .375 INJECTION, POWDER, LYOPHILIZED, FOR SOLUTION INTRAVENOUS at 17:32

## 2025-02-02 RX ADMIN — PIPERACILLIN AND TAZOBACTAM 3375 MG: 3; .375 INJECTION, POWDER, LYOPHILIZED, FOR SOLUTION INTRAVENOUS at 09:07

## 2025-02-02 RX ADMIN — HYDROCORTISONE SODIUM SUCCINATE 50 MG: 100 INJECTION, POWDER, FOR SOLUTION INTRAMUSCULAR; INTRAVENOUS at 08:33

## 2025-02-02 RX ADMIN — SODIUM CHLORIDE, PRESERVATIVE FREE 10 ML: 5 INJECTION INTRAVENOUS at 08:33

## 2025-02-02 RX ADMIN — BUDESONIDE 1 MG: 1 SUSPENSION RESPIRATORY (INHALATION) at 07:58

## 2025-02-02 RX ADMIN — SODIUM CHLORIDE, PRESERVATIVE FREE 20 MG: 5 INJECTION INTRAVENOUS at 08:33

## 2025-02-02 RX ADMIN — BUDESONIDE 1 MG: 1 SUSPENSION RESPIRATORY (INHALATION) at 20:43

## 2025-02-02 RX ADMIN — SODIUM CHLORIDE, PRESERVATIVE FREE 10 ML: 5 INJECTION INTRAVENOUS at 22:55

## 2025-02-02 RX ADMIN — ARFORMOTEROL TARTRATE 15 MCG: 15 SOLUTION RESPIRATORY (INHALATION) at 20:43

## 2025-02-02 RX ADMIN — HEPARIN SODIUM 5000 UNITS: 5000 INJECTION INTRAVENOUS; SUBCUTANEOUS at 05:56

## 2025-02-02 ASSESSMENT — PAIN SCALES - GENERAL
PAINLEVEL_OUTOF10: 0
PAINLEVEL_OUTOF10: 0

## 2025-02-03 PROBLEM — T17.500A MUCUS PLUGGING OF BRONCHI: Status: ACTIVE | Noted: 2025-02-03

## 2025-02-03 LAB
ANION GAP SERPL CALC-SCNC: 3 MMOL/L (ref 3–18)
ARTERIAL PATENCY WRIST A: POSITIVE
BASE EXCESS BLD CALC-SCNC: 11.4 MMOL/L
BDY SITE: ABNORMAL
BUN SERPL-MCNC: 27 MG/DL (ref 7–18)
BUN/CREAT SERPL: 33 (ref 12–20)
CALCIUM SERPL-MCNC: 8.8 MG/DL (ref 8.5–10.1)
CHLORIDE SERPL-SCNC: 112 MMOL/L (ref 100–111)
CO2 SERPL-SCNC: 34 MMOL/L (ref 21–32)
CREAT SERPL-MCNC: 0.81 MG/DL (ref 0.6–1.3)
GAS FLOW.O2 O2 DELIVERY SYS: ABNORMAL
GAS FLOW.O2 SETTING OXYMISER: 22 BPM
GLUCOSE BLD STRIP.AUTO-MCNC: 103 MG/DL (ref 70–110)
GLUCOSE BLD STRIP.AUTO-MCNC: 139 MG/DL (ref 70–110)
GLUCOSE BLD STRIP.AUTO-MCNC: 78 MG/DL (ref 70–110)
GLUCOSE BLD STRIP.AUTO-MCNC: 94 MG/DL (ref 70–110)
GLUCOSE SERPL-MCNC: 88 MG/DL (ref 74–99)
HCO3 BLD-SCNC: 37.1 MMOL/L (ref 21–28)
KAPPA LC FREE SER-MCNC: 63.4 MG/L (ref 3.3–19.4)
KAPPA LC FREE/LAMBDA FREE SER: 2.26 (ref 0.26–1.65)
LAMBDA LC FREE SERPL-MCNC: 28.1 MG/L (ref 5.7–26.3)
O2/TOTAL GAS SETTING VFR VENT: 40 %
PCO2 BLD: 56 MMHG (ref 35–48)
PH BLD: 7.43 (ref 7.35–7.45)
PO2 BLD: 93 MMHG (ref 83–108)
POTASSIUM SERPL-SCNC: 3.3 MMOL/L (ref 3.5–5.5)
POTASSIUM SERPL-SCNC: 3.7 MMOL/L (ref 3.5–5.5)
PROCALCITONIN SERPL-MCNC: <0.05 NG/ML
RESPIRATORY RATE, POC: 22 (ref 5–40)
SAO2 % BLD: 97.2 % (ref 92–97)
SERVICE CMNT-IMP: ABNORMAL
SODIUM SERPL-SCNC: 149 MMOL/L (ref 136–145)
SPECIMEN TYPE: ABNORMAL

## 2025-02-03 PROCEDURE — 6370000000 HC RX 637 (ALT 250 FOR IP): Performed by: PHYSICIAN ASSISTANT

## 2025-02-03 PROCEDURE — 2580000003 HC RX 258

## 2025-02-03 PROCEDURE — 99232 SBSQ HOSP IP/OBS MODERATE 35: CPT | Performed by: HOSPITALIST

## 2025-02-03 PROCEDURE — 36415 COLL VENOUS BLD VENIPUNCTURE: CPT

## 2025-02-03 PROCEDURE — 80048 BASIC METABOLIC PNL TOTAL CA: CPT

## 2025-02-03 PROCEDURE — 2500000003 HC RX 250 WO HCPCS: Performed by: PHYSICIAN ASSISTANT

## 2025-02-03 PROCEDURE — 94640 AIRWAY INHALATION TREATMENT: CPT

## 2025-02-03 PROCEDURE — 2700000000 HC OXYGEN THERAPY PER DAY

## 2025-02-03 PROCEDURE — 6360000002 HC RX W HCPCS

## 2025-02-03 PROCEDURE — 6360000002 HC RX W HCPCS: Performed by: INTERNAL MEDICINE

## 2025-02-03 PROCEDURE — 94669 MECHANICAL CHEST WALL OSCILL: CPT

## 2025-02-03 PROCEDURE — APPSS15 APP SPLIT SHARED TIME 0-15 MINUTES

## 2025-02-03 PROCEDURE — 92526 ORAL FUNCTION THERAPY: CPT

## 2025-02-03 PROCEDURE — 84132 ASSAY OF SERUM POTASSIUM: CPT

## 2025-02-03 PROCEDURE — 1100000003 HC PRIVATE W/ TELEMETRY

## 2025-02-03 PROCEDURE — 6360000002 HC RX W HCPCS: Performed by: PHYSICIAN ASSISTANT

## 2025-02-03 PROCEDURE — 2580000003 HC RX 258: Performed by: PHYSICIAN ASSISTANT

## 2025-02-03 PROCEDURE — 82803 BLOOD GASES ANY COMBINATION: CPT

## 2025-02-03 PROCEDURE — 36600 WITHDRAWAL OF ARTERIAL BLOOD: CPT

## 2025-02-03 PROCEDURE — 99232 SBSQ HOSP IP/OBS MODERATE 35: CPT | Performed by: INTERNAL MEDICINE

## 2025-02-03 PROCEDURE — 84145 PROCALCITONIN (PCT): CPT

## 2025-02-03 PROCEDURE — 2500000003 HC RX 250 WO HCPCS

## 2025-02-03 PROCEDURE — 2500000003 HC RX 250 WO HCPCS: Performed by: HOSPITALIST

## 2025-02-03 PROCEDURE — 82962 GLUCOSE BLOOD TEST: CPT

## 2025-02-03 PROCEDURE — 6370000000 HC RX 637 (ALT 250 FOR IP): Performed by: STUDENT IN AN ORGANIZED HEALTH CARE EDUCATION/TRAINING PROGRAM

## 2025-02-03 PROCEDURE — 94761 N-INVAS EAR/PLS OXIMETRY MLT: CPT

## 2025-02-03 PROCEDURE — 2580000003 HC RX 258: Performed by: HOSPITALIST

## 2025-02-03 RX ORDER — ALBUTEROL SULFATE 1.25 MG/3ML
1.25 SOLUTION RESPIRATORY (INHALATION) EVERY 6 HOURS
Status: DISCONTINUED | OUTPATIENT
Start: 2025-02-03 | End: 2025-02-05

## 2025-02-03 RX ORDER — SODIUM CHLORIDE FOR INHALATION 3 %
4 VIAL, NEBULIZER (ML) INHALATION EVERY 6 HOURS
Status: DISCONTINUED | OUTPATIENT
Start: 2025-02-03 | End: 2025-02-05

## 2025-02-03 RX ADMIN — ARFORMOTEROL TARTRATE 15 MCG: 15 SOLUTION RESPIRATORY (INHALATION) at 20:30

## 2025-02-03 RX ADMIN — PIPERACILLIN AND TAZOBACTAM 3375 MG: 3; .375 INJECTION, POWDER, LYOPHILIZED, FOR SOLUTION INTRAVENOUS at 10:12

## 2025-02-03 RX ADMIN — SODIUM CHLORIDE, PRESERVATIVE FREE 20 MG: 5 INJECTION INTRAVENOUS at 09:59

## 2025-02-03 RX ADMIN — ALBUTEROL SULFATE 1.25 MG: 1.25 SOLUTION RESPIRATORY (INHALATION) at 08:06

## 2025-02-03 RX ADMIN — POTASSIUM BICARBONATE 40 MEQ: 782 TABLET, EFFERVESCENT ORAL at 05:00

## 2025-02-03 RX ADMIN — ALBUTEROL SULFATE 1.25 MG: 1.25 SOLUTION RESPIRATORY (INHALATION) at 20:30

## 2025-02-03 RX ADMIN — SODIUM CHLORIDE SOLN NEBU 3% 4 ML: 3 NEBU SOLN at 20:30

## 2025-02-03 RX ADMIN — HEPARIN SODIUM 5000 UNITS: 5000 INJECTION INTRAVENOUS; SUBCUTANEOUS at 14:30

## 2025-02-03 RX ADMIN — ACETAMINOPHEN 650 MG: 325 TABLET ORAL at 21:34

## 2025-02-03 RX ADMIN — SODIUM CHLORIDE, PRESERVATIVE FREE 20 MG: 5 INJECTION INTRAVENOUS at 20:34

## 2025-02-03 RX ADMIN — SODIUM CHLORIDE SOLN NEBU 3% 4 ML: 3 NEBU SOLN at 15:51

## 2025-02-03 RX ADMIN — SODIUM CHLORIDE, PRESERVATIVE FREE 10 ML: 5 INJECTION INTRAVENOUS at 20:36

## 2025-02-03 RX ADMIN — PIPERACILLIN AND TAZOBACTAM 3375 MG: 3; .375 INJECTION, POWDER, LYOPHILIZED, FOR SOLUTION INTRAVENOUS at 17:43

## 2025-02-03 RX ADMIN — BUDESONIDE 1 MG: 1 SUSPENSION RESPIRATORY (INHALATION) at 08:06

## 2025-02-03 RX ADMIN — SODIUM CHLORIDE SOLN NEBU 3% 4 ML: 3 NEBU SOLN at 12:02

## 2025-02-03 RX ADMIN — PIPERACILLIN AND TAZOBACTAM 3375 MG: 3; .375 INJECTION, POWDER, LYOPHILIZED, FOR SOLUTION INTRAVENOUS at 01:46

## 2025-02-03 RX ADMIN — HEPARIN SODIUM 5000 UNITS: 5000 INJECTION INTRAVENOUS; SUBCUTANEOUS at 06:54

## 2025-02-03 RX ADMIN — HEPARIN SODIUM 5000 UNITS: 5000 INJECTION INTRAVENOUS; SUBCUTANEOUS at 21:33

## 2025-02-03 RX ADMIN — WATER 40 MG: 1 INJECTION INTRAMUSCULAR; INTRAVENOUS; SUBCUTANEOUS at 09:59

## 2025-02-03 RX ADMIN — SODIUM CHLORIDE SOLN NEBU 3% 4 ML: 3 NEBU SOLN at 08:06

## 2025-02-03 RX ADMIN — BUDESONIDE 1 MG: 1 SUSPENSION RESPIRATORY (INHALATION) at 20:30

## 2025-02-03 RX ADMIN — ARFORMOTEROL TARTRATE 15 MCG: 15 SOLUTION RESPIRATORY (INHALATION) at 08:06

## 2025-02-03 RX ADMIN — SODIUM CHLORIDE, PRESERVATIVE FREE 10 ML: 5 INJECTION INTRAVENOUS at 10:13

## 2025-02-03 RX ADMIN — ALBUTEROL SULFATE 1.25 MG: 1.25 SOLUTION RESPIRATORY (INHALATION) at 15:51

## 2025-02-03 RX ADMIN — IPRATROPIUM BROMIDE AND ALBUTEROL SULFATE 1 DOSE: .5; 3 SOLUTION RESPIRATORY (INHALATION) at 12:02

## 2025-02-03 ASSESSMENT — PAIN SCALES - GENERAL
PAINLEVEL_OUTOF10: 0

## 2025-02-03 ASSESSMENT — PAIN - FUNCTIONAL ASSESSMENT: PAIN_FUNCTIONAL_ASSESSMENT: ACTIVITIES ARE NOT PREVENTED

## 2025-02-03 NOTE — CARE COORDINATION
[facility] is pending acceptance, only HH agency that has accepted at this time.       The representative stated she will need HH orders due to the patient insurance company requiring an auth that may take 7 days or more.     Patient does not qualify for SNF due to insurance.     PIEDAD informed Provider.       SYD Elkins

## 2025-02-03 NOTE — PROCEDURES
PROCEDURE NOTE  Date: 2/3/2025   Name: Farhad Rogers  YOB: 1960    Procedures  Bedside ultrasound performed.  Small pleural effusion noted, with atelectatic lung.        Bronchial hygiene protocol discussed with RTDenver Yeager, PhD., PA-C.  8:29 AM  Pulmonary, Critical Care Medicine  [facility]

## 2025-02-04 ENCOUNTER — APPOINTMENT (OUTPATIENT)
Facility: HOSPITAL | Age: 65
DRG: 720 | End: 2025-02-04
Payer: MEDICAID

## 2025-02-04 LAB
ANION GAP SERPL CALC-SCNC: 1 MMOL/L (ref 3–18)
ARTERIAL PATENCY WRIST A: POSITIVE
BACTERIA SPEC CULT: NORMAL
BACTERIA SPEC CULT: NORMAL
BASE EXCESS BLD CALC-SCNC: 14.9 MMOL/L
BDY SITE: ABNORMAL
BUN SERPL-MCNC: 31 MG/DL (ref 7–18)
BUN/CREAT SERPL: 33 (ref 12–20)
CALCIUM SERPL-MCNC: 8.9 MG/DL (ref 8.5–10.1)
CHLORIDE SERPL-SCNC: 112 MMOL/L (ref 100–111)
CO2 SERPL-SCNC: 35 MMOL/L (ref 21–32)
CREAT SERPL-MCNC: 0.93 MG/DL (ref 0.6–1.3)
GAS FLOW.O2 O2 DELIVERY SYS: ABNORMAL
GAS FLOW.O2 SETTING OXYMISER: 17 BPM
GLUCOSE BLD STRIP.AUTO-MCNC: 107 MG/DL (ref 70–110)
GLUCOSE BLD STRIP.AUTO-MCNC: 125 MG/DL (ref 70–110)
GLUCOSE BLD STRIP.AUTO-MCNC: 131 MG/DL (ref 70–110)
GLUCOSE BLD STRIP.AUTO-MCNC: 76 MG/DL (ref 70–110)
GLUCOSE BLD STRIP.AUTO-MCNC: 97 MG/DL (ref 70–110)
GLUCOSE SERPL-MCNC: 120 MG/DL (ref 74–99)
HCO3 BLD-SCNC: 41.6 MMOL/L (ref 21–28)
O2/TOTAL GAS SETTING VFR VENT: 100 %
PCO2 BLD: 68.7 MMHG (ref 35–48)
PH BLD: 7.39 (ref 7.35–7.45)
PO2 BLD: 67 MMHG (ref 83–108)
POTASSIUM SERPL-SCNC: 3.8 MMOL/L (ref 3.5–5.5)
RESPIRATORY RATE, POC: 22 (ref 5–40)
SAO2 % BLD: 91.6 % (ref 92–97)
SERVICE CMNT-IMP: ABNORMAL
SERVICE CMNT-IMP: NORMAL
SERVICE CMNT-IMP: NORMAL
SODIUM SERPL-SCNC: 148 MMOL/L (ref 136–145)
SPECIMEN TYPE: ABNORMAL

## 2025-02-04 PROCEDURE — 94761 N-INVAS EAR/PLS OXIMETRY MLT: CPT

## 2025-02-04 PROCEDURE — 6360000002 HC RX W HCPCS

## 2025-02-04 PROCEDURE — 2500000003 HC RX 250 WO HCPCS: Performed by: HOSPITALIST

## 2025-02-04 PROCEDURE — 6370000000 HC RX 637 (ALT 250 FOR IP): Performed by: HOSPITALIST

## 2025-02-04 PROCEDURE — 82803 BLOOD GASES ANY COMBINATION: CPT

## 2025-02-04 PROCEDURE — 82962 GLUCOSE BLOOD TEST: CPT

## 2025-02-04 PROCEDURE — 6360000002 HC RX W HCPCS: Performed by: INTERNAL MEDICINE

## 2025-02-04 PROCEDURE — 2580000003 HC RX 258: Performed by: NURSE PRACTITIONER

## 2025-02-04 PROCEDURE — 99232 SBSQ HOSP IP/OBS MODERATE 35: CPT | Performed by: INTERNAL MEDICINE

## 2025-02-04 PROCEDURE — 2500000003 HC RX 250 WO HCPCS: Performed by: PHYSICIAN ASSISTANT

## 2025-02-04 PROCEDURE — 2500000003 HC RX 250 WO HCPCS

## 2025-02-04 PROCEDURE — 2580000003 HC RX 258

## 2025-02-04 PROCEDURE — 2700000000 HC OXYGEN THERAPY PER DAY

## 2025-02-04 PROCEDURE — 71045 X-RAY EXAM CHEST 1 VIEW: CPT

## 2025-02-04 PROCEDURE — APPSS15 APP SPLIT SHARED TIME 0-15 MINUTES

## 2025-02-04 PROCEDURE — 2580000003 HC RX 258: Performed by: PHYSICIAN ASSISTANT

## 2025-02-04 PROCEDURE — 6360000002 HC RX W HCPCS: Performed by: PHYSICIAN ASSISTANT

## 2025-02-04 PROCEDURE — 94640 AIRWAY INHALATION TREATMENT: CPT

## 2025-02-04 PROCEDURE — 36415 COLL VENOUS BLD VENIPUNCTURE: CPT

## 2025-02-04 PROCEDURE — 2580000003 HC RX 258: Performed by: HOSPITALIST

## 2025-02-04 PROCEDURE — 74018 RADEX ABDOMEN 1 VIEW: CPT

## 2025-02-04 PROCEDURE — 80048 BASIC METABOLIC PNL TOTAL CA: CPT

## 2025-02-04 PROCEDURE — 94669 MECHANICAL CHEST WALL OSCILL: CPT

## 2025-02-04 PROCEDURE — 97110 THERAPEUTIC EXERCISES: CPT

## 2025-02-04 PROCEDURE — 1100000003 HC PRIVATE W/ TELEMETRY

## 2025-02-04 PROCEDURE — 99232 SBSQ HOSP IP/OBS MODERATE 35: CPT | Performed by: HOSPITALIST

## 2025-02-04 PROCEDURE — 36600 WITHDRAWAL OF ARTERIAL BLOOD: CPT

## 2025-02-04 RX ORDER — MIDODRINE HYDROCHLORIDE 10 MG/1
10 TABLET ORAL
Status: DISCONTINUED | OUTPATIENT
Start: 2025-02-04 | End: 2025-02-19 | Stop reason: HOSPADM

## 2025-02-04 RX ORDER — MIDODRINE HYDROCHLORIDE 10 MG/1
10 TABLET ORAL
Status: DISCONTINUED | OUTPATIENT
Start: 2025-02-04 | End: 2025-02-04

## 2025-02-04 RX ORDER — DEXTROSE MONOHYDRATE 100 MG/ML
INJECTION, SOLUTION INTRAVENOUS CONTINUOUS
Status: DISCONTINUED | OUTPATIENT
Start: 2025-02-04 | End: 2025-02-07

## 2025-02-04 RX ADMIN — PIPERACILLIN AND TAZOBACTAM 3375 MG: 3; .375 INJECTION, POWDER, LYOPHILIZED, FOR SOLUTION INTRAVENOUS at 02:44

## 2025-02-04 RX ADMIN — ALBUTEROL SULFATE 1.25 MG: 1.25 SOLUTION RESPIRATORY (INHALATION) at 08:11

## 2025-02-04 RX ADMIN — SODIUM CHLORIDE SOLN NEBU 3% 4 ML: 3 NEBU SOLN at 08:11

## 2025-02-04 RX ADMIN — ALBUTEROL SULFATE 1.25 MG: 1.25 SOLUTION RESPIRATORY (INHALATION) at 20:33

## 2025-02-04 RX ADMIN — SODIUM CHLORIDE, PRESERVATIVE FREE 20 MG: 5 INJECTION INTRAVENOUS at 21:10

## 2025-02-04 RX ADMIN — WATER 40 MG: 1 INJECTION INTRAMUSCULAR; INTRAVENOUS; SUBCUTANEOUS at 08:46

## 2025-02-04 RX ADMIN — HEPARIN SODIUM 5000 UNITS: 5000 INJECTION INTRAVENOUS; SUBCUTANEOUS at 21:49

## 2025-02-04 RX ADMIN — ALBUTEROL SULFATE 1.25 MG: 1.25 SOLUTION RESPIRATORY (INHALATION) at 03:49

## 2025-02-04 RX ADMIN — MIDODRINE HYDROCHLORIDE 10 MG: 10 TABLET ORAL at 13:50

## 2025-02-04 RX ADMIN — HEPARIN SODIUM 5000 UNITS: 5000 INJECTION INTRAVENOUS; SUBCUTANEOUS at 13:50

## 2025-02-04 RX ADMIN — PIPERACILLIN AND TAZOBACTAM 3375 MG: 3; .375 INJECTION, POWDER, LYOPHILIZED, FOR SOLUTION INTRAVENOUS at 16:58

## 2025-02-04 RX ADMIN — DEXTROSE: 10 SOLUTION INTRAVENOUS at 18:36

## 2025-02-04 RX ADMIN — SODIUM CHLORIDE SOLN NEBU 3% 4 ML: 3 NEBU SOLN at 15:56

## 2025-02-04 RX ADMIN — ALBUTEROL SULFATE 1.25 MG: 1.25 SOLUTION RESPIRATORY (INHALATION) at 15:56

## 2025-02-04 RX ADMIN — ARFORMOTEROL TARTRATE 15 MCG: 15 SOLUTION RESPIRATORY (INHALATION) at 20:33

## 2025-02-04 RX ADMIN — BUDESONIDE 1 MG: 1 SUSPENSION RESPIRATORY (INHALATION) at 08:11

## 2025-02-04 RX ADMIN — SODIUM CHLORIDE SOLN NEBU 3% 4 ML: 3 NEBU SOLN at 20:33

## 2025-02-04 RX ADMIN — SODIUM CHLORIDE, PRESERVATIVE FREE 10 ML: 5 INJECTION INTRAVENOUS at 08:50

## 2025-02-04 RX ADMIN — SODIUM CHLORIDE, PRESERVATIVE FREE 10 ML: 5 INJECTION INTRAVENOUS at 21:10

## 2025-02-04 RX ADMIN — SODIUM CHLORIDE SOLN NEBU 3% 4 ML: 3 NEBU SOLN at 03:49

## 2025-02-04 RX ADMIN — PIPERACILLIN AND TAZOBACTAM 3375 MG: 3; .375 INJECTION, POWDER, LYOPHILIZED, FOR SOLUTION INTRAVENOUS at 10:22

## 2025-02-04 RX ADMIN — ARFORMOTEROL TARTRATE 15 MCG: 15 SOLUTION RESPIRATORY (INHALATION) at 08:11

## 2025-02-04 RX ADMIN — BUDESONIDE 1 MG: 1 SUSPENSION RESPIRATORY (INHALATION) at 20:33

## 2025-02-04 RX ADMIN — SODIUM CHLORIDE, PRESERVATIVE FREE 10 ML: 5 INJECTION INTRAVENOUS at 21:19

## 2025-02-04 RX ADMIN — SODIUM CHLORIDE, PRESERVATIVE FREE 20 MG: 5 INJECTION INTRAVENOUS at 08:46

## 2025-02-04 ASSESSMENT — PAIN SCALES - GENERAL
PAINLEVEL_OUTOF10: 0
PAINLEVEL_OUTOF10: 0

## 2025-02-05 ENCOUNTER — APPOINTMENT (OUTPATIENT)
Facility: HOSPITAL | Age: 65
DRG: 720 | End: 2025-02-05
Payer: MEDICAID

## 2025-02-05 LAB
ALBUMIN SERPL ELPH-MCNC: 2.9 G/DL (ref 2.9–4.4)
ALBUMIN SERPL-MCNC: 2.3 G/DL (ref 3.4–5)
ALBUMIN/GLOB SERPL: 1.3 (ref 0.7–1.7)
ALPHA1 GLOB SERPL ELPH-MCNC: 0.3 G/DL (ref 0–0.4)
ALPHA2 GLOB SERPL ELPH-MCNC: 0.5 G/DL (ref 0.4–1)
ANION GAP BLD CALC-SCNC: ABNORMAL MMOL/L (ref 10–20)
ANION GAP SERPL CALC-SCNC: 1 MMOL/L (ref 3–18)
ANION GAP SERPL CALC-SCNC: 3 MMOL/L (ref 3–18)
ARTERIAL PATENCY WRIST A: POSITIVE
B-GLOBULIN SERPL ELPH-MCNC: 1.1 G/DL (ref 0.7–1.3)
BASE EXCESS BLD CALC-SCNC: 14.8 MMOL/L
BASOPHILS # BLD: 0 K/UL (ref 0–0.1)
BASOPHILS NFR BLD: 0 % (ref 0–2)
BDY SITE: ABNORMAL
BUN SERPL-MCNC: 23 MG/DL (ref 7–18)
BUN SERPL-MCNC: 26 MG/DL (ref 7–18)
BUN/CREAT SERPL: 30 (ref 12–20)
BUN/CREAT SERPL: 36 (ref 12–20)
CA-I BLD-MCNC: 1.29 MMOL/L (ref 1.15–1.33)
CALCIUM SERPL-MCNC: 8.7 MG/DL (ref 8.5–10.1)
CALCIUM SERPL-MCNC: 9.1 MG/DL (ref 8.5–10.1)
CHLORIDE BLD-SCNC: 108 MMOL/L (ref 98–107)
CHLORIDE SERPL-SCNC: 115 MMOL/L (ref 100–111)
CHLORIDE SERPL-SCNC: 117 MMOL/L (ref 100–111)
CO2 BLD-SCNC: 39 MMOL/L (ref 22–29)
CO2 SERPL-SCNC: 35 MMOL/L (ref 21–32)
CO2 SERPL-SCNC: 35 MMOL/L (ref 21–32)
CREAT BLD-MCNC: 1.11 MG/DL (ref 0.6–1.3)
CREAT SERPL-MCNC: 0.72 MG/DL (ref 0.6–1.3)
CREAT SERPL-MCNC: 0.76 MG/DL (ref 0.6–1.3)
DIFFERENTIAL METHOD BLD: ABNORMAL
EOSINOPHIL # BLD: 0 K/UL (ref 0–0.4)
EOSINOPHIL NFR BLD: 0 % (ref 0–5)
ERYTHROCYTE [DISTWIDTH] IN BLOOD BY AUTOMATED COUNT: 18.8 % (ref 11.6–14.5)
FIO2 ON VENT: 100 %
GAMMA GLOB SERPL ELPH-MCNC: 0.4 G/DL (ref 0.4–1.8)
GAS FLOW.O2 O2 DELIVERY SYS: ABNORMAL
GLOBULIN SER CALC-MCNC: 2.3 G/DL (ref 2.2–3.9)
GLUCOSE BLD STRIP.AUTO-MCNC: 108 MG/DL (ref 70–110)
GLUCOSE BLD STRIP.AUTO-MCNC: 110 MG/DL (ref 70–110)
GLUCOSE BLD STRIP.AUTO-MCNC: 118 MG/DL (ref 70–110)
GLUCOSE BLD STRIP.AUTO-MCNC: 145 MG/DL (ref 70–110)
GLUCOSE BLD STRIP.AUTO-MCNC: 198 MG/DL (ref 70–110)
GLUCOSE BLD STRIP.AUTO-MCNC: 54 MG/DL (ref 70–110)
GLUCOSE BLD STRIP.AUTO-MCNC: 55 MG/DL (ref 70–110)
GLUCOSE BLD STRIP.AUTO-MCNC: 58 MG/DL (ref 70–110)
GLUCOSE BLD STRIP.AUTO-MCNC: 69 MG/DL (ref 70–110)
GLUCOSE BLD STRIP.AUTO-MCNC: 78 MG/DL (ref 70–110)
GLUCOSE BLD STRIP.AUTO-MCNC: 90 MG/DL (ref 70–110)
GLUCOSE BLD-MCNC: 203 MG/DL (ref 74–99)
GLUCOSE SERPL-MCNC: 128 MG/DL (ref 74–99)
GLUCOSE SERPL-MCNC: 154 MG/DL (ref 74–99)
HCO3 BLD-SCNC: 39.7 MMOL/L (ref 21–28)
HCT VFR BLD AUTO: 31.2 % (ref 36–48)
HGB BLD-MCNC: 9.1 G/DL (ref 13–16)
IMM GRANULOCYTES # BLD AUTO: 0 K/UL (ref 0–0.04)
IMM GRANULOCYTES NFR BLD AUTO: 0 % (ref 0–0.5)
INSPIRATION.DURATION SETTING TIME VENT: 0.8 SEC
LACTATE BLD-SCNC: 0.51 MMOL/L (ref 0.4–2)
LYMPHOCYTES # BLD: 0.28 K/UL (ref 0.9–3.6)
LYMPHOCYTES NFR BLD: 1 % (ref 21–52)
M PROTEIN SERPL ELPH-MCNC: 0.4 G/DL
MAGNESIUM SERPL-MCNC: 3 MG/DL (ref 1.6–2.6)
MCH RBC QN AUTO: 28.3 PG (ref 24–34)
MCHC RBC AUTO-ENTMCNC: 29.2 G/DL (ref 31–37)
MCV RBC AUTO: 97.2 FL (ref 78–100)
MONOCYTES # BLD: 2.75 K/UL (ref 0.05–1.2)
MONOCYTES NFR BLD: 10 % (ref 3–10)
NEUTS SEG # BLD: 24.47 K/UL (ref 1.8–8)
NEUTS SEG NFR BLD: 89 % (ref 40–73)
NRBC # BLD: 0 K/UL (ref 0–0.01)
NRBC BLD-RTO: 0 PER 100 WBC
PCO2 BLD: 51.2 MMHG (ref 35–48)
PEEP RESPIRATORY: 10
PH BLD: 7.5 (ref 7.35–7.45)
PHOSPHATE SERPL-MCNC: 1.5 MG/DL (ref 2.5–4.9)
PHOSPHATE SERPL-MCNC: 3.6 MG/DL (ref 2.5–4.9)
PLATELET # BLD AUTO: 328 K/UL (ref 135–420)
PLATELET COMMENT: ABNORMAL
PMV BLD AUTO: 12 FL (ref 9.2–11.8)
PO2 BLD: 399 MMHG (ref 83–108)
POTASSIUM BLD-SCNC: 3.1 MMOL/L (ref 3.5–5.1)
POTASSIUM SERPL-SCNC: 3.3 MMOL/L (ref 3.5–5.5)
POTASSIUM SERPL-SCNC: 3.9 MMOL/L (ref 3.5–5.5)
PROT SERPL-MCNC: 5.2 G/DL (ref 6–8.5)
RBC # BLD AUTO: 3.21 M/UL (ref 4.35–5.65)
RBC MORPH BLD: ABNORMAL
RESPIRATORY RATE, POC: 16 (ref 5–40)
SAO2 % BLD: 100 % (ref 94–98)
SERVICE CMNT-IMP: ABNORMAL
SODIUM BLD-SCNC: 156 MMOL/L (ref 136–145)
SODIUM SERPL-SCNC: 153 MMOL/L (ref 136–145)
SODIUM SERPL-SCNC: 153 MMOL/L (ref 136–145)
SPECIMEN SITE: ABNORMAL
VENTILATION MODE VENT: ABNORMAL
VT SETTING VENT: 480
WBC # BLD AUTO: 27.5 K/UL (ref 4.6–13.2)

## 2025-02-05 PROCEDURE — 6370000000 HC RX 637 (ALT 250 FOR IP): Performed by: PHYSICIAN ASSISTANT

## 2025-02-05 PROCEDURE — 99291 CRITICAL CARE FIRST HOUR: CPT | Performed by: HOSPITALIST

## 2025-02-05 PROCEDURE — 36415 COLL VENOUS BLD VENIPUNCTURE: CPT

## 2025-02-05 PROCEDURE — 97530 THERAPEUTIC ACTIVITIES: CPT

## 2025-02-05 PROCEDURE — 94003 VENT MGMT INPAT SUBQ DAY: CPT

## 2025-02-05 PROCEDURE — 2500000003 HC RX 250 WO HCPCS

## 2025-02-05 PROCEDURE — 82962 GLUCOSE BLOOD TEST: CPT

## 2025-02-05 PROCEDURE — 85025 COMPLETE CBC W/AUTO DIFF WBC: CPT

## 2025-02-05 PROCEDURE — 31624 DX BRONCHOSCOPE/LAVAGE: CPT

## 2025-02-05 PROCEDURE — 82330 ASSAY OF CALCIUM: CPT

## 2025-02-05 PROCEDURE — 71045 X-RAY EXAM CHEST 1 VIEW: CPT

## 2025-02-05 PROCEDURE — 84132 ASSAY OF SERUM POTASSIUM: CPT

## 2025-02-05 PROCEDURE — 0BC68ZZ EXTIRPATION OF MATTER FROM RIGHT LOWER LOBE BRONCHUS, VIA NATURAL OR ARTIFICIAL OPENING ENDOSCOPIC: ICD-10-PCS | Performed by: HOSPITALIST

## 2025-02-05 PROCEDURE — 6360000002 HC RX W HCPCS

## 2025-02-05 PROCEDURE — 82947 ASSAY GLUCOSE BLOOD QUANT: CPT

## 2025-02-05 PROCEDURE — 2700000000 HC OXYGEN THERAPY PER DAY

## 2025-02-05 PROCEDURE — 80048 BASIC METABOLIC PNL TOTAL CA: CPT

## 2025-02-05 PROCEDURE — 94761 N-INVAS EAR/PLS OXIMETRY MLT: CPT

## 2025-02-05 PROCEDURE — 2580000003 HC RX 258: Performed by: HOSPITALIST

## 2025-02-05 PROCEDURE — 83605 ASSAY OF LACTIC ACID: CPT

## 2025-02-05 PROCEDURE — 74018 RADEX ABDOMEN 1 VIEW: CPT

## 2025-02-05 PROCEDURE — 80069 RENAL FUNCTION PANEL: CPT

## 2025-02-05 PROCEDURE — 36600 WITHDRAWAL OF ARTERIAL BLOOD: CPT

## 2025-02-05 PROCEDURE — 1100000003 HC PRIVATE W/ TELEMETRY

## 2025-02-05 PROCEDURE — 2500000003 HC RX 250 WO HCPCS: Performed by: PHYSICIAN ASSISTANT

## 2025-02-05 PROCEDURE — 31645 BRNCHSC W/THER ASPIR 1ST: CPT | Performed by: HOSPITALIST

## 2025-02-05 PROCEDURE — 0BC58ZZ EXTIRPATION OF MATTER FROM RIGHT MIDDLE LOBE BRONCHUS, VIA NATURAL OR ARTIFICIAL OPENING ENDOSCOPIC: ICD-10-PCS | Performed by: HOSPITALIST

## 2025-02-05 PROCEDURE — 6360000002 HC RX W HCPCS: Performed by: PHYSICIAN ASSISTANT

## 2025-02-05 PROCEDURE — 84100 ASSAY OF PHOSPHORUS: CPT

## 2025-02-05 PROCEDURE — 97110 THERAPEUTIC EXERCISES: CPT

## 2025-02-05 PROCEDURE — 2500000003 HC RX 250 WO HCPCS: Performed by: HOSPITALIST

## 2025-02-05 PROCEDURE — 31500 INSERT EMERGENCY AIRWAY: CPT

## 2025-02-05 PROCEDURE — 0BH17EZ INSERTION OF ENDOTRACHEAL AIRWAY INTO TRACHEA, VIA NATURAL OR ARTIFICIAL OPENING: ICD-10-PCS | Performed by: PHYSICIAN ASSISTANT

## 2025-02-05 PROCEDURE — 0BC48ZZ EXTIRPATION OF MATTER FROM RIGHT UPPER LOBE BRONCHUS, VIA NATURAL OR ARTIFICIAL OPENING ENDOSCOPIC: ICD-10-PCS | Performed by: HOSPITALIST

## 2025-02-05 PROCEDURE — 5A1945Z RESPIRATORY VENTILATION, 24-96 CONSECUTIVE HOURS: ICD-10-PCS | Performed by: HOSPITALIST

## 2025-02-05 PROCEDURE — 84295 ASSAY OF SERUM SODIUM: CPT

## 2025-02-05 PROCEDURE — 2580000003 HC RX 258: Performed by: PHYSICIAN ASSISTANT

## 2025-02-05 PROCEDURE — 0BC38ZZ EXTIRPATION OF MATTER FROM RIGHT MAIN BRONCHUS, VIA NATURAL OR ARTIFICIAL OPENING ENDOSCOPIC: ICD-10-PCS | Performed by: HOSPITALIST

## 2025-02-05 PROCEDURE — 82803 BLOOD GASES ANY COMBINATION: CPT

## 2025-02-05 PROCEDURE — 6360000002 HC RX W HCPCS: Performed by: HOSPITALIST

## 2025-02-05 PROCEDURE — 85014 HEMATOCRIT: CPT

## 2025-02-05 PROCEDURE — 94669 MECHANICAL CHEST WALL OSCILL: CPT

## 2025-02-05 PROCEDURE — 83735 ASSAY OF MAGNESIUM: CPT

## 2025-02-05 PROCEDURE — 6360000002 HC RX W HCPCS: Performed by: INTERNAL MEDICINE

## 2025-02-05 PROCEDURE — 94640 AIRWAY INHALATION TREATMENT: CPT

## 2025-02-05 PROCEDURE — 94667 MNPJ CHEST WALL 1ST: CPT

## 2025-02-05 PROCEDURE — 6370000000 HC RX 637 (ALT 250 FOR IP): Performed by: HOSPITALIST

## 2025-02-05 PROCEDURE — 2580000003 HC RX 258

## 2025-02-05 PROCEDURE — 87040 BLOOD CULTURE FOR BACTERIA: CPT

## 2025-02-05 PROCEDURE — 02HV33Z INSERTION OF INFUSION DEVICE INTO SUPERIOR VENA CAVA, PERCUTANEOUS APPROACH: ICD-10-PCS | Performed by: PHYSICIAN ASSISTANT

## 2025-02-05 RX ORDER — NOREPINEPHRINE BITARTRATE 0.06 MG/ML
1-100 INJECTION, SOLUTION INTRAVENOUS CONTINUOUS
Status: DISCONTINUED | OUTPATIENT
Start: 2025-02-05 | End: 2025-02-07

## 2025-02-05 RX ORDER — MIDAZOLAM HYDROCHLORIDE 2 MG/2ML
2 INJECTION, SOLUTION INTRAMUSCULAR; INTRAVENOUS
Status: DISCONTINUED | OUTPATIENT
Start: 2025-02-05 | End: 2025-02-07

## 2025-02-05 RX ORDER — CHLORHEXIDINE GLUCONATE ORAL RINSE 1.2 MG/ML
15 SOLUTION DENTAL 2 TIMES DAILY
Status: DISCONTINUED | OUTPATIENT
Start: 2025-02-05 | End: 2025-02-07

## 2025-02-05 RX ORDER — DEXTROSE MONOHYDRATE 100 MG/ML
INJECTION, SOLUTION INTRAVENOUS CONTINUOUS PRN
Status: DISCONTINUED | OUTPATIENT
Start: 2025-02-05 | End: 2025-02-19 | Stop reason: HOSPADM

## 2025-02-05 RX ORDER — ALBUTEROL SULFATE 1.25 MG/3ML
1.25 SOLUTION RESPIRATORY (INHALATION)
Status: DISCONTINUED | OUTPATIENT
Start: 2025-02-05 | End: 2025-02-08

## 2025-02-05 RX ORDER — FENTANYL CITRATE 50 UG/ML
100 INJECTION, SOLUTION INTRAMUSCULAR; INTRAVENOUS
Status: DISCONTINUED | OUTPATIENT
Start: 2025-02-05 | End: 2025-02-07

## 2025-02-05 RX ORDER — SODIUM CHLORIDE FOR INHALATION 3 %
4 VIAL, NEBULIZER (ML) INHALATION
Status: DISCONTINUED | OUTPATIENT
Start: 2025-02-05 | End: 2025-02-08

## 2025-02-05 RX ORDER — DEXMEDETOMIDINE HYDROCHLORIDE 4 UG/ML
.1-1.5 INJECTION, SOLUTION INTRAVENOUS CONTINUOUS
Status: DISCONTINUED | OUTPATIENT
Start: 2025-02-05 | End: 2025-02-07

## 2025-02-05 RX ORDER — NOREPINEPHRINE BITARTRATE 0.06 MG/ML
INJECTION, SOLUTION INTRAVENOUS
Status: COMPLETED
Start: 2025-02-05 | End: 2025-02-05

## 2025-02-05 RX ADMIN — ARFORMOTEROL TARTRATE 15 MCG: 15 SOLUTION RESPIRATORY (INHALATION) at 09:13

## 2025-02-05 RX ADMIN — Medication 15 MCG/MIN: at 09:10

## 2025-02-05 RX ADMIN — ALBUTEROL SULFATE 1.25 MG: 1.25 SOLUTION RESPIRATORY (INHALATION) at 15:31

## 2025-02-05 RX ADMIN — MIDODRINE HYDROCHLORIDE 10 MG: 10 TABLET ORAL at 18:10

## 2025-02-05 RX ADMIN — DEXTROSE MONOHYDRATE 125 ML: 100 INJECTION, SOLUTION INTRAVENOUS at 00:14

## 2025-02-05 RX ADMIN — PIPERACILLIN AND TAZOBACTAM 3375 MG: 3; .375 INJECTION, POWDER, LYOPHILIZED, FOR SOLUTION INTRAVENOUS at 14:02

## 2025-02-05 RX ADMIN — PIPERACILLIN AND TAZOBACTAM 3375 MG: 3; .375 INJECTION, POWDER, LYOPHILIZED, FOR SOLUTION INTRAVENOUS at 07:51

## 2025-02-05 RX ADMIN — SODIUM CHLORIDE SOLN NEBU 3% 4 ML: 3 NEBU SOLN at 15:31

## 2025-02-05 RX ADMIN — SODIUM CHLORIDE, PRESERVATIVE FREE 20 MG: 5 INJECTION INTRAVENOUS at 21:49

## 2025-02-05 RX ADMIN — HEPARIN SODIUM 5000 UNITS: 5000 INJECTION INTRAVENOUS; SUBCUTANEOUS at 14:01

## 2025-02-05 RX ADMIN — ALBUTEROL SULFATE 1.25 MG: 1.25 SOLUTION RESPIRATORY (INHALATION) at 09:13

## 2025-02-05 RX ADMIN — SODIUM CHLORIDE, PRESERVATIVE FREE 20 MG: 5 INJECTION INTRAVENOUS at 07:48

## 2025-02-05 RX ADMIN — ARFORMOTEROL TARTRATE 15 MCG: 15 SOLUTION RESPIRATORY (INHALATION) at 20:59

## 2025-02-05 RX ADMIN — SODIUM CHLORIDE SOLN NEBU 3% 4 ML: 3 NEBU SOLN at 20:59

## 2025-02-05 RX ADMIN — BUDESONIDE 1 MG: 1 SUSPENSION RESPIRATORY (INHALATION) at 09:13

## 2025-02-05 RX ADMIN — SODIUM CHLORIDE, PRESERVATIVE FREE 10 ML: 5 INJECTION INTRAVENOUS at 07:48

## 2025-02-05 RX ADMIN — DEXMEDETOMIDINE HYDROCHLORIDE 0.2 MCG/KG/HR: 4 INJECTION, SOLUTION INTRAVENOUS at 09:58

## 2025-02-05 RX ADMIN — SODIUM CHLORIDE SOLN NEBU 3% 4 ML: 3 NEBU SOLN at 01:56

## 2025-02-05 RX ADMIN — 0.12% CHLORHEXIDINE GLUCONATE 15 ML: 1.2 RINSE ORAL at 21:49

## 2025-02-05 RX ADMIN — HEPARIN SODIUM 5000 UNITS: 5000 INJECTION INTRAVENOUS; SUBCUTANEOUS at 21:49

## 2025-02-05 RX ADMIN — BUDESONIDE 1 MG: 1 SUSPENSION RESPIRATORY (INHALATION) at 21:00

## 2025-02-05 RX ADMIN — MIDAZOLAM 2 MG: 1 INJECTION INTRAMUSCULAR; INTRAVENOUS at 15:23

## 2025-02-05 RX ADMIN — HEPARIN SODIUM 5000 UNITS: 5000 INJECTION INTRAVENOUS; SUBCUTANEOUS at 05:52

## 2025-02-05 RX ADMIN — ALBUTEROL SULFATE 1.25 MG: 1.25 SOLUTION RESPIRATORY (INHALATION) at 20:59

## 2025-02-05 RX ADMIN — FENTANYL CITRATE 100 MCG: 50 INJECTION, SOLUTION INTRAMUSCULAR; INTRAVENOUS at 15:23

## 2025-02-05 RX ADMIN — WATER 40 MG: 1 INJECTION INTRAMUSCULAR; INTRAVENOUS; SUBCUTANEOUS at 07:48

## 2025-02-05 RX ADMIN — SODIUM CHLORIDE, PRESERVATIVE FREE 10 ML: 5 INJECTION INTRAVENOUS at 21:51

## 2025-02-05 RX ADMIN — POTASSIUM PHOSPHATE, MONOBASIC POTASSIUM PHOSPHATE, DIBASIC 20 MMOL: 224; 236 INJECTION, SOLUTION, CONCENTRATE INTRAVENOUS at 18:09

## 2025-02-05 RX ADMIN — ALBUTEROL SULFATE 1.25 MG: 1.25 SOLUTION RESPIRATORY (INHALATION) at 01:56

## 2025-02-05 RX ADMIN — 0.12% CHLORHEXIDINE GLUCONATE 15 ML: 1.2 RINSE ORAL at 09:13

## 2025-02-05 ASSESSMENT — PULMONARY FUNCTION TESTS
PIF_VALUE: 23
PIF_VALUE: 22
PIF_VALUE: 18

## 2025-02-05 ASSESSMENT — PAIN SCALES - GENERAL: PAINLEVEL_OUTOF10: 0

## 2025-02-05 NOTE — PROCEDURES
PROCEDURE NOTE  Date: 2025   Name: Farhad Rogers  YOB: 1960    Procedures  Pulmonary, Critical Care, and Sleep Medicine    Name: Farhad Rogers MRN: [id number]   : 1960 Hospital: [facility]   Date: 2025        Intubation Note    Procedure: emergent intubation    Indication/pre procedure diagnosis: respiratory insufficiency  Post procedure diagnosis: same     Anesthesia- Rapid sequence:  Etomidate 8mg   Paralysis: Succinylcholine 50mg     After assessing the airway, the patient underwent preoxygenation with 100% FiO2 for 5 min. Etomidate and succinylcholine was given sequentially in rapid IV push.  The Sellick maneuver was performed throughout the entire sequence.  After adequate sedation and paralysis emergent intubation was performed.      A S3 laryngoscope/glidescope was used to visualize the epiglottis and vocal chords.    After positive identification of the vocal chords, an 7.5 ET tube was placed into the trachea with direct visualization.    The tube was seen passing through the vocal chords without some difficulty.  CO2 colorimetry was employed immediately to verify tube in airway with appropriate color change indicating detection of CO2.    Water vapor was seen within the ET tube, and auscultation of the abdomen revealed no bubbling souds.    Auscultation  and inspection of the chest after intubation showed symmetric chest excursion and symmetric air entry bilaterally.    Chest X-ray has been ordered and is pending.    The patient has been placed on a mechanical ventilator.    There were no complications.    Juliet Redmond PA-C   Supervised by Dr. Wu

## 2025-02-05 NOTE — PROCEDURES
PROCEDURE NOTE  Date: 2/5/2025   Name: Farhad Rogers  YOB: 1960    Procedures  [facility]  Central Line Procedure Note With Ultrasound Guidance    Indication/ pre procedure diagnosis: Need for vasopressors  Post procedure diagnosis: same    Risks, benefits, alternatives explained and consent obtained.    Time out performed.  Patient positioned in Trendelenburg.     Central line Bundle:  Full sterile barrier precautions used.  7-Step Sterility Protocol followed.  (cap, mask sterile gown, sterile gloves, large sterile sheet, hand hygiene, 2% chlorhexidine for cutaneous antisepsis)  5 mL 1% Lidocaine placed at insertion site.      Using ultrasound guidance,   RIGHT/LEFT: rightinternal jugular vein cannulated x 1 attempt(s) utilizing the modified Seldinger technique.    Position of guidewire confirmed in vein using ultrasound prior to dilating.   Dilation completed once successfully.   Guidewire was removed.  Central venous catheter was placed without difficulty.  no immediate complications encountered.  Good blood return on all 3 ports.  Catheter secured & Biopatch applied.  Sterile Tegaderm placed.      CXR pending.        Juliet Redmond PA-C  02/05/25  Pulmonary, Critical Care Medicine  [facility]

## 2025-02-05 NOTE — PROCEDURES
PROCEDURE NOTE  Date: 2025   Name: Farhad Rogers  YOB: 1960    Procedures    [facility]  Pulmonary, Critical Care, and Sleep Medicine    Name: Farhad Rogers MRN: [id number]   : 1960 Hospital: [facility]   Date: 2025        Bronchoscopy Report    Procedure: Therapeutic bronchoscopy.    Indication: Mucus Plugging    Consent/Treatment: Informed consent was waived due to emergent nature of procedure. Timeout verified the correct patient and correct procedure.     Anesthesia:   Patient on ventilator and receiving  precedex and fentanyl    Procedure Details:   -- The bronchoscope was introduced through an endotracheal tube.   -- The vocal cords were found to be normal.  -- The trachea and monae were completely inspected and were found to be normal.  -- The right-sided endobronchial anatomy was completely inspected and significant mucus plugging identified in all right sided lung segments, including R mainstem, RUL, RML, RLL.  All right sided lung segments vigorously suctioned and rinsed with normal saline.b  Noted thin walls and friability of airways after all mucus plugs suctionied  -- The left-sided endobronchial anatomy was completely inspected and was found to be patent however scarring and friability noted as well.  Consistent with underlying chronic lung disease    Specimens:   Not sent    Rapid On-Site Evaluation:  n/a    Complications: none    Estimated Blood Loss: Minimal    casey Lynn MD  2025  1:06 PM

## 2025-02-06 ENCOUNTER — APPOINTMENT (OUTPATIENT)
Facility: HOSPITAL | Age: 65
DRG: 720 | End: 2025-02-06
Payer: MEDICAID

## 2025-02-06 PROBLEM — J69.0 ASPIRATION PNEUMONIA OF RIGHT LOWER LOBE (HCC): Status: ACTIVE | Noted: 2025-02-06

## 2025-02-06 PROBLEM — A41.9 SEPSIS WITHOUT ACUTE ORGAN DYSFUNCTION (HCC): Status: ACTIVE | Noted: 2025-02-06

## 2025-02-06 LAB
ANION GAP SERPL CALC-SCNC: 2 MMOL/L (ref 3–18)
ARTERIAL PATENCY WRIST A: POSITIVE
BASE EXCESS BLD CALC-SCNC: 13 MMOL/L
BASOPHILS # BLD: 0 K/UL (ref 0–0.1)
BASOPHILS NFR BLD: 0 % (ref 0–2)
BDY SITE: ABNORMAL
BUN SERPL-MCNC: 24 MG/DL (ref 7–18)
BUN/CREAT SERPL: 29 (ref 12–20)
CALCIUM SERPL-MCNC: 9.2 MG/DL (ref 8.5–10.1)
CHLORIDE SERPL-SCNC: 114 MMOL/L (ref 100–111)
CO2 SERPL-SCNC: 33 MMOL/L (ref 21–32)
CREAT SERPL-MCNC: 0.83 MG/DL (ref 0.6–1.3)
DIFFERENTIAL METHOD BLD: ABNORMAL
EOSINOPHIL # BLD: 0.57 K/UL (ref 0–0.4)
EOSINOPHIL NFR BLD: 2 % (ref 0–5)
ERYTHROCYTE [DISTWIDTH] IN BLOOD BY AUTOMATED COUNT: 18.9 % (ref 11.6–14.5)
GAS FLOW.O2 O2 DELIVERY SYS: ABNORMAL
GAS FLOW.O2 SETTING OXYMISER: 12 BPM
GLUCOSE BLD STRIP.AUTO-MCNC: 108 MG/DL (ref 70–110)
GLUCOSE BLD STRIP.AUTO-MCNC: 126 MG/DL (ref 70–110)
GLUCOSE BLD STRIP.AUTO-MCNC: 53 MG/DL (ref 70–110)
GLUCOSE BLD STRIP.AUTO-MCNC: 79 MG/DL (ref 70–110)
GLUCOSE BLD STRIP.AUTO-MCNC: 82 MG/DL (ref 70–110)
GLUCOSE BLD STRIP.AUTO-MCNC: 93 MG/DL (ref 70–110)
GLUCOSE SERPL-MCNC: 97 MG/DL (ref 74–99)
HCO3 BLD-SCNC: 39.3 MMOL/L (ref 21–28)
HCT VFR BLD AUTO: 28.2 % (ref 36–48)
HGB BLD-MCNC: 8.2 G/DL (ref 13–16)
IMM GRANULOCYTES # BLD AUTO: 0 K/UL (ref 0–0.04)
IMM GRANULOCYTES NFR BLD AUTO: 0 % (ref 0–0.5)
LYMPHOCYTES # BLD: 0.29 K/UL (ref 0.9–3.6)
LYMPHOCYTES NFR BLD: 1 % (ref 21–52)
MAGNESIUM SERPL-MCNC: 2.7 MG/DL (ref 1.6–2.6)
MCH RBC QN AUTO: 27.9 PG (ref 24–34)
MCHC RBC AUTO-ENTMCNC: 29.1 G/DL (ref 31–37)
MCV RBC AUTO: 95.9 FL (ref 78–100)
MONOCYTES # BLD: 1.43 K/UL (ref 0.05–1.2)
MONOCYTES NFR BLD: 5 % (ref 3–10)
NEUTS SEG # BLD: 26.21 K/UL (ref 1.8–8)
NEUTS SEG NFR BLD: 92 % (ref 40–73)
NRBC # BLD: 0 K/UL (ref 0–0.01)
NRBC BLD-RTO: 0 PER 100 WBC
O2/TOTAL GAS SETTING VFR VENT: 45 %
PCO2 BLD: 63.2 MMHG (ref 35–48)
PEEP RESPIRATORY: 10 CMH2O
PH BLD: 7.4 (ref 7.35–7.45)
PHOSPHATE SERPL-MCNC: 3.3 MG/DL (ref 2.5–4.9)
PLATELET # BLD AUTO: 393 K/UL (ref 135–420)
PLATELET COMMENT: ABNORMAL
PMV BLD AUTO: 11.7 FL (ref 9.2–11.8)
PO2 BLD: 137 MMHG (ref 83–108)
POTASSIUM SERPL-SCNC: 3.6 MMOL/L (ref 3.5–5.5)
RBC # BLD AUTO: 2.94 M/UL (ref 4.35–5.65)
RBC MORPH BLD: ABNORMAL
RBC MORPH BLD: ABNORMAL
SAO2 % BLD: 99 % (ref 92–97)
SERVICE CMNT-IMP: ABNORMAL
SODIUM SERPL-SCNC: 149 MMOL/L (ref 136–145)
SPECIMEN TYPE: ABNORMAL
VENTILATION MODE VENT: ABNORMAL
VT SETTING VENT: 480 ML
WBC # BLD AUTO: 28.5 K/UL (ref 4.6–13.2)

## 2025-02-06 PROCEDURE — 6370000000 HC RX 637 (ALT 250 FOR IP): Performed by: HOSPITALIST

## 2025-02-06 PROCEDURE — 2580000003 HC RX 258: Performed by: PHYSICIAN ASSISTANT

## 2025-02-06 PROCEDURE — 83735 ASSAY OF MAGNESIUM: CPT

## 2025-02-06 PROCEDURE — 36600 WITHDRAWAL OF ARTERIAL BLOOD: CPT

## 2025-02-06 PROCEDURE — 6360000002 HC RX W HCPCS: Performed by: PHYSICIAN ASSISTANT

## 2025-02-06 PROCEDURE — 71045 X-RAY EXAM CHEST 1 VIEW: CPT

## 2025-02-06 PROCEDURE — 94761 N-INVAS EAR/PLS OXIMETRY MLT: CPT

## 2025-02-06 PROCEDURE — 94003 VENT MGMT INPAT SUBQ DAY: CPT

## 2025-02-06 PROCEDURE — 2500000003 HC RX 250 WO HCPCS

## 2025-02-06 PROCEDURE — 6360000002 HC RX W HCPCS: Performed by: INTERNAL MEDICINE

## 2025-02-06 PROCEDURE — 82803 BLOOD GASES ANY COMBINATION: CPT

## 2025-02-06 PROCEDURE — 2700000000 HC OXYGEN THERAPY PER DAY

## 2025-02-06 PROCEDURE — 6370000000 HC RX 637 (ALT 250 FOR IP): Performed by: PHYSICIAN ASSISTANT

## 2025-02-06 PROCEDURE — 80048 BASIC METABOLIC PNL TOTAL CA: CPT

## 2025-02-06 PROCEDURE — 82962 GLUCOSE BLOOD TEST: CPT

## 2025-02-06 PROCEDURE — 2580000003 HC RX 258: Performed by: HOSPITALIST

## 2025-02-06 PROCEDURE — 94669 MECHANICAL CHEST WALL OSCILL: CPT

## 2025-02-06 PROCEDURE — 89220 SPUTUM SPECIMEN COLLECTION: CPT

## 2025-02-06 PROCEDURE — 36415 COLL VENOUS BLD VENIPUNCTURE: CPT

## 2025-02-06 PROCEDURE — 99291 CRITICAL CARE FIRST HOUR: CPT | Performed by: HOSPITALIST

## 2025-02-06 PROCEDURE — 94668 MNPJ CHEST WALL SBSQ: CPT

## 2025-02-06 PROCEDURE — 94640 AIRWAY INHALATION TREATMENT: CPT

## 2025-02-06 PROCEDURE — 51798 US URINE CAPACITY MEASURE: CPT

## 2025-02-06 PROCEDURE — 2500000003 HC RX 250 WO HCPCS: Performed by: HOSPITALIST

## 2025-02-06 PROCEDURE — 84100 ASSAY OF PHOSPHORUS: CPT

## 2025-02-06 PROCEDURE — 1100000003 HC PRIVATE W/ TELEMETRY

## 2025-02-06 PROCEDURE — 6360000002 HC RX W HCPCS: Performed by: HOSPITALIST

## 2025-02-06 PROCEDURE — 6360000002 HC RX W HCPCS

## 2025-02-06 PROCEDURE — 94660 CPAP INITIATION&MGMT: CPT

## 2025-02-06 PROCEDURE — 2500000003 HC RX 250 WO HCPCS: Performed by: PHYSICIAN ASSISTANT

## 2025-02-06 PROCEDURE — 85025 COMPLETE CBC W/AUTO DIFF WBC: CPT

## 2025-02-06 RX ORDER — LACTOBACILLUS RHAMNOSUS GG 10B CELL
1 CAPSULE ORAL
Status: DISCONTINUED | OUTPATIENT
Start: 2025-02-07 | End: 2025-02-19 | Stop reason: HOSPADM

## 2025-02-06 RX ORDER — MULTIVIT-MIN/FERROUS GLUCONATE 9 MG/15 ML
15 LIQUID (ML) ORAL DAILY
Status: DISCONTINUED | OUTPATIENT
Start: 2025-02-06 | End: 2025-02-19 | Stop reason: HOSPADM

## 2025-02-06 RX ORDER — POLYETHYLENE GLYCOL 3350 17 G/17G
17 POWDER, FOR SOLUTION ORAL DAILY
Status: DISCONTINUED | OUTPATIENT
Start: 2025-02-06 | End: 2025-02-19 | Stop reason: HOSPADM

## 2025-02-06 RX ADMIN — POLYETHYLENE GLYCOL 3350 17 G: 17 POWDER, FOR SOLUTION ORAL at 11:54

## 2025-02-06 RX ADMIN — MIDODRINE HYDROCHLORIDE 10 MG: 10 TABLET ORAL at 11:54

## 2025-02-06 RX ADMIN — ALBUTEROL SULFATE 1.25 MG: 1.25 SOLUTION RESPIRATORY (INHALATION) at 04:26

## 2025-02-06 RX ADMIN — BUDESONIDE 1 MG: 1 SUSPENSION RESPIRATORY (INHALATION) at 08:11

## 2025-02-06 RX ADMIN — WATER 40 MG: 1 INJECTION INTRAMUSCULAR; INTRAVENOUS; SUBCUTANEOUS at 08:20

## 2025-02-06 RX ADMIN — PIPERACILLIN AND TAZOBACTAM 3375 MG: 3; .375 INJECTION, POWDER, LYOPHILIZED, FOR SOLUTION INTRAVENOUS at 15:26

## 2025-02-06 RX ADMIN — PIPERACILLIN AND TAZOBACTAM 3375 MG: 3; .375 INJECTION, POWDER, LYOPHILIZED, FOR SOLUTION INTRAVENOUS at 08:19

## 2025-02-06 RX ADMIN — ARFORMOTEROL TARTRATE 15 MCG: 15 SOLUTION RESPIRATORY (INHALATION) at 08:11

## 2025-02-06 RX ADMIN — SODIUM CHLORIDE SOLN NEBU 3% 4 ML: 3 NEBU SOLN at 13:13

## 2025-02-06 RX ADMIN — SODIUM CHLORIDE, PRESERVATIVE FREE 10 ML: 5 INJECTION INTRAVENOUS at 20:30

## 2025-02-06 RX ADMIN — ALBUTEROL SULFATE 1.25 MG: 1.25 SOLUTION RESPIRATORY (INHALATION) at 13:13

## 2025-02-06 RX ADMIN — Medication 15 ML: at 11:55

## 2025-02-06 RX ADMIN — 0.12% CHLORHEXIDINE GLUCONATE 15 ML: 1.2 RINSE ORAL at 20:29

## 2025-02-06 RX ADMIN — SODIUM CHLORIDE, PRESERVATIVE FREE 20 MG: 5 INJECTION INTRAVENOUS at 20:29

## 2025-02-06 RX ADMIN — Medication 7 MCG/MIN: at 08:29

## 2025-02-06 RX ADMIN — SODIUM CHLORIDE SOLN NEBU 3% 4 ML: 3 NEBU SOLN at 20:05

## 2025-02-06 RX ADMIN — SODIUM CHLORIDE SOLN NEBU 3% 4 ML: 3 NEBU SOLN at 04:26

## 2025-02-06 RX ADMIN — HEPARIN SODIUM 5000 UNITS: 5000 INJECTION INTRAVENOUS; SUBCUTANEOUS at 22:06

## 2025-02-06 RX ADMIN — ALBUTEROL SULFATE 1.25 MG: 1.25 SOLUTION RESPIRATORY (INHALATION) at 08:22

## 2025-02-06 RX ADMIN — HEPARIN SODIUM 5000 UNITS: 5000 INJECTION INTRAVENOUS; SUBCUTANEOUS at 05:48

## 2025-02-06 RX ADMIN — SODIUM CHLORIDE SOLN NEBU 3% 4 ML: 3 NEBU SOLN at 08:22

## 2025-02-06 RX ADMIN — SODIUM CHLORIDE, PRESERVATIVE FREE 20 MG: 5 INJECTION INTRAVENOUS at 08:19

## 2025-02-06 RX ADMIN — ARFORMOTEROL TARTRATE 15 MCG: 15 SOLUTION RESPIRATORY (INHALATION) at 20:05

## 2025-02-06 RX ADMIN — PIPERACILLIN AND TAZOBACTAM 3375 MG: 3; .375 INJECTION, POWDER, LYOPHILIZED, FOR SOLUTION INTRAVENOUS at 00:25

## 2025-02-06 RX ADMIN — SODIUM CHLORIDE, PRESERVATIVE FREE 10 ML: 5 INJECTION INTRAVENOUS at 08:23

## 2025-02-06 RX ADMIN — BUDESONIDE 1 MG: 1 SUSPENSION RESPIRATORY (INHALATION) at 20:05

## 2025-02-06 RX ADMIN — 0.12% CHLORHEXIDINE GLUCONATE 15 ML: 1.2 RINSE ORAL at 08:22

## 2025-02-06 RX ADMIN — MIDODRINE HYDROCHLORIDE 10 MG: 10 TABLET ORAL at 08:22

## 2025-02-06 RX ADMIN — HEPARIN SODIUM 5000 UNITS: 5000 INJECTION INTRAVENOUS; SUBCUTANEOUS at 13:00

## 2025-02-06 RX ADMIN — ALBUTEROL SULFATE 1.25 MG: 1.25 SOLUTION RESPIRATORY (INHALATION) at 20:05

## 2025-02-06 ASSESSMENT — PULMONARY FUNCTION TESTS
PIF_VALUE: 22
PIF_VALUE: 23
PIF_VALUE: 23
PIF_VALUE: 21

## 2025-02-06 ASSESSMENT — PAIN SCALES - GENERAL: PAINLEVEL_OUTOF10: 0

## 2025-02-07 ENCOUNTER — APPOINTMENT (OUTPATIENT)
Facility: HOSPITAL | Age: 65
DRG: 720 | End: 2025-02-07
Payer: MEDICAID

## 2025-02-07 LAB
ANION GAP SERPL CALC-SCNC: 2 MMOL/L (ref 3–18)
ANION GAP SERPL CALC-SCNC: 3 MMOL/L (ref 3–18)
BASOPHILS # BLD: 0.02 K/UL (ref 0–0.1)
BASOPHILS NFR BLD: 0.1 % (ref 0–2)
BUN SERPL-MCNC: 31 MG/DL (ref 7–18)
BUN SERPL-MCNC: 31 MG/DL (ref 7–18)
BUN/CREAT SERPL: 46 (ref 12–20)
BUN/CREAT SERPL: 46 (ref 12–20)
CALCIUM SERPL-MCNC: 8.4 MG/DL (ref 8.5–10.1)
CALCIUM SERPL-MCNC: 9.2 MG/DL (ref 8.5–10.1)
CHLORIDE SERPL-SCNC: 114 MMOL/L (ref 100–111)
CHLORIDE SERPL-SCNC: 117 MMOL/L (ref 100–111)
CO2 SERPL-SCNC: 34 MMOL/L (ref 21–32)
CO2 SERPL-SCNC: 36 MMOL/L (ref 21–32)
CREAT SERPL-MCNC: 0.67 MG/DL (ref 0.6–1.3)
CREAT SERPL-MCNC: 0.67 MG/DL (ref 0.6–1.3)
DIFFERENTIAL METHOD BLD: ABNORMAL
EOSINOPHIL # BLD: 0.02 K/UL (ref 0–0.4)
EOSINOPHIL NFR BLD: 0.1 % (ref 0–5)
ERYTHROCYTE [DISTWIDTH] IN BLOOD BY AUTOMATED COUNT: 18.7 % (ref 11.6–14.5)
GLUCOSE BLD STRIP.AUTO-MCNC: 113 MG/DL (ref 70–110)
GLUCOSE BLD STRIP.AUTO-MCNC: 118 MG/DL (ref 70–110)
GLUCOSE BLD STRIP.AUTO-MCNC: 93 MG/DL (ref 70–110)
GLUCOSE BLD STRIP.AUTO-MCNC: 97 MG/DL (ref 70–110)
GLUCOSE SERPL-MCNC: 105 MG/DL (ref 74–99)
GLUCOSE SERPL-MCNC: 126 MG/DL (ref 74–99)
HCT VFR BLD AUTO: 21.7 % (ref 36–48)
HCT VFR BLD AUTO: 26 % (ref 36–48)
HGB BLD-MCNC: 6.8 G/DL (ref 13–16)
HGB BLD-MCNC: 8.2 G/DL (ref 13–16)
HISTORY CHECK: NORMAL
IMM GRANULOCYTES # BLD AUTO: 0.24 K/UL (ref 0–0.04)
IMM GRANULOCYTES NFR BLD AUTO: 1.1 % (ref 0–0.5)
LYMPHOCYTES # BLD: 0.59 K/UL (ref 0.9–3.6)
LYMPHOCYTES NFR BLD: 2.7 % (ref 21–52)
MAGNESIUM SERPL-MCNC: 2.9 MG/DL (ref 1.6–2.6)
MCH RBC QN AUTO: 29.3 PG (ref 24–34)
MCHC RBC AUTO-ENTMCNC: 31.3 G/DL (ref 31–37)
MCV RBC AUTO: 93.5 FL (ref 78–100)
MONOCYTES # BLD: 1.14 K/UL (ref 0.05–1.2)
MONOCYTES NFR BLD: 5.2 % (ref 3–10)
NEUTS SEG # BLD: 19.76 K/UL (ref 1.8–8)
NEUTS SEG NFR BLD: 90.8 % (ref 40–73)
NRBC # BLD: 0 K/UL (ref 0–0.01)
NRBC BLD-RTO: 0 PER 100 WBC
PHOSPHATE SERPL-MCNC: 2.4 MG/DL (ref 2.5–4.9)
PHOSPHATE SERPL-MCNC: 3.5 MG/DL (ref 2.5–4.9)
PLATELET # BLD AUTO: 363 K/UL (ref 135–420)
PMV BLD AUTO: 11.6 FL (ref 9.2–11.8)
POTASSIUM SERPL-SCNC: 3.2 MMOL/L (ref 3.5–5.5)
POTASSIUM SERPL-SCNC: 4.2 MMOL/L (ref 3.5–5.5)
RBC # BLD AUTO: 2.32 M/UL (ref 4.35–5.65)
SODIUM SERPL-SCNC: 152 MMOL/L (ref 136–145)
SODIUM SERPL-SCNC: 154 MMOL/L (ref 136–145)
WBC # BLD AUTO: 21.8 K/UL (ref 4.6–13.2)

## 2025-02-07 PROCEDURE — 6360000002 HC RX W HCPCS: Performed by: PHYSICIAN ASSISTANT

## 2025-02-07 PROCEDURE — 6360000002 HC RX W HCPCS

## 2025-02-07 PROCEDURE — 83735 ASSAY OF MAGNESIUM: CPT

## 2025-02-07 PROCEDURE — 93005 ELECTROCARDIOGRAM TRACING: CPT

## 2025-02-07 PROCEDURE — 99291 CRITICAL CARE FIRST HOUR: CPT | Performed by: INTERNAL MEDICINE

## 2025-02-07 PROCEDURE — 2580000003 HC RX 258: Performed by: HOSPITALIST

## 2025-02-07 PROCEDURE — 94761 N-INVAS EAR/PLS OXIMETRY MLT: CPT

## 2025-02-07 PROCEDURE — 85025 COMPLETE CBC W/AUTO DIFF WBC: CPT

## 2025-02-07 PROCEDURE — 80048 BASIC METABOLIC PNL TOTAL CA: CPT

## 2025-02-07 PROCEDURE — 86900 BLOOD TYPING SEROLOGIC ABO: CPT

## 2025-02-07 PROCEDURE — 6370000000 HC RX 637 (ALT 250 FOR IP): Performed by: PHYSICIAN ASSISTANT

## 2025-02-07 PROCEDURE — 1100000003 HC PRIVATE W/ TELEMETRY

## 2025-02-07 PROCEDURE — 6360000002 HC RX W HCPCS: Performed by: INTERNAL MEDICINE

## 2025-02-07 PROCEDURE — P9016 RBC LEUKOCYTES REDUCED: HCPCS

## 2025-02-07 PROCEDURE — 86901 BLOOD TYPING SEROLOGIC RH(D): CPT

## 2025-02-07 PROCEDURE — 92610 EVALUATE SWALLOWING FUNCTION: CPT

## 2025-02-07 PROCEDURE — 2500000003 HC RX 250 WO HCPCS: Performed by: PHYSICIAN ASSISTANT

## 2025-02-07 PROCEDURE — 92526 ORAL FUNCTION THERAPY: CPT

## 2025-02-07 PROCEDURE — 2580000003 HC RX 258

## 2025-02-07 PROCEDURE — 94660 CPAP INITIATION&MGMT: CPT

## 2025-02-07 PROCEDURE — 86850 RBC ANTIBODY SCREEN: CPT

## 2025-02-07 PROCEDURE — 2700000000 HC OXYGEN THERAPY PER DAY

## 2025-02-07 PROCEDURE — 84100 ASSAY OF PHOSPHORUS: CPT

## 2025-02-07 PROCEDURE — 30233N1 TRANSFUSION OF NONAUTOLOGOUS RED BLOOD CELLS INTO PERIPHERAL VEIN, PERCUTANEOUS APPROACH: ICD-10-PCS | Performed by: HOSPITALIST

## 2025-02-07 PROCEDURE — 36430 TRANSFUSION BLD/BLD COMPNT: CPT

## 2025-02-07 PROCEDURE — 2500000003 HC RX 250 WO HCPCS: Performed by: HOSPITALIST

## 2025-02-07 PROCEDURE — 94640 AIRWAY INHALATION TREATMENT: CPT

## 2025-02-07 PROCEDURE — 2500000003 HC RX 250 WO HCPCS

## 2025-02-07 PROCEDURE — 6370000000 HC RX 637 (ALT 250 FOR IP): Performed by: HOSPITALIST

## 2025-02-07 PROCEDURE — 6360000002 HC RX W HCPCS: Performed by: HOSPITALIST

## 2025-02-07 PROCEDURE — 85014 HEMATOCRIT: CPT

## 2025-02-07 PROCEDURE — 2580000003 HC RX 258: Performed by: PHYSICIAN ASSISTANT

## 2025-02-07 PROCEDURE — 86923 COMPATIBILITY TEST ELECTRIC: CPT

## 2025-02-07 PROCEDURE — 71045 X-RAY EXAM CHEST 1 VIEW: CPT

## 2025-02-07 PROCEDURE — 85018 HEMOGLOBIN: CPT

## 2025-02-07 PROCEDURE — 82962 GLUCOSE BLOOD TEST: CPT

## 2025-02-07 PROCEDURE — 6370000000 HC RX 637 (ALT 250 FOR IP): Performed by: INTERNAL MEDICINE

## 2025-02-07 RX ORDER — POTASSIUM CHLORIDE 29.8 MG/ML
20 INJECTION INTRAVENOUS
Status: COMPLETED | OUTPATIENT
Start: 2025-02-07 | End: 2025-02-07

## 2025-02-07 RX ORDER — SODIUM CHLORIDE 9 MG/ML
INJECTION, SOLUTION INTRAVENOUS PRN
Status: DISCONTINUED | OUTPATIENT
Start: 2025-02-07 | End: 2025-02-19 | Stop reason: HOSPADM

## 2025-02-07 RX ADMIN — HEPARIN SODIUM 5000 UNITS: 5000 INJECTION INTRAVENOUS; SUBCUTANEOUS at 23:10

## 2025-02-07 RX ADMIN — SODIUM CHLORIDE, PRESERVATIVE FREE 20 MG: 5 INJECTION INTRAVENOUS at 09:07

## 2025-02-07 RX ADMIN — Medication 15 ML: at 09:08

## 2025-02-07 RX ADMIN — ALBUTEROL SULFATE 1.25 MG: 1.25 SOLUTION RESPIRATORY (INHALATION) at 17:28

## 2025-02-07 RX ADMIN — PIPERACILLIN AND TAZOBACTAM 3375 MG: 3; .375 INJECTION, POWDER, LYOPHILIZED, FOR SOLUTION INTRAVENOUS at 16:43

## 2025-02-07 RX ADMIN — SODIUM CHLORIDE, PRESERVATIVE FREE 10 ML: 5 INJECTION INTRAVENOUS at 09:28

## 2025-02-07 RX ADMIN — SODIUM CHLORIDE SOLN NEBU 3% 4 ML: 3 NEBU SOLN at 07:36

## 2025-02-07 RX ADMIN — PIPERACILLIN AND TAZOBACTAM 3375 MG: 3; .375 INJECTION, POWDER, LYOPHILIZED, FOR SOLUTION INTRAVENOUS at 00:51

## 2025-02-07 RX ADMIN — SODIUM CHLORIDE SOLN NEBU 3% 4 ML: 3 NEBU SOLN at 20:17

## 2025-02-07 RX ADMIN — HEPARIN SODIUM 5000 UNITS: 5000 INJECTION INTRAVENOUS; SUBCUTANEOUS at 05:30

## 2025-02-07 RX ADMIN — POLYETHYLENE GLYCOL 3350 17 G: 17 POWDER, FOR SOLUTION ORAL at 09:07

## 2025-02-07 RX ADMIN — ALBUTEROL SULFATE 1.25 MG: 1.25 SOLUTION RESPIRATORY (INHALATION) at 20:17

## 2025-02-07 RX ADMIN — WATER 40 MG: 1 INJECTION INTRAMUSCULAR; INTRAVENOUS; SUBCUTANEOUS at 09:07

## 2025-02-07 RX ADMIN — POTASSIUM CHLORIDE 20 MEQ: 29.8 INJECTION, SOLUTION INTRAVENOUS at 06:27

## 2025-02-07 RX ADMIN — SODIUM CHLORIDE SOLN NEBU 3% 4 ML: 3 NEBU SOLN at 04:44

## 2025-02-07 RX ADMIN — MIDODRINE HYDROCHLORIDE 10 MG: 10 TABLET ORAL at 11:47

## 2025-02-07 RX ADMIN — HEPARIN SODIUM 5000 UNITS: 5000 INJECTION INTRAVENOUS; SUBCUTANEOUS at 14:30

## 2025-02-07 RX ADMIN — ARFORMOTEROL TARTRATE 15 MCG: 15 SOLUTION RESPIRATORY (INHALATION) at 07:32

## 2025-02-07 RX ADMIN — Medication 1 CAPSULE: at 09:24

## 2025-02-07 RX ADMIN — MIDODRINE HYDROCHLORIDE 10 MG: 10 TABLET ORAL at 09:07

## 2025-02-07 RX ADMIN — SODIUM CHLORIDE SOLN NEBU 3% 4 ML: 3 NEBU SOLN at 17:28

## 2025-02-07 RX ADMIN — PIPERACILLIN AND TAZOBACTAM 3375 MG: 3; .375 INJECTION, POWDER, LYOPHILIZED, FOR SOLUTION INTRAVENOUS at 09:27

## 2025-02-07 RX ADMIN — ALBUTEROL SULFATE 1.25 MG: 1.25 SOLUTION RESPIRATORY (INHALATION) at 07:36

## 2025-02-07 RX ADMIN — ARFORMOTEROL TARTRATE 15 MCG: 15 SOLUTION RESPIRATORY (INHALATION) at 20:18

## 2025-02-07 RX ADMIN — 0.12% CHLORHEXIDINE GLUCONATE 15 ML: 1.2 RINSE ORAL at 09:08

## 2025-02-07 RX ADMIN — MIDODRINE HYDROCHLORIDE 10 MG: 10 TABLET ORAL at 16:44

## 2025-02-07 RX ADMIN — BUDESONIDE 1 MG: 1 SUSPENSION RESPIRATORY (INHALATION) at 07:32

## 2025-02-07 RX ADMIN — POTASSIUM CHLORIDE 20 MEQ: 29.8 INJECTION, SOLUTION INTRAVENOUS at 05:31

## 2025-02-07 RX ADMIN — BUDESONIDE 1 MG: 1 SUSPENSION RESPIRATORY (INHALATION) at 20:18

## 2025-02-07 RX ADMIN — PIPERACILLIN AND TAZOBACTAM 3375 MG: 3; .375 INJECTION, POWDER, LYOPHILIZED, FOR SOLUTION INTRAVENOUS at 22:59

## 2025-02-07 RX ADMIN — ALBUTEROL SULFATE 1.25 MG: 1.25 SOLUTION RESPIRATORY (INHALATION) at 04:44

## 2025-02-07 RX ADMIN — SODIUM CHLORIDE, PRESERVATIVE FREE 10 ML: 5 INJECTION INTRAVENOUS at 21:30

## 2025-02-07 RX ADMIN — POTASSIUM PHOSPHATE, MONOBASIC POTASSIUM PHOSPHATE, DIBASIC 15 MMOL: 224; 236 INJECTION, SOLUTION, CONCENTRATE INTRAVENOUS at 11:50

## 2025-02-07 ASSESSMENT — PAIN SCALES - GENERAL
PAINLEVEL_OUTOF10: 0

## 2025-02-07 NOTE — CONSENT
Informed Consent for Blood Component Transfusion Note    I have discussed with the mother the rationale for blood component transfusion; its benefits in treating or preventing fatigue, organ damage, or death; and its risk which includes mild transfusion reactions, rare risk of blood borne infection, or more serious but rare reactions. I have discussed the alternatives to transfusion, including the risk and consequences of not receiving transfusion. The mother had an opportunity to ask questions and had agreed to proceed with transfusion of blood components.    Electronically signed by Marissa Subramanian PA-C on 2/7/25 at 7:00 AM EST

## 2025-02-07 NOTE — FLOWSHEET NOTE
2000    Rec'd pt resting in bed awake and alert, no distress noted. Currently wearing bipap mask fio2 at 40%.  Follows simple commands, moves all ext's but still with generalized weakness to all.  VSS currently 1mic levo to keep map > 65.  Denies pain.  Assisted Repositioning.    2200 mouth care completed.  Pt forgetful at times. Weak cough, Will assist with IS when pt is able to tolerate being off bi-pap.   See POC in treatment section.

## 2025-02-08 LAB
ANION GAP SERPL CALC-SCNC: 1 MMOL/L (ref 3–18)
BACTERIA SPEC CULT: NORMAL
BACTERIA SPEC CULT: NORMAL
BASOPHILS # BLD: 0 K/UL (ref 0–0.1)
BASOPHILS NFR BLD: 0 % (ref 0–2)
BUN SERPL-MCNC: 28 MG/DL (ref 7–18)
BUN/CREAT SERPL: 46 (ref 12–20)
CALCIUM SERPL-MCNC: 8.8 MG/DL (ref 8.5–10.1)
CHLORIDE SERPL-SCNC: 118 MMOL/L (ref 100–111)
CO2 SERPL-SCNC: 34 MMOL/L (ref 21–32)
CREAT SERPL-MCNC: 0.61 MG/DL (ref 0.6–1.3)
DIFFERENTIAL METHOD BLD: ABNORMAL
EKG ATRIAL RATE: 61 BPM
EKG DIAGNOSIS: NORMAL
EKG P AXIS: 65 DEGREES
EKG P-R INTERVAL: 138 MS
EKG Q-T INTERVAL: 450 MS
EKG QRS DURATION: 94 MS
EKG QTC CALCULATION (BAZETT): 453 MS
EKG R AXIS: 61 DEGREES
EKG T AXIS: 69 DEGREES
EKG VENTRICULAR RATE: 61 BPM
EOSINOPHIL # BLD: 0 K/UL (ref 0–0.4)
EOSINOPHIL NFR BLD: 0 % (ref 0–5)
ERYTHROCYTE [DISTWIDTH] IN BLOOD BY AUTOMATED COUNT: 19.9 % (ref 11.6–14.5)
FERRITIN SERPL-MCNC: 266 NG/ML (ref 8–388)
FOLATE SERPL-MCNC: 6 NG/ML (ref 3.1–17.5)
GLUCOSE BLD STRIP.AUTO-MCNC: 110 MG/DL (ref 70–110)
GLUCOSE BLD STRIP.AUTO-MCNC: 170 MG/DL (ref 70–110)
GLUCOSE SERPL-MCNC: 100 MG/DL (ref 74–99)
HCT VFR BLD AUTO: 25.4 % (ref 36–48)
HGB BLD-MCNC: 7.8 G/DL (ref 13–16)
IMM GRANULOCYTES # BLD AUTO: 0 K/UL (ref 0–0.04)
IMM GRANULOCYTES NFR BLD AUTO: 0 % (ref 0–0.5)
IRON SATN MFR SERPL: 74 % (ref 20–50)
IRON SERPL-MCNC: 108 UG/DL (ref 50–175)
LYMPHOCYTES # BLD: 0.58 K/UL (ref 0.9–3.6)
LYMPHOCYTES NFR BLD: 3 % (ref 21–52)
MAGNESIUM SERPL-MCNC: 2.9 MG/DL (ref 1.6–2.6)
MCH RBC QN AUTO: 28.2 PG (ref 24–34)
MCHC RBC AUTO-ENTMCNC: 30.7 G/DL (ref 31–37)
MCV RBC AUTO: 91.7 FL (ref 78–100)
MONOCYTES # BLD: 0.77 K/UL (ref 0.05–1.2)
MONOCYTES NFR BLD: 4 % (ref 3–10)
NEUTS SEG # BLD: 17.95 K/UL (ref 1.8–8)
NEUTS SEG NFR BLD: 93 % (ref 40–73)
NRBC # BLD: 0.02 K/UL (ref 0–0.01)
NRBC BLD-RTO: 0.1 PER 100 WBC
PHOSPHATE SERPL-MCNC: 2.9 MG/DL (ref 2.5–4.9)
PLATELET # BLD AUTO: 403 K/UL (ref 135–420)
PLATELET COMMENT: ABNORMAL
PMV BLD AUTO: 11.7 FL (ref 9.2–11.8)
POTASSIUM SERPL-SCNC: 3.6 MMOL/L (ref 3.5–5.5)
RBC # BLD AUTO: 2.77 M/UL (ref 4.35–5.65)
RBC MORPH BLD: ABNORMAL
SERVICE CMNT-IMP: NORMAL
SODIUM SERPL-SCNC: 153 MMOL/L (ref 136–145)
TIBC SERPL-MCNC: 146 UG/DL (ref 250–450)
VIT B12 SERPL-MCNC: 1977 PG/ML (ref 211–911)
WBC # BLD AUTO: 19.3 K/UL (ref 4.6–13.2)

## 2025-02-08 PROCEDURE — 2700000000 HC OXYGEN THERAPY PER DAY

## 2025-02-08 PROCEDURE — 94660 CPAP INITIATION&MGMT: CPT

## 2025-02-08 PROCEDURE — 6360000002 HC RX W HCPCS: Performed by: PHYSICIAN ASSISTANT

## 2025-02-08 PROCEDURE — 82962 GLUCOSE BLOOD TEST: CPT

## 2025-02-08 PROCEDURE — 6370000000 HC RX 637 (ALT 250 FOR IP): Performed by: HOSPITALIST

## 2025-02-08 PROCEDURE — 80048 BASIC METABOLIC PNL TOTAL CA: CPT

## 2025-02-08 PROCEDURE — 82746 ASSAY OF FOLIC ACID SERUM: CPT

## 2025-02-08 PROCEDURE — 84100 ASSAY OF PHOSPHORUS: CPT

## 2025-02-08 PROCEDURE — 82728 ASSAY OF FERRITIN: CPT

## 2025-02-08 PROCEDURE — 94664 DEMO&/EVAL PT USE INHALER: CPT

## 2025-02-08 PROCEDURE — 83550 IRON BINDING TEST: CPT

## 2025-02-08 PROCEDURE — 94761 N-INVAS EAR/PLS OXIMETRY MLT: CPT

## 2025-02-08 PROCEDURE — 97168 OT RE-EVAL EST PLAN CARE: CPT

## 2025-02-08 PROCEDURE — 97535 SELF CARE MNGMENT TRAINING: CPT

## 2025-02-08 PROCEDURE — 94640 AIRWAY INHALATION TREATMENT: CPT

## 2025-02-08 PROCEDURE — 1100000003 HC PRIVATE W/ TELEMETRY

## 2025-02-08 PROCEDURE — 2580000003 HC RX 258: Performed by: HOSPITALIST

## 2025-02-08 PROCEDURE — 93010 ELECTROCARDIOGRAM REPORT: CPT | Performed by: INTERNAL MEDICINE

## 2025-02-08 PROCEDURE — 6370000000 HC RX 637 (ALT 250 FOR IP): Performed by: INTERNAL MEDICINE

## 2025-02-08 PROCEDURE — 83540 ASSAY OF IRON: CPT

## 2025-02-08 PROCEDURE — 2580000003 HC RX 258: Performed by: PHYSICIAN ASSISTANT

## 2025-02-08 PROCEDURE — 2580000003 HC RX 258: Performed by: INTERNAL MEDICINE

## 2025-02-08 PROCEDURE — 99233 SBSQ HOSP IP/OBS HIGH 50: CPT | Performed by: HOSPITALIST

## 2025-02-08 PROCEDURE — 6370000000 HC RX 637 (ALT 250 FOR IP): Performed by: PHYSICIAN ASSISTANT

## 2025-02-08 PROCEDURE — 6360000002 HC RX W HCPCS: Performed by: INTERNAL MEDICINE

## 2025-02-08 PROCEDURE — 97530 THERAPEUTIC ACTIVITIES: CPT

## 2025-02-08 PROCEDURE — 99291 CRITICAL CARE FIRST HOUR: CPT | Performed by: INTERNAL MEDICINE

## 2025-02-08 PROCEDURE — 83735 ASSAY OF MAGNESIUM: CPT

## 2025-02-08 PROCEDURE — 85025 COMPLETE CBC W/AUTO DIFF WBC: CPT

## 2025-02-08 PROCEDURE — 6360000002 HC RX W HCPCS: Performed by: HOSPITALIST

## 2025-02-08 PROCEDURE — 2500000003 HC RX 250 WO HCPCS: Performed by: PHYSICIAN ASSISTANT

## 2025-02-08 PROCEDURE — 97164 PT RE-EVAL EST PLAN CARE: CPT

## 2025-02-08 PROCEDURE — 82607 VITAMIN B-12: CPT

## 2025-02-08 PROCEDURE — 2500000003 HC RX 250 WO HCPCS

## 2025-02-08 PROCEDURE — 6360000002 HC RX W HCPCS

## 2025-02-08 RX ORDER — SODIUM CHLORIDE FOR INHALATION 3 %
4 VIAL, NEBULIZER (ML) INHALATION
Status: DISCONTINUED | OUTPATIENT
Start: 2025-02-08 | End: 2025-02-13

## 2025-02-08 RX ORDER — ALBUTEROL SULFATE 1.25 MG/3ML
1.25 SOLUTION RESPIRATORY (INHALATION)
Status: DISCONTINUED | OUTPATIENT
Start: 2025-02-08 | End: 2025-02-13

## 2025-02-08 RX ADMIN — Medication 15 ML: at 09:27

## 2025-02-08 RX ADMIN — ARFORMOTEROL TARTRATE 15 MCG: 15 SOLUTION RESPIRATORY (INHALATION) at 21:18

## 2025-02-08 RX ADMIN — POLYETHYLENE GLYCOL 3350 17 G: 17 POWDER, FOR SOLUTION ORAL at 09:28

## 2025-02-08 RX ADMIN — WATER 40 MG: 1 INJECTION INTRAMUSCULAR; INTRAVENOUS; SUBCUTANEOUS at 09:27

## 2025-02-08 RX ADMIN — ALBUTEROL SULFATE 1.25 MG: 1.25 SOLUTION RESPIRATORY (INHALATION) at 17:00

## 2025-02-08 RX ADMIN — Medication 1 CAPSULE: at 09:27

## 2025-02-08 RX ADMIN — MIDODRINE HYDROCHLORIDE 10 MG: 10 TABLET ORAL at 17:04

## 2025-02-08 RX ADMIN — SODIUM CHLORIDE SOLN NEBU 3% 4 ML: 3 NEBU SOLN at 17:00

## 2025-02-08 RX ADMIN — ALBUTEROL SULFATE 1.25 MG: 1.25 SOLUTION RESPIRATORY (INHALATION) at 12:42

## 2025-02-08 RX ADMIN — ALBUTEROL SULFATE 1.25 MG: 1.25 SOLUTION RESPIRATORY (INHALATION) at 03:32

## 2025-02-08 RX ADMIN — HEPARIN SODIUM 5000 UNITS: 5000 INJECTION INTRAVENOUS; SUBCUTANEOUS at 06:30

## 2025-02-08 RX ADMIN — HEPARIN SODIUM 5000 UNITS: 5000 INJECTION INTRAVENOUS; SUBCUTANEOUS at 14:13

## 2025-02-08 RX ADMIN — ARFORMOTEROL TARTRATE 15 MCG: 15 SOLUTION RESPIRATORY (INHALATION) at 09:10

## 2025-02-08 RX ADMIN — PIPERACILLIN AND TAZOBACTAM 3375 MG: 3; .375 INJECTION, POWDER, LYOPHILIZED, FOR SOLUTION INTRAVENOUS at 17:02

## 2025-02-08 RX ADMIN — ALBUTEROL SULFATE 1.25 MG: 1.25 SOLUTION RESPIRATORY (INHALATION) at 09:10

## 2025-02-08 RX ADMIN — MIDODRINE HYDROCHLORIDE 10 MG: 10 TABLET ORAL at 09:28

## 2025-02-08 RX ADMIN — SODIUM CHLORIDE, PRESERVATIVE FREE 10 ML: 5 INJECTION INTRAVENOUS at 09:28

## 2025-02-08 RX ADMIN — HEPARIN SODIUM 5000 UNITS: 5000 INJECTION INTRAVENOUS; SUBCUTANEOUS at 22:00

## 2025-02-08 RX ADMIN — PIPERACILLIN AND TAZOBACTAM 3375 MG: 3; .375 INJECTION, POWDER, LYOPHILIZED, FOR SOLUTION INTRAVENOUS at 09:31

## 2025-02-08 RX ADMIN — ALBUTEROL SULFATE 1.25 MG: 1.25 SOLUTION RESPIRATORY (INHALATION) at 21:18

## 2025-02-08 RX ADMIN — BUDESONIDE 1 MG: 1 SUSPENSION RESPIRATORY (INHALATION) at 09:10

## 2025-02-08 RX ADMIN — SODIUM CHLORIDE SOLN NEBU 3% 4 ML: 3 NEBU SOLN at 03:32

## 2025-02-08 RX ADMIN — MIDODRINE HYDROCHLORIDE 10 MG: 10 TABLET ORAL at 12:12

## 2025-02-08 RX ADMIN — SODIUM CHLORIDE SOLN NEBU 3% 4 ML: 3 NEBU SOLN at 21:18

## 2025-02-08 RX ADMIN — SODIUM CHLORIDE SOLN NEBU 3% 4 ML: 3 NEBU SOLN at 12:42

## 2025-02-08 RX ADMIN — BUDESONIDE 1 MG: 1 SUSPENSION RESPIRATORY (INHALATION) at 21:18

## 2025-02-08 RX ADMIN — SODIUM CHLORIDE, PRESERVATIVE FREE 10 ML: 5 INJECTION INTRAVENOUS at 22:00

## 2025-02-08 RX ADMIN — SODIUM CHLORIDE SOLN NEBU 3% 4 ML: 3 NEBU SOLN at 09:09

## 2025-02-09 ENCOUNTER — APPOINTMENT (OUTPATIENT)
Facility: HOSPITAL | Age: 65
DRG: 720 | End: 2025-02-09
Payer: MEDICAID

## 2025-02-09 LAB
ANION GAP SERPL CALC-SCNC: 0 MMOL/L (ref 3–18)
ANION GAP SERPL CALC-SCNC: ABNORMAL MMOL/L (ref 3–18)
ARTERIAL PATENCY WRIST A: POSITIVE
BASE EXCESS BLD CALC-SCNC: 10.8 MMOL/L
BASOPHILS # BLD: 0 K/UL (ref 0–0.1)
BASOPHILS # BLD: 0.01 K/UL (ref 0–0.1)
BASOPHILS NFR BLD: 0 % (ref 0–2)
BASOPHILS NFR BLD: 0.1 % (ref 0–2)
BDY SITE: ABNORMAL
BUN SERPL-MCNC: 24 MG/DL (ref 7–18)
BUN SERPL-MCNC: 24 MG/DL (ref 7–18)
BUN/CREAT SERPL: 41 (ref 12–20)
BUN/CREAT SERPL: 43 (ref 12–20)
CALCIUM SERPL-MCNC: 8.3 MG/DL (ref 8.5–10.1)
CALCIUM SERPL-MCNC: 8.8 MG/DL (ref 8.5–10.1)
CHLORIDE SERPL-SCNC: 117 MMOL/L (ref 100–111)
CHLORIDE SERPL-SCNC: 118 MMOL/L (ref 100–111)
CO2 SERPL-SCNC: 34 MMOL/L (ref 21–32)
CO2 SERPL-SCNC: 36 MMOL/L (ref 21–32)
CREAT SERPL-MCNC: 0.56 MG/DL (ref 0.6–1.3)
CREAT SERPL-MCNC: 0.59 MG/DL (ref 0.6–1.3)
DIFFERENTIAL METHOD BLD: ABNORMAL
DIFFERENTIAL METHOD BLD: ABNORMAL
EOSINOPHIL # BLD: 0 K/UL (ref 0–0.4)
EOSINOPHIL # BLD: 0.21 K/UL (ref 0–0.4)
EOSINOPHIL NFR BLD: 0 % (ref 0–5)
EOSINOPHIL NFR BLD: 1.4 % (ref 0–5)
ERYTHROCYTE [DISTWIDTH] IN BLOOD BY AUTOMATED COUNT: 19.1 % (ref 11.6–14.5)
ERYTHROCYTE [DISTWIDTH] IN BLOOD BY AUTOMATED COUNT: 19.4 % (ref 11.6–14.5)
GAS FLOW.O2 O2 DELIVERY SYS: ABNORMAL
GLUCOSE BLD STRIP.AUTO-MCNC: 103 MG/DL (ref 70–110)
GLUCOSE BLD STRIP.AUTO-MCNC: 105 MG/DL (ref 70–110)
GLUCOSE BLD STRIP.AUTO-MCNC: 105 MG/DL (ref 70–110)
GLUCOSE BLD STRIP.AUTO-MCNC: 74 MG/DL (ref 70–110)
GLUCOSE BLD STRIP.AUTO-MCNC: 82 MG/DL (ref 70–110)
GLUCOSE SERPL-MCNC: 106 MG/DL (ref 74–99)
GLUCOSE SERPL-MCNC: 121 MG/DL (ref 74–99)
HCO3 BLD-SCNC: 36.9 MMOL/L (ref 21–28)
HCT VFR BLD AUTO: 23.4 % (ref 36–48)
HCT VFR BLD AUTO: 24.5 % (ref 36–48)
HGB BLD-MCNC: 7.4 G/DL (ref 13–16)
HGB BLD-MCNC: 7.6 G/DL (ref 13–16)
IMM GRANULOCYTES # BLD AUTO: 0 K/UL (ref 0–0.04)
IMM GRANULOCYTES # BLD AUTO: 0.08 K/UL (ref 0–0.04)
IMM GRANULOCYTES NFR BLD AUTO: 0 % (ref 0–0.5)
IMM GRANULOCYTES NFR BLD AUTO: 0.5 % (ref 0–0.5)
LYMPHOCYTES # BLD: 0.45 K/UL (ref 0.9–3.6)
LYMPHOCYTES # BLD: 0.67 K/UL (ref 0.9–3.6)
LYMPHOCYTES NFR BLD: 3 % (ref 21–52)
LYMPHOCYTES NFR BLD: 4 % (ref 21–52)
MAGNESIUM SERPL-MCNC: 2.6 MG/DL (ref 1.6–2.6)
MAGNESIUM SERPL-MCNC: 2.7 MG/DL (ref 1.6–2.6)
MCH RBC QN AUTO: 28.9 PG (ref 24–34)
MCH RBC QN AUTO: 29 PG (ref 24–34)
MCHC RBC AUTO-ENTMCNC: 31 G/DL (ref 31–37)
MCHC RBC AUTO-ENTMCNC: 31.6 G/DL (ref 31–37)
MCV RBC AUTO: 91.8 FL (ref 78–100)
MCV RBC AUTO: 93.2 FL (ref 78–100)
MONOCYTES # BLD: 0.33 K/UL (ref 0.05–1.2)
MONOCYTES # BLD: 0.67 K/UL (ref 0.05–1.2)
MONOCYTES NFR BLD: 2 % (ref 3–10)
MONOCYTES NFR BLD: 4.5 % (ref 3–10)
NEUTS SEG # BLD: 13.59 K/UL (ref 1.8–8)
NEUTS SEG # BLD: 15.7 K/UL (ref 1.8–8)
NEUTS SEG NFR BLD: 90.5 % (ref 40–73)
NEUTS SEG NFR BLD: 94 % (ref 40–73)
NRBC # BLD: 0 K/UL (ref 0–0.01)
NRBC # BLD: 0 K/UL (ref 0–0.01)
NRBC BLD-RTO: 0 PER 100 WBC
NRBC BLD-RTO: 0 PER 100 WBC
O2/TOTAL GAS SETTING VFR VENT: 40 %
PCO2 BLD: 58.4 MMHG (ref 35–48)
PH BLD: 7.41 (ref 7.35–7.45)
PH BLDV: 7.34 (ref 7.32–7.42)
PHOSPHATE SERPL-MCNC: 2.4 MG/DL (ref 2.5–4.9)
PLATELET # BLD AUTO: 412 K/UL (ref 135–420)
PLATELET # BLD AUTO: 443 K/UL (ref 135–420)
PLATELET COMMENT: ABNORMAL
PMV BLD AUTO: 10.7 FL (ref 9.2–11.8)
PMV BLD AUTO: 11.4 FL (ref 9.2–11.8)
PO2 BLD: 41 MMHG (ref 83–108)
POTASSIUM SERPL-SCNC: 3.7 MMOL/L (ref 3.5–5.5)
POTASSIUM SERPL-SCNC: 3.8 MMOL/L (ref 3.5–5.5)
RBC # BLD AUTO: 2.55 M/UL (ref 4.35–5.65)
RBC # BLD AUTO: 2.63 M/UL (ref 4.35–5.65)
RBC MORPH BLD: ABNORMAL
RBC MORPH BLD: ABNORMAL
SAO2 % BLD: 74.8 % (ref 92–97)
SERVICE CMNT-IMP: ABNORMAL
SODIUM SERPL-SCNC: 152 MMOL/L (ref 136–145)
SODIUM SERPL-SCNC: 152 MMOL/L (ref 136–145)
SPECIMEN TYPE: ABNORMAL
WBC # BLD AUTO: 15 K/UL (ref 4.6–13.2)
WBC # BLD AUTO: 16.7 K/UL (ref 4.6–13.2)

## 2025-02-09 PROCEDURE — 2700000000 HC OXYGEN THERAPY PER DAY

## 2025-02-09 PROCEDURE — 6370000000 HC RX 637 (ALT 250 FOR IP): Performed by: INTERNAL MEDICINE

## 2025-02-09 PROCEDURE — 6370000000 HC RX 637 (ALT 250 FOR IP): Performed by: HOSPITALIST

## 2025-02-09 PROCEDURE — 80048 BASIC METABOLIC PNL TOTAL CA: CPT

## 2025-02-09 PROCEDURE — 85025 COMPLETE CBC W/AUTO DIFF WBC: CPT

## 2025-02-09 PROCEDURE — 71045 X-RAY EXAM CHEST 1 VIEW: CPT

## 2025-02-09 PROCEDURE — 2500000003 HC RX 250 WO HCPCS: Performed by: PHYSICIAN ASSISTANT

## 2025-02-09 PROCEDURE — 82962 GLUCOSE BLOOD TEST: CPT

## 2025-02-09 PROCEDURE — 84100 ASSAY OF PHOSPHORUS: CPT

## 2025-02-09 PROCEDURE — 6360000002 HC RX W HCPCS: Performed by: INTERNAL MEDICINE

## 2025-02-09 PROCEDURE — 82800 BLOOD PH: CPT

## 2025-02-09 PROCEDURE — 6360000002 HC RX W HCPCS: Performed by: PHYSICIAN ASSISTANT

## 2025-02-09 PROCEDURE — 6360000002 HC RX W HCPCS

## 2025-02-09 PROCEDURE — 94640 AIRWAY INHALATION TREATMENT: CPT

## 2025-02-09 PROCEDURE — 94660 CPAP INITIATION&MGMT: CPT

## 2025-02-09 PROCEDURE — 1100000003 HC PRIVATE W/ TELEMETRY

## 2025-02-09 PROCEDURE — 2500000003 HC RX 250 WO HCPCS

## 2025-02-09 PROCEDURE — 94664 DEMO&/EVAL PT USE INHALER: CPT

## 2025-02-09 PROCEDURE — 94761 N-INVAS EAR/PLS OXIMETRY MLT: CPT

## 2025-02-09 PROCEDURE — 6370000000 HC RX 637 (ALT 250 FOR IP): Performed by: PHYSICIAN ASSISTANT

## 2025-02-09 PROCEDURE — 82803 BLOOD GASES ANY COMBINATION: CPT

## 2025-02-09 PROCEDURE — 99232 SBSQ HOSP IP/OBS MODERATE 35: CPT | Performed by: HOSPITALIST

## 2025-02-09 PROCEDURE — 2580000003 HC RX 258: Performed by: PHYSICIAN ASSISTANT

## 2025-02-09 PROCEDURE — 36600 WITHDRAWAL OF ARTERIAL BLOOD: CPT

## 2025-02-09 PROCEDURE — 2580000003 HC RX 258: Performed by: HOSPITALIST

## 2025-02-09 PROCEDURE — 83735 ASSAY OF MAGNESIUM: CPT

## 2025-02-09 PROCEDURE — 2580000003 HC RX 258: Performed by: INTERNAL MEDICINE

## 2025-02-09 RX ORDER — DEXTROSE MONOHYDRATE 50 MG/ML
INJECTION, SOLUTION INTRAVENOUS CONTINUOUS
Status: DISCONTINUED | OUTPATIENT
Start: 2025-02-09 | End: 2025-02-12

## 2025-02-09 RX ADMIN — ARFORMOTEROL TARTRATE 15 MCG: 15 SOLUTION RESPIRATORY (INHALATION) at 07:33

## 2025-02-09 RX ADMIN — SODIUM CHLORIDE, PRESERVATIVE FREE 10 ML: 5 INJECTION INTRAVENOUS at 08:24

## 2025-02-09 RX ADMIN — HEPARIN SODIUM 5000 UNITS: 5000 INJECTION INTRAVENOUS; SUBCUTANEOUS at 15:29

## 2025-02-09 RX ADMIN — PIPERACILLIN AND TAZOBACTAM 3375 MG: 3; .375 INJECTION, POWDER, LYOPHILIZED, FOR SOLUTION INTRAVENOUS at 15:30

## 2025-02-09 RX ADMIN — SODIUM CHLORIDE, PRESERVATIVE FREE 10 ML: 5 INJECTION INTRAVENOUS at 22:31

## 2025-02-09 RX ADMIN — SODIUM CHLORIDE SOLN NEBU 3% 4 ML: 3 NEBU SOLN at 19:33

## 2025-02-09 RX ADMIN — Medication 1 CAPSULE: at 08:23

## 2025-02-09 RX ADMIN — POLYETHYLENE GLYCOL 3350 17 G: 17 POWDER, FOR SOLUTION ORAL at 08:24

## 2025-02-09 RX ADMIN — BUDESONIDE 1 MG: 1 SUSPENSION RESPIRATORY (INHALATION) at 07:33

## 2025-02-09 RX ADMIN — PIPERACILLIN AND TAZOBACTAM 3375 MG: 3; .375 INJECTION, POWDER, LYOPHILIZED, FOR SOLUTION INTRAVENOUS at 23:41

## 2025-02-09 RX ADMIN — SODIUM CHLORIDE SOLN NEBU 3% 4 ML: 3 NEBU SOLN at 01:00

## 2025-02-09 RX ADMIN — MIDODRINE HYDROCHLORIDE 10 MG: 10 TABLET ORAL at 08:23

## 2025-02-09 RX ADMIN — ALBUTEROL SULFATE 1.25 MG: 1.25 SOLUTION RESPIRATORY (INHALATION) at 07:33

## 2025-02-09 RX ADMIN — ALBUTEROL SULFATE 1.25 MG: 1.25 SOLUTION RESPIRATORY (INHALATION) at 01:00

## 2025-02-09 RX ADMIN — DEXTROSE MONOHYDRATE: 5 INJECTION, SOLUTION INTRAVENOUS at 15:31

## 2025-02-09 RX ADMIN — Medication 15 ML: at 08:23

## 2025-02-09 RX ADMIN — BUDESONIDE 1 MG: 1 SUSPENSION RESPIRATORY (INHALATION) at 19:33

## 2025-02-09 RX ADMIN — IPRATROPIUM BROMIDE AND ALBUTEROL SULFATE 1 DOSE: .5; 3 SOLUTION RESPIRATORY (INHALATION) at 22:13

## 2025-02-09 RX ADMIN — ALBUTEROL SULFATE 1.25 MG: 1.25 SOLUTION RESPIRATORY (INHALATION) at 19:33

## 2025-02-09 RX ADMIN — WATER 40 MG: 1 INJECTION INTRAMUSCULAR; INTRAVENOUS; SUBCUTANEOUS at 08:23

## 2025-02-09 RX ADMIN — HEPARIN SODIUM 5000 UNITS: 5000 INJECTION INTRAVENOUS; SUBCUTANEOUS at 06:08

## 2025-02-09 RX ADMIN — SODIUM CHLORIDE SOLN NEBU 3% 4 ML: 3 NEBU SOLN at 07:33

## 2025-02-09 RX ADMIN — ARFORMOTEROL TARTRATE 15 MCG: 15 SOLUTION RESPIRATORY (INHALATION) at 19:33

## 2025-02-09 RX ADMIN — HEPARIN SODIUM 5000 UNITS: 5000 INJECTION INTRAVENOUS; SUBCUTANEOUS at 22:30

## 2025-02-09 RX ADMIN — MIDODRINE HYDROCHLORIDE 10 MG: 10 TABLET ORAL at 12:00

## 2025-02-09 RX ADMIN — MIDODRINE HYDROCHLORIDE 10 MG: 10 TABLET ORAL at 15:30

## 2025-02-09 RX ADMIN — PIPERACILLIN AND TAZOBACTAM 3375 MG: 3; .375 INJECTION, POWDER, LYOPHILIZED, FOR SOLUTION INTRAVENOUS at 08:29

## 2025-02-09 RX ADMIN — PIPERACILLIN AND TAZOBACTAM 3375 MG: 3; .375 INJECTION, POWDER, LYOPHILIZED, FOR SOLUTION INTRAVENOUS at 00:34

## 2025-02-09 NOTE — ED NOTES
Pt becoming increasingly confused.   O2 saturation dropping.   Will updated MD and complete course of action.

## 2025-02-10 PROBLEM — E43 SEVERE PROTEIN-CALORIE MALNUTRITION: Status: ACTIVE | Noted: 2025-02-10

## 2025-02-10 LAB
ANION GAP SERPL CALC-SCNC: 1 MMOL/L (ref 3–18)
BASOPHILS # BLD: 0.01 K/UL (ref 0–0.1)
BASOPHILS NFR BLD: 0.1 % (ref 0–2)
BUN SERPL-MCNC: 21 MG/DL (ref 7–18)
BUN/CREAT SERPL: 36 (ref 12–20)
CALCIUM SERPL-MCNC: 8.3 MG/DL (ref 8.5–10.1)
CHLORIDE SERPL-SCNC: 120 MMOL/L (ref 100–111)
CO2 SERPL-SCNC: 34 MMOL/L (ref 21–32)
CREAT SERPL-MCNC: 0.58 MG/DL (ref 0.6–1.3)
DIFFERENTIAL METHOD BLD: ABNORMAL
EKG ATRIAL RATE: 45 BPM
EKG DIAGNOSIS: NORMAL
EKG P AXIS: 73 DEGREES
EKG P-R INTERVAL: 136 MS
EKG Q-T INTERVAL: 542 MS
EKG QRS DURATION: 92 MS
EKG QTC CALCULATION (BAZETT): 468 MS
EKG R AXIS: 70 DEGREES
EKG T AXIS: 73 DEGREES
EKG VENTRICULAR RATE: 45 BPM
EOSINOPHIL # BLD: 0.22 K/UL (ref 0–0.4)
EOSINOPHIL NFR BLD: 1.9 % (ref 0–5)
ERYTHROCYTE [DISTWIDTH] IN BLOOD BY AUTOMATED COUNT: 18.7 % (ref 11.6–14.5)
GLUCOSE BLD STRIP.AUTO-MCNC: 135 MG/DL (ref 70–110)
GLUCOSE BLD STRIP.AUTO-MCNC: 69 MG/DL (ref 70–110)
GLUCOSE BLD STRIP.AUTO-MCNC: 86 MG/DL (ref 70–110)
GLUCOSE BLD STRIP.AUTO-MCNC: 94 MG/DL (ref 70–110)
GLUCOSE SERPL-MCNC: 128 MG/DL (ref 74–99)
HCT VFR BLD AUTO: 21.7 % (ref 36–48)
HCT VFR BLD AUTO: 27.5 % (ref 36–48)
HGB BLD-MCNC: 6.6 G/DL (ref 13–16)
HGB BLD-MCNC: 8.6 G/DL (ref 13–16)
HISTORY CHECK: NORMAL
IMM GRANULOCYTES # BLD AUTO: 0.05 K/UL (ref 0–0.04)
IMM GRANULOCYTES NFR BLD AUTO: 0.4 % (ref 0–0.5)
LYMPHOCYTES # BLD: 0.39 K/UL (ref 0.9–3.6)
LYMPHOCYTES NFR BLD: 3.4 % (ref 21–52)
MAGNESIUM SERPL-MCNC: 2.6 MG/DL (ref 1.6–2.6)
MCH RBC QN AUTO: 28.7 PG (ref 24–34)
MCHC RBC AUTO-ENTMCNC: 30.4 G/DL (ref 31–37)
MCV RBC AUTO: 94.3 FL (ref 78–100)
MONOCYTES # BLD: 0.51 K/UL (ref 0.05–1.2)
MONOCYTES NFR BLD: 4.5 % (ref 3–10)
NEUTS SEG # BLD: 10.15 K/UL (ref 1.8–8)
NEUTS SEG NFR BLD: 89.7 % (ref 40–73)
NRBC # BLD: 0 K/UL (ref 0–0.01)
NRBC BLD-RTO: 0 PER 100 WBC
PHOSPHATE SERPL-MCNC: 1.8 MG/DL (ref 2.5–4.9)
PLATELET # BLD AUTO: 378 K/UL (ref 135–420)
PMV BLD AUTO: 11.7 FL (ref 9.2–11.8)
POTASSIUM SERPL-SCNC: 3.7 MMOL/L (ref 3.5–5.5)
RBC # BLD AUTO: 2.3 M/UL (ref 4.35–5.65)
SODIUM SERPL-SCNC: 155 MMOL/L (ref 136–145)
WBC # BLD AUTO: 11.3 K/UL (ref 4.6–13.2)

## 2025-02-10 PROCEDURE — 36415 COLL VENOUS BLD VENIPUNCTURE: CPT

## 2025-02-10 PROCEDURE — P9047 ALBUMIN (HUMAN), 25%, 50ML: HCPCS | Performed by: STUDENT IN AN ORGANIZED HEALTH CARE EDUCATION/TRAINING PROGRAM

## 2025-02-10 PROCEDURE — 85014 HEMATOCRIT: CPT

## 2025-02-10 PROCEDURE — 2580000003 HC RX 258: Performed by: PHYSICIAN ASSISTANT

## 2025-02-10 PROCEDURE — 2500000003 HC RX 250 WO HCPCS

## 2025-02-10 PROCEDURE — 6360000002 HC RX W HCPCS: Performed by: STUDENT IN AN ORGANIZED HEALTH CARE EDUCATION/TRAINING PROGRAM

## 2025-02-10 PROCEDURE — 84100 ASSAY OF PHOSPHORUS: CPT

## 2025-02-10 PROCEDURE — 6360000002 HC RX W HCPCS: Performed by: PHYSICIAN ASSISTANT

## 2025-02-10 PROCEDURE — 83735 ASSAY OF MAGNESIUM: CPT

## 2025-02-10 PROCEDURE — 80048 BASIC METABOLIC PNL TOTAL CA: CPT

## 2025-02-10 PROCEDURE — 2580000003 HC RX 258: Performed by: INTERNAL MEDICINE

## 2025-02-10 PROCEDURE — 99232 SBSQ HOSP IP/OBS MODERATE 35: CPT | Performed by: HOSPITALIST

## 2025-02-10 PROCEDURE — 94669 MECHANICAL CHEST WALL OSCILL: CPT

## 2025-02-10 PROCEDURE — 6370000000 HC RX 637 (ALT 250 FOR IP): Performed by: HOSPITALIST

## 2025-02-10 PROCEDURE — 6360000002 HC RX W HCPCS: Performed by: HOSPITALIST

## 2025-02-10 PROCEDURE — 6360000002 HC RX W HCPCS: Performed by: INTERNAL MEDICINE

## 2025-02-10 PROCEDURE — 6370000000 HC RX 637 (ALT 250 FOR IP): Performed by: FAMILY MEDICINE

## 2025-02-10 PROCEDURE — 2140000001 HC CVICU INTERMEDIATE R&B

## 2025-02-10 PROCEDURE — P9016 RBC LEUKOCYTES REDUCED: HCPCS

## 2025-02-10 PROCEDURE — 2500000003 HC RX 250 WO HCPCS: Performed by: FAMILY MEDICINE

## 2025-02-10 PROCEDURE — 6360000002 HC RX W HCPCS

## 2025-02-10 PROCEDURE — 85018 HEMOGLOBIN: CPT

## 2025-02-10 PROCEDURE — 92526 ORAL FUNCTION THERAPY: CPT

## 2025-02-10 PROCEDURE — 6370000000 HC RX 637 (ALT 250 FOR IP): Performed by: INTERNAL MEDICINE

## 2025-02-10 PROCEDURE — 82962 GLUCOSE BLOOD TEST: CPT

## 2025-02-10 PROCEDURE — 93005 ELECTROCARDIOGRAM TRACING: CPT | Performed by: STUDENT IN AN ORGANIZED HEALTH CARE EDUCATION/TRAINING PROGRAM

## 2025-02-10 PROCEDURE — 99232 SBSQ HOSP IP/OBS MODERATE 35: CPT | Performed by: INTERNAL MEDICINE

## 2025-02-10 PROCEDURE — 36430 TRANSFUSION BLD/BLD COMPNT: CPT

## 2025-02-10 PROCEDURE — 93010 ELECTROCARDIOGRAM REPORT: CPT | Performed by: INTERNAL MEDICINE

## 2025-02-10 PROCEDURE — 94660 CPAP INITIATION&MGMT: CPT

## 2025-02-10 PROCEDURE — 85025 COMPLETE CBC W/AUTO DIFF WBC: CPT

## 2025-02-10 PROCEDURE — 2500000003 HC RX 250 WO HCPCS: Performed by: PHYSICIAN ASSISTANT

## 2025-02-10 PROCEDURE — APPSS15 APP SPLIT SHARED TIME 0-15 MINUTES

## 2025-02-10 PROCEDURE — 2580000003 HC RX 258: Performed by: HOSPITALIST

## 2025-02-10 PROCEDURE — 2700000000 HC OXYGEN THERAPY PER DAY

## 2025-02-10 PROCEDURE — 94761 N-INVAS EAR/PLS OXIMETRY MLT: CPT

## 2025-02-10 PROCEDURE — 97112 NEUROMUSCULAR REEDUCATION: CPT

## 2025-02-10 PROCEDURE — 94640 AIRWAY INHALATION TREATMENT: CPT

## 2025-02-10 PROCEDURE — 6370000000 HC RX 637 (ALT 250 FOR IP): Performed by: PHYSICIAN ASSISTANT

## 2025-02-10 RX ORDER — SODIUM CHLORIDE 9 MG/ML
INJECTION, SOLUTION INTRAVENOUS PRN
Status: DISCONTINUED | OUTPATIENT
Start: 2025-02-10 | End: 2025-02-19 | Stop reason: HOSPADM

## 2025-02-10 RX ORDER — ATORVASTATIN CALCIUM 40 MG/1
80 TABLET, FILM COATED ORAL NIGHTLY
Status: DISCONTINUED | OUTPATIENT
Start: 2025-02-10 | End: 2025-02-19 | Stop reason: HOSPADM

## 2025-02-10 RX ORDER — THIAMINE HYDROCHLORIDE 100 MG/ML
200 INJECTION, SOLUTION INTRAMUSCULAR; INTRAVENOUS DAILY
Status: DISCONTINUED | OUTPATIENT
Start: 2025-02-10 | End: 2025-02-13

## 2025-02-10 RX ORDER — POLYETHYLENE GLYCOL 3350 17 G/17G
17 POWDER, FOR SOLUTION ORAL DAILY PRN
Status: DISCONTINUED | OUTPATIENT
Start: 2025-02-10 | End: 2025-02-19 | Stop reason: HOSPADM

## 2025-02-10 RX ORDER — ALBUMIN (HUMAN) 12.5 G/50ML
25 SOLUTION INTRAVENOUS ONCE
Status: COMPLETED | OUTPATIENT
Start: 2025-02-10 | End: 2025-02-10

## 2025-02-10 RX ORDER — ENOXAPARIN SODIUM 100 MG/ML
40 INJECTION SUBCUTANEOUS DAILY
Status: DISCONTINUED | OUTPATIENT
Start: 2025-02-10 | End: 2025-02-10

## 2025-02-10 RX ORDER — ASPIRIN 81 MG/1
81 TABLET, CHEWABLE ORAL DAILY
Status: DISCONTINUED | OUTPATIENT
Start: 2025-02-10 | End: 2025-02-10

## 2025-02-10 RX ORDER — SODIUM CHLORIDE 0.9 % (FLUSH) 0.9 %
5-40 SYRINGE (ML) INJECTION PRN
Status: DISCONTINUED | OUTPATIENT
Start: 2025-02-10 | End: 2025-02-15

## 2025-02-10 RX ORDER — ONDANSETRON 2 MG/ML
4 INJECTION INTRAMUSCULAR; INTRAVENOUS EVERY 6 HOURS PRN
Status: DISCONTINUED | OUTPATIENT
Start: 2025-02-10 | End: 2025-02-19 | Stop reason: HOSPADM

## 2025-02-10 RX ORDER — ONDANSETRON 4 MG/1
4 TABLET, ORALLY DISINTEGRATING ORAL EVERY 8 HOURS PRN
Status: DISCONTINUED | OUTPATIENT
Start: 2025-02-10 | End: 2025-02-19 | Stop reason: HOSPADM

## 2025-02-10 RX ORDER — ASPIRIN 300 MG/1
300 SUPPOSITORY RECTAL DAILY
Status: DISCONTINUED | OUTPATIENT
Start: 2025-02-10 | End: 2025-02-10

## 2025-02-10 RX ORDER — OXYCODONE HYDROCHLORIDE 5 MG/1
2.5 TABLET ORAL EVERY 4 HOURS PRN
Status: DISCONTINUED | OUTPATIENT
Start: 2025-02-10 | End: 2025-02-16

## 2025-02-10 RX ORDER — 0.9 % SODIUM CHLORIDE 0.9 %
500 INTRAVENOUS SOLUTION INTRAVENOUS ONCE
Status: DISCONTINUED | OUTPATIENT
Start: 2025-02-10 | End: 2025-02-19 | Stop reason: HOSPADM

## 2025-02-10 RX ORDER — SODIUM CHLORIDE 0.9 % (FLUSH) 0.9 %
5-40 SYRINGE (ML) INJECTION EVERY 12 HOURS SCHEDULED
Status: DISCONTINUED | OUTPATIENT
Start: 2025-02-10 | End: 2025-02-13 | Stop reason: SDUPTHER

## 2025-02-10 RX ADMIN — SODIUM CHLORIDE, PRESERVATIVE FREE 10 ML: 5 INJECTION INTRAVENOUS at 20:29

## 2025-02-10 RX ADMIN — ATORVASTATIN CALCIUM 80 MG: 40 TABLET, FILM COATED ORAL at 20:26

## 2025-02-10 RX ADMIN — WATER 40 MG: 1 INJECTION INTRAMUSCULAR; INTRAVENOUS; SUBCUTANEOUS at 07:37

## 2025-02-10 RX ADMIN — SODIUM CHLORIDE SOLN NEBU 3% 4 ML: 3 NEBU SOLN at 07:39

## 2025-02-10 RX ADMIN — Medication 1 CAPSULE: at 07:37

## 2025-02-10 RX ADMIN — SODIUM CHLORIDE, PRESERVATIVE FREE 10 ML: 5 INJECTION INTRAVENOUS at 09:50

## 2025-02-10 RX ADMIN — PIPERACILLIN AND TAZOBACTAM 3375 MG: 3; .375 INJECTION, POWDER, LYOPHILIZED, FOR SOLUTION INTRAVENOUS at 07:57

## 2025-02-10 RX ADMIN — ALBUTEROL SULFATE 1.25 MG: 1.25 SOLUTION RESPIRATORY (INHALATION) at 07:39

## 2025-02-10 RX ADMIN — ARFORMOTEROL TARTRATE 15 MCG: 15 SOLUTION RESPIRATORY (INHALATION) at 21:21

## 2025-02-10 RX ADMIN — DEXTROSE MONOHYDRATE: 5 INJECTION, SOLUTION INTRAVENOUS at 16:55

## 2025-02-10 RX ADMIN — ALBUTEROL SULFATE 1.25 MG: 1.25 SOLUTION RESPIRATORY (INHALATION) at 00:09

## 2025-02-10 RX ADMIN — ALBUTEROL SULFATE 1.25 MG: 1.25 SOLUTION RESPIRATORY (INHALATION) at 21:20

## 2025-02-10 RX ADMIN — PIPERACILLIN AND TAZOBACTAM 3375 MG: 3; .375 INJECTION, POWDER, LYOPHILIZED, FOR SOLUTION INTRAVENOUS at 23:34

## 2025-02-10 RX ADMIN — ALBUTEROL SULFATE 1.25 MG: 1.25 SOLUTION RESPIRATORY (INHALATION) at 16:25

## 2025-02-10 RX ADMIN — BUDESONIDE 1 MG: 1 SUSPENSION RESPIRATORY (INHALATION) at 07:39

## 2025-02-10 RX ADMIN — ALBUTEROL SULFATE 1.25 MG: 1.25 SOLUTION RESPIRATORY (INHALATION) at 11:44

## 2025-02-10 RX ADMIN — MIDODRINE HYDROCHLORIDE 10 MG: 10 TABLET ORAL at 12:11

## 2025-02-10 RX ADMIN — ALBUTEROL SULFATE 1.25 MG: 1.25 SOLUTION RESPIRATORY (INHALATION) at 06:07

## 2025-02-10 RX ADMIN — SODIUM CHLORIDE SOLN NEBU 3% 4 ML: 3 NEBU SOLN at 06:07

## 2025-02-10 RX ADMIN — MIDODRINE HYDROCHLORIDE 10 MG: 10 TABLET ORAL at 07:37

## 2025-02-10 RX ADMIN — THIAMINE HYDROCHLORIDE 200 MG: 100 INJECTION, SOLUTION INTRAMUSCULAR; INTRAVENOUS at 12:11

## 2025-02-10 RX ADMIN — DEXTROSE MONOHYDRATE: 5 INJECTION, SOLUTION INTRAVENOUS at 03:30

## 2025-02-10 RX ADMIN — SODIUM CHLORIDE SOLN NEBU 3% 4 ML: 3 NEBU SOLN at 11:43

## 2025-02-10 RX ADMIN — SODIUM CHLORIDE, PRESERVATIVE FREE 10 ML: 5 INJECTION INTRAVENOUS at 07:36

## 2025-02-10 RX ADMIN — SODIUM CHLORIDE SOLN NEBU 3% 4 ML: 3 NEBU SOLN at 00:09

## 2025-02-10 RX ADMIN — PANTOPRAZOLE SODIUM 40 MG: 40 INJECTION, POWDER, FOR SOLUTION INTRAVENOUS at 09:49

## 2025-02-10 RX ADMIN — SODIUM CHLORIDE SOLN NEBU 3% 4 ML: 3 NEBU SOLN at 16:25

## 2025-02-10 RX ADMIN — ALBUMIN (HUMAN) 25 G: 0.25 INJECTION, SOLUTION INTRAVENOUS at 01:46

## 2025-02-10 RX ADMIN — MIDODRINE HYDROCHLORIDE 10 MG: 10 TABLET ORAL at 17:03

## 2025-02-10 RX ADMIN — SODIUM CHLORIDE SOLN NEBU 3% 4 ML: 3 NEBU SOLN at 21:20

## 2025-02-10 RX ADMIN — ARFORMOTEROL TARTRATE 15 MCG: 15 SOLUTION RESPIRATORY (INHALATION) at 07:39

## 2025-02-10 RX ADMIN — PIPERACILLIN AND TAZOBACTAM 3375 MG: 3; .375 INJECTION, POWDER, LYOPHILIZED, FOR SOLUTION INTRAVENOUS at 15:35

## 2025-02-10 RX ADMIN — OXYCODONE HYDROCHLORIDE 2.5 MG: 5 TABLET ORAL at 13:26

## 2025-02-10 RX ADMIN — Medication 15 ML: at 07:36

## 2025-02-10 RX ADMIN — OXYCODONE HYDROCHLORIDE 2.5 MG: 5 TABLET ORAL at 20:28

## 2025-02-10 RX ADMIN — BUDESONIDE 1 MG: 1 SUSPENSION RESPIRATORY (INHALATION) at 21:21

## 2025-02-10 RX ADMIN — POLYETHYLENE GLYCOL 3350 17 G: 17 POWDER, FOR SOLUTION ORAL at 07:37

## 2025-02-10 ASSESSMENT — PAIN SCALES - GENERAL
PAINLEVEL_OUTOF10: 6
PAINLEVEL_OUTOF10: 8
PAINLEVEL_OUTOF10: 0

## 2025-02-10 ASSESSMENT — PAIN DESCRIPTION - FREQUENCY: FREQUENCY: CONTINUOUS

## 2025-02-10 ASSESSMENT — PAIN DESCRIPTION - ORIENTATION
ORIENTATION: POSTERIOR
ORIENTATION: LEFT

## 2025-02-10 ASSESSMENT — PAIN DESCRIPTION - LOCATION
LOCATION: BACK
LOCATION: LEG

## 2025-02-10 ASSESSMENT — PAIN DESCRIPTION - PAIN TYPE: TYPE: CHRONIC PAIN

## 2025-02-10 ASSESSMENT — PAIN DESCRIPTION - DESCRIPTORS
DESCRIPTORS: ACHING;DISCOMFORT
DESCRIPTORS: ACHING

## 2025-02-10 ASSESSMENT — PAIN DESCRIPTION - ONSET: ONSET: ON-GOING

## 2025-02-10 ASSESSMENT — PAIN - FUNCTIONAL ASSESSMENT: PAIN_FUNCTIONAL_ASSESSMENT: ACTIVITIES ARE NOT PREVENTED

## 2025-02-10 NOTE — CARE COORDINATION
Pt's [employer] [place] [employer] [facility] Care Coordinator is Orin at [phone] ext [phone].          GWEN SinN RN  Care Management

## 2025-02-11 ENCOUNTER — APPOINTMENT (OUTPATIENT)
Facility: HOSPITAL | Age: 65
DRG: 720 | End: 2025-02-11
Payer: MEDICAID

## 2025-02-11 LAB
ABO + RH BLD: NORMAL
ANION GAP SERPL CALC-SCNC: 3 MMOL/L (ref 3–18)
BACTERIA SPEC CULT: NORMAL
BACTERIA SPEC CULT: NORMAL
BASOPHILS # BLD: 0.01 K/UL (ref 0–0.1)
BASOPHILS NFR BLD: 0.1 % (ref 0–2)
BLD PROD TYP BPU: NORMAL
BLD PROD TYP BPU: NORMAL
BLOOD BANK BLOOD PRODUCT EXPIRATION DATE: NORMAL
BLOOD BANK BLOOD PRODUCT EXPIRATION DATE: NORMAL
BLOOD BANK DISPENSE STATUS: NORMAL
BLOOD BANK DISPENSE STATUS: NORMAL
BLOOD BANK ISBT PRODUCT BLOOD TYPE: 5100
BLOOD BANK ISBT PRODUCT BLOOD TYPE: 5100
BLOOD BANK PRODUCT CODE: NORMAL
BLOOD BANK PRODUCT CODE: NORMAL
BLOOD BANK UNIT TYPE AND RH: NORMAL
BLOOD BANK UNIT TYPE AND RH: NORMAL
BLOOD GROUP ANTIBODIES SERPL: NORMAL
BPU ID: NORMAL
BPU ID: NORMAL
BUN SERPL-MCNC: 14 MG/DL (ref 7–18)
BUN/CREAT SERPL: 22 (ref 12–20)
CALCIUM SERPL-MCNC: 8.4 MG/DL (ref 8.5–10.1)
CALLED TO: NORMAL
CHLORIDE SERPL-SCNC: 115 MMOL/L (ref 100–111)
CHOLEST SERPL-MCNC: 125 MG/DL
CO2 SERPL-SCNC: 31 MMOL/L (ref 21–32)
CREAT SERPL-MCNC: 0.63 MG/DL (ref 0.6–1.3)
CROSSMATCH RESULT: NORMAL
CROSSMATCH RESULT: NORMAL
DIFFERENTIAL METHOD BLD: ABNORMAL
EOSINOPHIL # BLD: 0.3 K/UL (ref 0–0.4)
EOSINOPHIL NFR BLD: 3.2 % (ref 0–5)
ERYTHROCYTE [DISTWIDTH] IN BLOOD BY AUTOMATED COUNT: 19.1 % (ref 11.6–14.5)
EST. AVERAGE GLUCOSE BLD GHB EST-MCNC: 120 MG/DL
GLUCOSE BLD STRIP.AUTO-MCNC: 125 MG/DL (ref 70–110)
GLUCOSE BLD STRIP.AUTO-MCNC: 147 MG/DL (ref 70–110)
GLUCOSE BLD STRIP.AUTO-MCNC: 149 MG/DL (ref 70–110)
GLUCOSE BLD STRIP.AUTO-MCNC: 35 MG/DL (ref 70–110)
GLUCOSE BLD STRIP.AUTO-MCNC: 44 MG/DL (ref 70–110)
GLUCOSE BLD STRIP.AUTO-MCNC: 49 MG/DL (ref 70–110)
GLUCOSE BLD STRIP.AUTO-MCNC: 51 MG/DL (ref 70–110)
GLUCOSE BLD STRIP.AUTO-MCNC: 56 MG/DL (ref 70–110)
GLUCOSE BLD STRIP.AUTO-MCNC: 77 MG/DL (ref 70–110)
GLUCOSE BLD STRIP.AUTO-MCNC: 89 MG/DL (ref 70–110)
GLUCOSE BLD STRIP.AUTO-MCNC: 92 MG/DL (ref 70–110)
GLUCOSE BLD STRIP.AUTO-MCNC: 93 MG/DL (ref 70–110)
GLUCOSE BLD STRIP.AUTO-MCNC: 94 MG/DL (ref 70–110)
GLUCOSE SERPL-MCNC: 97 MG/DL (ref 74–99)
HBA1C MFR BLD: 5.8 % (ref 4.2–5.6)
HCT VFR BLD AUTO: 31 % (ref 36–48)
HDLC SERPL-MCNC: 48 MG/DL (ref 40–60)
HDLC SERPL: 2.6 (ref 0–5)
HEMOCCULT STL QL: POSITIVE
HGB BLD-MCNC: 9.5 G/DL (ref 13–16)
IMM GRANULOCYTES # BLD AUTO: 0.05 K/UL (ref 0–0.04)
IMM GRANULOCYTES NFR BLD AUTO: 0.5 % (ref 0–0.5)
LDLC SERPL CALC-MCNC: 62 MG/DL (ref 0–100)
LIPID PANEL: NORMAL
LYMPHOCYTES # BLD: 0.39 K/UL (ref 0.9–3.6)
LYMPHOCYTES NFR BLD: 4.2 % (ref 21–52)
MAGNESIUM SERPL-MCNC: 2.4 MG/DL (ref 1.6–2.6)
MCH RBC QN AUTO: 28.1 PG (ref 24–34)
MCHC RBC AUTO-ENTMCNC: 30.6 G/DL (ref 31–37)
MCV RBC AUTO: 91.7 FL (ref 78–100)
MONOCYTES # BLD: 0.5 K/UL (ref 0.05–1.2)
MONOCYTES NFR BLD: 5.4 % (ref 3–10)
NEUTS SEG # BLD: 8.05 K/UL (ref 1.8–8)
NEUTS SEG NFR BLD: 86.6 % (ref 40–73)
NRBC # BLD: 0 K/UL (ref 0–0.01)
NRBC BLD-RTO: 0 PER 100 WBC
PHOSPHATE SERPL-MCNC: 2.1 MG/DL (ref 2.5–4.9)
PLATELET # BLD AUTO: 366 K/UL (ref 135–420)
PMV BLD AUTO: 11.1 FL (ref 9.2–11.8)
POTASSIUM SERPL-SCNC: 4.4 MMOL/L (ref 3.5–5.5)
RBC # BLD AUTO: 3.38 M/UL (ref 4.35–5.65)
SERVICE CMNT-IMP: NORMAL
SERVICE CMNT-IMP: NORMAL
SODIUM SERPL-SCNC: 149 MMOL/L (ref 136–145)
SPECIMEN EXP DATE BLD: NORMAL
TRIGL SERPL-MCNC: 75 MG/DL
UNIT DIVISION: 0
UNIT DIVISION: 0
UNIT ISSUE DATE/TIME: NORMAL
UNIT ISSUE DATE/TIME: NORMAL
VLDLC SERPL CALC-MCNC: 15 MG/DL
WBC # BLD AUTO: 9.3 K/UL (ref 4.6–13.2)

## 2025-02-11 PROCEDURE — 6360000002 HC RX W HCPCS: Performed by: HOSPITALIST

## 2025-02-11 PROCEDURE — 84100 ASSAY OF PHOSPHORUS: CPT

## 2025-02-11 PROCEDURE — 82962 GLUCOSE BLOOD TEST: CPT

## 2025-02-11 PROCEDURE — 6370000000 HC RX 637 (ALT 250 FOR IP): Performed by: HOSPITALIST

## 2025-02-11 PROCEDURE — 71045 X-RAY EXAM CHEST 1 VIEW: CPT

## 2025-02-11 PROCEDURE — 82272 OCCULT BLD FECES 1-3 TESTS: CPT

## 2025-02-11 PROCEDURE — 94660 CPAP INITIATION&MGMT: CPT

## 2025-02-11 PROCEDURE — 92526 ORAL FUNCTION THERAPY: CPT

## 2025-02-11 PROCEDURE — 97530 THERAPEUTIC ACTIVITIES: CPT

## 2025-02-11 PROCEDURE — 2580000003 HC RX 258: Performed by: HOSPITALIST

## 2025-02-11 PROCEDURE — 83036 HEMOGLOBIN GLYCOSYLATED A1C: CPT

## 2025-02-11 PROCEDURE — 83735 ASSAY OF MAGNESIUM: CPT

## 2025-02-11 PROCEDURE — 94761 N-INVAS EAR/PLS OXIMETRY MLT: CPT

## 2025-02-11 PROCEDURE — 94669 MECHANICAL CHEST WALL OSCILL: CPT

## 2025-02-11 PROCEDURE — 80061 LIPID PANEL: CPT

## 2025-02-11 PROCEDURE — 2700000000 HC OXYGEN THERAPY PER DAY

## 2025-02-11 PROCEDURE — 2580000003 HC RX 258: Performed by: PHYSICIAN ASSISTANT

## 2025-02-11 PROCEDURE — 85025 COMPLETE CBC W/AUTO DIFF WBC: CPT

## 2025-02-11 PROCEDURE — 6360000002 HC RX W HCPCS: Performed by: PHYSICIAN ASSISTANT

## 2025-02-11 PROCEDURE — 6360000002 HC RX W HCPCS

## 2025-02-11 PROCEDURE — 2500000003 HC RX 250 WO HCPCS: Performed by: PHYSICIAN ASSISTANT

## 2025-02-11 PROCEDURE — APPSS15 APP SPLIT SHARED TIME 0-15 MINUTES

## 2025-02-11 PROCEDURE — 36415 COLL VENOUS BLD VENIPUNCTURE: CPT

## 2025-02-11 PROCEDURE — 6360000002 HC RX W HCPCS: Performed by: INTERNAL MEDICINE

## 2025-02-11 PROCEDURE — 97535 SELF CARE MNGMENT TRAINING: CPT

## 2025-02-11 PROCEDURE — 6370000000 HC RX 637 (ALT 250 FOR IP): Performed by: INTERNAL MEDICINE

## 2025-02-11 PROCEDURE — 97112 NEUROMUSCULAR REEDUCATION: CPT

## 2025-02-11 PROCEDURE — 94640 AIRWAY INHALATION TREATMENT: CPT

## 2025-02-11 PROCEDURE — 2500000003 HC RX 250 WO HCPCS: Performed by: FAMILY MEDICINE

## 2025-02-11 PROCEDURE — 6370000000 HC RX 637 (ALT 250 FOR IP): Performed by: FAMILY MEDICINE

## 2025-02-11 PROCEDURE — 99232 SBSQ HOSP IP/OBS MODERATE 35: CPT | Performed by: INTERNAL MEDICINE

## 2025-02-11 PROCEDURE — 2140000001 HC CVICU INTERMEDIATE R&B

## 2025-02-11 PROCEDURE — 2500000003 HC RX 250 WO HCPCS

## 2025-02-11 PROCEDURE — 80048 BASIC METABOLIC PNL TOTAL CA: CPT

## 2025-02-11 PROCEDURE — 2580000003 HC RX 258: Performed by: INTERNAL MEDICINE

## 2025-02-11 PROCEDURE — 6370000000 HC RX 637 (ALT 250 FOR IP): Performed by: PHYSICIAN ASSISTANT

## 2025-02-11 RX ORDER — FOLIC ACID 1 MG/1
1 TABLET ORAL DAILY
Status: DISCONTINUED | OUTPATIENT
Start: 2025-02-11 | End: 2025-02-19 | Stop reason: HOSPADM

## 2025-02-11 RX ORDER — ACETAMINOPHEN 325 MG/1
650 TABLET ORAL EVERY 6 HOURS PRN
Status: DISCONTINUED | OUTPATIENT
Start: 2025-02-11 | End: 2025-02-19 | Stop reason: HOSPADM

## 2025-02-11 RX ORDER — INSULIN LISPRO 100 [IU]/ML
0-4 INJECTION, SOLUTION INTRAVENOUS; SUBCUTANEOUS
Status: DISCONTINUED | OUTPATIENT
Start: 2025-02-11 | End: 2025-02-19 | Stop reason: HOSPADM

## 2025-02-11 RX ORDER — ACETAMINOPHEN 650 MG/1
650 SUPPOSITORY RECTAL EVERY 6 HOURS PRN
Status: DISCONTINUED | OUTPATIENT
Start: 2025-02-11 | End: 2025-02-19 | Stop reason: HOSPADM

## 2025-02-11 RX ORDER — BISACODYL 10 MG
10 SUPPOSITORY, RECTAL RECTAL DAILY PRN
Status: DISCONTINUED | OUTPATIENT
Start: 2025-02-11 | End: 2025-02-19 | Stop reason: HOSPADM

## 2025-02-11 RX ADMIN — Medication 15 ML: at 08:07

## 2025-02-11 RX ADMIN — MIDODRINE HYDROCHLORIDE 10 MG: 10 TABLET ORAL at 17:07

## 2025-02-11 RX ADMIN — POLYETHYLENE GLYCOL 3350 17 G: 17 POWDER, FOR SOLUTION ORAL at 08:06

## 2025-02-11 RX ADMIN — THIAMINE HYDROCHLORIDE 200 MG: 100 INJECTION, SOLUTION INTRAMUSCULAR; INTRAVENOUS at 08:07

## 2025-02-11 RX ADMIN — FOLIC ACID 1 MG: 1 TABLET ORAL at 17:07

## 2025-02-11 RX ADMIN — SODIUM CHLORIDE SOLN NEBU 3% 4 ML: 3 NEBU SOLN at 01:13

## 2025-02-11 RX ADMIN — BUDESONIDE 1 MG: 1 SUSPENSION RESPIRATORY (INHALATION) at 07:21

## 2025-02-11 RX ADMIN — ALBUTEROL SULFATE 1.25 MG: 1.25 SOLUTION RESPIRATORY (INHALATION) at 07:22

## 2025-02-11 RX ADMIN — SODIUM CHLORIDE SOLN NEBU 3% 4 ML: 3 NEBU SOLN at 07:22

## 2025-02-11 RX ADMIN — SODIUM CHLORIDE, PRESERVATIVE FREE 10 ML: 5 INJECTION INTRAVENOUS at 08:08

## 2025-02-11 RX ADMIN — PIPERACILLIN AND TAZOBACTAM 3375 MG: 3; .375 INJECTION, POWDER, LYOPHILIZED, FOR SOLUTION INTRAVENOUS at 08:15

## 2025-02-11 RX ADMIN — Medication 1 CAPSULE: at 08:07

## 2025-02-11 RX ADMIN — DEXTROSE MONOHYDRATE: 5 INJECTION, SOLUTION INTRAVENOUS at 18:43

## 2025-02-11 RX ADMIN — MIDODRINE HYDROCHLORIDE 10 MG: 10 TABLET ORAL at 08:07

## 2025-02-11 RX ADMIN — DEXTROSE MONOHYDRATE: 5 INJECTION, SOLUTION INTRAVENOUS at 05:40

## 2025-02-11 RX ADMIN — WATER 40 MG: 1 INJECTION INTRAMUSCULAR; INTRAVENOUS; SUBCUTANEOUS at 08:07

## 2025-02-11 RX ADMIN — SODIUM CHLORIDE SOLN NEBU 3% 4 ML: 3 NEBU SOLN at 21:15

## 2025-02-11 RX ADMIN — ARFORMOTEROL TARTRATE 15 MCG: 15 SOLUTION RESPIRATORY (INHALATION) at 21:15

## 2025-02-11 RX ADMIN — ALBUTEROL SULFATE 1.25 MG: 1.25 SOLUTION RESPIRATORY (INHALATION) at 13:14

## 2025-02-11 RX ADMIN — SODIUM CHLORIDE, PRESERVATIVE FREE 10 ML: 5 INJECTION INTRAVENOUS at 21:41

## 2025-02-11 RX ADMIN — ARFORMOTEROL TARTRATE 15 MCG: 15 SOLUTION RESPIRATORY (INHALATION) at 07:21

## 2025-02-11 RX ADMIN — BUDESONIDE 1 MG: 1 SUSPENSION RESPIRATORY (INHALATION) at 21:15

## 2025-02-11 RX ADMIN — DOCUSATE SODIUM LIQUID 100 MG: 100 LIQUID ORAL at 17:07

## 2025-02-11 RX ADMIN — SODIUM CHLORIDE SOLN NEBU 3% 4 ML: 3 NEBU SOLN at 13:14

## 2025-02-11 RX ADMIN — SODIUM CHLORIDE SOLN NEBU 3% 4 ML: 3 NEBU SOLN at 04:02

## 2025-02-11 RX ADMIN — PANTOPRAZOLE SODIUM 40 MG: 40 INJECTION, POWDER, FOR SOLUTION INTRAVENOUS at 08:07

## 2025-02-11 RX ADMIN — ALBUTEROL SULFATE 1.25 MG: 1.25 SOLUTION RESPIRATORY (INHALATION) at 16:35

## 2025-02-11 RX ADMIN — PIPERACILLIN AND TAZOBACTAM 3375 MG: 3; .375 INJECTION, POWDER, LYOPHILIZED, FOR SOLUTION INTRAVENOUS at 15:37

## 2025-02-11 RX ADMIN — ALBUTEROL SULFATE 1.25 MG: 1.25 SOLUTION RESPIRATORY (INHALATION) at 04:02

## 2025-02-11 RX ADMIN — ALBUTEROL SULFATE 1.25 MG: 1.25 SOLUTION RESPIRATORY (INHALATION) at 21:16

## 2025-02-11 RX ADMIN — ATORVASTATIN CALCIUM 80 MG: 40 TABLET, FILM COATED ORAL at 21:38

## 2025-02-11 RX ADMIN — ALBUTEROL SULFATE 1.25 MG: 1.25 SOLUTION RESPIRATORY (INHALATION) at 01:13

## 2025-02-11 RX ADMIN — BISACODYL 10 MG: 10 SUPPOSITORY RECTAL at 17:07

## 2025-02-11 RX ADMIN — MIDODRINE HYDROCHLORIDE 10 MG: 10 TABLET ORAL at 12:46

## 2025-02-11 RX ADMIN — SODIUM CHLORIDE, PRESERVATIVE FREE 10 ML: 5 INJECTION INTRAVENOUS at 08:17

## 2025-02-11 RX ADMIN — SODIUM CHLORIDE SOLN NEBU 3% 4 ML: 3 NEBU SOLN at 16:35

## 2025-02-11 ASSESSMENT — PAIN SCALES - GENERAL
PAINLEVEL_OUTOF10: 0

## 2025-02-12 ENCOUNTER — APPOINTMENT (OUTPATIENT)
Facility: HOSPITAL | Age: 65
DRG: 720 | End: 2025-02-12
Attending: INTERNAL MEDICINE
Payer: MEDICAID

## 2025-02-12 ENCOUNTER — APPOINTMENT (OUTPATIENT)
Facility: HOSPITAL | Age: 65
DRG: 720 | End: 2025-02-12
Payer: MEDICAID

## 2025-02-12 PROBLEM — E88.09 HYPOALBUMINEMIA: Status: ACTIVE | Noted: 2025-02-12

## 2025-02-12 LAB
ANION GAP SERPL CALC-SCNC: 2 MMOL/L (ref 3–18)
BASOPHILS # BLD: 0.01 K/UL (ref 0–0.1)
BASOPHILS NFR BLD: 0.1 % (ref 0–2)
BUN SERPL-MCNC: 15 MG/DL (ref 7–18)
BUN/CREAT SERPL: 26 (ref 12–20)
CALCIUM SERPL-MCNC: 8.3 MG/DL (ref 8.5–10.1)
CHLORIDE SERPL-SCNC: 112 MMOL/L (ref 100–111)
CO2 SERPL-SCNC: 33 MMOL/L (ref 21–32)
CREAT SERPL-MCNC: 0.57 MG/DL (ref 0.6–1.3)
DIFFERENTIAL METHOD BLD: ABNORMAL
ECHO BSA: 1.74 M2
EOSINOPHIL # BLD: 0.01 K/UL (ref 0–0.4)
EOSINOPHIL NFR BLD: 0.1 % (ref 0–5)
ERYTHROCYTE [DISTWIDTH] IN BLOOD BY AUTOMATED COUNT: 18.4 % (ref 11.6–14.5)
GLUCOSE BLD STRIP.AUTO-MCNC: 122 MG/DL (ref 70–110)
GLUCOSE BLD STRIP.AUTO-MCNC: 123 MG/DL (ref 70–110)
GLUCOSE BLD STRIP.AUTO-MCNC: 143 MG/DL (ref 70–110)
GLUCOSE BLD STRIP.AUTO-MCNC: 146 MG/DL (ref 70–110)
GLUCOSE SERPL-MCNC: 126 MG/DL (ref 74–99)
HCT VFR BLD AUTO: 27.7 % (ref 36–48)
HGB BLD-MCNC: 8.5 G/DL (ref 13–16)
IMM GRANULOCYTES # BLD AUTO: 0.04 K/UL (ref 0–0.04)
IMM GRANULOCYTES NFR BLD AUTO: 0.4 % (ref 0–0.5)
LYMPHOCYTES # BLD: 0.33 K/UL (ref 0.9–3.6)
LYMPHOCYTES NFR BLD: 3.2 % (ref 21–52)
MAGNESIUM SERPL-MCNC: 2.4 MG/DL (ref 1.6–2.6)
MCH RBC QN AUTO: 28.1 PG (ref 24–34)
MCHC RBC AUTO-ENTMCNC: 30.7 G/DL (ref 31–37)
MCV RBC AUTO: 91.7 FL (ref 78–100)
MONOCYTES # BLD: 0.38 K/UL (ref 0.05–1.2)
MONOCYTES NFR BLD: 3.7 % (ref 3–10)
NEUTS SEG # BLD: 9.44 K/UL (ref 1.8–8)
NEUTS SEG NFR BLD: 92.5 % (ref 40–73)
NRBC # BLD: 0 K/UL (ref 0–0.01)
NRBC BLD-RTO: 0 PER 100 WBC
PHOSPHATE SERPL-MCNC: 2 MG/DL (ref 2.5–4.9)
PLATELET # BLD AUTO: 348 K/UL (ref 135–420)
PMV BLD AUTO: 11 FL (ref 9.2–11.8)
POTASSIUM SERPL-SCNC: 3.9 MMOL/L (ref 3.5–5.5)
RBC # BLD AUTO: 3.02 M/UL (ref 4.35–5.65)
SODIUM SERPL-SCNC: 147 MMOL/L (ref 136–145)
WBC # BLD AUTO: 10.2 K/UL (ref 4.6–13.2)

## 2025-02-12 PROCEDURE — 93971 EXTREMITY STUDY: CPT

## 2025-02-12 PROCEDURE — 93971 EXTREMITY STUDY: CPT | Performed by: SURGERY

## 2025-02-12 PROCEDURE — 6370000000 HC RX 637 (ALT 250 FOR IP): Performed by: HOSPITALIST

## 2025-02-12 PROCEDURE — 80048 BASIC METABOLIC PNL TOTAL CA: CPT

## 2025-02-12 PROCEDURE — 6360000002 HC RX W HCPCS

## 2025-02-12 PROCEDURE — 94761 N-INVAS EAR/PLS OXIMETRY MLT: CPT

## 2025-02-12 PROCEDURE — 2580000003 HC RX 258: Performed by: INTERNAL MEDICINE

## 2025-02-12 PROCEDURE — 6360000002 HC RX W HCPCS: Performed by: PHYSICIAN ASSISTANT

## 2025-02-12 PROCEDURE — 2500000003 HC RX 250 WO HCPCS

## 2025-02-12 PROCEDURE — 2500000003 HC RX 250 WO HCPCS: Performed by: FAMILY MEDICINE

## 2025-02-12 PROCEDURE — 82962 GLUCOSE BLOOD TEST: CPT

## 2025-02-12 PROCEDURE — 6360000002 HC RX W HCPCS: Performed by: INTERNAL MEDICINE

## 2025-02-12 PROCEDURE — 71045 X-RAY EXAM CHEST 1 VIEW: CPT

## 2025-02-12 PROCEDURE — 6370000000 HC RX 637 (ALT 250 FOR IP): Performed by: FAMILY MEDICINE

## 2025-02-12 PROCEDURE — 6370000000 HC RX 637 (ALT 250 FOR IP): Performed by: PHYSICIAN ASSISTANT

## 2025-02-12 PROCEDURE — 6360000002 HC RX W HCPCS: Performed by: HOSPITALIST

## 2025-02-12 PROCEDURE — 2580000003 HC RX 258: Performed by: PHYSICIAN ASSISTANT

## 2025-02-12 PROCEDURE — 2580000003 HC RX 258: Performed by: HOSPITALIST

## 2025-02-12 PROCEDURE — 94668 MNPJ CHEST WALL SBSQ: CPT

## 2025-02-12 PROCEDURE — 36415 COLL VENOUS BLD VENIPUNCTURE: CPT

## 2025-02-12 PROCEDURE — 92526 ORAL FUNCTION THERAPY: CPT

## 2025-02-12 PROCEDURE — 97530 THERAPEUTIC ACTIVITIES: CPT

## 2025-02-12 PROCEDURE — 94660 CPAP INITIATION&MGMT: CPT

## 2025-02-12 PROCEDURE — 2580000003 HC RX 258

## 2025-02-12 PROCEDURE — 2500000003 HC RX 250 WO HCPCS: Performed by: PHYSICIAN ASSISTANT

## 2025-02-12 PROCEDURE — 85025 COMPLETE CBC W/AUTO DIFF WBC: CPT

## 2025-02-12 PROCEDURE — 99232 SBSQ HOSP IP/OBS MODERATE 35: CPT | Performed by: INTERNAL MEDICINE

## 2025-02-12 PROCEDURE — 2140000001 HC CVICU INTERMEDIATE R&B

## 2025-02-12 PROCEDURE — 84100 ASSAY OF PHOSPHORUS: CPT

## 2025-02-12 PROCEDURE — 6370000000 HC RX 637 (ALT 250 FOR IP): Performed by: INTERNAL MEDICINE

## 2025-02-12 PROCEDURE — 83735 ASSAY OF MAGNESIUM: CPT

## 2025-02-12 PROCEDURE — 97112 NEUROMUSCULAR REEDUCATION: CPT

## 2025-02-12 PROCEDURE — 94640 AIRWAY INHALATION TREATMENT: CPT

## 2025-02-12 PROCEDURE — 2700000000 HC OXYGEN THERAPY PER DAY

## 2025-02-12 RX ORDER — FUROSEMIDE 10 MG/ML
40 INJECTION INTRAMUSCULAR; INTRAVENOUS ONCE
Status: COMPLETED | OUTPATIENT
Start: 2025-02-12 | End: 2025-02-12

## 2025-02-12 RX ADMIN — PIPERACILLIN AND TAZOBACTAM 3375 MG: 3; .375 INJECTION, POWDER, LYOPHILIZED, FOR SOLUTION INTRAVENOUS at 00:06

## 2025-02-12 RX ADMIN — SODIUM CHLORIDE SOLN NEBU 3% 4 ML: 3 NEBU SOLN at 21:07

## 2025-02-12 RX ADMIN — ATORVASTATIN CALCIUM 80 MG: 40 TABLET, FILM COATED ORAL at 21:33

## 2025-02-12 RX ADMIN — Medication 15 ML: at 08:50

## 2025-02-12 RX ADMIN — ALBUTEROL SULFATE 1.25 MG: 1.25 SOLUTION RESPIRATORY (INHALATION) at 04:48

## 2025-02-12 RX ADMIN — PANTOPRAZOLE SODIUM 40 MG: 40 INJECTION, POWDER, FOR SOLUTION INTRAVENOUS at 08:50

## 2025-02-12 RX ADMIN — SODIUM CHLORIDE, PRESERVATIVE FREE 10 ML: 5 INJECTION INTRAVENOUS at 21:33

## 2025-02-12 RX ADMIN — ALBUTEROL SULFATE 1.25 MG: 1.25 SOLUTION RESPIRATORY (INHALATION) at 15:54

## 2025-02-12 RX ADMIN — FUROSEMIDE 40 MG: 10 INJECTION, SOLUTION INTRAMUSCULAR; INTRAVENOUS at 13:01

## 2025-02-12 RX ADMIN — SODIUM CHLORIDE SOLN NEBU 3% 4 ML: 3 NEBU SOLN at 08:00

## 2025-02-12 RX ADMIN — BUDESONIDE 1 MG: 1 SUSPENSION RESPIRATORY (INHALATION) at 21:07

## 2025-02-12 RX ADMIN — ALBUTEROL SULFATE 1.25 MG: 1.25 SOLUTION RESPIRATORY (INHALATION) at 01:05

## 2025-02-12 RX ADMIN — WATER 40 MG: 1 INJECTION INTRAMUSCULAR; INTRAVENOUS; SUBCUTANEOUS at 08:50

## 2025-02-12 RX ADMIN — ARFORMOTEROL TARTRATE 15 MCG: 15 SOLUTION RESPIRATORY (INHALATION) at 08:00

## 2025-02-12 RX ADMIN — ALBUTEROL SULFATE 1.25 MG: 1.25 SOLUTION RESPIRATORY (INHALATION) at 08:00

## 2025-02-12 RX ADMIN — ALBUTEROL SULFATE 1.25 MG: 1.25 SOLUTION RESPIRATORY (INHALATION) at 21:07

## 2025-02-12 RX ADMIN — Medication 1 CAPSULE: at 08:50

## 2025-02-12 RX ADMIN — SODIUM CHLORIDE SOLN NEBU 3% 4 ML: 3 NEBU SOLN at 12:46

## 2025-02-12 RX ADMIN — DOCUSATE SODIUM LIQUID 100 MG: 100 LIQUID ORAL at 08:50

## 2025-02-12 RX ADMIN — SODIUM CHLORIDE SOLN NEBU 3% 4 ML: 3 NEBU SOLN at 01:05

## 2025-02-12 RX ADMIN — ALBUTEROL SULFATE 1.25 MG: 1.25 SOLUTION RESPIRATORY (INHALATION) at 12:46

## 2025-02-12 RX ADMIN — ARFORMOTEROL TARTRATE 15 MCG: 15 SOLUTION RESPIRATORY (INHALATION) at 21:07

## 2025-02-12 RX ADMIN — THIAMINE HYDROCHLORIDE 200 MG: 100 INJECTION, SOLUTION INTRAMUSCULAR; INTRAVENOUS at 08:50

## 2025-02-12 RX ADMIN — POTASSIUM PHOSPHATE, MONOBASIC POTASSIUM PHOSPHATE, DIBASIC 15 MMOL: 224; 236 INJECTION, SOLUTION, CONCENTRATE INTRAVENOUS at 13:51

## 2025-02-12 RX ADMIN — MIDODRINE HYDROCHLORIDE 10 MG: 10 TABLET ORAL at 08:50

## 2025-02-12 RX ADMIN — DEXTROSE MONOHYDRATE: 5 INJECTION, SOLUTION INTRAVENOUS at 09:03

## 2025-02-12 RX ADMIN — SODIUM CHLORIDE, PRESERVATIVE FREE 10 ML: 5 INJECTION INTRAVENOUS at 08:51

## 2025-02-12 RX ADMIN — SODIUM CHLORIDE SOLN NEBU 3% 4 ML: 3 NEBU SOLN at 04:48

## 2025-02-12 RX ADMIN — POLYETHYLENE GLYCOL 3350 17 G: 17 POWDER, FOR SOLUTION ORAL at 08:51

## 2025-02-12 RX ADMIN — BUDESONIDE 1 MG: 1 SUSPENSION RESPIRATORY (INHALATION) at 08:00

## 2025-02-12 RX ADMIN — SODIUM CHLORIDE SOLN NEBU 3% 4 ML: 3 NEBU SOLN at 23:41

## 2025-02-12 RX ADMIN — ALBUTEROL SULFATE 1.25 MG: 1.25 SOLUTION RESPIRATORY (INHALATION) at 23:41

## 2025-02-12 RX ADMIN — SODIUM CHLORIDE, PRESERVATIVE FREE 10 ML: 5 INJECTION INTRAVENOUS at 08:52

## 2025-02-12 RX ADMIN — FOLIC ACID 1 MG: 1 TABLET ORAL at 08:50

## 2025-02-12 RX ADMIN — SODIUM CHLORIDE SOLN NEBU 3% 4 ML: 3 NEBU SOLN at 15:54

## 2025-02-12 ASSESSMENT — PAIN SCALES - GENERAL
PAINLEVEL_OUTOF10: 0

## 2025-02-13 ENCOUNTER — APPOINTMENT (OUTPATIENT)
Facility: HOSPITAL | Age: 65
DRG: 720 | End: 2025-02-13
Payer: MEDICAID

## 2025-02-13 LAB
ANION GAP SERPL CALC-SCNC: 2 MMOL/L (ref 3–18)
BASOPHILS # BLD: 0.01 K/UL (ref 0–0.1)
BASOPHILS NFR BLD: 0.1 % (ref 0–2)
BUN SERPL-MCNC: 13 MG/DL (ref 7–18)
BUN/CREAT SERPL: 26 (ref 12–20)
CALCIUM SERPL-MCNC: 8.6 MG/DL (ref 8.5–10.1)
CHLORIDE SERPL-SCNC: 113 MMOL/L (ref 100–111)
CO2 SERPL-SCNC: 33 MMOL/L (ref 21–32)
CREAT SERPL-MCNC: 0.5 MG/DL (ref 0.6–1.3)
DIFFERENTIAL METHOD BLD: ABNORMAL
EOSINOPHIL # BLD: 0.02 K/UL (ref 0–0.4)
EOSINOPHIL NFR BLD: 0.2 % (ref 0–5)
ERYTHROCYTE [DISTWIDTH] IN BLOOD BY AUTOMATED COUNT: 17.6 % (ref 11.6–14.5)
GLUCOSE BLD STRIP.AUTO-MCNC: 102 MG/DL (ref 70–110)
GLUCOSE BLD STRIP.AUTO-MCNC: 125 MG/DL (ref 70–110)
GLUCOSE BLD STRIP.AUTO-MCNC: 168 MG/DL (ref 70–110)
GLUCOSE BLD STRIP.AUTO-MCNC: 82 MG/DL (ref 70–110)
GLUCOSE SERPL-MCNC: 83 MG/DL (ref 74–99)
HCT VFR BLD AUTO: 28.8 % (ref 36–48)
HGB BLD-MCNC: 9.2 G/DL (ref 13–16)
IMM GRANULOCYTES # BLD AUTO: 0.05 K/UL (ref 0–0.04)
IMM GRANULOCYTES NFR BLD AUTO: 0.5 % (ref 0–0.5)
LYMPHOCYTES # BLD: 0.47 K/UL (ref 0.9–3.6)
LYMPHOCYTES NFR BLD: 4.5 % (ref 21–52)
MAGNESIUM SERPL-MCNC: 2.3 MG/DL (ref 1.6–2.6)
MCH RBC QN AUTO: 28.7 PG (ref 24–34)
MCHC RBC AUTO-ENTMCNC: 31.9 G/DL (ref 31–37)
MCV RBC AUTO: 89.7 FL (ref 78–100)
MONOCYTES # BLD: 0.46 K/UL (ref 0.05–1.2)
MONOCYTES NFR BLD: 4.4 % (ref 3–10)
NEUTS SEG # BLD: 9.44 K/UL (ref 1.8–8)
NEUTS SEG NFR BLD: 90.3 % (ref 40–73)
NRBC # BLD: 0 K/UL (ref 0–0.01)
NRBC BLD-RTO: 0 PER 100 WBC
PHOSPHATE SERPL-MCNC: 2.4 MG/DL (ref 2.5–4.9)
PLATELET # BLD AUTO: 371 K/UL (ref 135–420)
PMV BLD AUTO: 11.2 FL (ref 9.2–11.8)
POTASSIUM SERPL-SCNC: 3.7 MMOL/L (ref 3.5–5.5)
RBC # BLD AUTO: 3.21 M/UL (ref 4.35–5.65)
SODIUM SERPL-SCNC: 148 MMOL/L (ref 136–145)
WBC # BLD AUTO: 10.5 K/UL (ref 4.6–13.2)

## 2025-02-13 PROCEDURE — 94664 DEMO&/EVAL PT USE INHALER: CPT

## 2025-02-13 PROCEDURE — 36415 COLL VENOUS BLD VENIPUNCTURE: CPT

## 2025-02-13 PROCEDURE — 82962 GLUCOSE BLOOD TEST: CPT

## 2025-02-13 PROCEDURE — 80048 BASIC METABOLIC PNL TOTAL CA: CPT

## 2025-02-13 PROCEDURE — 71045 X-RAY EXAM CHEST 1 VIEW: CPT

## 2025-02-13 PROCEDURE — 2580000003 HC RX 258: Performed by: INTERNAL MEDICINE

## 2025-02-13 PROCEDURE — 2700000000 HC OXYGEN THERAPY PER DAY

## 2025-02-13 PROCEDURE — 6370000000 HC RX 637 (ALT 250 FOR IP): Performed by: HOSPITALIST

## 2025-02-13 PROCEDURE — 6370000000 HC RX 637 (ALT 250 FOR IP): Performed by: FAMILY MEDICINE

## 2025-02-13 PROCEDURE — 6370000000 HC RX 637 (ALT 250 FOR IP): Performed by: INTERNAL MEDICINE

## 2025-02-13 PROCEDURE — 99232 SBSQ HOSP IP/OBS MODERATE 35: CPT | Performed by: INTERNAL MEDICINE

## 2025-02-13 PROCEDURE — 2500000003 HC RX 250 WO HCPCS

## 2025-02-13 PROCEDURE — 84100 ASSAY OF PHOSPHORUS: CPT

## 2025-02-13 PROCEDURE — 97530 THERAPEUTIC ACTIVITIES: CPT

## 2025-02-13 PROCEDURE — 97112 NEUROMUSCULAR REEDUCATION: CPT

## 2025-02-13 PROCEDURE — 92526 ORAL FUNCTION THERAPY: CPT

## 2025-02-13 PROCEDURE — 2580000003 HC RX 258

## 2025-02-13 PROCEDURE — 94669 MECHANICAL CHEST WALL OSCILL: CPT

## 2025-02-13 PROCEDURE — 97535 SELF CARE MNGMENT TRAINING: CPT

## 2025-02-13 PROCEDURE — 2140000001 HC CVICU INTERMEDIATE R&B

## 2025-02-13 PROCEDURE — 6360000002 HC RX W HCPCS: Performed by: INTERNAL MEDICINE

## 2025-02-13 PROCEDURE — 2500000003 HC RX 250 WO HCPCS: Performed by: PHYSICIAN ASSISTANT

## 2025-02-13 PROCEDURE — 6360000002 HC RX W HCPCS

## 2025-02-13 PROCEDURE — 6360000002 HC RX W HCPCS: Performed by: PHYSICIAN ASSISTANT

## 2025-02-13 PROCEDURE — 83735 ASSAY OF MAGNESIUM: CPT

## 2025-02-13 PROCEDURE — 94667 MNPJ CHEST WALL 1ST: CPT

## 2025-02-13 PROCEDURE — APPSS15 APP SPLIT SHARED TIME 0-15 MINUTES

## 2025-02-13 PROCEDURE — 94761 N-INVAS EAR/PLS OXIMETRY MLT: CPT

## 2025-02-13 PROCEDURE — 85025 COMPLETE CBC W/AUTO DIFF WBC: CPT

## 2025-02-13 PROCEDURE — 94640 AIRWAY INHALATION TREATMENT: CPT

## 2025-02-13 RX ORDER — GAUZE BANDAGE 2" X 2"
100 BANDAGE TOPICAL DAILY
Status: DISCONTINUED | OUTPATIENT
Start: 2025-02-13 | End: 2025-02-19 | Stop reason: HOSPADM

## 2025-02-13 RX ORDER — GUAIFENESIN 600 MG/1
600 TABLET, EXTENDED RELEASE ORAL 2 TIMES DAILY
Status: DISCONTINUED | OUTPATIENT
Start: 2025-02-13 | End: 2025-02-19 | Stop reason: HOSPADM

## 2025-02-13 RX ORDER — ACETYLCYSTEINE 100 MG/ML
4 SOLUTION ORAL; RESPIRATORY (INHALATION) EVERY 4 HOURS
Status: DISCONTINUED | OUTPATIENT
Start: 2025-02-13 | End: 2025-02-18

## 2025-02-13 RX ORDER — ALBUTEROL SULFATE 1.25 MG/3ML
1.25 SOLUTION RESPIRATORY (INHALATION)
Status: DISCONTINUED | OUTPATIENT
Start: 2025-02-13 | End: 2025-02-18

## 2025-02-13 RX ORDER — PANTOPRAZOLE SODIUM 40 MG/1
40 TABLET, DELAYED RELEASE ORAL
Status: DISCONTINUED | OUTPATIENT
Start: 2025-02-13 | End: 2025-02-19 | Stop reason: HOSPADM

## 2025-02-13 RX ORDER — SODIUM CHLORIDE FOR INHALATION 3 %
4 VIAL, NEBULIZER (ML) INHALATION 2 TIMES DAILY
Status: DISCONTINUED | OUTPATIENT
Start: 2025-02-13 | End: 2025-02-14

## 2025-02-13 RX ORDER — FUROSEMIDE 10 MG/ML
40 INJECTION INTRAMUSCULAR; INTRAVENOUS ONCE
Status: COMPLETED | OUTPATIENT
Start: 2025-02-13 | End: 2025-02-13

## 2025-02-13 RX ADMIN — ALBUTEROL SULFATE 1.25 MG: 1.25 SOLUTION RESPIRATORY (INHALATION) at 12:59

## 2025-02-13 RX ADMIN — ACETYLCYSTEINE 400 MG: 100 SOLUTION ORAL; RESPIRATORY (INHALATION) at 20:57

## 2025-02-13 RX ADMIN — ALBUTEROL SULFATE 1.25 MG: 1.25 SOLUTION RESPIRATORY (INHALATION) at 20:58

## 2025-02-13 RX ADMIN — Medication 100 MG: at 09:30

## 2025-02-13 RX ADMIN — MIDODRINE HYDROCHLORIDE 10 MG: 10 TABLET ORAL at 08:42

## 2025-02-13 RX ADMIN — FOLIC ACID 1 MG: 1 TABLET ORAL at 08:42

## 2025-02-13 RX ADMIN — FUROSEMIDE 40 MG: 10 INJECTION, SOLUTION INTRAMUSCULAR; INTRAVENOUS at 15:13

## 2025-02-13 RX ADMIN — ATORVASTATIN CALCIUM 80 MG: 40 TABLET, FILM COATED ORAL at 21:21

## 2025-02-13 RX ADMIN — BUDESONIDE 1 MG: 1 SUSPENSION RESPIRATORY (INHALATION) at 20:58

## 2025-02-13 RX ADMIN — ALBUTEROL SULFATE 1.25 MG: 1.25 SOLUTION RESPIRATORY (INHALATION) at 03:51

## 2025-02-13 RX ADMIN — WATER 40 MG: 1 INJECTION INTRAMUSCULAR; INTRAVENOUS; SUBCUTANEOUS at 08:37

## 2025-02-13 RX ADMIN — ALBUTEROL SULFATE 1.25 MG: 1.25 SOLUTION RESPIRATORY (INHALATION) at 08:54

## 2025-02-13 RX ADMIN — Medication 4 ML: at 20:58

## 2025-02-13 RX ADMIN — SODIUM CHLORIDE, PRESERVATIVE FREE 10 ML: 5 INJECTION INTRAVENOUS at 21:22

## 2025-02-13 RX ADMIN — PANTOPRAZOLE SODIUM 40 MG: 40 TABLET, DELAYED RELEASE ORAL at 09:30

## 2025-02-13 RX ADMIN — SODIUM CHLORIDE, PRESERVATIVE FREE 10 ML: 5 INJECTION INTRAVENOUS at 08:37

## 2025-02-13 RX ADMIN — MIDODRINE HYDROCHLORIDE 10 MG: 10 TABLET ORAL at 12:15

## 2025-02-13 RX ADMIN — Medication 1 CAPSULE: at 09:30

## 2025-02-13 RX ADMIN — GUAIFENESIN 600 MG: 600 TABLET ORAL at 12:14

## 2025-02-13 RX ADMIN — GUAIFENESIN 600 MG: 600 TABLET ORAL at 21:21

## 2025-02-13 RX ADMIN — SODIUM CHLORIDE SOLN NEBU 3% 4 ML: 3 NEBU SOLN at 03:51

## 2025-02-13 RX ADMIN — ACETYLCYSTEINE 400 MG: 100 SOLUTION ORAL; RESPIRATORY (INHALATION) at 16:45

## 2025-02-13 RX ADMIN — ALBUTEROL SULFATE 1.25 MG: 1.25 SOLUTION RESPIRATORY (INHALATION) at 23:44

## 2025-02-13 RX ADMIN — POTASSIUM PHOSPHATE, MONOBASIC POTASSIUM PHOSPHATE, DIBASIC 15 MMOL: 224; 236 INJECTION, SOLUTION, CONCENTRATE INTRAVENOUS at 17:53

## 2025-02-13 RX ADMIN — BUDESONIDE 1 MG: 1 SUSPENSION RESPIRATORY (INHALATION) at 08:54

## 2025-02-13 RX ADMIN — ARFORMOTEROL TARTRATE 15 MCG: 15 SOLUTION RESPIRATORY (INHALATION) at 20:58

## 2025-02-13 RX ADMIN — ACETYLCYSTEINE 400 MG: 100 SOLUTION ORAL; RESPIRATORY (INHALATION) at 12:59

## 2025-02-13 RX ADMIN — ARFORMOTEROL TARTRATE 15 MCG: 15 SOLUTION RESPIRATORY (INHALATION) at 08:54

## 2025-02-13 RX ADMIN — SODIUM CHLORIDE SOLN NEBU 3% 4 ML: 3 NEBU SOLN at 08:54

## 2025-02-13 RX ADMIN — Medication 15 ML: at 08:43

## 2025-02-13 RX ADMIN — SODIUM CHLORIDE, PRESERVATIVE FREE 10 ML: 5 INJECTION INTRAVENOUS at 15:14

## 2025-02-13 RX ADMIN — ALBUTEROL SULFATE 1.25 MG: 1.25 SOLUTION RESPIRATORY (INHALATION) at 16:45

## 2025-02-13 RX ADMIN — ACETYLCYSTEINE 400 MG: 100 SOLUTION ORAL; RESPIRATORY (INHALATION) at 23:44

## 2025-02-13 ASSESSMENT — PAIN SCALES - GENERAL
PAINLEVEL_OUTOF10: 0

## 2025-02-14 ENCOUNTER — APPOINTMENT (OUTPATIENT)
Facility: HOSPITAL | Age: 65
DRG: 720 | End: 2025-02-14
Payer: MEDICAID

## 2025-02-14 LAB
ANION GAP SERPL CALC-SCNC: 3 MMOL/L (ref 3–18)
BASOPHILS # BLD: 0.01 K/UL (ref 0–0.1)
BASOPHILS NFR BLD: 0.1 % (ref 0–2)
BUN SERPL-MCNC: 17 MG/DL (ref 7–18)
BUN/CREAT SERPL: 33 (ref 12–20)
CALCIUM SERPL-MCNC: 8.7 MG/DL (ref 8.5–10.1)
CHLORIDE SERPL-SCNC: 111 MMOL/L (ref 100–111)
CO2 SERPL-SCNC: 34 MMOL/L (ref 21–32)
CREAT SERPL-MCNC: 0.51 MG/DL (ref 0.6–1.3)
DIFFERENTIAL METHOD BLD: ABNORMAL
EOSINOPHIL # BLD: 0.04 K/UL (ref 0–0.4)
EOSINOPHIL NFR BLD: 0.4 % (ref 0–5)
ERYTHROCYTE [DISTWIDTH] IN BLOOD BY AUTOMATED COUNT: 17.8 % (ref 11.6–14.5)
GLUCOSE BLD STRIP.AUTO-MCNC: 104 MG/DL (ref 70–110)
GLUCOSE BLD STRIP.AUTO-MCNC: 107 MG/DL (ref 70–110)
GLUCOSE BLD STRIP.AUTO-MCNC: 156 MG/DL (ref 70–110)
GLUCOSE BLD STRIP.AUTO-MCNC: 173 MG/DL (ref 70–110)
GLUCOSE SERPL-MCNC: 69 MG/DL (ref 74–99)
HCT VFR BLD AUTO: 29.8 % (ref 36–48)
HGB BLD-MCNC: 9.2 G/DL (ref 13–16)
IMM GRANULOCYTES # BLD AUTO: 0.05 K/UL (ref 0–0.04)
IMM GRANULOCYTES NFR BLD AUTO: 0.5 % (ref 0–0.5)
LYMPHOCYTES # BLD: 0.43 K/UL (ref 0.9–3.6)
LYMPHOCYTES NFR BLD: 4.3 % (ref 21–52)
MAGNESIUM SERPL-MCNC: 1.9 MG/DL (ref 1.6–2.6)
MCH RBC QN AUTO: 28.3 PG (ref 24–34)
MCHC RBC AUTO-ENTMCNC: 30.9 G/DL (ref 31–37)
MCV RBC AUTO: 91.7 FL (ref 78–100)
MONOCYTES # BLD: 0.49 K/UL (ref 0.05–1.2)
MONOCYTES NFR BLD: 4.9 % (ref 3–10)
NEUTS SEG # BLD: 8.94 K/UL (ref 1.8–8)
NEUTS SEG NFR BLD: 89.8 % (ref 40–73)
NRBC # BLD: 0 K/UL (ref 0–0.01)
NRBC BLD-RTO: 0 PER 100 WBC
PHOSPHATE SERPL-MCNC: 2.6 MG/DL (ref 2.5–4.9)
PLATELET # BLD AUTO: 378 K/UL (ref 135–420)
PMV BLD AUTO: 11.3 FL (ref 9.2–11.8)
POTASSIUM SERPL-SCNC: 4.1 MMOL/L (ref 3.5–5.5)
RBC # BLD AUTO: 3.25 M/UL (ref 4.35–5.65)
SODIUM SERPL-SCNC: 148 MMOL/L (ref 136–145)
WBC # BLD AUTO: 10 K/UL (ref 4.6–13.2)

## 2025-02-14 PROCEDURE — 2700000000 HC OXYGEN THERAPY PER DAY

## 2025-02-14 PROCEDURE — 6370000000 HC RX 637 (ALT 250 FOR IP): Performed by: HOSPITALIST

## 2025-02-14 PROCEDURE — 6360000002 HC RX W HCPCS

## 2025-02-14 PROCEDURE — 82962 GLUCOSE BLOOD TEST: CPT

## 2025-02-14 PROCEDURE — 97530 THERAPEUTIC ACTIVITIES: CPT

## 2025-02-14 PROCEDURE — 2140000001 HC CVICU INTERMEDIATE R&B

## 2025-02-14 PROCEDURE — 97112 NEUROMUSCULAR REEDUCATION: CPT

## 2025-02-14 PROCEDURE — 6360000002 HC RX W HCPCS: Performed by: INTERNAL MEDICINE

## 2025-02-14 PROCEDURE — 94640 AIRWAY INHALATION TREATMENT: CPT

## 2025-02-14 PROCEDURE — 85025 COMPLETE CBC W/AUTO DIFF WBC: CPT

## 2025-02-14 PROCEDURE — 71045 X-RAY EXAM CHEST 1 VIEW: CPT

## 2025-02-14 PROCEDURE — 6370000000 HC RX 637 (ALT 250 FOR IP): Performed by: INTERNAL MEDICINE

## 2025-02-14 PROCEDURE — 6360000002 HC RX W HCPCS: Performed by: PHYSICIAN ASSISTANT

## 2025-02-14 PROCEDURE — 94761 N-INVAS EAR/PLS OXIMETRY MLT: CPT

## 2025-02-14 PROCEDURE — 83735 ASSAY OF MAGNESIUM: CPT

## 2025-02-14 PROCEDURE — 84100 ASSAY OF PHOSPHORUS: CPT

## 2025-02-14 PROCEDURE — 94668 MNPJ CHEST WALL SBSQ: CPT

## 2025-02-14 PROCEDURE — 99232 SBSQ HOSP IP/OBS MODERATE 35: CPT | Performed by: HOSPITALIST

## 2025-02-14 PROCEDURE — 36415 COLL VENOUS BLD VENIPUNCTURE: CPT

## 2025-02-14 PROCEDURE — 2500000003 HC RX 250 WO HCPCS

## 2025-02-14 PROCEDURE — 94660 CPAP INITIATION&MGMT: CPT

## 2025-02-14 PROCEDURE — 99232 SBSQ HOSP IP/OBS MODERATE 35: CPT | Performed by: INTERNAL MEDICINE

## 2025-02-14 PROCEDURE — 80048 BASIC METABOLIC PNL TOTAL CA: CPT

## 2025-02-14 PROCEDURE — 2500000003 HC RX 250 WO HCPCS: Performed by: PHYSICIAN ASSISTANT

## 2025-02-14 RX ORDER — FUROSEMIDE 10 MG/ML
40 INJECTION INTRAMUSCULAR; INTRAVENOUS ONCE
Status: COMPLETED | OUTPATIENT
Start: 2025-02-14 | End: 2025-02-14

## 2025-02-14 RX ADMIN — Medication 1 CAPSULE: at 08:45

## 2025-02-14 RX ADMIN — ACETYLCYSTEINE 400 MG: 100 SOLUTION ORAL; RESPIRATORY (INHALATION) at 20:50

## 2025-02-14 RX ADMIN — ALBUTEROL SULFATE 1.25 MG: 1.25 SOLUTION RESPIRATORY (INHALATION) at 17:23

## 2025-02-14 RX ADMIN — MIDODRINE HYDROCHLORIDE 10 MG: 10 TABLET ORAL at 17:03

## 2025-02-14 RX ADMIN — ACETYLCYSTEINE 400 MG: 100 SOLUTION ORAL; RESPIRATORY (INHALATION) at 04:58

## 2025-02-14 RX ADMIN — Medication 15 ML: at 08:45

## 2025-02-14 RX ADMIN — SODIUM CHLORIDE, PRESERVATIVE FREE 10 ML: 5 INJECTION INTRAVENOUS at 08:50

## 2025-02-14 RX ADMIN — WATER 40 MG: 1 INJECTION INTRAMUSCULAR; INTRAVENOUS; SUBCUTANEOUS at 08:45

## 2025-02-14 RX ADMIN — ALBUTEROL SULFATE 1.25 MG: 1.25 SOLUTION RESPIRATORY (INHALATION) at 20:50

## 2025-02-14 RX ADMIN — FUROSEMIDE 40 MG: 10 INJECTION, SOLUTION INTRAMUSCULAR; INTRAVENOUS at 08:50

## 2025-02-14 RX ADMIN — PANTOPRAZOLE SODIUM 40 MG: 40 TABLET, DELAYED RELEASE ORAL at 08:44

## 2025-02-14 RX ADMIN — FOLIC ACID 1 MG: 1 TABLET ORAL at 08:45

## 2025-02-14 RX ADMIN — ARFORMOTEROL TARTRATE 15 MCG: 15 SOLUTION RESPIRATORY (INHALATION) at 20:50

## 2025-02-14 RX ADMIN — ACETYLCYSTEINE 400 MG: 100 SOLUTION ORAL; RESPIRATORY (INHALATION) at 08:16

## 2025-02-14 RX ADMIN — SODIUM CHLORIDE, PRESERVATIVE FREE 10 ML: 5 INJECTION INTRAVENOUS at 21:00

## 2025-02-14 RX ADMIN — ALBUTEROL SULFATE 1.25 MG: 1.25 SOLUTION RESPIRATORY (INHALATION) at 09:57

## 2025-02-14 RX ADMIN — ARFORMOTEROL TARTRATE 15 MCG: 15 SOLUTION RESPIRATORY (INHALATION) at 08:16

## 2025-02-14 RX ADMIN — ACETYLCYSTEINE 400 MG: 100 SOLUTION ORAL; RESPIRATORY (INHALATION) at 17:23

## 2025-02-14 RX ADMIN — SODIUM CHLORIDE, PRESERVATIVE FREE 10 ML: 5 INJECTION INTRAVENOUS at 08:45

## 2025-02-14 RX ADMIN — BUDESONIDE 1 MG: 1 SUSPENSION RESPIRATORY (INHALATION) at 20:50

## 2025-02-14 RX ADMIN — Medication 100 MG: at 08:45

## 2025-02-14 RX ADMIN — ALBUTEROL SULFATE 1.25 MG: 1.25 SOLUTION RESPIRATORY (INHALATION) at 04:58

## 2025-02-14 RX ADMIN — ACETYLCYSTEINE 400 MG: 100 SOLUTION ORAL; RESPIRATORY (INHALATION) at 12:54

## 2025-02-14 RX ADMIN — BUDESONIDE 1 MG: 1 SUSPENSION RESPIRATORY (INHALATION) at 08:16

## 2025-02-14 RX ADMIN — GUAIFENESIN 600 MG: 600 TABLET ORAL at 08:45

## 2025-02-14 RX ADMIN — ALBUTEROL SULFATE 1.25 MG: 1.25 SOLUTION RESPIRATORY (INHALATION) at 12:54

## 2025-02-14 ASSESSMENT — PAIN SCALES - GENERAL
PAINLEVEL_OUTOF10: 0

## 2025-02-14 NOTE — FLOWSHEET NOTE
02/14/25 1527   Oxygen Therapy/Pulse Ox   O2 Device Heated high flow cannula   O2 Flow Rate (L/min) 30 L/min   FiO2  100 %   Pulse (!) 111   Respirations (!) 37   SpO2 100 %     Patient remain short of breath using accessory muscles .   Respiratory therapy was notified .   BIPAP initiated.

## 2025-02-14 NOTE — FLOWSHEET NOTE
02/14/25 1500   Vitals   Pulse (!) 101   Respirations 27   BP (!) 177/88   MAP (Calculated) 118   MAP (mmHg) 111   Oxygen Therapy   SpO2 (!) 77 %     Patient very short of breath, tachycardic and tachypneic. .   High flow increased to 30 liters and 100% FIO2.  Respiratory therapist was karin .   Dr. Momin was notified via secure message.

## 2025-02-15 ENCOUNTER — APPOINTMENT (OUTPATIENT)
Facility: HOSPITAL | Age: 65
DRG: 720 | End: 2025-02-15
Payer: MEDICAID

## 2025-02-15 LAB
ANION GAP SERPL CALC-SCNC: 5 MMOL/L (ref 3–18)
BASOPHILS # BLD: 0.01 K/UL (ref 0–0.1)
BASOPHILS # BLD: 0.01 K/UL (ref 0–0.1)
BASOPHILS NFR BLD: 0.1 % (ref 0–2)
BASOPHILS NFR BLD: 0.1 % (ref 0–2)
BUN SERPL-MCNC: 23 MG/DL (ref 7–18)
BUN/CREAT SERPL: 35 (ref 12–20)
CALCIUM SERPL-MCNC: 9 MG/DL (ref 8.5–10.1)
CHLORIDE SERPL-SCNC: 109 MMOL/L (ref 100–111)
CO2 SERPL-SCNC: 35 MMOL/L (ref 21–32)
CREAT SERPL-MCNC: 0.66 MG/DL (ref 0.6–1.3)
DIFFERENTIAL METHOD BLD: ABNORMAL
DIFFERENTIAL METHOD BLD: ABNORMAL
EOSINOPHIL # BLD: 0.01 K/UL (ref 0–0.4)
EOSINOPHIL # BLD: 0.01 K/UL (ref 0–0.4)
EOSINOPHIL NFR BLD: 0.1 % (ref 0–5)
EOSINOPHIL NFR BLD: 0.1 % (ref 0–5)
ERYTHROCYTE [DISTWIDTH] IN BLOOD BY AUTOMATED COUNT: 17.8 % (ref 11.6–14.5)
ERYTHROCYTE [DISTWIDTH] IN BLOOD BY AUTOMATED COUNT: 17.9 % (ref 11.6–14.5)
GLUCOSE BLD STRIP.AUTO-MCNC: 197 MG/DL (ref 70–110)
GLUCOSE BLD STRIP.AUTO-MCNC: 198 MG/DL (ref 70–110)
GLUCOSE BLD STRIP.AUTO-MCNC: 70 MG/DL (ref 70–110)
GLUCOSE BLD STRIP.AUTO-MCNC: 99 MG/DL (ref 70–110)
GLUCOSE SERPL-MCNC: 70 MG/DL (ref 74–99)
HCT VFR BLD AUTO: 31.4 % (ref 36–48)
HCT VFR BLD AUTO: 32.2 % (ref 36–48)
HGB BLD-MCNC: 9.5 G/DL (ref 13–16)
HGB BLD-MCNC: 9.7 G/DL (ref 13–16)
IMM GRANULOCYTES # BLD AUTO: 0.02 K/UL (ref 0–0.04)
IMM GRANULOCYTES # BLD AUTO: 0.06 K/UL (ref 0–0.04)
IMM GRANULOCYTES NFR BLD AUTO: 0.2 % (ref 0–0.5)
IMM GRANULOCYTES NFR BLD AUTO: 0.6 % (ref 0–0.5)
LYMPHOCYTES # BLD: 0.38 K/UL (ref 0.9–3.6)
LYMPHOCYTES # BLD: 0.41 K/UL (ref 0.9–3.6)
LYMPHOCYTES NFR BLD: 4 % (ref 21–52)
LYMPHOCYTES NFR BLD: 4.1 % (ref 21–52)
MAGNESIUM SERPL-MCNC: 2 MG/DL (ref 1.6–2.6)
MCH RBC QN AUTO: 28 PG (ref 24–34)
MCH RBC QN AUTO: 28.2 PG (ref 24–34)
MCHC RBC AUTO-ENTMCNC: 30.1 G/DL (ref 31–37)
MCHC RBC AUTO-ENTMCNC: 30.3 G/DL (ref 31–37)
MCV RBC AUTO: 93.1 FL (ref 78–100)
MCV RBC AUTO: 93.2 FL (ref 78–100)
MONOCYTES # BLD: 0.42 K/UL (ref 0.05–1.2)
MONOCYTES # BLD: 0.45 K/UL (ref 0.05–1.2)
MONOCYTES NFR BLD: 4.4 % (ref 3–10)
MONOCYTES NFR BLD: 4.5 % (ref 3–10)
NEUTS SEG # BLD: 8.74 K/UL (ref 1.8–8)
NEUTS SEG # BLD: 9.02 K/UL (ref 1.8–8)
NEUTS SEG NFR BLD: 90.6 % (ref 40–73)
NEUTS SEG NFR BLD: 91.2 % (ref 40–73)
NRBC # BLD: 0 K/UL (ref 0–0.01)
NRBC # BLD: 0 K/UL (ref 0–0.01)
NRBC BLD-RTO: 0 PER 100 WBC
NRBC BLD-RTO: 0 PER 100 WBC
PHOSPHATE SERPL-MCNC: 2 MG/DL (ref 2.5–4.9)
PLATELET # BLD AUTO: 366 K/UL (ref 135–420)
PLATELET # BLD AUTO: 393 K/UL (ref 135–420)
PMV BLD AUTO: 11.3 FL (ref 9.2–11.8)
PMV BLD AUTO: 11.5 FL (ref 9.2–11.8)
POTASSIUM SERPL-SCNC: 3.8 MMOL/L (ref 3.5–5.5)
RBC # BLD AUTO: 3.37 M/UL (ref 4.35–5.65)
RBC # BLD AUTO: 3.46 M/UL (ref 4.35–5.65)
SODIUM SERPL-SCNC: 149 MMOL/L (ref 136–145)
WBC # BLD AUTO: 10 K/UL (ref 4.6–13.2)
WBC # BLD AUTO: 9.6 K/UL (ref 4.6–13.2)

## 2025-02-15 PROCEDURE — 6370000000 HC RX 637 (ALT 250 FOR IP): Performed by: HOSPITALIST

## 2025-02-15 PROCEDURE — 2700000000 HC OXYGEN THERAPY PER DAY

## 2025-02-15 PROCEDURE — 6360000002 HC RX W HCPCS: Performed by: PHYSICIAN ASSISTANT

## 2025-02-15 PROCEDURE — 6360000002 HC RX W HCPCS: Performed by: INTERNAL MEDICINE

## 2025-02-15 PROCEDURE — 6370000000 HC RX 637 (ALT 250 FOR IP): Performed by: INTERNAL MEDICINE

## 2025-02-15 PROCEDURE — 83735 ASSAY OF MAGNESIUM: CPT

## 2025-02-15 PROCEDURE — 82962 GLUCOSE BLOOD TEST: CPT

## 2025-02-15 PROCEDURE — 94761 N-INVAS EAR/PLS OXIMETRY MLT: CPT

## 2025-02-15 PROCEDURE — 94660 CPAP INITIATION&MGMT: CPT

## 2025-02-15 PROCEDURE — 99233 SBSQ HOSP IP/OBS HIGH 50: CPT | Performed by: INTERNAL MEDICINE

## 2025-02-15 PROCEDURE — 99232 SBSQ HOSP IP/OBS MODERATE 35: CPT | Performed by: HOSPITALIST

## 2025-02-15 PROCEDURE — 2500000003 HC RX 250 WO HCPCS: Performed by: INTERNAL MEDICINE

## 2025-02-15 PROCEDURE — 2580000003 HC RX 258: Performed by: INTERNAL MEDICINE

## 2025-02-15 PROCEDURE — 80048 BASIC METABOLIC PNL TOTAL CA: CPT

## 2025-02-15 PROCEDURE — 6370000000 HC RX 637 (ALT 250 FOR IP): Performed by: PHYSICIAN ASSISTANT

## 2025-02-15 PROCEDURE — 36415 COLL VENOUS BLD VENIPUNCTURE: CPT

## 2025-02-15 PROCEDURE — 6370000000 HC RX 637 (ALT 250 FOR IP): Performed by: FAMILY MEDICINE

## 2025-02-15 PROCEDURE — 84100 ASSAY OF PHOSPHORUS: CPT

## 2025-02-15 PROCEDURE — 94640 AIRWAY INHALATION TREATMENT: CPT

## 2025-02-15 PROCEDURE — 2500000003 HC RX 250 WO HCPCS: Performed by: PHYSICIAN ASSISTANT

## 2025-02-15 PROCEDURE — 85025 COMPLETE CBC W/AUTO DIFF WBC: CPT

## 2025-02-15 PROCEDURE — 2500000003 HC RX 250 WO HCPCS

## 2025-02-15 PROCEDURE — 71045 X-RAY EXAM CHEST 1 VIEW: CPT

## 2025-02-15 PROCEDURE — 6360000002 HC RX W HCPCS

## 2025-02-15 PROCEDURE — 94668 MNPJ CHEST WALL SBSQ: CPT

## 2025-02-15 PROCEDURE — 2140000001 HC CVICU INTERMEDIATE R&B

## 2025-02-15 RX ORDER — FUROSEMIDE 10 MG/ML
40 INJECTION INTRAMUSCULAR; INTRAVENOUS ONCE
Status: COMPLETED | OUTPATIENT
Start: 2025-02-15 | End: 2025-02-15

## 2025-02-15 RX ADMIN — ARFORMOTEROL TARTRATE 15 MCG: 15 SOLUTION RESPIRATORY (INHALATION) at 20:28

## 2025-02-15 RX ADMIN — ACETYLCYSTEINE 400 MG: 100 SOLUTION ORAL; RESPIRATORY (INHALATION) at 11:17

## 2025-02-15 RX ADMIN — ALBUTEROL SULFATE 1.25 MG: 1.25 SOLUTION RESPIRATORY (INHALATION) at 07:37

## 2025-02-15 RX ADMIN — Medication 1 CAPSULE: at 09:10

## 2025-02-15 RX ADMIN — FUROSEMIDE 40 MG: 10 INJECTION, SOLUTION INTRAMUSCULAR; INTRAVENOUS at 11:03

## 2025-02-15 RX ADMIN — MIDODRINE HYDROCHLORIDE 10 MG: 10 TABLET ORAL at 09:10

## 2025-02-15 RX ADMIN — BUDESONIDE 1 MG: 1 SUSPENSION RESPIRATORY (INHALATION) at 20:28

## 2025-02-15 RX ADMIN — SODIUM CHLORIDE, PRESERVATIVE FREE 10 ML: 5 INJECTION INTRAVENOUS at 09:11

## 2025-02-15 RX ADMIN — ALBUTEROL SULFATE 1.25 MG: 1.25 SOLUTION RESPIRATORY (INHALATION) at 14:51

## 2025-02-15 RX ADMIN — ATORVASTATIN CALCIUM 80 MG: 40 TABLET, FILM COATED ORAL at 20:14

## 2025-02-15 RX ADMIN — FOLIC ACID 1 MG: 1 TABLET ORAL at 09:10

## 2025-02-15 RX ADMIN — ACETYLCYSTEINE 400 MG: 100 SOLUTION ORAL; RESPIRATORY (INHALATION) at 23:50

## 2025-02-15 RX ADMIN — ALBUTEROL SULFATE 1.25 MG: 1.25 SOLUTION RESPIRATORY (INHALATION) at 11:17

## 2025-02-15 RX ADMIN — GUAIFENESIN 600 MG: 600 TABLET ORAL at 09:10

## 2025-02-15 RX ADMIN — BUDESONIDE 1 MG: 1 SUSPENSION RESPIRATORY (INHALATION) at 07:37

## 2025-02-15 RX ADMIN — ALBUTEROL SULFATE 1.25 MG: 1.25 SOLUTION RESPIRATORY (INHALATION) at 20:28

## 2025-02-15 RX ADMIN — ALBUTEROL SULFATE 1.25 MG: 1.25 SOLUTION RESPIRATORY (INHALATION) at 00:53

## 2025-02-15 RX ADMIN — ALBUTEROL SULFATE 1.25 MG: 1.25 SOLUTION RESPIRATORY (INHALATION) at 04:39

## 2025-02-15 RX ADMIN — ACETYLCYSTEINE 400 MG: 100 SOLUTION ORAL; RESPIRATORY (INHALATION) at 20:28

## 2025-02-15 RX ADMIN — MIDODRINE HYDROCHLORIDE 10 MG: 10 TABLET ORAL at 17:28

## 2025-02-15 RX ADMIN — ALBUTEROL SULFATE 1.25 MG: 1.25 SOLUTION RESPIRATORY (INHALATION) at 23:50

## 2025-02-15 RX ADMIN — GUAIFENESIN 600 MG: 600 TABLET ORAL at 20:14

## 2025-02-15 RX ADMIN — ARFORMOTEROL TARTRATE 15 MCG: 15 SOLUTION RESPIRATORY (INHALATION) at 07:37

## 2025-02-15 RX ADMIN — POTASSIUM PHOSPHATE, MONOBASIC POTASSIUM PHOSPHATE, DIBASIC 10 MMOL: 224; 236 INJECTION, SOLUTION, CONCENTRATE INTRAVENOUS at 14:45

## 2025-02-15 RX ADMIN — ACETYLCYSTEINE 400 MG: 100 SOLUTION ORAL; RESPIRATORY (INHALATION) at 00:53

## 2025-02-15 RX ADMIN — ACETYLCYSTEINE 400 MG: 100 SOLUTION ORAL; RESPIRATORY (INHALATION) at 04:39

## 2025-02-15 RX ADMIN — Medication 100 MG: at 09:10

## 2025-02-15 RX ADMIN — INSULIN LISPRO 1 UNITS: 100 INJECTION, SOLUTION INTRAVENOUS; SUBCUTANEOUS at 17:28

## 2025-02-15 RX ADMIN — PANTOPRAZOLE SODIUM 40 MG: 40 TABLET, DELAYED RELEASE ORAL at 06:38

## 2025-02-15 RX ADMIN — Medication 15 ML: at 09:10

## 2025-02-15 RX ADMIN — WATER 40 MG: 1 INJECTION INTRAMUSCULAR; INTRAVENOUS; SUBCUTANEOUS at 09:11

## 2025-02-15 RX ADMIN — DOCUSATE SODIUM LIQUID 100 MG: 100 LIQUID ORAL at 09:10

## 2025-02-15 RX ADMIN — POLYETHYLENE GLYCOL 3350 17 G: 17 POWDER, FOR SOLUTION ORAL at 09:10

## 2025-02-15 RX ADMIN — ACETYLCYSTEINE 400 MG: 100 SOLUTION ORAL; RESPIRATORY (INHALATION) at 14:51

## 2025-02-15 RX ADMIN — SODIUM CHLORIDE, PRESERVATIVE FREE 10 ML: 5 INJECTION INTRAVENOUS at 21:05

## 2025-02-15 RX ADMIN — ACETYLCYSTEINE 400 MG: 100 SOLUTION ORAL; RESPIRATORY (INHALATION) at 07:37

## 2025-02-15 RX ADMIN — INSULIN LISPRO 1 UNITS: 100 INJECTION, SOLUTION INTRAVENOUS; SUBCUTANEOUS at 12:14

## 2025-02-15 ASSESSMENT — PAIN SCALES - GENERAL
PAINLEVEL_OUTOF10: 0
PAINLEVEL_OUTOF10: 0

## 2025-02-16 ENCOUNTER — APPOINTMENT (OUTPATIENT)
Facility: HOSPITAL | Age: 65
DRG: 720 | End: 2025-02-16
Payer: MEDICAID

## 2025-02-16 LAB
ALBUMIN SERPL-MCNC: 2.3 G/DL (ref 3.4–5)
ALBUMIN/GLOB SERPL: 0.6 (ref 0.8–1.7)
ALP SERPL-CCNC: 126 U/L (ref 45–117)
ALT SERPL-CCNC: 45 U/L (ref 16–61)
AMMONIA PLAS-SCNC: <10 UMOL/L (ref 11–32)
ANION GAP BLD CALC-SCNC: ABNORMAL MMOL/L (ref 10–20)
ANION GAP BLD CALC-SCNC: ABNORMAL MMOL/L (ref 10–20)
ANION GAP SERPL CALC-SCNC: 3 MMOL/L (ref 3–18)
ANION GAP SERPL CALC-SCNC: 3 MMOL/L (ref 3–18)
ARTERIAL PATENCY WRIST A: POSITIVE
AST SERPL-CCNC: 26 U/L (ref 10–38)
BASE EXCESS BLD CALC-SCNC: 12.6 MMOL/L
BASE EXCESS BLD CALC-SCNC: 14.4 MMOL/L
BASE EXCESS BLD CALC-SCNC: 19.2 MMOL/L
BASOPHILS # BLD: 0 K/UL (ref 0–0.1)
BASOPHILS # BLD: 0.01 K/UL (ref 0–0.1)
BASOPHILS NFR BLD: 0 % (ref 0–2)
BASOPHILS NFR BLD: 0.1 % (ref 0–2)
BDY SITE: ABNORMAL
BILIRUB SERPL-MCNC: 0.7 MG/DL (ref 0.2–1)
BODY TEMPERATURE: 98.3
BUN SERPL-MCNC: 22 MG/DL (ref 7–18)
BUN SERPL-MCNC: 23 MG/DL (ref 7–18)
BUN/CREAT SERPL: 38 (ref 12–20)
BUN/CREAT SERPL: 40 (ref 12–20)
CA-I BLD-MCNC: 1.25 MMOL/L (ref 1.15–1.33)
CA-I BLD-MCNC: 1.4 MMOL/L (ref 1.15–1.33)
CALCIUM SERPL-MCNC: 8.8 MG/DL (ref 8.5–10.1)
CALCIUM SERPL-MCNC: 9.1 MG/DL (ref 8.5–10.1)
CHLORIDE BLD-SCNC: 110 MMOL/L (ref 98–107)
CHLORIDE SERPL-SCNC: 111 MMOL/L (ref 100–111)
CHLORIDE SERPL-SCNC: 111 MMOL/L (ref 100–111)
CO2 BLD-SCNC: 38 MMOL/L (ref 22–29)
CO2 BLD-SCNC: 41 MMOL/L (ref 22–29)
CO2 SERPL-SCNC: 35 MMOL/L (ref 21–32)
CO2 SERPL-SCNC: 36 MMOL/L (ref 21–32)
CREAT BLD-MCNC: 0.87 MG/DL (ref 0.6–1.3)
CREAT SERPL-MCNC: 0.57 MG/DL (ref 0.6–1.3)
CREAT SERPL-MCNC: 0.58 MG/DL (ref 0.6–1.3)
DIFFERENTIAL METHOD BLD: ABNORMAL
DIFFERENTIAL METHOD BLD: ABNORMAL
EOSINOPHIL # BLD: 0 K/UL (ref 0–0.4)
EOSINOPHIL # BLD: 0.01 K/UL (ref 0–0.4)
EOSINOPHIL NFR BLD: 0 % (ref 0–5)
EOSINOPHIL NFR BLD: 0.1 % (ref 0–5)
ERYTHROCYTE [DISTWIDTH] IN BLOOD BY AUTOMATED COUNT: 17.7 % (ref 11.6–14.5)
ERYTHROCYTE [DISTWIDTH] IN BLOOD BY AUTOMATED COUNT: 18 % (ref 11.6–14.5)
FIO2 ON VENT: 40 %
FIO2 ON VENT: 40 %
GAS FLOW.O2 O2 DELIVERY SYS: ABNORMAL
GLOBULIN SER CALC-MCNC: 3.9 G/DL (ref 2–4)
GLUCOSE BLD STRIP.AUTO-MCNC: 109 MG/DL (ref 70–110)
GLUCOSE BLD STRIP.AUTO-MCNC: 114 MG/DL (ref 70–110)
GLUCOSE BLD STRIP.AUTO-MCNC: 186 MG/DL (ref 70–110)
GLUCOSE BLD STRIP.AUTO-MCNC: 212 MG/DL (ref 70–110)
GLUCOSE BLD-MCNC: 116 MG/DL (ref 74–99)
GLUCOSE BLD-MCNC: 97 MG/DL (ref 74–99)
GLUCOSE SERPL-MCNC: 104 MG/DL (ref 74–99)
GLUCOSE SERPL-MCNC: 95 MG/DL (ref 74–99)
HCO3 BLD-SCNC: 38.6 MMOL/L (ref 21–28)
HCO3 BLD-SCNC: 41.7 MMOL/L (ref 21–28)
HCO3 BLD-SCNC: 46.3 MMOL/L (ref 21–28)
HCT VFR BLD AUTO: 21.8 % (ref 36–48)
HCT VFR BLD AUTO: 28.9 % (ref 36–48)
HCT VFR BLD CALC: 28 % (ref 36–49)
HGB BLD-MCNC: 6.7 G/DL (ref 13–16)
HGB BLD-MCNC: 8.8 G/DL (ref 13–16)
IMM GRANULOCYTES # BLD AUTO: 0.02 K/UL (ref 0–0.04)
IMM GRANULOCYTES # BLD AUTO: 0.04 K/UL (ref 0–0.04)
IMM GRANULOCYTES NFR BLD AUTO: 0.2 % (ref 0–0.5)
IMM GRANULOCYTES NFR BLD AUTO: 0.4 % (ref 0–0.5)
IPAP/PIP: 20
LACTATE BLD-SCNC: 0.57 MMOL/L (ref 0.4–2)
LACTATE BLD-SCNC: 0.82 MMOL/L (ref 0.4–2)
LACTATE SERPL-SCNC: 1.5 MMOL/L (ref 0.4–2)
LYMPHOCYTES # BLD: 0.2 K/UL (ref 0.9–3.6)
LYMPHOCYTES # BLD: 0.43 K/UL (ref 0.9–3.6)
LYMPHOCYTES NFR BLD: 1.9 % (ref 21–52)
LYMPHOCYTES NFR BLD: 4.5 % (ref 21–52)
MAGNESIUM SERPL-MCNC: 2 MG/DL (ref 1.6–2.6)
MAGNESIUM SERPL-MCNC: 2 MG/DL (ref 1.6–2.6)
MCH RBC QN AUTO: 28.3 PG (ref 24–34)
MCH RBC QN AUTO: 29.4 PG (ref 24–34)
MCHC RBC AUTO-ENTMCNC: 30.4 G/DL (ref 31–37)
MCHC RBC AUTO-ENTMCNC: 30.7 G/DL (ref 31–37)
MCV RBC AUTO: 92.9 FL (ref 78–100)
MCV RBC AUTO: 95.6 FL (ref 78–100)
MONOCYTES # BLD: 0.28 K/UL (ref 0.05–1.2)
MONOCYTES # BLD: 0.43 K/UL (ref 0.05–1.2)
MONOCYTES NFR BLD: 2.7 % (ref 3–10)
MONOCYTES NFR BLD: 4.5 % (ref 3–10)
NEUTS SEG # BLD: 8.63 K/UL (ref 1.8–8)
NEUTS SEG # BLD: 9.8 K/UL (ref 1.8–8)
NEUTS SEG NFR BLD: 90.6 % (ref 40–73)
NEUTS SEG NFR BLD: 95 % (ref 40–73)
NRBC # BLD: 0 K/UL (ref 0–0.01)
NRBC # BLD: 0 K/UL (ref 0–0.01)
NRBC BLD-RTO: 0 PER 100 WBC
NRBC BLD-RTO: 0 PER 100 WBC
NT PRO BNP: 251 PG/ML (ref 0–900)
O2/TOTAL GAS SETTING VFR VENT: 40 %
PCO2 BLD: 53.4 MMHG (ref 35–48)
PCO2 BLD: 64.9 MMHG (ref 35–48)
PCO2 BLDV: 69.4 MMHG (ref 41–51)
PEEP RESPIRATORY: 8
PH BLD: 7.46 (ref 7.35–7.45)
PH BLD: 7.47 (ref 7.35–7.45)
PH BLDV: 7.39 (ref 7.32–7.42)
PHOSPHATE SERPL-MCNC: 2.3 MG/DL (ref 2.5–4.9)
PLATELET # BLD AUTO: 313 K/UL (ref 135–420)
PLATELET # BLD AUTO: 348 K/UL (ref 135–420)
PMV BLD AUTO: 11.6 FL (ref 9.2–11.8)
PMV BLD AUTO: 12 FL (ref 9.2–11.8)
PO2 BLD: 56 MMHG (ref 83–108)
PO2 BLD: 77 MMHG (ref 83–108)
PO2 BLDV: 88 MMHG (ref 25–40)
POTASSIUM BLD-SCNC: 3.5 MMOL/L (ref 3.5–5.1)
POTASSIUM BLD-SCNC: 3.5 MMOL/L (ref 3.5–5.1)
POTASSIUM SERPL-SCNC: 3.8 MMOL/L (ref 3.5–5.5)
POTASSIUM SERPL-SCNC: 4.6 MMOL/L (ref 3.5–5.5)
PRESSURE SUPPORT SETTING VENT: 20
PROT SERPL-MCNC: 6.2 G/DL (ref 6.4–8.2)
RBC # BLD AUTO: 2.28 M/UL (ref 4.35–5.65)
RBC # BLD AUTO: 3.11 M/UL (ref 4.35–5.65)
RESPIRATORY RATE, POC: 22 (ref 5–40)
SAO2 % BLD: 90 % (ref 94–98)
SAO2 % BLD: 95.2 % (ref 92–97)
SAO2 % BLD: 96 % (ref 94–98)
SERVICE CMNT-IMP: ABNORMAL
SODIUM BLD-SCNC: 154 MMOL/L (ref 136–145)
SODIUM BLD-SCNC: 154 MMOL/L (ref 136–145)
SODIUM SERPL-SCNC: 149 MMOL/L (ref 136–145)
SODIUM SERPL-SCNC: 150 MMOL/L (ref 136–145)
SPECIMEN SITE: ABNORMAL
SPECIMEN SITE: ABNORMAL
SPECIMEN TYPE: ABNORMAL
TROPONIN I SERPL HS-MCNC: 10 NG/L (ref 0–78)
VENTILATION MODE VENT: ABNORMAL
VENTILATION MODE VENT: ABNORMAL
WBC # BLD AUTO: 10.3 K/UL (ref 4.6–13.2)
WBC # BLD AUTO: 9.5 K/UL (ref 4.6–13.2)

## 2025-02-16 PROCEDURE — 6370000000 HC RX 637 (ALT 250 FOR IP): Performed by: INTERNAL MEDICINE

## 2025-02-16 PROCEDURE — 84100 ASSAY OF PHOSPHORUS: CPT

## 2025-02-16 PROCEDURE — 82947 ASSAY GLUCOSE BLOOD QUANT: CPT

## 2025-02-16 PROCEDURE — 80053 COMPREHEN METABOLIC PANEL: CPT

## 2025-02-16 PROCEDURE — 6360000002 HC RX W HCPCS

## 2025-02-16 PROCEDURE — 6360000002 HC RX W HCPCS: Performed by: INTERNAL MEDICINE

## 2025-02-16 PROCEDURE — 94640 AIRWAY INHALATION TREATMENT: CPT

## 2025-02-16 PROCEDURE — 93005 ELECTROCARDIOGRAM TRACING: CPT

## 2025-02-16 PROCEDURE — 2500000003 HC RX 250 WO HCPCS

## 2025-02-16 PROCEDURE — 85014 HEMATOCRIT: CPT

## 2025-02-16 PROCEDURE — 82330 ASSAY OF CALCIUM: CPT

## 2025-02-16 PROCEDURE — 82784 ASSAY IGA/IGD/IGG/IGM EACH: CPT

## 2025-02-16 PROCEDURE — 2500000003 HC RX 250 WO HCPCS: Performed by: HEALTH CARE PROVIDER

## 2025-02-16 PROCEDURE — 94761 N-INVAS EAR/PLS OXIMETRY MLT: CPT

## 2025-02-16 PROCEDURE — 2500000003 HC RX 250 WO HCPCS: Performed by: PHYSICIAN ASSISTANT

## 2025-02-16 PROCEDURE — 6360000002 HC RX W HCPCS: Performed by: PHYSICIAN ASSISTANT

## 2025-02-16 PROCEDURE — 6370000000 HC RX 637 (ALT 250 FOR IP): Performed by: HOSPITALIST

## 2025-02-16 PROCEDURE — 82140 ASSAY OF AMMONIA: CPT

## 2025-02-16 PROCEDURE — 83605 ASSAY OF LACTIC ACID: CPT

## 2025-02-16 PROCEDURE — 36600 WITHDRAWAL OF ARTERIAL BLOOD: CPT

## 2025-02-16 PROCEDURE — 36415 COLL VENOUS BLD VENIPUNCTURE: CPT

## 2025-02-16 PROCEDURE — 94660 CPAP INITIATION&MGMT: CPT

## 2025-02-16 PROCEDURE — 2700000000 HC OXYGEN THERAPY PER DAY

## 2025-02-16 PROCEDURE — 87040 BLOOD CULTURE FOR BACTERIA: CPT

## 2025-02-16 PROCEDURE — 86334 IMMUNOFIX E-PHORESIS SERUM: CPT

## 2025-02-16 PROCEDURE — 6360000002 HC RX W HCPCS: Performed by: HEALTH CARE PROVIDER

## 2025-02-16 PROCEDURE — 84295 ASSAY OF SERUM SODIUM: CPT

## 2025-02-16 PROCEDURE — 2140000001 HC CVICU INTERMEDIATE R&B

## 2025-02-16 PROCEDURE — 83735 ASSAY OF MAGNESIUM: CPT

## 2025-02-16 PROCEDURE — 84484 ASSAY OF TROPONIN QUANT: CPT

## 2025-02-16 PROCEDURE — 71045 X-RAY EXAM CHEST 1 VIEW: CPT

## 2025-02-16 PROCEDURE — 85025 COMPLETE CBC W/AUTO DIFF WBC: CPT

## 2025-02-16 PROCEDURE — 84132 ASSAY OF SERUM POTASSIUM: CPT

## 2025-02-16 PROCEDURE — 82803 BLOOD GASES ANY COMBINATION: CPT

## 2025-02-16 PROCEDURE — 99233 SBSQ HOSP IP/OBS HIGH 50: CPT | Performed by: INTERNAL MEDICINE

## 2025-02-16 PROCEDURE — 83880 ASSAY OF NATRIURETIC PEPTIDE: CPT

## 2025-02-16 PROCEDURE — 82962 GLUCOSE BLOOD TEST: CPT

## 2025-02-16 PROCEDURE — 94668 MNPJ CHEST WALL SBSQ: CPT

## 2025-02-16 PROCEDURE — 6370000000 HC RX 637 (ALT 250 FOR IP): Performed by: FAMILY MEDICINE

## 2025-02-16 PROCEDURE — 99232 SBSQ HOSP IP/OBS MODERATE 35: CPT | Performed by: HOSPITALIST

## 2025-02-16 RX ADMIN — ATORVASTATIN CALCIUM 80 MG: 40 TABLET, FILM COATED ORAL at 21:25

## 2025-02-16 RX ADMIN — ACETYLCYSTEINE 400 MG: 100 SOLUTION ORAL; RESPIRATORY (INHALATION) at 15:33

## 2025-02-16 RX ADMIN — ACETYLCYSTEINE 400 MG: 100 SOLUTION ORAL; RESPIRATORY (INHALATION) at 08:08

## 2025-02-16 RX ADMIN — ALBUTEROL SULFATE 1.25 MG: 1.25 SOLUTION RESPIRATORY (INHALATION) at 12:34

## 2025-02-16 RX ADMIN — SODIUM CHLORIDE, PRESERVATIVE FREE 10 ML: 5 INJECTION INTRAVENOUS at 08:50

## 2025-02-16 RX ADMIN — ACETYLCYSTEINE 400 MG: 100 SOLUTION ORAL; RESPIRATORY (INHALATION) at 19:37

## 2025-02-16 RX ADMIN — ALBUTEROL SULFATE 1.25 MG: 1.25 SOLUTION RESPIRATORY (INHALATION) at 15:33

## 2025-02-16 RX ADMIN — MIDODRINE HYDROCHLORIDE 10 MG: 10 TABLET ORAL at 12:19

## 2025-02-16 RX ADMIN — ALBUTEROL SULFATE 1.25 MG: 1.25 SOLUTION RESPIRATORY (INHALATION) at 03:42

## 2025-02-16 RX ADMIN — ARFORMOTEROL TARTRATE 15 MCG: 15 SOLUTION RESPIRATORY (INHALATION) at 08:08

## 2025-02-16 RX ADMIN — WATER 40 MG: 1 INJECTION INTRAMUSCULAR; INTRAVENOUS; SUBCUTANEOUS at 08:47

## 2025-02-16 RX ADMIN — ARFORMOTEROL TARTRATE 15 MCG: 15 SOLUTION RESPIRATORY (INHALATION) at 19:37

## 2025-02-16 RX ADMIN — ALBUTEROL SULFATE 1.25 MG: 1.25 SOLUTION RESPIRATORY (INHALATION) at 08:08

## 2025-02-16 RX ADMIN — ACETYLCYSTEINE 400 MG: 100 SOLUTION ORAL; RESPIRATORY (INHALATION) at 12:34

## 2025-02-16 RX ADMIN — BUDESONIDE 1 MG: 1 SUSPENSION RESPIRATORY (INHALATION) at 08:08

## 2025-02-16 RX ADMIN — ACETYLCYSTEINE 400 MG: 100 SOLUTION ORAL; RESPIRATORY (INHALATION) at 03:42

## 2025-02-16 RX ADMIN — GUAIFENESIN 600 MG: 600 TABLET ORAL at 21:26

## 2025-02-16 RX ADMIN — BUDESONIDE 1 MG: 1 SUSPENSION RESPIRATORY (INHALATION) at 19:37

## 2025-02-16 RX ADMIN — WATER 10 MG: 1 INJECTION INTRAMUSCULAR; INTRAVENOUS; SUBCUTANEOUS at 06:20

## 2025-02-16 RX ADMIN — SODIUM CHLORIDE, PRESERVATIVE FREE 10 ML: 5 INJECTION INTRAVENOUS at 22:22

## 2025-02-16 RX ADMIN — ALBUTEROL SULFATE 1.25 MG: 1.25 SOLUTION RESPIRATORY (INHALATION) at 19:37

## 2025-02-16 NOTE — SIGNIFICANT EVENT
Was paged about patient being agitated overnight and pulling out IV.  When I assessed patient, he appeared restless.  He is not oriented to location nor to date.  He had been switched over from BiPAP to high flow today.  SpO2 100%.  ABG was done (likely venous) to evaluate possible hypercapnia, however pCO2 is 53 which is improved from previous.  Most likely patient is experiencing delirium.  10 mg IM Zyprexa has been ordered.  Plan to reestablish IV access when patient is less agitated    DO Ulises Menezes [facility]  Hospitalist  Internal Medicine     
would be tracheostomy as that would be his 3rd intubation in the span of 1 month   ABG this morning: pH 7.47, PCO2 53.4, PO2 56, HCO2 38.6  EKG is NSR  Plan:  Labs: ABG, Ammonia, CMP, CBC, Blood culture, Troponin, Lactic Acid  ABG: HCO3 is 41.7 TCO2 41, PCO2 Venous: 69.4, PO2 Venous 88,   Chest Xray  Avoid zyprexa  Consider CT head      Disposition: CVT stepdown  Patient condition: POOR      Attending Charles Tejada notified of rapid response and is in agreement with plan.     Primary team resuming care, will follow up on Labs    [facility] Senior resident Dr. Stephanie Nash present during Rapid Response.        MAY ZAMORA DO -PGY 1  [facility]  February 16, 2025 4:32 PM

## 2025-02-16 NOTE — CARE COORDINATION
Per Dr. Momin, pt's family wants to know options for SNF placement.  Met with pt's mother Rebecca Rogers and pt's son Rosa at bedside.   Discussed 3 options of ARU if pt meets their requirements, LTC placement in a nursing facility and home with home health and caregiver.  Pt's mom stated pt already has a paid caregiver.  She is looking into LTC placement and she will let us know the decision.  Pt only has Medicaid.            GWEN SinN RN  Care Management

## 2025-02-17 LAB
ANION GAP SERPL CALC-SCNC: 3 MMOL/L (ref 3–18)
BASOPHILS # BLD: 0 K/UL (ref 0–0.1)
BASOPHILS NFR BLD: 0 % (ref 0–2)
BUN SERPL-MCNC: 22 MG/DL (ref 7–18)
BUN/CREAT SERPL: 35 (ref 12–20)
CALCIUM SERPL-MCNC: 8.7 MG/DL (ref 8.5–10.1)
CHLORIDE SERPL-SCNC: 113 MMOL/L (ref 100–111)
CO2 SERPL-SCNC: 37 MMOL/L (ref 21–32)
CREAT SERPL-MCNC: 0.62 MG/DL (ref 0.6–1.3)
DIFFERENTIAL METHOD BLD: ABNORMAL
EKG ATRIAL RATE: 74 BPM
EKG DIAGNOSIS: NORMAL
EKG P AXIS: 98 DEGREES
EKG P-R INTERVAL: 134 MS
EKG Q-T INTERVAL: 382 MS
EKG QRS DURATION: 94 MS
EKG QTC CALCULATION (BAZETT): 424 MS
EKG R AXIS: 102 DEGREES
EKG T AXIS: 96 DEGREES
EKG VENTRICULAR RATE: 74 BPM
EOSINOPHIL # BLD: 0 K/UL (ref 0–0.4)
EOSINOPHIL NFR BLD: 0 % (ref 0–5)
ERYTHROCYTE [DISTWIDTH] IN BLOOD BY AUTOMATED COUNT: 17.6 % (ref 11.6–14.5)
GLUCOSE BLD STRIP.AUTO-MCNC: 123 MG/DL (ref 70–110)
GLUCOSE BLD STRIP.AUTO-MCNC: 131 MG/DL (ref 70–110)
GLUCOSE BLD STRIP.AUTO-MCNC: 144 MG/DL (ref 70–110)
GLUCOSE BLD STRIP.AUTO-MCNC: 145 MG/DL (ref 70–110)
GLUCOSE BLD STRIP.AUTO-MCNC: 33 MG/DL (ref 70–110)
GLUCOSE BLD STRIP.AUTO-MCNC: 40 MG/DL (ref 70–110)
GLUCOSE BLD STRIP.AUTO-MCNC: 41 MG/DL (ref 70–110)
GLUCOSE BLD STRIP.AUTO-MCNC: 60 MG/DL (ref 70–110)
GLUCOSE BLD STRIP.AUTO-MCNC: 93 MG/DL (ref 70–110)
GLUCOSE SERPL-MCNC: 86 MG/DL (ref 74–99)
HCT VFR BLD AUTO: 32.7 % (ref 36–48)
HGB BLD-MCNC: 9.9 G/DL (ref 13–16)
IGA SERPL-MCNC: 322 MG/DL (ref 70–400)
IGG SERPL-MCNC: 756 MG/DL (ref 700–1600)
IGM SERPL-MCNC: 84 MG/DL (ref 40–230)
IMM GRANULOCYTES # BLD AUTO: 0.02 K/UL (ref 0–0.04)
IMM GRANULOCYTES NFR BLD AUTO: 0.3 % (ref 0–0.5)
LYMPHOCYTES # BLD: 0.16 K/UL (ref 0.9–3.6)
LYMPHOCYTES NFR BLD: 2.4 % (ref 21–52)
MAGNESIUM SERPL-MCNC: 2 MG/DL (ref 1.6–2.6)
MCH RBC QN AUTO: 28.4 PG (ref 24–34)
MCHC RBC AUTO-ENTMCNC: 30.3 G/DL (ref 31–37)
MCV RBC AUTO: 93.7 FL (ref 78–100)
MONOCYTES # BLD: 0.16 K/UL (ref 0.05–1.2)
MONOCYTES NFR BLD: 2.4 % (ref 3–10)
NEUTS SEG # BLD: 6.43 K/UL (ref 1.8–8)
NEUTS SEG NFR BLD: 94.9 % (ref 40–73)
NRBC # BLD: 0 K/UL (ref 0–0.01)
NRBC BLD-RTO: 0 PER 100 WBC
PHOSPHATE SERPL-MCNC: 1.7 MG/DL (ref 2.5–4.9)
PLATELET # BLD AUTO: 298 K/UL (ref 135–420)
PMV BLD AUTO: 10.9 FL (ref 9.2–11.8)
POTASSIUM SERPL-SCNC: 3.5 MMOL/L (ref 3.5–5.5)
RBC # BLD AUTO: 3.49 M/UL (ref 4.35–5.65)
SODIUM SERPL-SCNC: 153 MMOL/L (ref 136–145)
WBC # BLD AUTO: 6.8 K/UL (ref 4.6–13.2)

## 2025-02-17 PROCEDURE — 94761 N-INVAS EAR/PLS OXIMETRY MLT: CPT

## 2025-02-17 PROCEDURE — 84100 ASSAY OF PHOSPHORUS: CPT

## 2025-02-17 PROCEDURE — 6370000000 HC RX 637 (ALT 250 FOR IP): Performed by: HOSPITALIST

## 2025-02-17 PROCEDURE — 2700000000 HC OXYGEN THERAPY PER DAY

## 2025-02-17 PROCEDURE — 6370000000 HC RX 637 (ALT 250 FOR IP): Performed by: INTERNAL MEDICINE

## 2025-02-17 PROCEDURE — 94640 AIRWAY INHALATION TREATMENT: CPT

## 2025-02-17 PROCEDURE — 97530 THERAPEUTIC ACTIVITIES: CPT

## 2025-02-17 PROCEDURE — 2580000003 HC RX 258: Performed by: PHYSICIAN ASSISTANT

## 2025-02-17 PROCEDURE — 97535 SELF CARE MNGMENT TRAINING: CPT

## 2025-02-17 PROCEDURE — 36415 COLL VENOUS BLD VENIPUNCTURE: CPT

## 2025-02-17 PROCEDURE — 6360000002 HC RX W HCPCS: Performed by: INTERNAL MEDICINE

## 2025-02-17 PROCEDURE — 80048 BASIC METABOLIC PNL TOTAL CA: CPT

## 2025-02-17 PROCEDURE — 99232 SBSQ HOSP IP/OBS MODERATE 35: CPT | Performed by: HOSPITALIST

## 2025-02-17 PROCEDURE — 94668 MNPJ CHEST WALL SBSQ: CPT

## 2025-02-17 PROCEDURE — 6360000002 HC RX W HCPCS: Performed by: PHYSICIAN ASSISTANT

## 2025-02-17 PROCEDURE — 94660 CPAP INITIATION&MGMT: CPT

## 2025-02-17 PROCEDURE — 94669 MECHANICAL CHEST WALL OSCILL: CPT

## 2025-02-17 PROCEDURE — 85025 COMPLETE CBC W/AUTO DIFF WBC: CPT

## 2025-02-17 PROCEDURE — 83735 ASSAY OF MAGNESIUM: CPT

## 2025-02-17 PROCEDURE — 2580000003 HC RX 258: Performed by: HOSPITALIST

## 2025-02-17 PROCEDURE — 2500000003 HC RX 250 WO HCPCS: Performed by: PHYSICIAN ASSISTANT

## 2025-02-17 PROCEDURE — 82962 GLUCOSE BLOOD TEST: CPT

## 2025-02-17 PROCEDURE — 2140000001 HC CVICU INTERMEDIATE R&B

## 2025-02-17 RX ORDER — DEXTROSE MONOHYDRATE 50 MG/ML
INJECTION, SOLUTION INTRAVENOUS CONTINUOUS
Status: DISCONTINUED | OUTPATIENT
Start: 2025-02-17 | End: 2025-02-19

## 2025-02-17 RX ADMIN — Medication 15 ML: at 10:28

## 2025-02-17 RX ADMIN — BUDESONIDE 1 MG: 1 SUSPENSION RESPIRATORY (INHALATION) at 20:15

## 2025-02-17 RX ADMIN — MIDODRINE HYDROCHLORIDE 10 MG: 10 TABLET ORAL at 10:28

## 2025-02-17 RX ADMIN — ACETYLCYSTEINE 400 MG: 100 SOLUTION ORAL; RESPIRATORY (INHALATION) at 23:22

## 2025-02-17 RX ADMIN — ALBUTEROL SULFATE 1.25 MG: 1.25 SOLUTION RESPIRATORY (INHALATION) at 20:15

## 2025-02-17 RX ADMIN — ACETYLCYSTEINE 400 MG: 100 SOLUTION ORAL; RESPIRATORY (INHALATION) at 12:11

## 2025-02-17 RX ADMIN — ACETYLCYSTEINE 400 MG: 100 SOLUTION ORAL; RESPIRATORY (INHALATION) at 04:03

## 2025-02-17 RX ADMIN — SODIUM CHLORIDE, PRESERVATIVE FREE 10 ML: 5 INJECTION INTRAVENOUS at 08:23

## 2025-02-17 RX ADMIN — ALBUTEROL SULFATE 1.25 MG: 1.25 SOLUTION RESPIRATORY (INHALATION) at 23:22

## 2025-02-17 RX ADMIN — DEXTROSE MONOHYDRATE: 50 INJECTION, SOLUTION INTRAVENOUS at 22:54

## 2025-02-17 RX ADMIN — DEXTROSE MONOHYDRATE: 50 INJECTION, SOLUTION INTRAVENOUS at 08:47

## 2025-02-17 RX ADMIN — GUAIFENESIN 600 MG: 600 TABLET ORAL at 10:27

## 2025-02-17 RX ADMIN — DEXTROSE MONOHYDRATE 125 ML: 100 INJECTION, SOLUTION INTRAVENOUS at 07:52

## 2025-02-17 RX ADMIN — BUDESONIDE 1 MG: 1 SUSPENSION RESPIRATORY (INHALATION) at 07:24

## 2025-02-17 RX ADMIN — MIDODRINE HYDROCHLORIDE 10 MG: 10 TABLET ORAL at 17:51

## 2025-02-17 RX ADMIN — DEXTROSE MONOHYDRATE 125 ML: 100 INJECTION, SOLUTION INTRAVENOUS at 08:08

## 2025-02-17 RX ADMIN — ARFORMOTEROL TARTRATE 15 MCG: 15 SOLUTION RESPIRATORY (INHALATION) at 20:15

## 2025-02-17 RX ADMIN — ALBUTEROL SULFATE 1.25 MG: 1.25 SOLUTION RESPIRATORY (INHALATION) at 07:24

## 2025-02-17 RX ADMIN — Medication 1 CAPSULE: at 10:31

## 2025-02-17 RX ADMIN — ARFORMOTEROL TARTRATE 15 MCG: 15 SOLUTION RESPIRATORY (INHALATION) at 07:24

## 2025-02-17 RX ADMIN — Medication 100 MG: at 10:28

## 2025-02-17 RX ADMIN — SODIUM CHLORIDE, PRESERVATIVE FREE 10 ML: 5 INJECTION INTRAVENOUS at 21:37

## 2025-02-17 RX ADMIN — ACETYLCYSTEINE 400 MG: 100 SOLUTION ORAL; RESPIRATORY (INHALATION) at 20:15

## 2025-02-17 RX ADMIN — ACETYLCYSTEINE 400 MG: 100 SOLUTION ORAL; RESPIRATORY (INHALATION) at 00:19

## 2025-02-17 RX ADMIN — ACETYLCYSTEINE 400 MG: 100 SOLUTION ORAL; RESPIRATORY (INHALATION) at 16:45

## 2025-02-17 RX ADMIN — ALBUTEROL SULFATE 1.25 MG: 1.25 SOLUTION RESPIRATORY (INHALATION) at 12:11

## 2025-02-17 RX ADMIN — ALBUTEROL SULFATE 1.25 MG: 1.25 SOLUTION RESPIRATORY (INHALATION) at 16:45

## 2025-02-17 RX ADMIN — FOLIC ACID 1 MG: 1 TABLET ORAL at 10:27

## 2025-02-17 RX ADMIN — MIDODRINE HYDROCHLORIDE 10 MG: 10 TABLET ORAL at 12:42

## 2025-02-17 RX ADMIN — ALBUTEROL SULFATE 1.25 MG: 1.25 SOLUTION RESPIRATORY (INHALATION) at 04:03

## 2025-02-17 RX ADMIN — ALBUTEROL SULFATE 1.25 MG: 1.25 SOLUTION RESPIRATORY (INHALATION) at 00:19

## 2025-02-17 RX ADMIN — ACETYLCYSTEINE 400 MG: 100 SOLUTION ORAL; RESPIRATORY (INHALATION) at 07:24

## 2025-02-17 NOTE — CARE COORDINATION
MAMADOU called the patient mother who stated based on the patient capabilities at this time, she feels as though LTC placement will be more beneficial.     The patient mother stated he does not have anyone who can care for his needs at home.       The patient mother informed MAMADOU she prefers the [facility]. However, MAMADOU explained it is pending bed availably. MAMADOU explained that she will send the patient clinicals to facilities in [facility], to see who would accept the mother was agreeable.       The mother requested a facility on [facility] which is [facility]. Sw sent his clinicals there as well.     Waiting for an accepting facility. Mamadou left  a message with Tonja Pickens to see if her facilities in [place] can accept.       SYD Elkins

## 2025-02-18 LAB
ANION GAP SERPL CALC-SCNC: 2 MMOL/L (ref 3–18)
BASOPHILS # BLD: 0.01 K/UL (ref 0–0.1)
BASOPHILS NFR BLD: 0.1 % (ref 0–2)
BUN SERPL-MCNC: 21 MG/DL (ref 7–18)
BUN/CREAT SERPL: 30 (ref 12–20)
CALCIUM SERPL-MCNC: 9.1 MG/DL (ref 8.5–10.1)
CHLORIDE SERPL-SCNC: 109 MMOL/L (ref 100–111)
CO2 SERPL-SCNC: 36 MMOL/L (ref 21–32)
CREAT SERPL-MCNC: 0.7 MG/DL (ref 0.6–1.3)
DIFFERENTIAL METHOD BLD: ABNORMAL
EOSINOPHIL # BLD: 0.02 K/UL (ref 0–0.4)
EOSINOPHIL NFR BLD: 0.2 % (ref 0–5)
ERYTHROCYTE [DISTWIDTH] IN BLOOD BY AUTOMATED COUNT: 17.6 % (ref 11.6–14.5)
GLUCOSE BLD STRIP.AUTO-MCNC: 104 MG/DL (ref 70–110)
GLUCOSE BLD STRIP.AUTO-MCNC: 114 MG/DL (ref 70–110)
GLUCOSE BLD STRIP.AUTO-MCNC: 118 MG/DL (ref 70–110)
GLUCOSE BLD STRIP.AUTO-MCNC: 79 MG/DL (ref 70–110)
GLUCOSE SERPL-MCNC: 113 MG/DL (ref 74–99)
HCT VFR BLD AUTO: 30.4 % (ref 36–48)
HGB BLD-MCNC: 8.9 G/DL (ref 13–16)
IMM GRANULOCYTES # BLD AUTO: 0.04 K/UL (ref 0–0.04)
IMM GRANULOCYTES NFR BLD AUTO: 0.5 % (ref 0–0.5)
LYMPHOCYTES # BLD: 0.45 K/UL (ref 0.9–3.6)
LYMPHOCYTES NFR BLD: 5.2 % (ref 21–52)
MAGNESIUM SERPL-MCNC: 2.3 MG/DL (ref 1.6–2.6)
MCH RBC QN AUTO: 27.9 PG (ref 24–34)
MCHC RBC AUTO-ENTMCNC: 29.3 G/DL (ref 31–37)
MCV RBC AUTO: 95.3 FL (ref 78–100)
MONOCYTES # BLD: 0.18 K/UL (ref 0.05–1.2)
MONOCYTES NFR BLD: 2.1 % (ref 3–10)
NEUTS SEG # BLD: 7.88 K/UL (ref 1.8–8)
NEUTS SEG NFR BLD: 91.9 % (ref 40–73)
NRBC # BLD: 0 K/UL (ref 0–0.01)
NRBC BLD-RTO: 0 PER 100 WBC
PHOSPHATE SERPL-MCNC: 1.7 MG/DL (ref 2.5–4.9)
PLATELET # BLD AUTO: 250 K/UL (ref 135–420)
PMV BLD AUTO: 11.3 FL (ref 9.2–11.8)
POTASSIUM SERPL-SCNC: 3.2 MMOL/L (ref 3.5–5.5)
RBC # BLD AUTO: 3.19 M/UL (ref 4.35–5.65)
SODIUM SERPL-SCNC: 147 MMOL/L (ref 136–145)
WBC # BLD AUTO: 8.6 K/UL (ref 4.6–13.2)

## 2025-02-18 PROCEDURE — 6370000000 HC RX 637 (ALT 250 FOR IP): Performed by: HOSPITALIST

## 2025-02-18 PROCEDURE — 6360000002 HC RX W HCPCS: Performed by: PHYSICIAN ASSISTANT

## 2025-02-18 PROCEDURE — 6370000000 HC RX 637 (ALT 250 FOR IP): Performed by: INTERNAL MEDICINE

## 2025-02-18 PROCEDURE — 80048 BASIC METABOLIC PNL TOTAL CA: CPT

## 2025-02-18 PROCEDURE — 2500000003 HC RX 250 WO HCPCS: Performed by: HOSPITALIST

## 2025-02-18 PROCEDURE — 2500000003 HC RX 250 WO HCPCS: Performed by: PHYSICIAN ASSISTANT

## 2025-02-18 PROCEDURE — 6360000002 HC RX W HCPCS: Performed by: INTERNAL MEDICINE

## 2025-02-18 PROCEDURE — 99232 SBSQ HOSP IP/OBS MODERATE 35: CPT | Performed by: HOSPITALIST

## 2025-02-18 PROCEDURE — 2580000003 HC RX 258: Performed by: HOSPITALIST

## 2025-02-18 PROCEDURE — 36415 COLL VENOUS BLD VENIPUNCTURE: CPT

## 2025-02-18 PROCEDURE — 94669 MECHANICAL CHEST WALL OSCILL: CPT

## 2025-02-18 PROCEDURE — 84100 ASSAY OF PHOSPHORUS: CPT

## 2025-02-18 PROCEDURE — 94761 N-INVAS EAR/PLS OXIMETRY MLT: CPT

## 2025-02-18 PROCEDURE — 6370000000 HC RX 637 (ALT 250 FOR IP): Performed by: FAMILY MEDICINE

## 2025-02-18 PROCEDURE — 85025 COMPLETE CBC W/AUTO DIFF WBC: CPT

## 2025-02-18 PROCEDURE — 2700000000 HC OXYGEN THERAPY PER DAY

## 2025-02-18 PROCEDURE — 94660 CPAP INITIATION&MGMT: CPT

## 2025-02-18 PROCEDURE — 94640 AIRWAY INHALATION TREATMENT: CPT

## 2025-02-18 PROCEDURE — 83735 ASSAY OF MAGNESIUM: CPT

## 2025-02-18 PROCEDURE — 92526 ORAL FUNCTION THERAPY: CPT

## 2025-02-18 PROCEDURE — 2140000001 HC CVICU INTERMEDIATE R&B

## 2025-02-18 PROCEDURE — 82962 GLUCOSE BLOOD TEST: CPT

## 2025-02-18 PROCEDURE — 6370000000 HC RX 637 (ALT 250 FOR IP): Performed by: PHYSICIAN ASSISTANT

## 2025-02-18 RX ORDER — ACETYLCYSTEINE 100 MG/ML
4 SOLUTION ORAL; RESPIRATORY (INHALATION)
Status: DISCONTINUED | OUTPATIENT
Start: 2025-02-19 | End: 2025-02-19

## 2025-02-18 RX ORDER — ALBUTEROL SULFATE 1.25 MG/3ML
1.25 SOLUTION RESPIRATORY (INHALATION)
Status: DISCONTINUED | OUTPATIENT
Start: 2025-02-19 | End: 2025-02-19

## 2025-02-18 RX ADMIN — GUAIFENESIN 600 MG: 600 TABLET ORAL at 08:13

## 2025-02-18 RX ADMIN — POTASSIUM PHOSPHATE, MONOBASIC AND POTASSIUM PHOSPHATE, DIBASIC 20 MMOL: 224; 236 INJECTION, SOLUTION, CONCENTRATE INTRAVENOUS at 12:00

## 2025-02-18 RX ADMIN — FOLIC ACID 1 MG: 1 TABLET ORAL at 08:14

## 2025-02-18 RX ADMIN — MIDODRINE HYDROCHLORIDE 10 MG: 10 TABLET ORAL at 08:13

## 2025-02-18 RX ADMIN — DEXTROSE MONOHYDRATE: 50 INJECTION, SOLUTION INTRAVENOUS at 16:27

## 2025-02-18 RX ADMIN — Medication 100 MG: at 08:13

## 2025-02-18 RX ADMIN — BUDESONIDE 1 MG: 1 SUSPENSION RESPIRATORY (INHALATION) at 08:54

## 2025-02-18 RX ADMIN — BUDESONIDE 1 MG: 1 SUSPENSION RESPIRATORY (INHALATION) at 20:05

## 2025-02-18 RX ADMIN — MIDODRINE HYDROCHLORIDE 10 MG: 10 TABLET ORAL at 16:28

## 2025-02-18 RX ADMIN — SODIUM CHLORIDE, PRESERVATIVE FREE 10 ML: 5 INJECTION INTRAVENOUS at 08:15

## 2025-02-18 RX ADMIN — ALBUTEROL SULFATE 1.25 MG: 1.25 SOLUTION RESPIRATORY (INHALATION) at 02:57

## 2025-02-18 RX ADMIN — POTASSIUM BICARBONATE 40 MEQ: 782 TABLET, EFFERVESCENT ORAL at 08:13

## 2025-02-18 RX ADMIN — SODIUM CHLORIDE, PRESERVATIVE FREE 10 ML: 5 INJECTION INTRAVENOUS at 20:32

## 2025-02-18 RX ADMIN — ALBUTEROL SULFATE 1.25 MG: 1.25 SOLUTION RESPIRATORY (INHALATION) at 17:28

## 2025-02-18 RX ADMIN — ALBUTEROL SULFATE 1.25 MG: 1.25 SOLUTION RESPIRATORY (INHALATION) at 20:05

## 2025-02-18 RX ADMIN — ACETYLCYSTEINE 400 MG: 100 SOLUTION ORAL; RESPIRATORY (INHALATION) at 13:04

## 2025-02-18 RX ADMIN — ACETYLCYSTEINE 400 MG: 100 SOLUTION ORAL; RESPIRATORY (INHALATION) at 08:54

## 2025-02-18 RX ADMIN — PANTOPRAZOLE SODIUM 40 MG: 40 TABLET, DELAYED RELEASE ORAL at 08:13

## 2025-02-18 RX ADMIN — ALBUTEROL SULFATE 1.25 MG: 1.25 SOLUTION RESPIRATORY (INHALATION) at 08:54

## 2025-02-18 RX ADMIN — Medication 1 CAPSULE: at 08:13

## 2025-02-18 RX ADMIN — ACETYLCYSTEINE 400 MG: 100 SOLUTION ORAL; RESPIRATORY (INHALATION) at 17:28

## 2025-02-18 RX ADMIN — ACETYLCYSTEINE 400 MG: 100 SOLUTION ORAL; RESPIRATORY (INHALATION) at 20:05

## 2025-02-18 RX ADMIN — Medication 15 ML: at 08:14

## 2025-02-18 RX ADMIN — ARFORMOTEROL TARTRATE 15 MCG: 15 SOLUTION RESPIRATORY (INHALATION) at 08:54

## 2025-02-18 RX ADMIN — ARFORMOTEROL TARTRATE 15 MCG: 15 SOLUTION RESPIRATORY (INHALATION) at 20:05

## 2025-02-18 RX ADMIN — ALBUTEROL SULFATE 1.25 MG: 1.25 SOLUTION RESPIRATORY (INHALATION) at 13:05

## 2025-02-18 RX ADMIN — MIDODRINE HYDROCHLORIDE 10 MG: 10 TABLET ORAL at 12:02

## 2025-02-18 RX ADMIN — ACETYLCYSTEINE 400 MG: 100 SOLUTION ORAL; RESPIRATORY (INHALATION) at 02:57

## 2025-02-18 ASSESSMENT — PAIN DESCRIPTION - PAIN TYPE: TYPE: CHRONIC PAIN

## 2025-02-18 ASSESSMENT — PAIN SCALES - GENERAL
PAINLEVEL_OUTOF10: 0
PAINLEVEL_OUTOF10: 0

## 2025-02-18 ASSESSMENT — PAIN DESCRIPTION - ORIENTATION: ORIENTATION: POSTERIOR

## 2025-02-18 ASSESSMENT — PAIN DESCRIPTION - DESCRIPTORS: DESCRIPTORS: ACHING;DISCOMFORT

## 2025-02-18 ASSESSMENT — PAIN DESCRIPTION - LOCATION: LOCATION: BACK

## 2025-02-18 ASSESSMENT — PAIN DESCRIPTION - FREQUENCY: FREQUENCY: CONTINUOUS

## 2025-02-18 ASSESSMENT — PAIN - FUNCTIONAL ASSESSMENT: PAIN_FUNCTIONAL_ASSESSMENT: ACTIVITIES ARE NOT PREVENTED

## 2025-02-18 ASSESSMENT — PAIN DESCRIPTION - ONSET: ONSET: ON-GOING

## 2025-02-18 NOTE — RT PROTOCOL NOTE
Per Dr Request please use patient home bipap/cpap tonight when it arrives instead of hospital machine

## 2025-02-18 NOTE — CARE COORDINATION
Called pt's mom Rebecca Rogers [phone] to update her on LTC placement.  She stated her son is refusing LTC placement and wants to return home and that will be the plan now.    Met with pt.  He declined LTC and stated he wants to return home.  He stated he has BIPAP machine, hospital bed, manual wheelchair and power wheelchair at home.  He wants to return home with home health.    Discussed with Dr. Denis.        GWEN SinN RN  Care Management

## 2025-02-18 NOTE — CARE COORDINATION
Extended LTC bed search to more nursing facilities.          Dana Leavitt, GWENN RN  Care Management

## 2025-02-18 NOTE — CARE COORDINATION
Chart reviewed.  Called Ton[facility] [phone] to see if any of their facilities have LTC bed.  She stated for now [facility] does not but she will check others and call CM back.          Dana Leavitt, GWENN RN  Care Management

## 2025-02-18 NOTE — CARE COORDINATION
Pt's clinicals sent to [facility] in [facility] for review if pt qualifies for LTACH placement.  Left a message for Padmini Weber of [facility] [phone].              GWEN SinN RN  Care Management

## 2025-02-19 VITALS
HEIGHT: 66 IN | OXYGEN SATURATION: 100 % | DIASTOLIC BLOOD PRESSURE: 70 MMHG | HEART RATE: 83 BPM | SYSTOLIC BLOOD PRESSURE: 116 MMHG | WEIGHT: 140 LBS | RESPIRATION RATE: 25 BRPM | BODY MASS INDEX: 22.5 KG/M2 | TEMPERATURE: 97.3 F

## 2025-02-19 LAB
ANION GAP SERPL CALC-SCNC: 4 MMOL/L (ref 3–18)
BASOPHILS # BLD: 0.01 K/UL (ref 0–0.1)
BASOPHILS NFR BLD: 0.1 % (ref 0–2)
BUN SERPL-MCNC: 17 MG/DL (ref 7–18)
BUN/CREAT SERPL: 31 (ref 12–20)
CALCIUM SERPL-MCNC: 8.8 MG/DL (ref 8.5–10.1)
CHLORIDE SERPL-SCNC: 104 MMOL/L (ref 100–111)
CO2 SERPL-SCNC: 35 MMOL/L (ref 21–32)
CREAT SERPL-MCNC: 0.54 MG/DL (ref 0.6–1.3)
DIFFERENTIAL METHOD BLD: ABNORMAL
EOSINOPHIL # BLD: 0.07 K/UL (ref 0–0.4)
EOSINOPHIL NFR BLD: 0.8 % (ref 0–5)
ERYTHROCYTE [DISTWIDTH] IN BLOOD BY AUTOMATED COUNT: 16.9 % (ref 11.6–14.5)
GLUCOSE BLD STRIP.AUTO-MCNC: 130 MG/DL (ref 70–110)
GLUCOSE SERPL-MCNC: 111 MG/DL (ref 74–99)
HCT VFR BLD AUTO: 29.7 % (ref 36–48)
HGB BLD-MCNC: 9.3 G/DL (ref 13–16)
IGA SERPL-MCNC: 366 MG/DL (ref 61–437)
IGG SERPL-MCNC: 783 MG/DL (ref 603–1613)
IGM SERPL-MCNC: 85 MG/DL (ref 20–172)
IMM GRANULOCYTES # BLD AUTO: 0.03 K/UL (ref 0–0.04)
IMM GRANULOCYTES NFR BLD AUTO: 0.3 % (ref 0–0.5)
LYMPHOCYTES # BLD: 0.35 K/UL (ref 0.9–3.6)
LYMPHOCYTES NFR BLD: 3.8 % (ref 21–52)
MAGNESIUM SERPL-MCNC: 2 MG/DL (ref 1.6–2.6)
MCH RBC QN AUTO: 29.4 PG (ref 24–34)
MCHC RBC AUTO-ENTMCNC: 31.3 G/DL (ref 31–37)
MCV RBC AUTO: 94 FL (ref 78–100)
MONOCYTES # BLD: 0.2 K/UL (ref 0.05–1.2)
MONOCYTES NFR BLD: 2.1 % (ref 3–10)
NEUTS SEG # BLD: 8.67 K/UL (ref 1.8–8)
NEUTS SEG NFR BLD: 92.9 % (ref 40–73)
NRBC # BLD: 0 K/UL (ref 0–0.01)
NRBC BLD-RTO: 0 PER 100 WBC
PHOSPHATE SERPL-MCNC: 2.2 MG/DL (ref 2.5–4.9)
PLATELET # BLD AUTO: 219 K/UL (ref 135–420)
PMV BLD AUTO: 11.3 FL (ref 9.2–11.8)
POTASSIUM SERPL-SCNC: 3.3 MMOL/L (ref 3.5–5.5)
PROT PATTERN SERPL IFE-IMP: NORMAL
RBC # BLD AUTO: 3.16 M/UL (ref 4.35–5.65)
SODIUM SERPL-SCNC: 143 MMOL/L (ref 136–145)
WBC # BLD AUTO: 9.3 K/UL (ref 4.6–13.2)

## 2025-02-19 PROCEDURE — 84100 ASSAY OF PHOSPHORUS: CPT

## 2025-02-19 PROCEDURE — 82962 GLUCOSE BLOOD TEST: CPT

## 2025-02-19 PROCEDURE — 80048 BASIC METABOLIC PNL TOTAL CA: CPT

## 2025-02-19 PROCEDURE — 6370000000 HC RX 637 (ALT 250 FOR IP): Performed by: INTERNAL MEDICINE

## 2025-02-19 PROCEDURE — 6360000002 HC RX W HCPCS: Performed by: PHYSICIAN ASSISTANT

## 2025-02-19 PROCEDURE — 6370000000 HC RX 637 (ALT 250 FOR IP): Performed by: HOSPITALIST

## 2025-02-19 PROCEDURE — 83735 ASSAY OF MAGNESIUM: CPT

## 2025-02-19 PROCEDURE — 94640 AIRWAY INHALATION TREATMENT: CPT

## 2025-02-19 PROCEDURE — 6360000002 HC RX W HCPCS: Performed by: INTERNAL MEDICINE

## 2025-02-19 PROCEDURE — 94761 N-INVAS EAR/PLS OXIMETRY MLT: CPT

## 2025-02-19 PROCEDURE — 2580000003 HC RX 258: Performed by: HOSPITALIST

## 2025-02-19 PROCEDURE — 99239 HOSP IP/OBS DSCHRG MGMT >30: CPT | Performed by: HOSPITALIST

## 2025-02-19 PROCEDURE — 85025 COMPLETE CBC W/AUTO DIFF WBC: CPT

## 2025-02-19 PROCEDURE — 36415 COLL VENOUS BLD VENIPUNCTURE: CPT

## 2025-02-19 PROCEDURE — 6360000002 HC RX W HCPCS: Performed by: STUDENT IN AN ORGANIZED HEALTH CARE EDUCATION/TRAINING PROGRAM

## 2025-02-19 RX ORDER — MIDODRINE HYDROCHLORIDE 10 MG/1
10 TABLET ORAL
Qty: 90 TABLET | Refills: 0 | Status: SHIPPED | OUTPATIENT
Start: 2025-02-19 | End: 2025-03-21

## 2025-02-19 RX ORDER — UMECLIDINIUM BROMIDE AND VILANTEROL TRIFENATATE 62.5; 25 UG/1; UG/1
1 POWDER RESPIRATORY (INHALATION) DAILY
Qty: 1 EACH | Refills: 0 | Status: SHIPPED | OUTPATIENT
Start: 2025-02-19

## 2025-02-19 RX ORDER — IPRATROPIUM BROMIDE AND ALBUTEROL SULFATE 2.5; .5 MG/3ML; MG/3ML
3 SOLUTION RESPIRATORY (INHALATION) EVERY 4 HOURS PRN
Qty: 60 EACH | Refills: 0 | Status: SHIPPED | OUTPATIENT
Start: 2025-02-19

## 2025-02-19 RX ORDER — ATORVASTATIN CALCIUM 80 MG/1
80 TABLET, FILM COATED ORAL NIGHTLY
Qty: 30 TABLET | Refills: 3 | Status: SHIPPED | OUTPATIENT
Start: 2025-02-19

## 2025-02-19 RX ORDER — GUAIFENESIN 600 MG/1
600 TABLET, EXTENDED RELEASE ORAL EVERY 8 HOURS PRN
Qty: 20 TABLET | Refills: 0 | Status: SHIPPED | OUTPATIENT
Start: 2025-02-19

## 2025-02-19 RX ORDER — THIAMINE MONONITRATE (VIT B1) 100 MG
100 TABLET ORAL DAILY
Qty: 30 TABLET | Refills: 0 | Status: SHIPPED | OUTPATIENT
Start: 2025-02-20 | End: 2025-03-22

## 2025-02-19 RX ORDER — ALBUTEROL SULFATE 1.25 MG/3ML
1.25 SOLUTION RESPIRATORY (INHALATION)
Status: DISCONTINUED | OUTPATIENT
Start: 2025-02-19 | End: 2025-02-19 | Stop reason: HOSPADM

## 2025-02-19 RX ORDER — PANTOPRAZOLE SODIUM 40 MG/1
40 TABLET, DELAYED RELEASE ORAL DAILY
Qty: 30 TABLET | Refills: 0 | Status: SHIPPED | OUTPATIENT
Start: 2025-02-19

## 2025-02-19 RX ORDER — FOLIC ACID 1 MG/1
1 TABLET ORAL DAILY
Qty: 30 TABLET | Refills: 0 | Status: SHIPPED | OUTPATIENT
Start: 2025-02-20

## 2025-02-19 RX ORDER — ACETYLCYSTEINE 100 MG/ML
4 SOLUTION ORAL; RESPIRATORY (INHALATION)
Status: DISCONTINUED | OUTPATIENT
Start: 2025-02-19 | End: 2025-02-19 | Stop reason: HOSPADM

## 2025-02-19 RX ADMIN — MIDODRINE HYDROCHLORIDE 10 MG: 10 TABLET ORAL at 08:42

## 2025-02-19 RX ADMIN — ARFORMOTEROL TARTRATE 15 MCG: 15 SOLUTION RESPIRATORY (INHALATION) at 07:50

## 2025-02-19 RX ADMIN — BUDESONIDE 1 MG: 1 SUSPENSION RESPIRATORY (INHALATION) at 07:50

## 2025-02-19 RX ADMIN — Medication 100 MG: at 08:42

## 2025-02-19 RX ADMIN — FOLIC ACID 1 MG: 1 TABLET ORAL at 08:42

## 2025-02-19 RX ADMIN — GUAIFENESIN 600 MG: 600 TABLET ORAL at 08:42

## 2025-02-19 RX ADMIN — PANTOPRAZOLE SODIUM 40 MG: 40 TABLET, DELAYED RELEASE ORAL at 06:11

## 2025-02-19 RX ADMIN — Medication 15 ML: at 08:42

## 2025-02-19 RX ADMIN — ALBUTEROL SULFATE 1.25 MG: 1.25 SOLUTION RESPIRATORY (INHALATION) at 07:50

## 2025-02-19 RX ADMIN — DEXTROSE MONOHYDRATE: 50 INJECTION, SOLUTION INTRAVENOUS at 06:33

## 2025-02-19 RX ADMIN — ACETYLCYSTEINE 400 MG: 100 INHALANT RESPIRATORY (INHALATION) at 07:50

## 2025-02-19 RX ADMIN — DOCUSATE SODIUM LIQUID 100 MG: 100 LIQUID ORAL at 08:42

## 2025-02-19 RX ADMIN — Medication 1 CAPSULE: at 08:42

## 2025-02-19 ASSESSMENT — PAIN SCALES - GENERAL
PAINLEVEL_OUTOF10: 0

## 2025-02-19 ASSESSMENT — PAIN DESCRIPTION - ONSET: ONSET: ON-GOING

## 2025-02-19 ASSESSMENT — PAIN DESCRIPTION - PAIN TYPE: TYPE: CHRONIC PAIN

## 2025-02-19 ASSESSMENT — PAIN - FUNCTIONAL ASSESSMENT: PAIN_FUNCTIONAL_ASSESSMENT: ACTIVITIES ARE NOT PREVENTED

## 2025-02-19 ASSESSMENT — PAIN DESCRIPTION - DESCRIPTORS: DESCRIPTORS: ACHING;DISCOMFORT

## 2025-02-19 ASSESSMENT — PAIN DESCRIPTION - ORIENTATION: ORIENTATION: POSTERIOR

## 2025-02-19 ASSESSMENT — PAIN DESCRIPTION - FREQUENCY: FREQUENCY: CONTINUOUS

## 2025-02-19 ASSESSMENT — PAIN DESCRIPTION - LOCATION: LOCATION: BACK

## 2025-02-19 NOTE — PROGRESS NOTES
Hematology / Oncology Progress Note      Patient: Farhad Rogers  Gender: male   MRN: [id number]    YOB: 1960 Age: 64 y.o.   CSN: [id number]    LOS:  LOS: 21 days   Admit Date: 1/28/2025     PCP: Teo Tong MD    Date of evaluation: 2/18/2025     Assessment:       ICD-10-CM    1. Hypernatremia  E87.0       2. Hypoalbuminemia  E88.09       3. Aphasia  R47.01       4. Acute hypoxic respiratory failure  J96.01 Echo (TTE) complete (PRN contrast/bubble/strain/3D)     Echo (TTE) complete (PRN contrast/bubble/strain/3D)     Vascular duplex upper extremity venous right     Vascular duplex upper extremity venous right      5. 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  Moderate oropharyngeal dysphagia  Recurrent aspiration pneumonia  COPD with asthma  DOUG on CPAP  Metabolic encephalopathy likely from CO2 narcosis, improved  Sarcoidosis - pulmonary nodular   Hypovolemic vs septic shock 2/2 aspiration pneumonia, resolved  Acute toxic/metabolic encephalopathy, resolved  Anemia requiring pRBC transfusion   Acute kidney injury - prerenal azotemia vs ATN, resolved  Hypernatremia 2/2 dehydration, on D5  Hypophosphatemia  HTN  Severe polymyoneuropathy, wheelchair bound at baseline. improving    Pertinent Labs:  1/31/25 K/L 2.0 (H)(N< 1.65), SPEP 0.4 g/dl.  2/16/2025 IgA 366, IgG 783, IgM 85 [all normal].  Plan:   -2/16/2025 serum immunoglobulins are normal.  No indication for IVIG therapy.  -2/16/2025 SIFE and 24-hour UPEP/UIFE to evaluate for monoclonal gammopathy are pending.  -Patient's acute condition [acute hypoxic respiratory failure requiring mechanical ventilation, intubated x 2, metabolic encephalopathy with CO2 
    In Patient Progress note      Admit Date: 1/28/2025      Impression:        1) nonoliguric DARRIAN ( baseline creat ~ 0.6) d/t ATN --> resolved   2) Acute respiratory failure   3) Multifactorial shock - hypovolemic vs septic--> improved hemodynamics   4) Metabolic encephalopathy  5) hypernatremia ----> on d5w   6) Sarcoidoses   7) Pulmonary nodular amyloidosis   8) COPD with asthma   9) severe hypoalbuminemia   10) anemia /thrombocytopenia   11) metabolic alkalosis      Recommendations:   1) continue  d5w@ 75cc/hrs   2) MAP goal >65, cont midodrine   3) monitor electrolytes and renal functions daily  4) renally dose meds for egfr ~ < 30 , creatinine overestimating GFR   5) agree with Lasix IV intermittently for diuresis , CXR reviewed rt sided effusions , atelectasis   6) paraprot workup per hem/onc   7) wean O2 per pulmonary       Guarded prognosis   Please call with questions     Sancho Vasquez MD FASN  Cell [phone]  Pager: [phone]  Fax   [phone]   Subjective:     - No acute over night events.  - respiratory - stable  - hemodynamics - stable, no pressrs  - UOP-diuresing well  - Nutrition -ok    Objective:     BP 98/60   Pulse 79   Temp 97 °F (36.1 °C) (Temporal)   Resp (!) 33   Ht 1.676 m (5' 6\")   Wt 65.3 kg (144 lb) Comment: bed was changed from yesterday  SpO2 95%   BMI 23.24 kg/m²       Intake/Output Summary (Last 24 hours) at 2/17/2025 0922  Last data filed at 2/17/2025 0635  Gross per 24 hour   Intake --   Output 500 ml   Net -500 ml       Physical Exam:     HFNC   HENT mmm  RS decreased AE rt base   CVS s1s2 wnl no JVD  Ext edema +  Skin no rashes      Data Review:    Recent Labs     02/17/25  0347   WBC 6.8   RBC 3.49*   HCT 32.7*   MCV 93.7   MCH 28.4   MCHC 30.3*   RDW 17.6*   MPV 10.9     Recent Labs     02/15/25  0609 02/16/25  0743 02/16/25  1810 02/17/25  0347   BUN 23* 23* 22* 22*   K 3.8 4.6 3.8 3.5   * 149* 150* 153*    111 111 113*   CO2 35* 35* 36* 37*   PHOS 2.0* 
    In Patient Progress note      Admit Date: 1/28/2025      Impression:        1) nonoliguric DARRIAN ( baseline creat ~ 0.6) d/t ATN --> resolved   2) Acute respiratory failure   3) Multifactorial shock - hypovolemic vs septic--> improved hemodynamics   4) Metabolic encephalopathy  5) hypernatremia ----> on d5w   6) Sarcoidoses   7) Pulmonary nodular amyloidosis   8) COPD with asthma   9) severe hypoalbuminemia   10) anemia /thrombocytopenia   11) metabolic alkalosis   12) hypokalemia      Recommendations:   1) continue  d5w@ 75cc/hrs   2) MAP goal >65, cont midodrine   3) monitor electrolytes and renal functions daily  4) renally dose meds for egfr ~ < 30 , creatinine overestimating GFR   5) agree with Lasix IV intermittently for diuresis , CXR reviewed rt sided effusions , atelectasis   6) paraprot workup per hem/onc   7) wean O2 per pulmonary  8) replace potassium        Guarded prognosis   Please call with questions     Sancho Vasquez MD FASN  Cell [phone]  Pager: [phone]  Fax   [phone]   Subjective:     - No acute over night events.  - respiratory - stable  - hemodynamics - stable, no pressrs  - UOP-diuresing well  - Nutrition -ok    Objective:     /73   Pulse 75   Temp 98.3 °F (36.8 °C) (Oral)   Resp 22   Ht 1.676 m (5' 6\")   Wt 65.3 kg (144 lb) Comment: bed was changed from yesterday  SpO2 99%   BMI 23.24 kg/m²       Intake/Output Summary (Last 24 hours) at 2/18/2025 1612  Last data filed at 2/18/2025 1140  Gross per 24 hour   Intake --   Output 300 ml   Net -300 ml       Physical Exam:     HFNC   HENT mmm  RS decreased AE rt base   CVS s1s2 wnl no JVD  Ext edema +  Skin no rashes      Data Review:    Recent Labs     02/18/25  0431   WBC 8.6   RBC 3.19*   HCT 30.4*   MCV 95.3   MCH 27.9   MCHC 29.3*   RDW 17.6*   MPV 11.3     Recent Labs     02/16/25  0743 02/16/25  1810 02/17/25  0347 02/18/25  0431   BUN 23* 22* 22* 21*   K 4.6 3.8 3.5 3.2*   * 150* 153* 147*    111 113* 
    In Patient Progress note      Admit Date: 1/28/2025      Impression:        1) nonoliguric DARRIAN ( baseline creat ~ 0.6) d/t ATN --> resolved   2) Acute respiratory failure   3) Multifactorial shock - hypovolemic vs septic--> improved hemodynamics   4) Metabolic encephalopathy  5) hypernatremia ----> on d5w   6) Sarcoidoses   7) Pulmonary nodular amyloidosis   8) COPD with asthma   9) severe hypoalbuminemia   10) anemia /thrombocytopenia   11) metabolic alkalosis   12) hypokalemia      Recommendations:   1) continue  d5w@ 75cc/hrs for free water deficit/hypernatremia   2) MAP goal >65, cont midodrine   3) monitor electrolytes and renal functions daily  4) renally dose meds for egfr ~ < 30 , creatinine overestimating GFR   5) hold diuretics  6) paraprot workup per hem/onc   7) wean O2 per pulmonary  8) replace potassium and phos     Labs pending   Follow labs today        Guarded prognosis   Please call with questions,     Sancho Vasquez MD FASN  Cell [phone]  Pager: [phone]  Fax   [phone]   Subjective:     - No acute over night events.  - respiratory - stable  - hemodynamics - stable, no pressrs  - UOP-diuresing well  - Nutrition -ok    Objective:     /70   Pulse 83   Temp 97.3 °F (36.3 °C) (Oral)   Resp 25   Ht 1.676 m (5' 6\")   Wt 63.5 kg (140 lb)   SpO2 100%   BMI 22.60 kg/m²       Intake/Output Summary (Last 24 hours) at 2/19/2025 0839  Last data filed at 2/19/2025 0500  Gross per 24 hour   Intake 3753.36 ml   Output 1580 ml   Net 2173.36 ml       Physical Exam:     HFNC   HENT mmm  RS decreased AE rt base   CVS s1s2 wnl no JVD  Ext edema +  Skin no rashes      Data Review:    Recent Labs     02/18/25  0431   WBC 8.6   RBC 3.19*   HCT 30.4*   MCV 95.3   MCH 27.9   MCHC 29.3*   RDW 17.6*   MPV 11.3     Recent Labs     02/16/25  1810 02/17/25  0347 02/18/25  0431   BUN 22* 22* 21*   K 3.8 3.5 3.2*   * 153* 147*    113* 109   CO2 36* 37* 36*   PHOS  --  1.7* 1.7*       Sancho 
    In Patient Progress note      Admit Date: 1/28/2025      Impression:        1) nonoliguric DARRIAN ( baseline creat ~ 0.6) d/t ATN --> resolved   2) Acute respiratory failure ,   3) Multifactorial shock - hypovolemic vs septic due to unknown source   4) Metabolic encephalopathy  5) hypernatremia improved  6) Sarcoidoses   7) Pulmonary nodular amyloidosis   8) COPD with asthma   9) severe hypoalbuminemia   10) anemia /thrombocytopenia      Recommendations:   1) increase free water with TF   2) MAP goal >65, cont midodrine   3) monitor electrolytes and renal functions daily  4) renally dose meds for egfr ~ < 30 , creatinine overestimating GFR   5) agree with Lasix IV intermittently for diuresis , CXR reviewed rt sided effusions , atelectasis   6) M spoke on SPEP/immunofixation , K/L ratio 2 range which is abnormal , with h/o amylodosis , please   Consult hem/onc   7) wean O2 per pulmonary    Follow peripherally over weekend  Plan to see Monday      Guarded prognosis   Please call with questions     Sancho Vasquez MD FASN  Cell [phone]  Pager: [phone]  Fax   [phone]   Subjective:     - No acute over night events.  - respiratory - stable  - hemodynamics - stable, no pressrs  - UOP-diuresing well  - Nutrition -ok    Objective:     /76   Pulse 71   Temp 97.5 °F (36.4 °C) (Oral)   Resp 20   Ht 1.676 m (5' 6\")   Wt 65.3 kg (144 lb) Comment: bed was changed from yesterday  SpO2 94%   BMI 23.24 kg/m²       Intake/Output Summary (Last 24 hours) at 2/14/2025 1223  Last data filed at 2/14/2025 1146  Gross per 24 hour   Intake 730 ml   Output 4000 ml   Net -3270 ml       Physical Exam:     NC   HENT mmm  RS decreased AE rt base   CVS s1s2 wnl no JVD  Ext edema +  Skin no rashes      Data Review:    Recent Labs     02/14/25  0434   WBC 10.0   RBC 3.25*   HCT 29.8*   MCV 91.7   MCH 28.3   MCHC 30.9*   RDW 17.8*   MPV 11.3     Recent Labs     02/12/25  0418 02/13/25  0352 02/14/25  0434   BUN 15 13 17   K 
    In Patient Progress note      Admit Date: 1/28/2025      Impression:     1) nonoliguric DARRIAN ( baseline creat ~ 0.6) d/t ATN   2) Acute respiratory failure   3) Multifactorial shock - hypovolemic vs septic due to unknown source   4) Metabolic encephalopathy  5) hypernatremia improved  6) Sarcoidoses   7) Pulmonary nodular amyloidosis   8) COPD with asthma   9) severe hypoalbuminemia   10) anemia /thrombocytopenia      Recommendations:   1) increase free water with TF   2) MAP goal >65, cont midodrine   3) monitor electrolytes and renal functions daily  4) renally dose meds for egfr ~ < 30 , creatinine overestimating GFR   5) avoid nephrotoxins, contrast  6) f/u on paraprot workup   7) wean O2 per pulmonary        Please call with questions     Sancho Vasquez MD FASN  Cell [phone]  Pager: [phone]  Fax   [phone]   Subjective:     - No acute over night events.  - respiratory - stable  - hemodynamics - stable, no pressrs  - UOP-ok  - Nutrition -poor    Objective:     BP (!) 98/55   Pulse 61   Temp 97.3 °F (36.3 °C)   Resp 13   Ht 1.676 m (5' 6\")   Wt 66.6 kg (146 lb 13.2 oz)   SpO2 99%   BMI 23.70 kg/m²       Intake/Output Summary (Last 24 hours) at 2/4/2025 0932  Last data filed at 2/4/2025 0145  Gross per 24 hour   Intake 195.35 ml   Output 500 ml   Net -304.65 ml       Physical Exam:     On HFNAC   HENT mmm  RS AEBE   CVS s1s2 wnl no JVD  Ext edema +  Skin no rashes  Neuro oriented X  2     Data Review:    No results for input(s): \"WBC\", \"RBC\", \"HCT\", \"MCV\", \"MCH\", \"MCHC\", \"RDW\", \"PLATELET\", \"MPV\" in the last 72 hours.    Invalid input(s): \"HEMOGLOBIN\"    Recent Labs     02/02/25  0351 02/03/25  0204 02/03/25  0943 02/04/25  0113   BUN 28* 27*  --  31*   K 3.2* 3.3* 3.7 3.8    149*  --  148*    112*  --  112*   CO2 29 34*  --  35*       Sancho Vasquez MD  
    In Patient Progress note      Admit Date: 1/28/2025      Impression:     1) nonoliguric DARRIAN ( baseline creat ~ 0.6) d/t ATN   2) Acute respiratory failure   3) Multifactorial shock - hypovolemic vs septic due to unknown source   4) Metabolic encephalopathy  5) hypernatremia improved  6) Sarcoidoses   7) Pulmonary nodular amyloidosis   8) COPD with asthma   9) severe hypoalbuminemia   10) anemia /thrombocytopenia      Recommendations:   1) off IVF, increase free water with TF for hyperna   2) MAP goal >65, wean off  midodrine   3) monitor electrolytes and renal functions q12hrs   4) renally dose meds for egfr ~ < 30 , creatinine overestimating GFR   5) avoid nephrotoxins, contrast  6) f/u on paraprot workup         Please call with questions     Sancho Vasquez MD FASN  Cell [phone]  Pager: [phone]  Fax   [phone]   Subjective:     - No acute over night events.  - respiratory - stable  - hemodynamics - stable, no pressrs  - UOP-  - Nutrition -    Objective:     /65   Pulse 78   Temp 98.2 °F (36.8 °C)   Resp 15   Ht 1.676 m (5' 6\")   Wt 66.6 kg (146 lb 13.2 oz)   SpO2 95%   BMI 23.70 kg/m²       Intake/Output Summary (Last 24 hours) at 2/3/2025 1533  Last data filed at 2/3/2025 1400  Gross per 24 hour   Intake 418.18 ml   Output 1000 ml   Net -581.82 ml       Physical Exam:     On HFNAC   HENT mmm  RS AEBE   CVS s1s2 wnl no JVD  Ext edema +  Skin no rashes  Neuro     Data Review:    Recent Labs     02/01/25 0205   WBC 7.7   RBC 2.97*   HCT 26.1*   MCV 87.9   MCH 27.6   MCHC 31.4   RDW 18.2*   MPV 12.1*     Recent Labs     01/31/25  1843 02/01/25  0205 02/02/25  0351 02/03/25  0204 02/03/25  0943   BUN  --  38* 28* 27*  --    K 3.9 3.7 3.2* 3.3* 3.7   NA  --  142 145 149*  --    CL  --  109 111 112*  --    CO2  --  29 29 34*  --    PHOS  --  3.4  --   --   --        Sancho Vasquez MD  
   01/29/25 0109   ETT    Placement Date/Time: 01/28/25 4908   Present on Admission/Arrival: No  Placed By: In ED;Licensed provider;RT  Placement Verified By: Chest X-ray;Colorimetric ETCO2 device  Technique: Video laryngoscopy  Airway Tube Size: 7.5 mm  Location: Oral  Insert...   Secured At 27 cm   Measured From Lips   ETT Placement Center     ETT advanced it from 25cm @gum to 27@ the lip per MD.   
   02/02/25 0952   Settings/Measurements   PIP Observed 16 cm H20   IPAP 15 cmH20   CPAP/EPAP 8 cmH2O   Vt (Measured) 666 mL   Rate Ordered 8   FiO2  40 %   Minute Volume (L/min) 14.8 Liters   Mask Leak (lpm) 76 lpm   Patient's Home Machine No   Alarm Settings   Alarms On Y   Low Pressure (cmH2O) 8 cmH2O   High Pressure (cmH2O) 40 cmH2O   Apnea (secs) 20 secs   RR Low (bpm) 8   RR High (bpm) 40 br/min       Placed pt on NIV, above settings, for respiratory distress. Pt is comfortable. No distress noted at this time.   
   02/06/25 0830   Weaning Parameters   Spontaneous Breathing Trial Complete (S)  No (comment)  (Attempted, patient goes apneic.)       
   02/07/25 2338   NIV Type   NIV Started/Stopped (S)  Off  (refused)       
   02/10/25 0914   Patient Transport   Time Spent Transporting 16-30   Transport Ventillation Type Other  (NRB)   Transport From ICU   Transport Destination Other   Emergency Equipment Included Yes     RT assisted in transport of patient on oxygen equipment.  Dual verification of equipment completed with [facility].    
   02/11/25 0403   Settings/Measurements   PIP Observed 11 cm H20   IPAP 10 cmH20   CPAP/EPAP 5 cmH2O   IPAP Min 10 cmH2O   IPAP Max 22 cmH2O   Vt (Set, mL) 400 mL   Vt (Measured) 328 mL   Rate Ordered 10   Insp Rise Time (%) 3 %   FiO2  50 %   Minute Volume (L/min) 6 Liters   Mask Leak (lpm) 49 lpm   Patient's Home Machine No   Alarm Settings   Alarms On Y   Low Pressure (cmH2O) 5 cmH2O   High Pressure (cmH2O) 40 cmH2O   Apnea (secs) 20 secs   RR Low (bpm) 8   RR High (bpm) 40 br/min       
   02/12/25 0116   NIV Type   $NIV $Daily Charge   Suction Setup and Functional Yes   NIV Started/Stopped On   Equipment Type V60   Mode AVAPS   Mask Type Full face mask   Mask Size Small   Bonnet size Small   Settings/Measurements   PIP Observed 13 cm H20   CPAP/EPAP 5 cmH2O   IPAP Min 22 cmH2O   IPAP Max 10 cmH2O   Vt (Set, mL) 400 mL   Vt (Measured) 387 mL   Rate Ordered 10   Insp Rise Time (%) 3 %   FiO2  (S)  40 %   I Time/ I Time % 0.9 s   Minute Volume (L/min) 7.7 Liters   Mask Leak (lpm) 50 lpm   Patient's Home Machine No       
   02/14/25 2050   Oxygen Therapy/Pulse Ox   O2 Device Heated high flow cannula   O2 Flow Rate (L/min) (S)  40 L/min   FiO2  (S)  100 %     Pt was on HF eating banan pudding. While putting vest behind pt he became increasiingly SOB and sats dropped to 67%. I increased HFNC to 40L/100% and began vest therapy and nebulizer in-line. Pt stated he felt better, and sats increased to 93%.   
   02/15/25 1118   Oxygen Therapy/Pulse Ox   O2 Device Heated high flow cannula   O2 Flow Rate (L/min) 25 L/min   FiO2  60 %   Pulse 91       
   02/15/25 1451   Oxygen Therapy/Pulse Ox   O2 Device Heated high flow cannula   O2 Flow Rate (L/min) 25 L/min   FiO2  50 %       
   02/16/25 1322   Oxygen Therapy/Pulse Ox   O2 Device Heated high flow cannula   O2 Flow Rate (L/min) 20 L/min   FiO2  45 %       
   02/16/25 1428   Oxygen Therapy/Pulse Ox   O2 Device Heated high flow cannula   O2 Flow Rate (L/min) 20 L/min   FiO2  40 %     Pt is comfortable at this time.   
   02/17/25 0479   NIV Type   $NIV $Daily Charge   Ventilator ID [id number]   Equipment Type v60   Mode AVAPS   Mask Type Full face mask   Mask Size Medium   Bonnet size Medium   Assessment   Level of Consciousness 0   Comfort Level Good   Using Accessory Muscles No   Mask Compliance Good   Skin Assessment Clean, dry, & intact   Skin Protection for O2 Device Yes   Orientation Middle   Location Nose   Intervention(s) Skin Barrier   Breath Sounds   Respiratory Pattern Regular   Settings/Measurements   PIP Observed 16 cm H20   CPAP/EPAP 8 cmH2O   IPAP Min 20 cmH2O   IPAP Max 10 cmH2O   Vt (Set, mL) 400 mL   Vt (Measured) 369 mL   Insp Rise Time (%) 3 %   FiO2  (S)  30 %  (spo2 100%)   I Time/ I Time % 0.9 s   Minute Volume (L/min) 9 Liters   Mask Leak (lpm) 57 lpm   Patient's Home Machine No   Alarm Settings   Alarms On Y   Low Pressure (cmH2O) 5 cmH2O   High Pressure (cmH2O) -40 cmH2O   Apnea (secs) 20 secs   RR Low (bpm) 8   RR High (bpm) 45 br/min       
   02/17/25 2018   Oxygen Therapy/Pulse Ox   O2 Therapy Oxygen   $Oxygen $Daily Charge   O2 Device Nasal cannula   O2 Flow Rate (L/min) (S)  3 L/min  (spo2 100)   Pulse 79   Respirations 17   SpO2 100 %       
   02/17/25 2104   NIV Type   $NIV $Daily Charge   Ventilator ID [id number]   Suction Setup and Functional Yes   NIV Started/Stopped On   Equipment Type v60   Mode AVAPS   Mask Type Full face mask   Mask Size Medium   Bonnet size Medium   Assessment   Level of Consciousness 1   Comfort Level Good   Using Accessory Muscles No   Mask Compliance Good   Skin Assessment Clean, dry, & intact   Skin Protection for O2 Device Yes   Orientation Middle   Location Nose   Intervention(s) Skin Barrier   Breath Sounds   Respiratory Pattern Regular   Breath Sounds Bilateral Diminished   Settings/Measurements   PIP Observed 16 cm H20   CPAP/EPAP 8 cmH2O   IPAP Min 20 cmH2O   IPAP Max 10 cmH2O   Vt (Set, mL) 400 mL   Vt (Measured) 386 mL   Insp Rise Time (%) 3 %   FiO2  (S)  35 %  (spo2 97)   I Time/ I Time % 0.9 s   Minute Volume (L/min) 11 Liters   Mask Leak (lpm) 50 lpm   Humidity 31   Patient's Home Machine No   Alarm Settings   Alarms On Y   Low Pressure (cmH2O) 5 cmH2O   High Pressure (cmH2O) 40 cmH2O   Apnea (secs) 20 secs   RR Low (bpm) 8   RR High (bpm) 45 br/min       
   02/18/25 0527   Oxygen Therapy/Pulse Ox   O2 Therapy Oxygen humidified   $Oxygen $Daily Charge   O2 Device Nasal cannula   O2 Flow Rate (L/min) (S)  2 L/min   Pulse 66   Respirations 15   SpO2 100 %       
   02/18/25 2340   NIV Type   NIV Started/Stopped On   Equipment Type resmed   Mask Type Nasal mask   Assessment   Pulse 78   Respirations 22   SpO2 97 %   Level of Consciousness 0   Comfort Level Good   Using Accessory Muscles No   Mask Compliance Good   Skin Assessment Clean, dry, & intact   Breath Sounds   Respiratory Pattern Regular   Breath Sounds Bilateral Diminished   Settings/Measurements   O2 Flow Rate (L/min) 2 L/min   Patient's Home Machine Yes (type/vendor)  (resmed)   Electrical Safety Check Performed Yes   Alarm Settings   Alarms On Y       
  Physician Progress Note      PATIENT:               KONG VELASCO  CSN #:                  [id number]  :                       1960  ADMIT DATE:       2025 11:51 AM  DISCH DATE:  RESPONDING  PROVIDER #:        Ranjeet Church MD          QUERY TEXT:    Pt admitted with sepsis, aspiration pna, shock, and has moderate malnutrition   documented in 2/ IM PN with 2/10 RD assessment noting severe malnutrition.   Please further specify type of malnutrition with documentation in the medical   record.    The medical record reflects the following:  Risk Factors: acute illness  Clinical Indicators:  PN- This patient has moderate protein calorie   malnutrition.  - 2/10 RD: Malnutrition Status:  Severe malnutrition (02/10/25 0821)  Context:  Acute Illness  Findings of the 6 clinical characteristics of malnutrition:  Energy Intake:  50% or less of estimated energy requirements for 5 or more   days  Weight Loss:  Greater than 2% over 1 week  Body Fat Loss:  Mild body fat loss Fat Overlying Ribs  Muscle Mass Loss:  Mild muscle mass loss Temples (temporalis), Clavicles   (pectoralis & deltoids)  Treatment: RD assessment with recommendation to advance diet as medically able   and provide ONS, monitoring    ASPEN Criteria:    https://aspenjournals.onlinelibrary.cardoza.com/doi/full/10.1177/189315789815750  5  Options provided:  -- Severe Malnutrition  -- Other - I will add my own diagnosis  -- Disagree - Not applicable / Not valid  -- Disagree - Clinically unable to determine / Unknown  -- Refer to Clinical Documentation Reviewer    PROVIDER RESPONSE TEXT:    This patient has severe malnutrition.    Query created by: Uzma Go on 2025 8:00 AM      Electronically signed by:  Ranjeet Church MD 2025 1:21 PM          
  Physician Progress Note      PATIENT:               KONG VELASCO  [facility] #:                  [id number]  :                       1960  ADMIT DATE:       2025 11:51 AM  DISCH DATE:  RESPONDING  PROVIDER #:        Roddy Romero MD          QUERY TEXT:    Patient admitted with acute resp failure, multifactorial shock, and   encephalopathy. Noted to have \"moderate malnutrition\" documented in 2/3 RD   assessment. If possible, please document in progress notes and discharge   summary if you are evaluating and /or treating any of the following:    The medical record reflects the following:  Risk Factors: acute illness  Clinical Indicators: 2/3 RD: Malnutrition Status:  Moderate malnutrition   (25 1101)  Context:  Acute Illness  Findings of the 6 clinical characteristics of malnutrition:  Energy Intake:  Mild decrease in energy intake  Weight Loss:  Greater than 2% over 1 week  Body Fat Loss:  Mild body fat loss Fat Overlying Ribs  Muscle Mass Loss:  Mild muscle mass loss Temples (temporalis), Clavicles   (pectoralis & deltoids)  Treatment: RD assessment, TF and ONS 2 times daily, monitoring    ASPEN Criteria:    https://aspenjournals.onlinelibrary.cardoza.com/doi/full/10.1177/688307684363401  5  Options provided:  -- Protein calorie malnutrition moderate  -- Other - I will add my own diagnosis  -- Disagree - Not applicable / Not valid  -- Disagree - Clinically unable to determine / Unknown  -- Refer to Clinical Documentation Reviewer    PROVIDER RESPONSE TEXT:    This patient has moderate protein calorie malnutrition.    Query created by: Uzma Go on 2025 9:09 AM      Electronically signed by:  Roddy Romero MD 2025 10:04 AM          
  [facility]  Pulmonary, Critical Care, and Sleep Medicine    Name: Farhad Rogers MRN: [id number]   : 1960 Hospital: [facility]   Date: 2/10/2025        Pulmonary Medicine: Daily Progress Note    Admission Date:   2025  LOS: 13  MAR reviewed and pertinent medications noted or modified as needed    IMPRESSION:   Acute hypoxic respiratory failure - requiring mechanical ventilation for airway protection, intubated , extubated on , to 4L NC  Emergent re-intubation  for worsening hypoxic respiratory failure and encephalopathy 2/2 mucus plugging.   Extubated  to BIPAP. Now on HFNC 35L/50%.   Interval hypoxia and hypercapnia, with confusion, requiring continous BiPAP  Mucous plug on RLL, s/p emergent bronchoscopy on  for mucus plugs, with improvement  Moderate oropharyngeal dysphagia  Recurrent aspiration  Aspiration pneumonia  COPD with asthma  DOUG on CPAP  Metabolic encephalopathy likely from CO2 narcosis, improved  Sarcoidosis - pulmonary nodular   Hypovolemic vs septic shock 2/2 aspiration pneumonia, resolved  Acute toxic/metabolic encephalopathy, resolved  Anemia requiring pRBC transfusion   Acute kidney injury - prerenal azotemia vs ATN, resolved  Hypernatremia 2/2 dehydration, on D5  HTN  Severe neuropathy, wheelchair bound        Patient Active Problem List   Diagnosis    Myopathy    Peripheral neuropathy    Asthma    Sarcoid    Pulmonary artery hypertension (HCC)    GI bleed    HTN (hypertension)    Shortness of breath    Iron deficiency anemia    Hypogonadism in male    Hypogonadism male    Symptomatic anemia    Hypokalemia    Impotence of organic origin    Obstructive apnea    Gastric polyp    Calf muscle weakness    Aphasia    Acute hypoxic respiratory failure    Metabolic encephalopathy    Hypernatremia    Bradycardia    Moderate tricuspid valve regurgitation    Mucus plugging of bronchi    Sepsis without acute organ dysfunction (HCC)    
  [facility]  Pulmonary, Critical Care, and Sleep Medicine    Name: Farhad Rogers MRN: [id number]   : 1960 Hospital: [facility]   Date: 2/3/2025        Pulmonary Medicine: Daily Progress Note    Admission Date:   2025  LOS: 6  MAR reviewed and pertinent medications noted or modified as needed    IMPRESSION:   Acute hypoxic respiratory failure - requiring mechanical ventilation for airway protection, extubated on , to 4L NC  Interval increase in oxygen requirement, likely due to mucous plug on CXR   Moderate pleural effusion  Atelectasis, post obstructive vs compressive from pleural effusion   moderate oropharyngeal dysphagia   Multifactorial shock - hypovolemic vs septic due to aspiration PNA (less likely however due to lack of elevated WBC, negative lactic, negative procal) vs adrenal insufficiency. Resolved  Acute toxic/metabolic encephalopathy - CT head revealed a focus in the R lenticular capsule concerning for small infarct.  Acute kidney injury - prerenal azotemia vs ATN. Resolved  Hypernatremia 2/2 dehydration?, increase in Na 149 on   Sarcoidosis - follows Dr. Loza outpatient  Pulmonary nodular amyloidosis  COPD with asthma  DOUG on CPAP  HTN  Severe neuropathy, wheelchair bound       Patient Active Problem List   Diagnosis    Myopathy    Peripheral neuropathy    Asthma    Sarcoid    Pulmonary artery hypertension (HCC)    GI bleed    HTN (hypertension)    Shortness of breath    Iron deficiency anemia    Hypogonadism in male    Hypogonadism male    Symptomatic anemia    Hypokalemia    Impotence of organic origin    Obstructive apnea    Gastric polyp    Calf muscle weakness    Aphasia    Acute hypoxic respiratory failure    Metabolic encephalopathy    Hypernatremia    Bradycardia    Moderate tricuspid valve regurgitation          RECOMMENDATIONS:     PULM:  Bedside ultrasound this morning consistent with CXR on , atelectatic lung, small 
 Latest Reference Range & Units 02/02/25 09:15   DEVICE -   High Flow Nasal Cannula   Site -   RIGHT RADIAL   POC HCO3 21 - 28 MMOL/L 32.2 (H)   POC O2 SAT 94 - 98 % 83 (L)   POC TCO2 22 - 29 MMOL/L 31 (H)   Base Excess mmol/L 7.7   PH, VENOUS (POC) 7.32 - 7.42   7.47 (H)   PCO2, Fidelia, POC 41 - 51 MMHG 44.2   PO2, VENOUS (POC) 25 - 40 mmHg 45 (H)   POC Ionized Calcium 1.15 - 1.33 mmol/L 1.24   POC Potassium 3.5 - 5.1 mmol/L 3.8   POC Sodium 136 - 145 mmol/L 145   (H): Data is abnormally high  (L): Data is abnormally low  
 Latest Reference Range & Units 02/16/25 16:05   POC HCO3 21 - 28 MMOL/L 41.7 (H)   POC O2 SAT 94 - 98 % 96   POC TCO2 22 - 29 MMOL/L 41 (HH)   POC PEEP/CPA -   8   POC Pressure Support -   20   Base Excess mmol/L 14.4   PH, VENOUS (POC) 7.32 - 7.42   7.39   PCO2, Fidelia, POC 41 - 51 MMHG 69.4 (H)   PO2, VENOUS (POC) 25 - 40 mmHg 88 (H)   POC Ionized Calcium 1.15 - 1.33 mmol/L 1.40 (H)   POC Potassium 3.5 - 5.1 mmol/L 3.5   POC Sodium 136 - 145 mmol/L 154 (H)   POC Hematocrit 36 - 49 % 28 (L)   (HH): Data is critically high  (H): Data is abnormally high  (L): Data is abnormally low    Responded to RR. Placed pt on NIV. ABG above.  Plan:  keep pt on NIV and repeat gas.   
 Latest Reference Range & Units 02/16/25 16:05   POC Sodium 136 - 145 mmol/L 154 (H)   POC Potassium 3.5 - 5.1 mmol/L 3.5   POC Chloride 98 - 107 mmol/L 110 (H)   POC Creatinine 0.6 - 1.3 mg/dL 0.87   POC Ionized Calcium 1.15 - 1.33 mmol/L 1.40 (H)   POC Lactic Acid 0.40 - 2.00 mmol/L 0.57   POC TCO2 22 - 29 MMOL/L 41 (HH)   POC HCO3 21 - 28 MMOL/L 41.7 (H)   POC Hematocrit 36 - 49 % 28 (L)   POC O2 SAT 94 - 98 % 96   (HH): Data is critically high  (H): Data is abnormally high  (L): Data is abnormally low  
 Latest Reference Range & Units 02/16/25 17:36   FIO2 % 40   POC HCO3 21 - 28 MMOL/L 46.3 (H)   POC O2 SAT 92 - 97 % 95.2   POC pCO2 35.0 - 48.0 MMHG 64.9 (H)   POC pH 7.35 - 7.45   7.46 (H)   POC PO2 83 - 108 MMHG 77 (L)   (H): Data is abnormally high  (L): Data is abnormally low  
 attended the interdisciplinary rounds for Farhad Rogers, who is a 64 y.o.,male. Patient's Primary Language is: English.   According to the patient's EMR Buddhism Affiliation is: Religious.     The reason the Patient came to the hospital is:   Patient Active Problem List    Diagnosis Date Noted    Mucus plugging of bronchi 02/03/2025    Bradycardia 02/01/2025    Moderate tricuspid valve regurgitation 02/01/2025    Metabolic encephalopathy 01/29/2025    Hypernatremia 01/29/2025    Aphasia 01/28/2025    Acute hypoxic respiratory failure 01/28/2025    Calf muscle weakness 04/26/2023    Hypogonadism in male 05/01/2018    Impotence of organic origin 05/01/2018    HTN (hypertension) 03/18/2017    Iron deficiency anemia 03/18/2017    Hypogonadism male 03/18/2017    Hypokalemia 03/18/2017    Gastric polyp 03/18/2017    Asthma     Sarcoid     Obstructive apnea     GI bleed 03/17/2017    Symptomatic anemia 03/17/2017    Myopathy     Peripheral neuropathy     Pulmonary artery hypertension (HCC)     Shortness of breath           Plan:   participated and listen to the recommendations of the IDR team.  Chaplains will continue to follow and will provide pastoral care on an as needed/requested basis.   recommends bedside caregivers page  on duty if patient shows signs of acute spiritual or emotional distress.     Georges George  Staff   Spiritual Health   [phone]       
0705- Bedside and Verbal shift change report given to Gracia RN (oncoming nurse) by Nitza RN (offgoing nurse). Report included the following information SBAR, Intake/Output, MAR, Recent Results, and Cardiac Rhythm NSR .     1553- RRT called due to patient's unresponsiveness to sternal rub. Patient pulls arms back when painful stimuli applied. Vital Signs: 78 HR, 16 RR, 96% O2, 121/68 BP. BGL= 212    Respiratory Therapist in room, placed patient on bipap.    RRT team in room. ABG, 12 Lead EKG, Labs, XR Chest ordered.     1600- Vitals: 76 HR, 23 RR, 99% O2, 96/66 BP.     1623- Vitals: 77 HR, 25 RR, 100% O2, 124/77 BP. RRT end.    1910- Bedside and Verbal shift change report given to Nitza BECERRIL (oncoming nurse) by Gracia RN (offgoing nurse). Report included the following information SBAR, Intake/Output, MAR, Recent Results, and Cardiac Rhythm NSR.              
0840: While giving meds to the patient,pt stated he  wanted to go home.patient is Aox4. Notified the MD that pt wanted to home. Explained to the patient that before DC we need to get a proper equipment to get home. Labs this morning is not taken due to pt is refusing it. Explained to the patient that we need blood sample and patient agreed to draw his blood.     0945: patient wanted to sign the AMA form. MD at bedside and explained the risk of leaving the hospital without proper equipment at home.  Patient insist and wanted to go home.  AMA form signed and documented.  Tele mon and PIV removed.    
ABG done, results in chart. Vent settings adjusted-       02/05/25 1128   Ventilator Settings   FiO2  (S)  50 %  (Weaned following ABG)   Insp Time (sec) 0.8 sec   Resp Rate (Set) (S)  12 bpm  (REduced following ABG)   PEEP/CPAP (cmH2O) 10   Target Vt 480       
Assumed care of patient from JONE Goddard (off going nurse). Patient appears to be sleeping. Currently on BiPAP. No apparent distress noted. Patient offers no concerns and can verbalize needs. Assessment to follow. Call bell within reach. Bed in lowest, locked position.   
Attempted pt at 842. Pt unable to be seen 2/2 currently on BiPAP and has had low BS this morning. Will continue to follow up as schedule allows. Thank you]  BRITT Bryant/MARYANNE  
Attending Non-violent Restraint Reevaluation     I have reevaluated the patient one hour after initiation of intervention. The patient is comfortable, uninjured, but continues to pose an imminent risk of injury to self to themselves and/or serious disruption of medical treatment required to keep patient stable.     The patient's current medical and behavioral conditions that warrant the use intervention include Behavioral management problems and Pulling out devices:  Unable to follow directions/instructions.  Restraint or seclusion will be discontinued at the earliest possible time, regardless of the scheduled expiration of the order.    Based on my evaluation, restraints will be continued: Yes    Marissa Subramanian PA-C   02/06/25   Pulmonary, Critical Care Medicine  [facility]      
Bedside and Verbal shift change report given to Jasvir Doss RN (oncoming nurse) by Roberto Carlos Laureano RN (offgoing nurse). Report included the following information Nurse Handoff Report, Intake/Output, MAR, Recent Results, and Cardiac Rhythm NSR / Sinus william .    
Bedside and Verbal shift change report given to Nitza Gaitan RN (oncoming nurse) by Gracia Alcantara RN (offgoing nurse). Report included the following information Nurse Handoff Report, Adult Overview, and Cardiac Rhythm SR .     0000:  Patient not ready to go on Bipap yet. This RN encouraged using the bipap. RT notified.  
Bedside and Verbal shift change report given to Nitza Gaitan RN (oncoming nurse) by Roberto Carlos Laureano RN (offgoing nurse). Report included the following information Nurse Handoff Report, Adult Overview, and Cardiac Rhythm NSR .       0413:  Patient expressing wanting to get in wheelchair. Appears restless. Patient off Bipap and requesting ginger ale. This nurse added Honey thick thicken to ginger ale.    0520:  Patient pulled IV out and Hi rosales. Patient was uncooperative at this time. This RN referred to patient as \"Mr. Rogers\" patient responded that his name is Thomas. Spoke to Dr. Lakhani this morning and his emergency contact, Jamilah. Murali and Zyprexa ordered. Jamilah plans to come in this AM.   
Bedside and Verbal shift change report given to Roberto Carlos Laureano RN   (oncoming nurse) by Jasvir RN (offgoing nurse). Report included the following information Nurse Handoff Report, Index, MAR, Recent Results, and Cardiac Rhythm SR .     
Bedside and Verbal shift change report given to Roberto Carlos Laureano RN   (oncoming nurse) by Margarette BECERRIL (offgoing nurse). Report included the following information Nurse Handoff Report, Index, MAR, Recent Results, and Cardiac Rhythm SB .     
Bedside and Verbal shift change report given to Roberto Carlos Laureano RN   (oncoming nurse) by Margarette BECERRIL (offgoing nurse). Report included the following information Nurse Handoff Report, Index, MAR, Recent Results, and Cardiac Rhythm SB .     
Bedside and Verbal shift change report given to Roberto Carlos Laureano RN   (oncoming nurse) by Nitza BECERRIL (offgoing nurse). Report included the following information Nurse Handoff Report, Index, MAR, Recent Results, and Cardiac Rhythm SR .     
Bedside and Verbal shift change report given to Roberto Carlos Laureano RN   (oncoming nurse) by Nitza BECERRIL (offgoing nurse). Report included the following information Nurse Handoff Report, Index, MAR, Recent Results, and Cardiac Rhythm SR .     
Cardiology Progress Note    Admit Date: 1/28/2025  Attending Cardiologist: Dr. Brantley    IMPRESSION  Acute hypoxic respiratory failure, CT abdomen pelvis without contrast new bibasilar dependent opacities in the lungs may represent atelectasis or pneumonia aspiration pneumonia should be excluded, chest x-ray 1/29/2025 bilateral mid to lower lung subsegmental atelectasis or scarring  History of bradycardia with hypothermia, EKG 1/1/2025 sinus bradycardia with marked sinus arrhythmia otherwise normal EKG, 2D echo 1/29/2025 ejection fraction 55 to 60%  Tricuspid valve mild to moderate regurgitation by 2D echo 1/29/2025  Sinus tachycardia, may be secondary to sepsis aspiration pneumonia  Moderate right effusion by chest x-ray 2/2/2025  History of sarcoidosis pulmonary nodular amyloidosis  Hypokalemia  Hypothermia  HTN - normotensive     PLAN  Monitor blood pressure, adjust antihypertensive medications as necessary.  Currently on 5 mg p.o. 3 times daily, home dose of amlodipine currently held as well as home triamterene hydrochlorothiazide  Recommend correct electrolytes, Potassium >4, Magnesium >2  Check proBNP.    Patient without high risk cardiac sarcoid findings on echocardiogram or EKG would have lower yield for further cardiac imaging for cardiac sarcoidosis.  Statin if can tolerate prior to discharge.     Thank you for this consultation and for allowing us to participate in this patient's care.     Primary cardiologist not applicable Dr. Brantley Consult    Subjective:     Denies chest pain  Denies shortness of breath  Denies abdominal pain    Objective:      Patient Vitals for the past 8 hrs:   Temp Pulse Resp BP SpO2   02/02/25 0801 -- -- -- -- 97 %   02/02/25 0700 98.1 °F (36.7 °C) 83 25 (!) 149/101 99 %   02/02/25 0600 97.9 °F (36.6 °C) 81 22 (!) 153/78 100 %   02/02/25 0500 97.5 °F (36.4 °C) 78 21 131/85 100 %   02/02/25 0400 97.3 °F (36.3 °C) 77 21 (!) 151/80 100 %   02/02/25 0300 96.8 °F (36 °C) 82 27 139/83 
Cardiology Progress Note    Admit Date: 1/28/2025  Attending Cardiologist: Dr. Muro     Assessment:     -Acute hypoxic respiratory failure, currently on HFNC.   -Acute toxic/metabolic encephalopathy   -Aspiration PNA   -Sinus bradycardia in setting of hypothermia, resolved.   -Moderate right pleural effusion, Mucus plug   -Shock, multifactorial, resolved.    -HTN   -DARRIAN   -Hypernatremia   -COPD, DOUG, Sarcoidosis, Pulmonary nodular amyloidosis      Plan:     HR currently stable in the 70s.   BP stable but would continue to hold antihypertensives at this time.   Avoiding aggressive diuresis at this time with hypernatremia, nephrology following.   Continue supportive care.     Subjective:     On HFNC     Objective:      Patient Vitals for the past 8 hrs:   Temp Pulse Resp BP SpO2   02/03/25 0700 98.1 °F (36.7 °C) 72 14 123/61 95 %   02/03/25 0600 98.2 °F (36.8 °C) 75 (!) 32 113/70 96 %   02/03/25 0500 98.4 °F (36.9 °C) 75 18 124/83 94 %   02/03/25 0400 98.6 °F (37 °C) 77 18 (!) 115/94 97 %   02/03/25 0300 98.8 °F (37.1 °C) 79 18 133/66 98 %   02/03/25 0200 98.8 °F (37.1 °C) 80 23 125/77 96 %   02/03/25 0100 98.8 °F (37.1 °C) 81 20 (!) 149/69 99 %         Patient Vitals for the past 96 hrs:   Weight   01/31/25 0600 67.3 kg (148 lb 5.9 oz)   01/30/25 1206 72.6 kg (160 lb)       TELE: normal sinus rhythm               Current Facility-Administered Medications   Medication Dose Route Frequency    sodium chloride (Inhalant) 3 % nebulizer solution 4 mL  4 mL Nebulization Q6H    And    albuterol (ACCUNEB) nebulizer solution 1.25 mg  1.25 mg Nebulization Q6H    [Held by provider] midodrine (PROAMATINE) tablet 5 mg  5 mg Per NG tube TID WC    famotidine (PEPCID) 20 mg in sodium chloride (PF) 0.9 % 10 mL injection  20 mg IntraVENous BID    hydrocortisone sodium succinate PF (SOLU-CORTEF) injection 50 mg  50 mg IntraVENous Q12H    acetaminophen (TYLENOL) tablet 650 mg  650 mg Oral Q6H PRN    Or    acetaminophen (TYLENOL) 
Chart reviewed   Na little Up , looks like he is off TF now , needs to encourage free water intake , if trends up more may need D5W   Will follow   
Chart reviewed  Noted worsening hypoxia , reviewed CXR new rt sided effusion  D/c ivf and gentle diuresis   Lasix 20 mg IV X 1  BP ok   DARRIAN resolving ( baseline creat 0.6)     Please call with questions    Sancho Vasquez MD FASN  Cell [phone]  Pager: [phone]  Fax   [phone]   
Comprehensive Nutrition Assessment    Type and Reason for Visit:  Reassess    Nutrition Recommendations/Plan:   Continue current diet as tolerated per SLP recs.   Continue oral supplements: Magic Cup (each provides 290 kcal, 9g protein) once daily  Continue multivitamin, folic acid, thiamine supplementation daily.  Daily wts.  Continue to monitor tolerance of PO, compliance of oral supplements, weight, labs, and plan of care during admission.     Malnutrition Assessment:  Malnutrition Status:  Severe malnutrition (02/12/25 0858)    Context:  Acute Illness     Findings of the 6 clinical characteristics of malnutrition:  Energy Intake:  75% or less of estimated energy requirements for 7 or more days  Weight Loss:  Greater than 5% over 1 month     Body Fat Loss:  Moderate body fat loss Triceps, Orbital (mild buccal)   Muscle Mass Loss:  Moderate muscle mass loss Temples (temporalis), Clavicles (pectoralis & deltoids) (no thigh/calf/hand)  Fluid Accumulation:  Mild Extremities, Generalized   Strength:  Not Performed    Nutrition Assessment:    Admitted with c/o generalized weakness, AMS. 1/29: resp failure, intubated and transferred to ICU. 1/30 s/p extubation with NGT in place. SLP advance PO diet from full/mod thick to soft/bite sized, moderately thick liquids on 2/3. Per chart review, pt pulled NGT 2/4. SLP continues to follow, last eval 2/12- rec'd easy to chew solids and honey-thick liquids. Per flow sheets, meal intake 1-100%, with 7/10 entries <50%. No supplement intake recorded. Attempted to visit pt- RT in room x2. Lunch tray had not yet been delivered, will re-attempt to assess meal and supplement intake at follow-up.    Nutrition Related Findings:    Pertinent Meds:   Liquid MVI/minerals  Colace  Folic acid  Lactobacillus  Protonix  thiamine Pertinent Labs:  Recent Labs     02/16/25  0743 02/16/25  1605 02/16/25  1810 02/16/25 2016 02/17/25  0347   GLUCOSE 95  --  104*  --  86   BUN 23*  --  22*  --  22* 
Comprehensive Nutrition Assessment    Type and Reason for Visit:  Reassess    Nutrition Recommendations/Plan:   Continue current diet as tolerated per SLP recs.  Order/trial Magic Cup (each provides 290 kcal, 9g protein) once daily.  Encourage PO intake. Order feeding assist with meals. Please document PO intake in flowsheets, even if 0%.   Continue multivitamin, folic acid, thiamine supplementation daily  Daily wts.   Continue to monitor tolerance of PO, compliance of oral supplements, weight, labs, and plan of care during admission.     Malnutrition Assessment:  Malnutrition Status:  Severe malnutrition (02/12/25 0858)    Context:  Acute Illness     Findings of the 6 clinical characteristics of malnutrition:  Energy Intake:  75% or less of estimated energy requirements for 7 or more days  Weight Loss:  Greater than 5% over 1 month     Body Fat Loss:  Moderate body fat loss Triceps, Orbital (mild buccal)   Muscle Mass Loss:  Moderate muscle mass loss Temples (temporalis), Clavicles (pectoralis & deltoids) (no thigh/calf/hand)  Fluid Accumulation:  Mild Extremities, Generalized   Strength:  Not Performed    Nutrition Assessment:    Admitted with c/o generalized weakness, AMS. 1/29: resp failure, intubated and transferred to ICU. 1/30 s/p extubation with NGT in place. SLP advance PO diet from full/mod thick to soft/bite sized, moderately thick liquids on 2/3. Per chart review, pt pulled NGT 2/4. Required emergent re-intubation 2/5; extubation to BIPAP 2/6. SLP advanced PO diet to regular, mildly thick liquids 2/7; last eval 2/10 rec'd easy to chew, moderately thick liquids. Visited pt, has not had breakfast yet. States eating better than before, consuming most of his meals. Performed NFPE. States NKFA, UBW PTA was 185 lb. Bed scale taken this date 77.5 kg. Per RN, pt usually has family assist with feeding, normally consuming 1-25% of breakfast, ~50-75% of lunch/dinner. Will add oral supplements to increase 
Comprehensive Nutrition Assessment    Type and Reason for Visit:  Reassess    Nutrition Recommendations/Plan:   Continue full, moderately thick liquids per SLP recs. Diet advancement per SLP.  Order Magic Cup (each provides 290 kcal, 9g protein) BID and Gelatein (each provides 80 kcal, 20g protein) once daily.  Daily wts.  Continue to monitor tolerance of PO, compliance of oral supplements, weight, labs, and plan of care during admission.         Malnutrition Assessment:  Malnutrition Status:  At risk for malnutrition (s/p ext, full/mod thick liquids only per SLP) (01/31/25 1216)    Context:  Acute Illness       Nutrition Assessment:    Admitted with c/o generalized weakness, AMS. 1/29: resp failure, intubated and transferred to ICU. 1/30 s/p extubation. Noted Positive Nutrition Screen - PI. Discussed care during interdisciplinary rounds. Per RN, pt receiving lytes replacements. Noted SLP eval, pt presents with mod oropharyngeal dysphagia, rec'd full/moderately thick liquids only. Will add oral supplements to increase calorie/protein intake opportunity. Monitor PO intake and diet advancement.    Nutrition Related Findings:    Pertinent Meds:   Pepcid  Solucortef  Zosyn  KCl 10 mEq  Vanco  D5 (held per MAR) Pertinent Labs:  Recent Labs     01/30/25  0300 01/30/25  1010 01/30/25  1730 01/31/25  0330   GLUCOSE 121* 149* 147* 149*   BUN 47* 44*  --  39*   CREATININE 1.41* 1.43*  --  1.29   * 150* 147* 147*   K 2.9* 3.5 3.6 3.2*   * 115*  --  112*   CO2 31 30  --  30   CALCIUM 8.5 8.5  --  8.7   PHOS 3.1  --   --  2.8   MG 1.9 2.6  --  2.4     Recent Labs     01/29/25  0604 01/29/25  0912 01/29/25  1718 01/29/25  2344 01/30/25  0547 01/30/25  1212 01/31/25  0203 01/31/25  0647   POCGLU 77 107 195* 180* 173* 145* 180* 160*       Last BM:  (PTA)    Skin: Wound Type: Pressure Injury, Stage II    Edema:    Generalized   Edema Generalized: Trace     Current Nutrition Intake & Therapies:    Average Meal Intake:  
Comprehensive Nutrition Assessment    Type and Reason for Visit:  Reassess, NPO/clear liquid    Nutrition Recommendations/Plan:   Advance PO diet as tolerated when medically feasible per MD/SLP recs.  Order appropriate ONS once diet advances.  Encourage PO intake. Please document PO intake in flowsheets, even if 0%.   Continue multivitamin daily.   Order thiamine 200 mg IV daily.  Daily wts.  Continue to monitor readiness for diet advancement or nutrition support, weight, labs, and plan of care during admission.     Malnutrition Assessment:  Malnutrition Status:  Severe malnutrition (02/10/25 0821)    Context:  Acute Illness     Findings of the 6 clinical characteristics of malnutrition:  Energy Intake:  50% or less of estimated energy requirements for 5 or more days  Weight Loss:  Greater than 2% over 1 week     Body Fat Loss:  Mild body fat loss Fat Overlying Ribs   Muscle Mass Loss:  Mild muscle mass loss Temples (temporalis), Clavicles (pectoralis & deltoids)  Fluid Accumulation:  Unable to assess Extremities, Generalized   Strength:  Not Performed    Nutrition Assessment:    Admitted with c/o generalized weakness, AMS. 1/29: resp failure, intubated and transferred to ICU. 1/30 s/p extubation with NGT in place. SLP advance PO diet from full/mod thick to soft/bite sized, moderately thick liquids on 2/3. Per chart review, pt pulled NGT 2/4. Required emergent re-intubation 2/5; extubation to BIPAP 2/6. SLP advanced PO diet to regular, mildly thick liquids 2/7. [facility] RN, pt was not eating much PO, drank thickened ensure with family encouragement. Made NPO 2/10. Discussed care with Dr. Denis - suspect pt will not eat enough via PO diet alone; may need to consider supplemental artifical nutrition? MD to talk with pt/family. Nutritional needs are not being met.    Meal Intake: Provides on average ~28% kcal, 30% protein of estimated needs  Patient Vitals for the past 168 hrs:   PO Meals 
First 12-lead showed lead reversal so was repeated  Per repeat 12-lead interpretation, sinus bradycardia with marked sinus arrhythmia, but otherwise normal ECG  
[facility]      Previous Regimen Famotidine 20mg IV daily   Serum Creatinine No results found for: \"SERENA\", \"CREAPOC\"   Creatinine Clearance Estimated Creatinine Clearance: 55 mL/min (based on SCr of 1.2 mg/dL).   BUN Lab Results   Component Value Date/Time    BUN 38 02/01/2025 02:05 AM           The following medication: famotidine was automatically dose-adjusted per [facility] P&T Committee Protocol, with respect to renal function.      Dosage changed to:  famotidine 20mg IV BID    Additional notes:    Pharmacy to continue to monitor patient daily.   Will make dosage adjustments based upon changing renal function.  Signed MIRIAM MOCK RPH.     
HFNC setting adjustment-       02/09/25 0734   Oxygen Therapy/Pulse Ox   O2 Therapy Oxygen humidified   $Oxygen $Daily Charge   O2 Device Heated high flow cannula  (Vapotherm)   O2 Flow Rate (L/min) 35 L/min   FiO2  (S)  50 %  (Weaned from 60)   Pulse 60   Respirations 20   SpO2 100 %   Skin Assessment Clean, dry, & intact   Humidification Source Heated wire   Humidification Temp 36   Circuit Condensation Drained   Pulse Oximeter Device Mode Continuous   $Pulse Oximeter $Spot check (multiple/continuous)       
Holding PT treatment d/t currently receiving blood transfusion. H/H 6.6/21.7. Discussed with JONE Luu, preparing for transfer to CVT. Will follow up.   
Infectious Disease progress Note        Reason: Evaluate for sepsis, worsening leukocytosis, pneumonia    Current abx Prior abx   Piperacillin/tazobactam 1/29 -2/4 , restarted 2/5 Vancomycin 1/29-1/31     Lines:       Assessment :  64 old man with past medical history significant for hypertension, gout, peripheral neuropathy, obesity admitted to [facility] on 1/28/2025 for altered mental status.    Septic shock-present on admission due to aspiration pneumonia    Now with persistent leukocytosis    Clinical presentation consistent with sepsis, acute hypoxic respiratory failure-likely due to recurrent aspiration pneumonia    Intubated 1/29-1/30, 2/5-2/6    Status post bronchoscopy 2/5-Significant mucus plugging noted in right-sided lung including right mainstem, right upper lobe, right middle lobe, right lower lobe.   Sputum cx 1/29-normal respiratory magda    Worsening leukocytosis noted 2/6 despite improvement in respiratory status likely due to steroids/significant pneumonia.  Monitor for partially treated pneumonia secondary to piperacillin resistant gram-negative's, gram-positive's    Clinically better.  Improving leukocytosis.  Remains on high flow on today's exam    Recommendations:    D/c piperacillin/tazobactam - monitor off antibiotics  Taper steroids per primary team  Aspiration precautions  Wean oxygen as tolerated     Above plan was discussed in details with patient, family . Please call me if any further questions or concerns. Will continue to participate in the care of this patient.    HPI:    Feels better.  Denies abdominal pain, nausea, vomiting.  Improved breathing.      Past Medical History:   Diagnosis Date    Essential hypertension     Essential hypertension, benign     Gout     Hypogonadism male     Impotence     Microscopic hematuria     Myopathy     Obesity, unspecified     Obstructive sleep apnea     on bipap    Other chronic pulmonary heart diseases     Peripheral neuropathy 10/25/2012    Sarcoid 
Infectious Disease progress Note        Reason: Evaluate for sepsis, worsening leukocytosis, pneumonia    Current abx Prior abx   Piperacillin/tazobactam 1/29 -2/4 , restarted 2/5 Vancomycin 1/29-1/31     Lines:       Assessment :  64 old man with past medical history significant for hypertension, gout, peripheral neuropathy, obesity admitted to [facility] on 1/28/2025 for altered mental status.    Septic shock-present on admission due to aspiration pneumonia    Now with persistent leukocytosis    Clinical presentation consistent with sepsis, acute hypoxic respiratory failure-likely due to recurrent aspiration pneumonia    Intubated 1/29-1/30, 2/5-2/6    Status post bronchoscopy 2/5-Significant mucus plugging noted in right-sided lung including right mainstem, right upper lobe, right middle lobe, right lower lobe.   Sputum cx 1/29-normal respiratory magda    Worsening leukocytosis noted 2/6 despite improvement in respiratory status likely due to steroids/significant pneumonia.  Monitor for partially treated pneumonia secondary to piperacillin resistant gram-negative's, gram-positive's    Clinically better.  Improving leukocytosis.  Remains on high flow on today's exam    Recommendations:    Continue piperacillin/tazobactam till 2/12  Taper steroids per primary team  Aspiration precautions  Wean oxygen as tolerated     Above plan was discussed in details with patient, family . Please call me if any further questions or concerns. Will continue to participate in the care of this patient.    HPI:    Feels better.  Denies abdominal pain, nausea, vomiting.  Improved breathing.      Past Medical History:   Diagnosis Date    Essential hypertension     Essential hypertension, benign     Gout     Hypogonadism male     Impotence     Microscopic hematuria     Myopathy     Obesity, unspecified     Obstructive sleep apnea     on bipap    Other chronic pulmonary heart diseases     Peripheral neuropathy 10/25/2012    Sarcoid 10/25/2012    
Infectious Disease progress Note        Reason: Evaluate for sepsis, worsening leukocytosis, pneumonia    Current abx Prior abx   Piperacillin/tazobactam 1/29 -2/4 , restarted 2/5 Vancomycin 1/29-1/31     Lines:       Assessment :  64 old man with past medical history significant for hypertension, gout, peripheral neuropathy, obesity admitted to [facility] on 1/28/2025 for altered mental status.    Septic shock-present on admission due to aspiration pneumonia    Now with persistent leukocytosis    Clinical presentation consistent with sepsis, acute hypoxic respiratory failure-likely due to recurrent aspiration pneumonia    Intubated 1/29-1/30, 2/5-2/6    Status post bronchoscopy 2/5-Significant mucus plugging noted in right-sided lung including right mainstem, right upper lobe, right middle lobe, right lower lobe.   Sputum cx 1/29-normal respiratory magda    Worsening leukocytosis noted 2/6 despite improvement in respiratory status likely due to steroids/significant pneumonia.  Monitor for partially treated pneumonia secondary to piperacillin resistant gram-negative's, gram-positive's    Clinically better.  Improving leukocytosis.  Comfortable on high flow on today's exam    Recommendations:    Continue piperacillin/tazobactam  Taper steroids per ICU team  Aspiration precautions  Monitor CBC, temperature, procalcitonin, clinically     Above plan was discussed in details with patient, family and dr Lynn. Please call me if any further questions or concerns. Will continue to participate in the care of this patient.    HPI:    Feels better.  Denies abdominal pain, nausea, vomiting.  Improved breathing.      Past Medical History:   Diagnosis Date    Essential hypertension     Essential hypertension, benign     Gout     Hypogonadism male     Impotence     Microscopic hematuria     Myopathy     Obesity, unspecified     Obstructive sleep apnea     on bipap    Other chronic pulmonary heart diseases     Peripheral neuropathy 
Infectious Disease progress Note        Reason: Evaluate for sepsis, worsening leukocytosis, pneumonia    Current abx Prior abx   Piperacillin/tazobactam 1/29 -2/4 , restarted 2/5 Vancomycin 1/29-1/31     Lines:       Assessment :  64 old man with past medical history significant for hypertension, gout, peripheral neuropathy, obesity admitted to [facility] on 1/28/2025 for altered mental status.    Septic shock-present on admission due to aspiration pneumonia    Now with persistent leukocytosis    Clinical presentation consistent with sepsis, acute hypoxic respiratory failure-likely due to recurrent aspiration pneumonia    Intubated 1/29-1/30, 2/5-2/6    Status post bronchoscopy 2/5-Significant mucus plugging noted in right-sided lung including right mainstem, right upper lobe, right middle lobe, right lower lobe.   Sputum cx 1/29-normal respiratory magda    Worsening leukocytosis noted 2/6 despite improvement in respiratory status likely due to steroids/significant pneumonia.  Monitor for partially treated pneumonia secondary to piperacillin resistant gram-negative's, gram-positive's    Clinically better.  Improving leukocytosis.  Remains on high flow on today's exam    Recommendations:    monitor off antibiotics  Taper steroids per primary team  Aspiration precautions  Wean oxygen as tolerated    Will sign off. Follow up prn. thanks     Above plan was discussed in details with patient, family . Please call me if any further questions or concerns. Will continue to participate in the care of this patient.    HPI:    No new complaints.  Denies abdominal pain, nausea, vomiting.  Improved breathing.      Past Medical History:   Diagnosis Date    Essential hypertension     Essential hypertension, benign     Gout     Hypogonadism male     Impotence     Microscopic hematuria     Myopathy     Obesity, unspecified     Obstructive sleep apnea     on bipap    Other chronic pulmonary heart diseases     Peripheral neuropathy 
MRI screening form needs to be filled out and faxed to [phone] BEFORE MRI can be scheduled.  If unable to obtain information from patient , [facility] needs to be contacted . If patient is claustrophobic or will needs pain meds, please have ordered in advance in order to facilitate exam.   
New OT orders received and chart reviewed. Unable to see pt for OT evaluation at this time due to:  Evaluation orders noted from 1/28/2025. Pt had a change in medical status and was intubated last night, now remains intubated and being transferred to ICU. We will complete current OT order.  Please re-order when pt's condition improves and pt will be able to participate in OT. Thank you for this referral.    Karis Torres MS, OTR/L  
New OT orders received with patient already on caseload. Will follow up as appropriate. Acknowledged new orders.         Thank you,   Sofía Case MS, OTR/L    
Nighttime hospitalist note  RN called stating that patient has bradycardia during his sleep.  RN states patient is asymptomatic.  RN states that when patient wakes up heart rate improves.  Will order an EKG.  Recent echocardiogram noted.  If bradycardia is persistent please consider cardiology consultation -will defer to the daytime rounding hospitalist  
Nutrition Note    Admitted with c/o generalized weakness, AMS. 1/29: resp failure, intubated and transferred to ICU. 1/30 s/p extubation. SLP following, advance PO diet from full/mod thick to soft/bite sized, moderately thick liquids on 2/3. Per chart review, pt pulled NGT 2/4. Required emergent re-intubation 2/5. Discussed care during interdisciplinary rounds. Per RN, pt tolerating trickle feeds. Continues with hypernatremia, on D10. Ok to advance TF and increase  ml q1h per MD. ICU added bowel regimen. Continue to monitor tolerance.    Nutrition Related Findings:   Pertinent Meds:   Pepcid  Solumedrol  Zosyn  Glycolax  D10 @ 75 ml/hr (180g dex, 612 kcal)  Levo @ 7 mcg/min  Pertinent Labs:  Recent Labs     02/05/25  0355 02/05/25  1122 02/05/25  1424 02/06/25  0218   GLUCOSE 128*  --  154* 97   BUN 26*  --  23* 24*   CREATININE 0.72   < > 0.76 0.83   *  --  153* 149*   K 3.9  --  3.3* 3.6   *  --  115* 114*   CO2 35*  --  35* 33*   CALCIUM 9.1  --  8.7 9.2   PHOS 3.6  --  1.5* 3.3   MG 3.0*  --   --  2.7*    < > = values in this interval not displayed.     Recent Labs     02/05/25  0606 02/05/25  1110 02/05/25  1122 02/05/25  1813 02/05/25  2336 02/06/25  0229 02/06/25  0543 02/06/25  0544   POCGLU 198* 145* 203* 90 78 79 53* 82       Last BM: PTA    Skin: Wound Type: Pressure Injury, Stage II    Edema:    Right upper extremity, Left upper extremity   Edema Generalized: Trace  RUE Edema: +2  LUE Edema: +2           Estimated Daily Nutrient Needs: 65.3 kg  Energy Requirements Based On: Formula  Weight Used for Energy Requirements: Current  Energy (kcal/day): 2144-0284 ([facility] 2003b x 0.9-1.1)  Weight Used for Protein Requirements: Current  Protein (g/day):  (1.2-2)  Method Used for Fluid Requirements: 1 ml/kcal  Fluid (ml/day): 3250-7895    Nutrition Recommendations/Plan:   Modify tube feeding regimen:   Formula: Vital HP (Peptide High Protein)  Advance as tolerated by 10 ml q 4 
PCCM Update:    Patient noted to be hypoglycemic. Maintenance fluids changed from LR to D5 1/2NS @75cc's/hr. Dr. Vasquez aware.    Juliet Mejía PA-C  01/29/25  Pulmonary, Critical Care Medicine  [facility]      
PCCM follow/up  Patient with confusion, desats. Place back on bipap. Obtain ABG.    Parts of this note may contain dictated portions.    Rosita Denton PA-C  02/09/25  Pulmonary, Critical Care Medicine  [facility]     Addendum, ABG compensated, hold bipap.  
PT K came back 3.2 despite maintenance fluids ordered. Replaced with 40mEQ effer-K per replacement protocol ordered.   
Patient extubated and placed on NIV with charted settings on flowsheet.     Good cuff leak prior to extubation.      02/06/25 1520   Vent Information   Ventilator Discontinue Yes       
Patient is not ready for night time BiPAP use. Will place patient on after 2230.   
Patient is not ready for night time CPAP use. Patient has Home CPAP at bedside. Will check with patient at 2300 to see if he is ready to be placed on his CPAP.   
Patient is ready for night time BiPAP        02/11/25 2226   NIV Type   Suction Setup and Functional Yes   NIV Started/Stopped On   Equipment Type V60   Mode AVAPS   Mask Type Full face mask   Mask Size Small   Bonnet size Small   Settings/Measurements   PIP Observed 11 cm H20   IPAP 10 cmH20   CPAP/EPAP 5 cmH2O   IPAP Min 10 cmH2O   IPAP Max 22 cmH2O   Vt (Set, mL) 400 mL   Vt (Measured) 369 mL   Rate Ordered 10   Insp Rise Time (%) 3 %   FiO2  50 %   I Time/ I Time % 0.9 s   Minute Volume (L/min) 5.9 Liters   Mask Leak (lpm) 35 lpm   Patient's Home Machine No       
Patient is ready for night time BiPAP use.      
Patient placed on HHFNC per MD request. Patient tolerating well at this time.        02/07/25 0913   Oxygen Therapy/Pulse Ox   O2 Device (S)  Heated high flow cannula   O2 Flow Rate (L/min) 30 L/min   FiO2  40 %   Pulse 62   Respirations 19   Humidification Source Heated wire   Humidification Temp 33       
Patient placed on NIV after extubation with below settings.        02/06/25 1525   NIV Type   Suction Setup and Functional Yes   NIV Started/Stopped On   Equipment Type V60   Mode Bilevel   Mask Type Full face mask   Mask Size Small   Assessment   Using Accessory Muscles Yes   Mask Compliance Good   Skin Assessment Clean, dry, & intact   Skin Protection for O2 Device No   Breath Sounds   Respiratory Pattern Regular   Settings/Measurements   PIP Observed 10 cm H20   IPAP 10 cmH20   CPAP/EPAP 5 cmH2O   Vt (Measured) 434 mL   Rate Ordered 8   Insp Rise Time (%) 3 %   FiO2  40 %   I Time/ I Time % 3 s   Minute Volume (L/min) 8.5 Liters   Mask Leak (lpm) 63 lpm   Humidity   (turned on)   Patient's Home Machine No   Alarm Settings   Alarms On Y   Low Pressure (cmH2O) 5 cmH2O   High Pressure (cmH2O) 40 cmH2O   RR Low (bpm) 8   RR High (bpm) 40 br/min       
Patient refuses MetaNeb and BiPAP at this time. Agrees to go on in about an hour.    02/17/25 0021   Breath Sounds   Respiratory Pattern Regular   Breath Sounds Bilateral Diminished   Oxygen Therapy/Pulse Ox   O2 Therapy Oxygen humidified   O2 Device Heated high flow cannula   O2 Flow Rate (L/min) 20 L/min   FiO2  40 %   SpO2 95 %       
Patient switched to AVAPS mode after discussing with Dr. Lynn who was in agreement.          02/06/25 0974   NIV Type   Mode (S)  AVAPS   Mask Type Full face mask   Mask Size Small   Assessment   Level of Consciousness 0   Comfort Level Good   Using Accessory Muscles No   Mask Compliance Good   Skin Assessment Clean, dry, & intact   Skin Protection for O2 Device No   Breath Sounds   Respiratory Pattern Regular   Settings/Measurements   PIP Observed 10 cm H20   CPAP/EPAP 5 cmH2O   IPAP Min 10 cmH2O   IPAP Max 22 cmH2O   Vt (Set, mL) 400 mL   Vt (Measured) 413 mL   Rate Ordered 10   Insp Rise Time (%) 3 %   FiO2  40 %   I Time/ I Time % 0.9 s   Minute Volume (L/min) 7.2 Liters   Mask Leak (lpm) 61 lpm   Humidity 30.9   Patient's Home Machine No   Alarm Settings   Alarms On Y   Low Pressure (cmH2O) 5 cmH2O   High Pressure (cmH2O) 40 cmH2O   RR Low (bpm) 8   RR High (bpm) 40 br/min       
Patient tolerated well   02/18/25 1198   Treatment   $Bronchial Hygiene $Oscillatory therapy  (Vest completed for ten min)       
Patient was transported to CVT with RN and this RT. He was placed on NRB during transport.    Patient requested to go on the NIV instead of the HHFNC at this time. Placed with below settings.    This RT gave report to the CVT RT.       02/10/25 0929   NIV Type   NIV Started/Stopped On   Equipment Type V60   Mode AVAPS   Mask Type Full face mask   Mask Size Small   Assessment   Respirations 18   Level of Consciousness 0   Comfort Level Good   Using Accessory Muscles No   Mask Compliance Good   Skin Protection for O2 Device Yes   Location Ear   Intervention(s) Ear Cushion   Settings/Measurements   PIP Observed 11 cm H20   CPAP/EPAP 5 cmH2O   IPAP Min 10 cmH2O   IPAP Max 22 cmH2O   Vt (Set, mL) 400 mL   Vt (Measured) 379 mL   Rate Ordered 10   Insp Rise Time (%) 3 %   FiO2  50 %   I Time/ I Time % 0.9 s   Minute Volume (L/min) 7 Liters   Mask Leak (lpm) 40 lpm   Humidity   (turned on)   Patient's Home Machine No       
Per CNA, Patient BG 60, rechecked at 40.  Patient assessed and opens eyes. Currently on Bi-pap. D10 administered.  Will reassess in 15 minutes.      0804- Patient BG rechecked, BG 41 on right hand. Patient remains responsive.     0806- BG repeated and is now reading  33 on right hand    0808 - 2nd dose of D10 administered.   0812- BG checked on left hand and reading 93. MD notified of hypoglycemic episode. Will continue to monitor. Call bell within reach.      
Physical Therapy    New PT orders received and chart reviewed. Unable to see pt for PT evaluation at this time due to:  Evaluation orders noted from 1/28/2025. Pt had a change in medical status and was intubated last night, now remains intubated and being transferred to ICU. We will complete current PT order.  Please re-order when pt's condition improves and pt will be able to participate in PT. Thank you for this referral.    Basia Herrera, PT, DPT     
Physical Therapy  Pt not seen for skilled PT due to:    Multiple treatment team members at bedside >10 minutes. Unavailable for PT session at this time.     Will f/u later as schedule allows. Thank you.  TASIA BazanT       
Physical Therapy  Pt not seen for skilled PT due to:    Patient politely refusing PT interventions this date, reporting plans to discharge home today. Caregiver actively packing up room/belongings. Intervention options, including additional caregiver training, offered; however, patient and caregiver politely declining.     Thank you.  TASIA BazanT       
Physical Therapy  Pt not seen for skilled PT due to:    Patient politely refusing any/all mobility, requesting rest this date. Multiple intervention options offered, but patient continued to decline.     Thank you.  TASIA BazanT       
Physical Therapy  Pt underwent emergent intubation due to respiratory insufficiency, PT order will be completed and new order needed when pt is appropriate to continue with therapy.  Thank you.      Keesha Richards, PT, DPT     
Placed on AVAPS     02/17/25 0215   NIV Type   Mode AVAPS   Mask Type Full face mask   Mask Size Medium   Assessment   SpO2 95 %   Breath Sounds   Respiratory Pattern Regular   Breath Sounds Bilateral Diminished   Settings/Measurements   PIP Observed 19 cm H20   IPAP Min 10 cmH2O   IPAP Max 20 cmH2O   Vt (Set, mL) 400 mL   Vt (Measured) 402 mL   Rate Ordered 10   FiO2  40 %   Minute Volume (L/min) 9.7 Liters   Mask Leak (lpm) 62 lpm       
Pt extubated to 4 LPM humidified NC following successful SBT and positive cuff- leak test. Suctioned airway pre and post procedure. No complications, no signs of stridor or distress.   
Pt is now not eating most of his meals - nurse to replace NGT but he did not tolerate it.   Will continue to follow.   
Pts AM BG results:  0544  Pt AM BG read critical at 35.    0546  Immediate repeat 93.     0548  Repeat 89.    0610  Repeat 56.    0612  Repeat 94.    Called lab and validated BG w/ AM labs is 97. Pt has continuous D5 running currently.      
RT assisted in transport of patient on oxygen equipment.  Dual verification of equipment completed with JONE Joshi.      01/29/25 0750   Patient Transport   Time Spent Transporting 46-60   Transport Ventillation Type Transport vent   Transport From ER   Transport Destination CT scan   Transport Destination ICU       
Renal functions improved to baseline   Continue D5W for hypernatremia   Following peripherally       
Review of Systems    Physical Exam  Skin:     General: Skin is cool.      Capillary Refill: Capillary refill takes more than 3 seconds.      Findings: Ecchymosis present.            
SBT initiated, PS 10, PEEP 5, FiO2 35%. Pt opens eyes and tracks, follows some commands.       01/30/25 0717   Weaning Parameters   Spontaneous Breathing Trial Complete Yes   Respiratory Rate Observed 14   Ve 8.36      RSBI 19         Tolerating on PS 10. Will monitor for signs of apnea or distress.   
SBT started at this time with below settings.     MD and PA made aware.       02/06/25 1329   Vent Information   Vent Mode (S)  CPAP/PS   Ventilator Settings   FiO2  40 %   PEEP/CPAP (cmH2O) (S)  7   Pressure Support (cm H2O) 7 cm H2O   Vent Patient Data (Readings)   Vt Spont (mL) 587 mL   Rate Measured 7 br/min   Minute Volume (L/min) 3.54 Liters   I:E Ratio 1:2.3   Pressure Sensitivity 2 cm H2O   Expiratory Sensitivity (%) 25 %   Insp Rise Time (%) 70 %   Backup Apnea On   Backup Rate 12 Breaths Per Minute   Backup Vt 480   Weaning Parameters   Spontaneous Breathing Trial Complete Yes   Respiratory Rate Observed 8   Ve 3.55      RSBI 20   PEF 31       
Speech Pathology    Bedside eval orders received. Pt is currently on speech caseload. Will acknowledge orders at this time and follow up bedside.     Thank you for this referral.    Leigh Tijerina M.S. CCC-SLP  Speech Language Pathologist    
Speech Pathology    Bedside eval orders received. Upon chart review Pt had a change in mental status and was intubated. Will discontinue orders at this time. Please re-consult as medial condition permits.     Thank you for this referral.    Leigh Tijerina M.S. CCC-SLP  Speech Language Pathologist    
Speech Pathology    Bedside swallow eval completed with recs of full honey thick liquids. D/w RN. Full report to follow.     Thank you for this referral.    Leigh Tijerina M.S. CCC-SLP  Speech Language Pathologist    
Speech Pathology    Bedside swallow eval completed with recs of reg solids and nectar thick liquids. D/w RN. Full report to follow.     Thank you for this referral.    Leigh Tijerina M.S. CCC-SLP  Speech Language Pathologist    
Speech Pathology:     Pt emergently intubated. Will sign off. Please re-consult if extubated/appropriate for PO.     Thank you for this referral.    Frieda Ibarra M.S., CCC-SLP/L  Speech-Language Pathologist    
Speech Pathology:     Pt/family in meeting with MD/palliative. Pt wishes to leave AMA.     Thank you for this referral.    Frieda Ibarra M.S., CCC-SLP/L  Speech-Language Pathologist    
Spiritual Health History and Assessment/Progress Note  [facility] [facility]    Attempted Encounter,  ,  ,      Name: Farhad Rogers MRN: [id number]    Age: 64 y.o.     Sex: male   Language: English   Nondenominational: Confucianism   Aphasia     Date: 1/29/2025            Total Time Calculated: 3 min              Spiritual Assessment began in [facility] 3 INTENSIVE CARE UNIT        Referral/Consult From: Rounding   Encounter Overview/Reason: Attempted Encounter  Service Provided For: Patient    Jesusita, Belief, Meaning:   Patient unable to assess at this time  Family/Friends No family/friends present      Importance and Influence:  Patient unable to assess at this time  Family/Friends No family/friends present    Community:  Patient Other: unknown  Family/Friends No family/friends present    Assessment and Plan of Care:   Patient intubated. No family at the bedside. Provided prayer.     Patient Interventions include: Provided sacramental/Muslim ritual  Family/Friends Interventions include: No family/friends present    Patient Plan of Care: Spiritual Care available upon further referral  Family/Friends Plan of Care: No family/friends present    Electronically signed by Chaplain Osvaldo on 1/29/2025 at 11:14 AM   
Started Chest CPT with the Vest at 10Hz 7 for 7 min.   
Titrate FIO2 30%.   02/17/25 1214   Oxygen Therapy/Pulse Ox   O2 Therapy Oxygen humidified   O2 Device Heated high flow cannula   O2 Flow Rate (L/min) 15 L/min   FiO2  30 %       
Titrate Flow to 15L.   02/17/25 0954   Oxygen Therapy/Pulse Ox   O2 Therapy Oxygen humidified   O2 Device Heated high flow cannula   O2 Flow Rate (L/min) 15 L/min   FiO2  40 %       
Titrate to 5L NC.   02/17/25 1557   Oxygen Therapy/Pulse Ox   O2 Therapy Oxygen humidified   O2 Device Nasal cannula   O2 Flow Rate (L/min) 5 L/min       
[facility]   Pharmacy Pharmacokinetic Monitoring Service - Vancomycin     Farhad Rogers is a 64 y.o. male starting on vancomycin therapy for Sepsis of Unknown Etiology. Pharmacy consulted for monitoring and adjustment.    Target Concentration: Goal AUC/FREDDY 400-600 mg*hr/L    Additional Antimicrobials: Piperacillin/Tazobactam    Pertinent Laboratory Values:   Temp: (!) 96.6 °F (35.9 °C), Weight - Scale: 72.6 kg (160 lb)  Recent Labs     01/28/25  1315 01/28/25  2215   CREATININE 0.96 0.93   BUN 47* 47*   WBC 12.0  --      Estimated Creatinine Clearance: 71 mL/min (based on SCr of 0.93 mg/dL).    Pertinent Cultures:  Culture Date Source Results   - - -   MRSA Nasal Swab: N/A. Non-respiratory infection    Plan:  Dosing recommendations based on Bayesian software  Start vancomycin 1750 mg IV x 1 followed by 1500 mg IV q24h  Anticipated AUC of 450 and trough concentration of 9.9 at steady state  Renal labs as indicated   Vancomycin concentration ordered for  01/31 @ 0400  Pharmacy will continue to monitor patient and adjust therapy as indicated    Thank you for the consult,  SOHAIL MINAYA [facility]  1/29/2025   
[facility]ist [facility]  Progress Note    Patient: Farhad Rogers Age: 64 y.o. : 1960 MR#: [id number] SSN: [id number]  Date/Time: 2025     Subjective: Patient lying in the bed, alert awake, feels much better.  Family including sister at the bedside.  Patient denies any new complaints  Overnight events noted, patient desaturated and was placed on BiPAP.  Patient currently on high flow oxygen at 40 L     64 y.o. male with a past medical history of COPD with asthma, sarcoidosis, pulmonary nodular amyloidosis, DOUG, HTN, severe neuropathy wheelchair-bound, presented to the ED on  with complaints of generalized weakness and inability to tolerate oral secretions. Per chart review, at baseline patient is able to talk and make his own food. CT head revealed small lacunar infarct age-indeterminate. CT A/P revealed concern for aspiration and mild ileus. Workup was otherwise wnl. Pt was subsequently admitted to the floor for further workup however pt became progressively hypotensive despite fluid boluses. He was subsequently started on levophed. Dr. Vasquez was also consulted for DARRIAN with persistent hypernatremia and patient was started on maintenance fluids. Given worsening mentation, pt was emergently intubated. Upon arrival to the unit, pt was noted to be in profound hypovolemic shock, is now s/p multiple vasopressors. Neuro was consulted. MRI brain  revealed few small nonspecific foci of hyperintensity in the deep white matter of the right and left frontal lobes. Cerebell was negative. Pt was extubated on . Encephalopathy much improved however remains weak overall.   Patient  developed a mucous plug, noted on imaging , initiated on bronchial hygiene protocol and mucomyst, but had poor cough and airway clearance.   On , patient was upgraded back to ICU, developped worsening hypoxia on HHFNC with 100% FiO2, and emergently intubated morning of  for agonal 
[facility] Hospitalist Group  Progress Note    Patient: Farhad Rogers Age: 64 y.o. : 1960 MR#: [id number] SSN: [id number]  Date/Time: 2025     Subjective:   Patient doing well.  No pain complaints at this time.  Hypoglycemia noted this a.m.  No new complaints at this time    Disposition SNF  Discharge 2025    hPI:64 y.o. male with a past medical history of COPD with asthma, sarcoidosis, pulmonary nodular amyloidosis, DOUG, HTN, severe neuropathy wheelchair-bound, presented to the ED on  with complaints of generalized weakness and inability to tolerate oral secretions. Per chart review, at baseline patient is able to talk and make his own food. CT head revealed small lacunar infarct age-indeterminate. CT A/P revealed concern for aspiration and mild ileus. Workup was otherwise wnl. Pt was subsequently admitted to the floor for further workup however pt became progressively hypotensive despite fluid boluses. He was subsequently started on levophed. Dr. Vasquez was also consulted for DARRIAN with persistent hypernatremia and patient was started on maintenance fluids. Given worsening mentation, pt was emergently intubated. Upon arrival to the unit, pt was noted to be in profound hypovolemic shock, is now s/p multiple vasopressors. Neuro was consulted. MRI brain  revealed few small nonspecific foci of hyperintensity in the deep white matter of the right and left frontal lobes. Cerebell was negative. Pt was extubated on . Encephalopathy much improved however remains weak overall.   Patient  developed a mucous plug, noted on imaging , initiated on bronchial hygiene protocol and mucomyst, but had poor cough and airway clearance.   On , patient was upgraded back to ICU, developped worsening hypoxia on HHFNC with 100% FiO2, and emergently intubated morning of  for agonal respirations, s/p emergent bronch post intubation. Debra-intubation, pt noted to be 
[facility] Hospitalist Group  Progress Note    Patient: Farhad Rogers Age: 64 y.o. : 1960 MR#: [id number] SSN: [id number]  Date/Time: 2/10/2025     Subjective: Patient lying in the bed, alert awake, feels much better.  Patient denies any new complaints     64 y.o. male with a past medical history of COPD with asthma, sarcoidosis, pulmonary nodular amyloidosis, DOUG, HTN, severe neuropathy wheelchair-bound, presented to the ED on  with complaints of generalized weakness and inability to tolerate oral secretions. Per chart review, at baseline patient is able to talk and make his own food. CT head revealed small lacunar infarct age-indeterminate. CT A/P revealed concern for aspiration and mild ileus. Workup was otherwise wnl. Pt was subsequently admitted to the floor for further workup however pt became progressively hypotensive despite fluid boluses. He was subsequently started on levophed. Dr. Vasquez was also consulted for DARRIAN with persistent hypernatremia and patient was started on maintenance fluids. Given worsening mentation, pt was emergently intubated. Upon arrival to the unit, pt was noted to be in profound hypovolemic shock, is now s/p multiple vasopressors. Neuro was consulted. MRI brain  revealed few small nonspecific foci of hyperintensity in the deep white matter of the right and left frontal lobes. Cerebell was negative. Pt was extubated on . Encephalopathy much improved however remains weak overall.   Patient  developed a mucous plug, noted on imaging , initiated on bronchial hygiene protocol and mucomyst, but had poor cough and airway clearance.   On , patient was upgraded back to ICU, developped worsening hypoxia on HHFNC with 100% FiO2, and emergently intubated morning of  for agonal respirations, s/p emergent bronch post intubation. Debra-intubation, pt noted to be profoundly hypotensive and started on levophed.  Patient currently being off 
[facility] Hospitalist [facility]  Progress Note    Patient: Farhad Rogers Age: 64 y.o. : 1960 MR#: [id number] SSN: [id number]  Date/Time: 2025     Subjective:   Patient doing well at this time.  No significant overnight events.  Oxygen requirements slightly decreased from yesterday.  Diuresis to continue today.    Disposition: Home with home health, ineligible for SNF benefits  Discharge, 2025    Review of systems  General: No fevers or chills.  Cardiovascular: No chest pain or pressure. No palpitations.   Pulmonary: No shortness of breath, cough or wheeze.   Gastrointestinal: No abdominal pain, nausea, vomiting or diarrhea.   Genitourinary: No urinary frequency, urgency, hesitancy or dysuria.   Musculoskeletal: No joint or muscle pain, no back pain, no recent trauma.    Neurologic: No headache, numbness, tingling or weakness.   Assessment/Plan:   Acute hypoxic respiratory failure - requiring mechanical ventilation for airway protection, intubated , extubated on , Emergent re-intubation  for worsening hypoxic respiratory failure and encephalopathy 2/2 mucus plugging. Extubated  to BIPAP. Now on HFNC 50% 30 LPM  Hypernatremia  Leukocytosis, could be due to steroids  Hypovolemic vs septic shock 2/2 aspiration pneumonia, improved  Acute toxic/metabolic encephalopathy, resolved. Mentation to baseline  Septic shock resolved  Anemia requiring PRBC transfusion, H&H trending down since admission, no signs of bleeding  Mucous plug on RLL, s/p emergent bronchoscopy on  for mucus plugs  Moderate oropharyngeal dysphagia  Acute kidney injury. Resolved  Sarcoidosis - pulmonary nodular   COPD with asthma  DOUG on CPAP  HTN  Severe neuropathy, wheelchair bound     Admitted to CVT stepdown  Lasix 40 mg IV x 1 today again, good response yesterday  RUE US negative for DVT  Continue high flow oxygen and wean as tolerated  Pulmonary following  Empiric antibiotics 
[facility] Hospitalist Group  Progress Note    Patient: Farhad Rogers Age: 64 y.o. : 1960 MR#: [id number] SSN: [id number]  Date/Time: 2025     Subjective:   Patient doing well, net negative 2.4 L No pain complaints today. Still having tachypnea. Denies shortness of breath. Discussed with respiratory to wean as tolerated.    Dispo:  DC: 25    Review of systems  General: No fevers or chills.  Cardiovascular: No chest pain or pressure. No palpitations.   Pulmonary: No shortness of breath, cough or wheeze.   Gastrointestinal: No abdominal pain, nausea, vomiting or diarrhea.   Genitourinary: No urinary frequency, urgency, hesitancy or dysuria.   Musculoskeletal: No joint or muscle pain, no back pain, no recent trauma.    Neurologic: No headache, numbness, tingling or weakness.   Assessment/Plan:   Acute hypoxic respiratory failure - requiring mechanical ventilation for airway protection, intubated , extubated on , Emergent re-intubation  for worsening hypoxic respiratory failure and encephalopathy 2/2 mucus plugging. Extubated  to BIPAP. Now on HFNC 50% 30 LPM  Hypernatremia  Leukocytosis, could be due to steroids  Hypovolemic vs septic shock 2/2 aspiration pneumonia, improved  Acute toxic/metabolic encephalopathy, resolved. Mentation to baseline  Septic shock resolved  Anemia requiring PRBC transfusion, H&H trending down since admission, no signs of bleeding  Mucous plug on RLL, s/p emergent bronchoscopy on  for mucus plugs  Moderate oropharyngeal dysphagia  Acute kidney injury. Resolved  Sarcoidosis - pulmonary nodular   COPD with asthma  DOUG on CPAP  HTN  Severe neuropathy, wheelchair bound     Admitted to CVT stepdown  Discontinue D5, query role of fluid in slowly correcting hypoxia  Nephrology re consulted for hypernatremia  Lasix 40 mg IV again today  Follow up CXR  RUE US negative for DVT  Continue high flow oxygen and wean as 
[facility] Hospitalist Group  Progress Note    Patient: Farhad Rogers Age: 64 y.o. : 1960 MR#: [id number] SSN: [id number]  Date/Time: 2/15/2025     Subjective:   Patient doing well.  -1 L overnight.  No pain complaints.  Seems to be oxygenating better today.  Continue to wean oxygen if possible    Dispo:  DC: 25    Review of systems  General: No fevers or chills.  Cardiovascular: No chest pain or pressure. No palpitations.   Pulmonary: No shortness of breath, cough or wheeze.   Gastrointestinal: No abdominal pain, nausea, vomiting or diarrhea.   Genitourinary: No urinary frequency, urgency, hesitancy or dysuria.   Musculoskeletal: No joint or muscle pain, no back pain, no recent trauma.    Neurologic: No headache, numbness, tingling or weakness.   Assessment/Plan:   Acute hypoxic respiratory failure - requiring mechanical ventilation for airway protection, intubated , extubated on , Emergent re-intubation  for worsening hypoxic respiratory failure and encephalopathy 2/2 mucus plugging. Extubated  to BIPAP. Now on HFNC 50% 30 LPM  Hypernatremia  Leukocytosis, could be due to steroids  Hypovolemic vs septic shock 2/2 aspiration pneumonia, improved  Acute toxic/metabolic encephalopathy, resolved. Mentation to baseline  Septic shock resolved  Anemia requiring PRBC transfusion, H&H trending down since admission, no signs of bleeding  Mucous plug on RLL, s/p emergent bronchoscopy on  for mucus plugs  Moderate oropharyngeal dysphagia  Acute kidney injury. Resolved  Sarcoidosis - pulmonary nodular   COPD with asthma  DOUG on CPAP  HTN  Severe neuropathy, wheelchair bound     Admitted to CVT stepdown  Discontinue D5, query role of fluid in slowly correcting hypoxia  Nephrology re consulted for hypernatremia  Lasix 40 mg IV again today  Follow up CXR  RUE US negative for DVT  Continue high flow oxygen and wean as tolerated  Pulmonary following  Continue IV 
[facility] Hospitalist Group  Progress Note    Patient: Farhad Rogers Age: 64 y.o. : 1960 MR#: [id number] SSN: [id number]  Date/Time: 2025     Subjective: Patient lying in the bed, feels much better.  He has been weaned off high flow oxygen this morning, currently on 4 L oxygen supplementation.  Caregiver at the bedside.     Assessment/Plan:   Acute hypoxic respiratory failure - requiring mechanical ventilation for airway protection, intubated , extubated on , Emergent re-intubation  for worsening hypoxic respiratory failure and encephalopathy 2/2 mucus plugging. Extubated  to BIPAP.  Off high flow oxygen, currently on 4 L oxygen supplementation.  Hypernatremia, improving  Hypoglycemia  Leukocytosis, could be due to steroids  Hypovolemic vs septic shock 2/2 aspiration pneumonia, improved  Acute toxic/metabolic encephalopathy, resolved. Mentation to baseline  Septic shock resolved  Anemia requiring PRBC transfusion, H&H trending down since admission, no signs of bleeding  Mucous plug on RLL, s/p emergent bronchoscopy on  for mucus plugs  Moderate oropharyngeal dysphagia  Acute kidney injury. Resolved  Sarcoidosis - pulmonary nodular   COPD with asthma  DOUG on CPAP  HTN  Severe neuropathy, wheelchair bound     Plan  Continue D5W, monitor glucose and sodium levels  Advised patient on increased p.o. fluid intake  Continue BiPAP nightly as needed  Discussed with pulmonary, recommend patient mostly optimized for discharge in 1 to 2 days.  Patient states uses BiPAP at home, currently family brought the machine we will test with the respiratory therapist if it is BiPAP then patient could go home with that.  If it is a CPAP then pulmonary recommending BiPAP at discharge.  Discussed with nephrology, agree with hydration  RUE US negative for DVT  Continue scheduled bronchodilators and bronchial hygiene protocol  Mucomyst as needed  Encourage p.o. fluid intake  Iron 
[facility]  Pulmonary, Critical Care, and Sleep Medicine    Name: Farhad Rogers MRN: [id number]   : 1960 Hospital: [facility]   Date: 2025        IMPRESSION:   Acute hypoxic respiratory failure: requiring mechanical ventilation for airway protection, intubated , extubated on , to 4L NC  Emergent re-intubation  for worsening hypoxic respiratory failure and encephalopathy 2/2 mucus plugging.   Extubated  to BIPAP.   Interval hypoxia and hypercapnia, with confusion, requiring continous BiPAP  O2 demand continues to decline.  CXR still with improvement of R atelectasis  Mucous plug on RLL, s/p emergent bronchoscopy on  for mucus plugs, with improvement.  Concern for recurrent mucous plugging in the setting of recurrent aspiration  Moderate oropharyngeal dysphagia  Recurrent aspiration  Aspiration pneumonia  COPD with asthma  DOUG on CPAP  Metabolic encephalopathy likely from CO2 narcosis, improved  Sarcoidosis - pulmonary nodular   Hypovolemic vs septic shock 2/2 aspiration pneumonia, resolved  Acute toxic/metabolic encephalopathy, resolved  Anemia requiring pRBC transfusion   Acute kidney injury - prerenal azotemia vs ATN, resolved  Hypernatremia 2/2 dehydration, on D5  Hypophosphatemia  HTN  Severe polymyoneuropathy, wheelchair bound at baseline. improving     Patient Active Problem List   Diagnosis    Myopathy    Peripheral neuropathy    Asthma    Sarcoid    Pulmonary artery hypertension (HCC)    GI bleed    HTN (hypertension)    Shortness of breath    Iron deficiency anemia    Hypogonadism in male    Hypogonadism male    Symptomatic anemia    Hypokalemia    Impotence of organic origin    Obstructive apnea    Gastric polyp    Calf muscle weakness    Aphasia    Acute hypoxic respiratory failure    Metabolic encephalopathy    Hypernatremia    Bradycardia    Moderate tricuspid valve regurgitation    Mucus plugging of bronchi    Sepsis without acute 
[facility]  Pulmonary, Critical Care, and Sleep Medicine    Name: Farhad Rogers MRN: [id number]   : 1960 Hospital: [facility]   Date: 2025        IMPRESSION:   Acute hypoxic respiratory failure: requiring mechanical ventilation for airway protection, intubated , extubated on , to 4L NC  Emergent re-intubation  for worsening hypoxic respiratory failure and encephalopathy 2/2 mucus plugging.   Extubated  to BIPAP.   Interval hypoxia and hypercapnia, with confusion, requiring continous BiPAP  Improving, on HHFNC 35L, 40%  Mucous plug on RLL, s/p emergent bronchoscopy on  for mucus plugs, with improvement.  Concern for recurrent mucous plugging in the setting of recurrent aspiration  Moderate oropharyngeal dysphagia  Recurrent aspiration  Aspiration pneumonia  COPD with asthma  DOUG on CPAP  Metabolic encephalopathy likely from CO2 narcosis, improved  Sarcoidosis - pulmonary nodular   Hypovolemic vs septic shock 2/2 aspiration pneumonia, resolved  Acute toxic/metabolic encephalopathy, resolved  Anemia requiring pRBC transfusion   Acute kidney injury - prerenal azotemia vs ATN, resolved  Hypernatremia 2/2 dehydration, on D5  Hypophosphatemia  HTN  Severe polymyoneuropathy, wheelchair bound at baseline. improving     Patient Active Problem List   Diagnosis    Myopathy    Peripheral neuropathy    Asthma    Sarcoid    Pulmonary artery hypertension (HCC)    GI bleed    HTN (hypertension)    Shortness of breath    Iron deficiency anemia    Hypogonadism in male    Hypogonadism male    Symptomatic anemia    Hypokalemia    Impotence of organic origin    Obstructive apnea    Gastric polyp    Calf muscle weakness    Aphasia    Acute hypoxic respiratory failure    Metabolic encephalopathy    Hypernatremia    Bradycardia    Moderate tricuspid valve regurgitation    Mucus plugging of bronchi    Sepsis without acute organ dysfunction (HCC)    Aspiration pneumonia 
[facility]  Pulmonary, Critical Care, and Sleep Medicine    Name: Farhad Rogers MRN: [id number]   : 1960 Hospital: [facility]   Date: 2/15/2025        IMPRESSION:   Acute hypoxic respiratory failure: requiring mechanical ventilation for airway protection, intubated , extubated on , to 4L NC  Emergent re-intubation  for worsening hypoxic respiratory failure and encephalopathy 2/2 mucus plugging.   Extubated  to BIPAP.   Interval hypoxia and hypercapnia, with confusion, requiring continous BiPAP  Worsened yesterday afternoon requiring BiPAP briefly.  Returned to [facility] later in evening following additional chest PT.  Received history of patient family bringing in additional food for patient  Mucous plug on RLL, s/p emergent bronchoscopy on  for mucus plugs, with improvement.  Concern for recurrent mucous plugging in the setting of recurrent aspiration  Moderate oropharyngeal dysphagia  Recurrent aspiration  Aspiration pneumonia  COPD with asthma  DOUG on CPAP  Metabolic encephalopathy likely from CO2 narcosis, improved  Sarcoidosis - pulmonary nodular   Hypovolemic vs septic shock 2/2 aspiration pneumonia, resolved  Acute toxic/metabolic encephalopathy, resolved  Anemia requiring pRBC transfusion   Acute kidney injury - prerenal azotemia vs ATN, resolved  Hypernatremia 2/2 dehydration, on D5  Hypophosphatemia  HTN  Severe polymyoneuropathy, wheelchair bound at baseline. improving     Patient Active Problem List   Diagnosis    Myopathy    Peripheral neuropathy    Asthma    Sarcoid    Pulmonary artery hypertension (HCC)    GI bleed    HTN (hypertension)    Shortness of breath    Iron deficiency anemia    Hypogonadism in male    Hypogonadism male    Symptomatic anemia    Hypokalemia    Impotence of organic origin    Obstructive apnea    Gastric polyp    Calf muscle weakness    Aphasia    Acute hypoxic respiratory failure    Metabolic encephalopathy    
[facility]  Pulmonary, Critical Care, and Sleep Medicine    Name: Farhad Rogers MRN: [id number]   : 1960 Hospital: [facility]   Date: 2025        IMPRESSION:   Acute hypoxic respiratory failure: requiring mechanical ventilation for airway protection, intubated , extubated on , to 4L NC  Emergent re-intubation  for worsening hypoxic respiratory failure and encephalopathy 2/2 mucus plugging.   Extubated  to BIPAP.   Interval hypoxia and hypercapnia, with confusion, requiring continous BiPAP  Improving, on HHFNC 30L, 25%  Mucous plug on RLL, s/p emergent bronchoscopy on  for mucus plugs, with improvement.  Concern for recurrent mucous plugging in the setting of recurrent aspiration  Moderate oropharyngeal dysphagia  Recurrent aspiration  Aspiration pneumonia  COPD with asthma  DOUG on CPAP  Metabolic encephalopathy likely from CO2 narcosis, improved  Sarcoidosis - pulmonary nodular   Hypovolemic vs septic shock 2/2 aspiration pneumonia, resolved  Acute toxic/metabolic encephalopathy, resolved  Anemia requiring pRBC transfusion   Acute kidney injury - prerenal azotemia vs ATN, resolved  Hypernatremia 2/2 dehydration, on D5  Hypophosphatemia  HTN  Severe polymyoneuropathy, wheelchair bound at baseline. improving     Patient Active Problem List   Diagnosis    Myopathy    Peripheral neuropathy    Asthma    Sarcoid    Pulmonary artery hypertension (HCC)    GI bleed    HTN (hypertension)    Shortness of breath    Iron deficiency anemia    Hypogonadism in male    Hypogonadism male    Symptomatic anemia    Hypokalemia    Impotence of organic origin    Obstructive apnea    Gastric polyp    Calf muscle weakness    Aphasia    Acute hypoxic respiratory failure    Metabolic encephalopathy    Hypernatremia    Bradycardia    Moderate tricuspid valve regurgitation    Mucus plugging of bronchi    Sepsis without acute organ dysfunction (HCC)    Aspiration pneumonia 
[facility] [facility]  Pulmonary, Critical Care, and Sleep Medicine    Name: Farhad Rogers MRN: [id number]   : 1960 Hospital: [facility]   Date: 2025        IMPRESSION:   Acute hypoxic respiratory failure: requiring mechanical ventilation for airway protection, intubated , extubated on , to 4L NC  Emergent re-intubation  for worsening hypoxic respiratory failure and encephalopathy 2/2 mucus plugging.   Extubated  to BIPAP. Now on HFNC 35L/50%.   Interval hypoxia and hypercapnia, with confusion, requiring continous BiPAP  Improving, on HHFNC 30L, 40%  Mucous plug on RLL, s/p emergent bronchoscopy on  for mucus plugs, with improvement  Moderate oropharyngeal dysphagia  Recurrent aspiration  Aspiration pneumonia  COPD with asthma  DOUG on CPAP  Metabolic encephalopathy likely from CO2 narcosis, improved  Sarcoidosis - pulmonary nodular   Hypovolemic vs septic shock 2/2 aspiration pneumonia, resolved  Acute toxic/metabolic encephalopathy, resolved  Anemia requiring pRBC transfusion   Acute kidney injury - prerenal azotemia vs ATN, resolved  Hypernatremia 2/2 dehydration, on D5  Hypophosphatemia  HTN  Severe polymyoneuropathy, wheelchair bound at baseline. improving     Patient Active Problem List   Diagnosis    Myopathy    Peripheral neuropathy    Asthma    Sarcoid    Pulmonary artery hypertension (HCC)    GI bleed    HTN (hypertension)    Shortness of breath    Iron deficiency anemia    Hypogonadism in male    Hypogonadism male    Symptomatic anemia    Hypokalemia    Impotence of organic origin    Obstructive apnea    Gastric polyp    Calf muscle weakness    Aphasia    Acute hypoxic respiratory failure    Metabolic encephalopathy    Hypernatremia    Bradycardia    Moderate tricuspid valve regurgitation    Mucus plugging of bronchi    Sepsis without acute organ dysfunction (HCC)    Aspiration pneumonia of both lungs (HCC)    Severe protein-calorie malnutrition 
[facility]  Pulmonary, Critical Care, and Sleep Medicine    Name: Farhad Rogers MRN: [id number]   : 1960 Hospital: [facility]   Date: 2025        IMPRESSION:   Acute hypoxic respiratory failure: requiring mechanical ventilation for airway protection, intubated , extubated on , to 4L NC  Emergent re-intubation  for worsening hypoxic respiratory failure and encephalopathy 2/2 mucus plugging.   Extubated  to BIPAP.   Interval hypoxia and hypercapnia, with confusion, requiring continous BiPAP  Tolerated HFNC well for the most part overnight however significant event of agigation with Zyprexa given noted  Mucous plug on RLL, s/p emergent bronchoscopy on  for mucus plugs, with improvement.  Concern for recurrent mucous plugging in the setting of recurrent aspiration  Moderate oropharyngeal dysphagia  Recurrent aspiration  Aspiration pneumonia  COPD with asthma  DOUG on CPAP  Metabolic encephalopathy likely from CO2 narcosis, improved  Sarcoidosis - pulmonary nodular   Hypovolemic vs septic shock 2/2 aspiration pneumonia, resolved  Acute toxic/metabolic encephalopathy, resolved  Anemia requiring pRBC transfusion   Acute kidney injury - prerenal azotemia vs ATN, resolved  Hypernatremia 2/2 dehydration, on D5  Hypophosphatemia  HTN  Severe polymyoneuropathy, wheelchair bound at baseline. improving     Patient Active Problem List   Diagnosis    Myopathy    Peripheral neuropathy    Asthma    Sarcoid    Pulmonary artery hypertension (HCC)    GI bleed    HTN (hypertension)    Shortness of breath    Iron deficiency anemia    Hypogonadism in male    Hypogonadism male    Symptomatic anemia    Hypokalemia    Impotence of organic origin    Obstructive apnea    Gastric polyp    Calf muscle weakness    Aphasia    Acute hypoxic respiratory failure    Metabolic encephalopathy    Hypernatremia    Bradycardia    Moderate tricuspid valve regurgitation    Mucus plugging 
[facility]  Pulmonary, Critical Care, and Sleep Medicine  Progress note    Name: Farhad Rogers MRN: [id number]   : 1960 Hospital: [facility]   Date: 2025        IMPRESSION:   Acute hypoxic respiratory failure - requiring mechanical ventilation for airway protection, extubated on   Multifactorial shock - hypovolemic vs septic due to aspiration PNA (less likely however due to lack of elevated WBC, negative lactic, negative procal) vs adrenal insufficiency  Acute toxic/metabolic encephalopathy - CT head revealed a focus in the R lenticular capsule concerning for small infarct  Acute kidney injury - prerenal azotemia vs ATN  Hypernatremia 2/2 dehydration?  Sarcoidosis - follows Dr. Loza outpatient  Pulmonary nodular amyloidosis  COPD with asthma  DOUG on CPAP  HTN  Severe neuropathy, wheelchair bound      PLAN:   Oxygen: Titrate FiO2 for goal SPO2> 90% . Currently on 4L NC.   Per patient report he uses BIPAP at home QHS   Bronchial hygiene protocol. Encourage frequent use of incentive spirometry   Bronchodilators/ICS: Brovana and pulmicort; PRN duonebs. Can resume home maintenance therapy at hospital discharge  Steroids: Can transition to oral Prednisone 40 mg daily  Antibiotics: On Zosyn. Blood, Sputum, and Urine cultures NGTD. RVP negative. MRSA screen negative.   Aspiration precautions  Assess home Oxygen needs at discharge  OT, PT, OOB and ambulate with assistance as tolerated  Healthy weight  DVT, PUD prophylaxis per primary team    Pulmonary will continue to follow. Will need plan for outpatient follow-up with Dr. Loza prior to hospital discharge     Subjective/Interval History:   25   Appears comfortable. Feeling \"pretty good.\" No sob, feels \"a little weak\". Tolerating PO.   On 2L NC.     ROS:Pertinent items are noted in HPI.    Objective:   Vital Signs:    /69   Pulse (!) 49   Temp 97.9 °F (36.6 °C) (Oral)   Resp 14   Ht 1.676 m (5' 6\")  
[facility]  Pulmonary, Critical Care, and Sleep Medicine  Progress note    Name: Farhad Rogers MRN: [id number]   : 1960 Hospital: [facility]   Date: 2025        IMPRESSION:   Acute hypoxic respiratory failure - requiring mechanical ventilation for airway protection, extubated on   Multifactorial shock - hypovolemic vs septic due to aspiration PNA (less likely however due to lack of elevated WBC, negative lactic, negative procal) vs adrenal insufficiency  Acute toxic/metabolic encephalopathy - CT head revealed a focus in the R lenticular capsule concerning for small infarct  Acute kidney injury - prerenal azotemia vs ATN  Hypernatremia 2/2 dehydration?  Sarcoidosis - follows Dr. Loza outpatient  Pulmonary nodular amyloidosis  COPD with asthma  DOUG on CPAP  HTN  Severe neuropathy, wheelchair bound      PLAN:   Worsening hypoxemic respiratory failure this am  CXR and ABG ordered. Placed on HFNC, may need BiPAP(uses at home per patient) Atelectasis on CT abdomen . Tube feeds paused.   Oxygen: Titrate FiO2 for goal SPO2> 90% .   Bronchial hygiene protocol.   Bronchodilators/ICS: Brovana and pulmicort; PRN duonebs.   Steroids: Transition to solumedrol   Antibiotics: On Zosyn. Can consider repeating cultures. Previous blood, sputum, and urine cultures NGTD. RVP negative. MRSA screen negative.   Aspiration precautions  Assess home Oxygen needs at discharge  OT, PT, OOB and ambulate with assistance as tolerated  Healthy weight  DVT, PUD prophylaxis per primary team    Pulmonary will continue to follow. Will need plan for outpatient follow-up with Dr. Loza prior to hospital discharge     Subjective/Interval History:   25     This am the patient was more confused, hypothermic with tachypnea, and increased work of breathing. Decreased BS on the right. He completed breathing treatments this am already and no wheezing on exam. Ordered a CXR, ABG and started 
[facility].  Pulmonary, Critical Care, and Sleep Medicine    Name: Farhad Rogers MRN: [id number]   : 1960 Hospital: [facility]   Date: 2025  Admission Date: 2025     Chart and notes reviewed. Data reviewed. I have evaluated all findings.    [x]I have reviewed the flowsheet and previous day’s notes.    [x]The patient is unable to give any meaningful history or review of systems because the patient is:  []Intubated [x]BIPAP   []Unresponsive      [x]The patient is critically ill on      []Mechanical ventilation []Pressors   [x]BiPAP []     IMPRESSION:   Acute hypoxic respiratory failure - requiring mechanical ventilation for airway protection, intubated , extubated on , to 4L NC  Emergent re-intubation  for worsening hypoxic respiratory failure and encephalopathy 2/2 mucus plugging. Extubated  to BIPAP  Hypovolemic vs septic shock 2/2 aspiration pneumonia  Acute toxic/metabolic encephalopathy - ?hypoxic, improving  Septic shock on low dose Levophed  Anemia requiring PRBC transfusion  Mucous plug on RLL, s/p emergent bronchoscopy on  for mucus plugs  Moderate oropharyngeal dysphagia  Acute kidney injury - prerenal azotemia vs ATN. Resolved  Hypernatremia 2/2 dehydration?  Sarcoidosis - pulmonary nodular   COPD with asthma  DOUG on CPAP  HTN  Severe neuropathy, wheelchair bound      Patient Active Problem List   Diagnosis    Myopathy    Peripheral neuropathy    Asthma    Sarcoid    Pulmonary artery hypertension (HCC)    GI bleed    HTN (hypertension)    Shortness of breath    Iron deficiency anemia    Hypogonadism in male    Hypogonadism male    Symptomatic anemia    Hypokalemia    Impotence of organic origin    Obstructive apnea    Gastric polyp    Calf muscle weakness    Aphasia    Acute hypoxic respiratory failure    Metabolic encephalopathy    Hypernatremia    Bradycardia    Moderate tricuspid valve regurgitation    Mucus plugging of bronchi 
[facility].  Pulmonary, Critical Care, and Sleep Medicine    Name: Farhad Rogers MRN: [id number]   : 1960 Hospital: [facility]   Date: 2025  Admission Date: 2025     Chart and notes reviewed. Data reviewed. I have evaluated all findings.    [x]I have reviewed the flowsheet and previous day’s notes.    []The patient is unable to give any meaningful history or review of systems because the patient is:  []Intubated []BIPAP   []Unresponsive      []The patient is critically ill on      []Mechanical ventilation []Pressors   []BiPAP []     IMPRESSION:   Acute hypoxic respiratory failure - requiring mechanical ventilation for airway protection, intubated , extubated on , to 4L NC  Emergent re-intubation  for worsening hypoxic respiratory failure and encephalopathy 2/2 mucus plugging. Extubated  to BIPAP. Now on HFNC 45% 30 LPM  Hypovolemic vs septic shock 2/2 aspiration pneumonia, improved  Acute toxic/metabolic encephalopathy - ?hypoxic, resolved. Mentation to baseline  Septic shock resolved, BP stable off pressor support  Anemia requiring PRBC transfusion  Mucous plug on RLL, s/p emergent bronchoscopy on  for mucus plugs  Moderate oropharyngeal dysphagia  Acute kidney injury - prerenal azotemia vs ATN. Resolved  Hypernatremia 2/2 dehydration?  Sarcoidosis - pulmonary nodular   COPD with asthma  DOUG on CPAP  HTN  Severe neuropathy, wheelchair bound      Patient Active Problem List   Diagnosis    Myopathy    Peripheral neuropathy    Asthma    Sarcoid    Pulmonary artery hypertension (HCC)    GI bleed    HTN (hypertension)    Shortness of breath    Iron deficiency anemia    Hypogonadism in male    Hypogonadism male    Symptomatic anemia    Hypokalemia    Impotence of organic origin    Obstructive apnea    Gastric polyp    Calf muscle weakness    Aphasia    Acute hypoxic respiratory failure    Metabolic encephalopathy    Hypernatremia    Bradycardia 
[facility].  Pulmonary, Critical Care, and Sleep Medicine    Name: Farhad Rogers MRN: [id number]   : 1960 Hospital: [facility]   Date: 2025  Admission Date: 2025     Chart and notes reviewed. Data reviewed. I have evaluated all findings.    [x]I have reviewed the flowsheet and previous day’s notes.    []The patient is unable to give any meaningful history or review of systems because the patient is:  []Intubated []Sedated   []Unresponsive      []The patient is critically ill on      []Mechanical ventilation []Pressors   []BiPAP []     IMPRESSION:   Acute hypoxic respiratory failure - requiring mechanical ventilation for airway protection  Multifactorial shock - hypovolemic vs septic due to aspiration PNA (less likely however due to lack of elevated WBC, negative lactic, negative procal) vs adrenal insufficiency  Acute toxic/metabolic encephalopathy - CT head revealed a focus in the R lenticular capsule concerning for small infarct  Transaminitis  Hypernatremia 2/2 dehydration?  Sarcoidosis - follows Dr. Loza outpatient  Pulmonary nodular amyloidosis  COPD with asthma  DOUG on CPAP  HTN  Severe neuropathy, wheelchair bound     Patient Active Problem List   Diagnosis    Myopathy    Peripheral neuropathy    Asthma    Sarcoid    Pulmonary artery hypertension (HCC)    GI bleed    HTN (hypertension)    Shortness of breath    Iron deficiency anemia    Hypogonadism in male    Hypogonadism male    Symptomatic anemia    Hypokalemia    Impotence of organic origin    Obstructive apnea    Gastric polyp    Calf muscle weakness    Aphasia    Acute hypoxic respiratory failure    Metabolic encephalopathy    Hypernatremia        RECOMMENDATIONS:   Neuro:   -Titrate sedation to RASS of 0 to -1. PRN for breakthrough sedation needs.  -CT head  revealed focus in the right lenticular capsule that was concerning for possible small infarct  -MRI brain  revealed few small 
[facility]ist [facility]  Progress Note    Patient: Farhad Rogers Age: 64 y.o. : 1960 MR#: [id number] SSN: [id number]  Date/Time: 2025     Subjective:   Patient stable at this time.  Undergoing hospital delirium as he is more confused and needing soft bilateral wrist restraints today.  Denies pain complaints.  Updated family bedside.  Respiratory status relatively stable but not improving like expected.    Dispo; HH vs LTC, per  patient has no SNF benefit  Discharge 2025    Review of systems  General: No fevers or chills.  Cardiovascular: No chest pain or pressure. No palpitations.   Pulmonary: No shortness of breath, cough or wheeze.   Gastrointestinal: No abdominal pain, nausea, vomiting or diarrhea.   Genitourinary: No urinary frequency, urgency, hesitancy or dysuria.   Musculoskeletal: No joint or muscle pain, no back pain, no recent trauma.    Neurologic: No headache, numbness, tingling or weakness.   Assessment/Plan:   Acute hypoxic respiratory failure - requiring mechanical ventilation for airway protection, intubated , extubated on , Emergent re-intubation  for worsening hypoxic respiratory failure and encephalopathy 2/2 mucus plugging. Extubated  to BIPAP. Now on HFNC 50% 30 LPM  Hypernatremia  Leukocytosis, could be due to steroids  Hypovolemic vs septic shock 2/2 aspiration pneumonia, improved  Acute toxic/metabolic encephalopathy, resolved. Mentation to baseline  Septic shock resolved  Anemia requiring PRBC transfusion, H&H trending down since admission, no signs of bleeding  Mucous plug on RLL, s/p emergent bronchoscopy on  for mucus plugs  Moderate oropharyngeal dysphagia  Acute kidney injury. Resolved  Sarcoidosis - pulmonary nodular   COPD with asthma  DOUG on CPAP  HTN  Severe neuropathy, wheelchair bound     Admitted to CVT stepdown  Discontinue D5, query role of fluid in slowly correcting hypoxia  Nephrology re 
[facility] Hospitalist Group  Progress Note    Patient: Farhad Rogers Age: 64 y.o. : 1960 MR#: [id number] SSN: [id number]  Date/Time: 2025     Subjective: Patient lying in the bed, on high flow oxygen.  Caregiver at the bedside.  Patient feels fine, denies any new complaints.  He was able to answer questions very appropriately.  More alert awake.  Overnight events noted  Discussed with RN, patient's blood sugar dropped earlier today when he was on BiPAP, D10 was given.     Assessment/Plan:   Acute hypoxic respiratory failure - requiring mechanical ventilation for airway protection, intubated , extubated on , Emergent re-intubation  for worsening hypoxic respiratory failure and encephalopathy 2/2 mucus plugging. Extubated  to BIPAP. Now on HFNC 50% 30 LPM  Hypernatremia  Hypoglycemia  Leukocytosis, could be due to steroids  Hypovolemic vs septic shock 2/2 aspiration pneumonia, improved  Acute toxic/metabolic encephalopathy, resolved. Mentation to baseline  Septic shock resolved  Anemia requiring PRBC transfusion, H&H trending down since admission, no signs of bleeding  Mucous plug on RLL, s/p emergent bronchoscopy on  for mucus plugs  Moderate oropharyngeal dysphagia  Acute kidney injury. Resolved  Sarcoidosis - pulmonary nodular   COPD with asthma  DOUG on CPAP  HTN  Severe neuropathy, wheelchair bound     Plan  Will start D5W, monitor glucose and sodium levels  Continue BiPAP nightly as needed  Continue high flow oxygen as tolerated  Nephrology on case  RUE US negative for DVT  Pulmonary following  Continue scheduled bronchodilators and bronchial hygiene protocol  Mucomyst as needed  Encourage p.o. fluid intake  Iron levels look good  Continue folic acid  Will continue to monitor H&H and transfuse if needed  Will trend leukocytosis  Continue dysphagia diet  Continue bowel regimen  PT/OT recommending SNF placement    Discussed with patient and also with 
[facility] Hospitalist Group  Progress Note    Patient: Farhad Rogers Age: 64 y.o. : 1960 MR#: [id number] SSN: [id number]  Date/Time: 2/3/2025     Chief Complaint   Patient presents with    Altered Mental Status     Subjective:   64-year-old male with past medical history of COPD with asthma, sarcoidosis pulmonary nodular amyloidosis, neuropathy wheelchair-bound who presented for generalized weakness inability to tolerate oral secretions found to be hypotensive requiring pressor support and ICU stay.  Patient transition to general medical service also with DARRIAN persistent hypernatremia.  Encephalopathy improving however still remains weak overall.     Patient seen and examined.  Caregiver and primary RN at bedside.  Seen wearing nasal cannula reports he is on oxygen intermittently throughout the day only when he needs to use his CPAP at night, has not been using CPAP while in hospital.  No new complaints, overall plan is for SNF versus acute rehab.      -patient seen evaluated, lying in bed, no acute distress, patient is on a Christian hugger since his temperature has been 92.1 Fahrenheit since this morning.  He is also bradycardic and blood pressures have been soft in the 90s systolic.  Continue IV fluids.  Cardiology consulted for bradycardia.    -patient seen and evaluated, lying in bed, no acute distress.  Noted events from overnight where patient was noted to be hypothermic however was placed on Christian hugger and temperatures have resolved patient is not tachycardic.  Bradycardia-cardiology consulted, patient is not tachycardic.  Patient also noted to be short of breath with acute hypoxic respiratory failure currently on BiPAP.  Pulmonology on board.    2/3-patient seen evaluated, lying in bed, no acute distress.  Continue high flow.  Patient is currently eating, family member at bedside mentions that he ate more than 50% of his lunch.  If patient continues to eat, will remove 
[facility] Hospitalist Group  Progress Note    Patient: Farhad Rogers Age: 64 y.o. : 1960 MR#: [id number] SSN: [id number]  Date/Time: 2025     Chief Complaint   Patient presents with    Altered Mental Status     Subjective:   64-year-old male with past medical history of COPD with asthma, sarcoidosis pulmonary nodular amyloidosis, neuropathy wheelchair-bound who presented for generalized weakness inability to tolerate oral secretions found to be hypotensive requiring pressor support and ICU stay.  Patient transition to general medical service also with DARRIAN persistent hypernatremia.  Encephalopathy improving however still remains weak overall.     Patient seen and examined.  Caregiver and primary RN at bedside.  Seen wearing nasal cannula reports he is on oxygen intermittently throughout the day only when he needs to use his CPAP at night, has not been using CPAP while in hospital.  No new complaints, overall plan is for SNF versus acute rehab.      -patient seen evaluated, lying in bed, no acute distress, patient is on a Christian hugger since his temperature has been 92.1 Fahrenheit since this morning.  He is also bradycardic and blood pressures have been soft in the 90s systolic.  Continue IV fluids.  Cardiology consulted for bradycardia.    -patient seen and evaluated, lying in bed, no acute distress.  Noted events from overnight where patient was noted to be hypothermic however was placed on Christian hugger and temperatures have resolved patient is not tachycardic.  Bradycardia-cardiology consulted, patient is not tachycardic.  Patient also noted to be short of breath with acute hypoxic respiratory failure currently on BiPAP.  Pulmonology on board.      Assessment/Plan:     Acute hypoxic respiratory failure, worsened today, patient is on BiPAP, pulmonology on board, chest x-ray shows right pleural effusion, IV fluids stopped, IV Lasix per nephrology.  Extubated to nasal 
[facility] Hospitalist Group  Progress Note    Patient: Farhad Rogers Age: 64 y.o. : 1960 MR#: [id number] SSN: [id number]  Date/Time: 2025     Subjective: Patient lying in the bed, alert awake, pleasantly confused.  He denies any new complaints.  RN at the bedside.  Patient states he would like to go home.     64 y.o. male with a past medical history of COPD with asthma, sarcoidosis, pulmonary nodular amyloidosis, DOUG, HTN, severe neuropathy wheelchair-bound, presented to the ED on  with complaints of generalized weakness and inability to tolerate oral secretions. Per chart review, at baseline patient is able to talk and make his own food. CT head revealed small lacunar infarct age-indeterminate. CT A/P revealed concern for aspiration and mild ileus. Workup was otherwise wnl. Pt was subsequently admitted to the floor for further workup however pt became progressively hypotensive despite fluid boluses. He was subsequently started on levophed. Dr. Vasquez was also consulted for DARRIAN with persistent hypernatremia and patient was started on maintenance fluids. Given worsening mentation, pt was emergently intubated. Upon arrival to the unit, pt was noted to be in profound hypovolemic shock, is now s/p multiple vasopressors. Neuro was consulted. MRI brain  revealed few small nonspecific foci of hyperintensity in the deep white matter of the right and left frontal lobes. Cerebell was negative. Pt was extubated on . Encephalopathy much improved however remains weak overall.   Patient  developed a mucous plug, noted on imaging , initiated on bronchial hygiene protocol and mucomyst, but had poor cough and airway clearance.   On , patient was upgraded back to ICU, developped worsening hypoxia on HHFNC with 100% FiO2, and emergently intubated morning of  for agonal respirations, s/p emergent bronch post intubation. Debra-intubation, pt noted to be profoundly 
[facility] Hospitalist Group  Progress Note    Patient: Farhad Rogers Age: 64 y.o. : 1960 MR#: [id number] SSN: [id number]  Date/Time: 2025     Chief Complaint   Patient presents with    Altered Mental Status     Subjective:   64-year-old male with past medical history of COPD with asthma, sarcoidosis pulmonary nodular amyloidosis, neuropathy wheelchair-bound who presented for generalized weakness inability to tolerate oral secretions found to be hypotensive requiring pressor support and ICU stay.  Patient transition to general medical service also with DARRIAN persistent hypernatremia.  Encephalopathy improving however still remains weak overall.     Patient seen and examined.  Caregiver and primary RN at bedside.  Seen wearing nasal cannula reports he is on oxygen intermittently throughout the day only when he needs to use his CPAP at night, has not been using CPAP while in hospital.  No new complaints, overall plan is for SNF versus acute rehab.      -patient seen evaluated, lying in bed, no acute distress, patient is on a Christian hugger since his temperature has been 92.1 Fahrenheit since this morning.  He is also bradycardic and blood pressures have been soft in the 90s systolic.  Continue IV fluids.  Cardiology consulted for bradycardia.    Assessment/Plan:   Acute hypoxic respiratory failure, improving  Extubated to nasal cannula (due to inability to protect airway) on   Multifactorial shock  -Hypovolemic or septic due to aspiration pneumonia though seems less likely secondary to no leukocytosis negative lactic and negative Pro-Cade versus possible adrenal insufficiency.   Continue Zosyn for now. (likely can de-escalate as MRSA nares negative.  Blood cultures, respiratory cultures negative to date, follow cultures  On midodrine 3 times daily.    Acute toxic metabolic encephalopathy  CT head focus in the right lenticular capsule concerning for small infarct.  EEG with 
[facility]   Pharmacy Pharmacokinetic Monitoring Service - Vancomycin    Indication: Sepsis of Unknown Etiology  Target Concentration: Goal AUC/FREDDY 400-600 mg*hr/L  Day of Therapy: 2  Additional Antimicrobials: Piperacillin/Tazobactam    Pertinent Laboratory Values:   Wt Readings from Last 1 Encounters:   01/30/25 72.6 kg (160 lb)     Temp Readings from Last 1 Encounters:   01/30/25 98.8 °F (37.1 °C)     Estimated Creatinine Clearance: 47 mL/min (A) (based on SCr of 1.41 mg/dL (H)).  Recent Labs     01/29/25  0115 01/29/25  0245 01/30/25  0300   CREATININE  --  1.07 1.41*   BUN  --  52* 47*   WBC 9.1  --  8.6   PROCAL 0.05  --   --        Pertinent Cultures:  Culture Date Source Results   1/28 blood NGTD   1/29 resp In process   1/29 urine In process   MRSA Nasal Swab: was ordered by provider, awaiting results.    Assessment:  Date Current Dose Concentration Timing of Concentration (h) AUC   1/29 1750 mg x 1 - - -   1/30 1500 mg q24h - - 656   Note: Serum concentrations collected for AUC dosing may appear elevated if collected in close proximity to the dose administered, this is not necessarily an indication of toxicity    Plan:  Decrease dose to 1000 mg q24h due to increase in serum creatinine to start tomorrow @ 1000  Anticipated AUC of 457 mg/L.hr and trough concentration of 12.5 mg/L at steady state  Vancomycin concentration ordered for 1/31 @ 0400  Renal labs ordered daily. Monitor closely to potentially dose by level if renal function continues to be unstable  Pharmacy will continue to monitor patient and adjust therapy as indicated    Thank you for the consult,  Alley Montoya RPH  1/30/2025 10:32 AM   
[facility]   Pharmacy Pharmacokinetic Monitoring Service - Vancomycin    Indication: Sepsis of Unknown Etiology  Target Concentration: Goal AUC/FREDDY 400-600 mg*hr/L  Day of Therapy: 3   Additional Antimicrobials: Piperacillin/Tazobactam    Pertinent Laboratory Values:   Wt Readings from Last 1 Encounters:   01/31/25 67.3 kg (148 lb 5.9 oz)     Temp Readings from Last 1 Encounters:   01/31/25 98.5 °F (36.9 °C) (Oral)     Estimated Creatinine Clearance: 52 mL/min (based on SCr of 1.29 mg/dL).  Recent Labs     01/29/25  0115 01/29/25  0245 01/30/25  0300 01/30/25  1010 01/31/25  0330   CREATININE  --    < > 1.41* 1.43* 1.29   BUN  --    < > 47* 44* 39*   WBC 9.1  --  8.6  --  10.3   PROCAL 0.05  --   --   --   --     < > = values in this interval not displayed.       Pertinent Cultures:  Culture Date Source Results   1/28 blood NGTD   1/29 resp In process   1/29 urine ngtd   MRSA Nasal Swab: was ordered by provider, awaiting results.    Assessment:  Date Current Dose Concentration Timing of Concentration (h) AUC   1/29 1750 mg x 1 - - -   1/30 1500 mg q24h - - 656   1/31 hold 28.6 21.5    Note: Serum concentrations collected for AUC dosing may appear elevated if collected in close proximity to the dose administered, this is not necessarily an indication of toxicity    Plan:  Vancomycin, dosing per levels  No dose today  Random vancomycin level in a.m. 2/1  Renal labs ordered daily. Monitor closely to potentially dose by level if renal function continues to be unstable  Pharmacy will continue to monitor patient and adjust therapy as indicated    Thank you for the consult,  MIRIAM MOCK RPH  1/31/2025 9:02 AM     
Vest performed for ten min. Patient tolerated well   02/13/25 8518   Treatment   $Bronchial Hygiene $Oscillatory therapy   Oxygen Therapy/Pulse Ox   Pulse 70   Respirations 28   SpO2 95 %   RT Vest   Vest Hertz 8   Vest Pressure 4       
Vest therapy completed for ten min. Patient tolerated well and had nonproductive cough post treatment   02/18/25 0901   Treatment   $Bronchial Hygiene $Oscillatory therapy       
Vest therapy performed for ten min. Patient tolerated well   02/13/25 0900   Treatment   Treatment Type HHN;Aerosol generator   $Bronchial Hygiene $Oscillatory therapy   $Treatment Type $Inhaled Therapy/Meds   Medications Budesonide   Oxygen Therapy/Pulse Ox   Pulse 75   Respirations 23   SpO2 100 %   RT Vest   Vest Hertz (S)  8  (lowered for patient comfort)   Vest Pressure 7       
Vest therapydone for ten min. Patient tolerated well   02/13/25 2776   Treatment   $Bronchial Hygiene $Oscillatory therapy   RT Vest   Vest Hertz 8   Vest Pressure (S)  4  (reduced for patient comfort)       
hyperintensity in the deep white matter of the right and left frontal lobes  -Cerebell negative  -Neuro following     Pulm:   -Titrate FiO2 for goal SPO2> 90%, head of the bed at 30' all times.    -PRN duonebs.   -Brovana and pulmicort  -Extubated on 1/30     CVS :  -Actively titrate vasopressors aim MAP >65mmHg. S/p levophed and vasopressin  -Check cardiac panel  -Echo 1/29 EF 55-60%, dilated RV     Fluids:   -D5 @100cc/hr (for hypernatremia)     GI:   -SUP, Trend LFTs, Zofran PRN for N/V, NPO  -Lipase wnl  -CT A/P 1/29 revealed mild ileus     Renal:    -Trend Renal indices, Strict Is/Os, Gerardo (+)  -Dr. Vasquez consulted     Hem/Onc:   -Monitor for s/o active bleeding.   -SQH     I/D:  -Sepsis bundle per hospital protocol  -Blood, Sputum, and Urine cultures NGTD. RVP negative.  -MRSA nares pending  -Lactic acid ordered- initial and repeat Q4hrs till normalized.  -Antibiotics:Vanc and Zosyn.      Endocrine:   -Q6 glucoses, SSI.   -TSH 8.40 and T4 0.9     Metabolic:    -Daily BMP, mag, phos. Trend lytes, replace as needed.   -Solucortef 50mg Q12h     Musc/Skin:   -no acute issues, wound care        Palliative: Full Code No additional code details, MPOA:Rebecca Rogers. Case discussed with Jamilah Wood (caretaker) who states patient also has a son (Naveen) who lives in [facility] but was unable to provide a phone number.     Best practice :    Glycemic control  IHI ICU bundles:   Central Line Bundle Followed , Gerardo Bundle Followed, and Vent Bundle Followed, Vent Day 1  [facility] Vent patients-    VAP bundle-[place] tube to suction at 20-30 cm Hg, Maintain Alicia tube with 5-10ml air every 4 hours, Routine oral care every 4 hours, Elevation of head > 45 degree, Daily sedation holiday and SBT evaluation starting at 6.00am.  Stress ulcer prophylaxis.   Pepcid  DVT prophylaxis.   SQH  Need for Lines, gerardo assessed.  Palliative care evaluation.  Restraints not 
          Temp (24hrs), Av.4 °F (36.9 °C), Min:97.8 °F (36.6 °C), Max:99 °F (37.2 °C)       Intake/Output:   Last shift:      No intake/output data recorded.  Last 3 shifts:  1901 -  0700  In: -   Out: 425 [Urine:425]    Intake/Output Summary (Last 24 hours) at 2025 0943  Last data filed at 2025 1150  Gross per 24 hour   Intake --   Output 225 ml   Net -225 ml        Physical Exam:    General: in no apparent distress, alert, and oriented times 3   HEENT: PERRLA, EOMI, fundi benign   Neck: No abnormally enlarged lymph nodes.   Lungs: CTA bilaterally. +tachypnea. No accessory muscle use.   Heart: Regular rate and rhythm or without murmur or extra heart sounds   Abdomen: abdomen is soft without significant tenderness, masses, organomegaly or guarding   Extremity: +RUE swelling.    Neuro: alert, grossly nonfocal        DATA:  Labs:  Recent Labs     25  [id number] 25  0431   WBC 10.3 6.8 8.6   HGB 6.7* 9.9* 8.9*   HCT 21.8* 32.7* 30.4*    298 250     Recent Labs     25  0743 25  1810 257 25  0431   * 150*  --  153* 147*   K 4.6 3.8  --  3.5 3.2*    111  --  113* 109   CO2 35* 36*  --  37* 36*   BUN 23* 22*  --  22* 21*   MG 2.0  --  2.0 2.0 2.3   PHOS 2.3*  --   --  1.7* 1.7*   ALT  --  45  --   --   --      No results for input(s): \"PH\", \"PCO2\", \"PO2\", \"HCO3\", \"FIO2\" in the last 72 hours.                                                      Echo:   25    ECHO (TTE) COMPLETE (PRN CONTRAST/BUBBLE/STRAIN/3D) 2025  4:56 PM (Final)    Interpretation Summary    Image quality is technically difficult. Technically difficult study with poor endocardial visualization and procedure performed with the patient in a supine position due to mechanically ventilation.  Most views obtained from subcostal window.    Left Ventricle: Normal left ventricular systolic function with a visually estimated EF of 55 - 60%. Left 
650 mg  650 mg Rectal Q6H PRN    heparin (porcine) injection 5,000 Units  5,000 Units SubCUTAneous 3 times per day    budesonide (PULMICORT) nebulizer suspension 1 mg  1 mg Nebulization BID RT    piperacillin-tazobactam (ZOSYN) 3,375 mg in sodium chloride 0.9 % 50 mL IVPB (mini-bag)  3,375 mg IntraVENous Q8H    arformoterol tartrate (BROVANA) nebulizer solution 15 mcg  15 mcg Nebulization BID RT    ipratropium 0.5 mg-albuterol 2.5 mg (DUONEB) nebulizer solution 1 Dose  1 Dose Inhalation Q4H PRN    insulin lispro (HUMALOG,ADMELOG) injection vial 0-4 Units  0-4 Units SubCUTAneous Q6H    sodium chloride flush 0.9 % injection 5-40 mL  5-40 mL IntraVENous 2 times per day    sodium chloride flush 0.9 % injection 5-40 mL  5-40 mL IntraVENous PRN    potassium chloride (KLOR-CON M) extended release tablet 40 mEq  40 mEq Oral PRN    Or    potassium bicarb-citric acid (EFFER-K) effervescent tablet 40 mEq  40 mEq Oral PRN    Or    potassium chloride 10 mEq/100 mL IVPB (Peripheral Line)  10 mEq IntraVENous PRN    magnesium sulfate 2000 mg in 50 mL IVPB premix  2,000 mg IntraVENous PRN       Labs:    Recent Results (from the past 24 hour(s))   POCT Glucose    Collection Time: 02/03/25 12:38 PM   Result Value Ref Range    POC Glucose 78 70 - 110 mg/dL   POCT Glucose    Collection Time: 02/03/25  5:19 PM   Result Value Ref Range    POC Glucose 139 (H) 70 - 110 mg/dL   Basic Metabolic Panel    Collection Time: 02/04/25  1:13 AM   Result Value Ref Range    Sodium 148 (H) 136 - 145 mmol/L    Potassium 3.8 3.5 - 5.5 mmol/L    Chloride 112 (H) 100 - 111 mmol/L    CO2 35 (H) 21 - 32 mmol/L    Anion Gap 1 (L) 3.0 - 18 mmol/L    Glucose 120 (H) 74 - 99 mg/dL    BUN 31 (H) 7.0 - 18 MG/DL    Creatinine 0.93 0.6 - 1.3 MG/DL    BUN/Creatinine Ratio 33 (H) 12 - 20      Est, Glom Filt Rate >90 >60 ml/min/1.73m2    Calcium 8.9 8.5 - 10.1 MG/DL   POCT Glucose    Collection Time: 02/04/25  2:03 AM   Result Value Ref Range    POC Glucose 107 70 - 
Atrial Rate 67 BPM    P-R Interval 164 ms    QRS Duration 100 ms    Q-T Interval 416 ms    QTc Calculation (Bazett) 439 ms    P Axis 74 degrees    R Axis 66 degrees    T Axis 19 degrees    Diagnosis       Normal sinus rhythm  Normal ECG  When compared with ECG of 28-JAN-2025 22:14,  ST no longer elevated in Inferior leads  T wave inversion no longer evident in Anterolateral leads  Confirmed by MD Rm Marc ([id number]) on 1/31/2025 12:21:51 PM     POCT Glucose    Collection Time: 01/31/25  2:03 AM   Result Value Ref Range    POC Glucose 180 (H) 70 - 110 mg/dL   CBC with Auto Differential    Collection Time: 01/31/25  3:30 AM   Result Value Ref Range    WBC 10.3 4.6 - 13.2 K/uL    RBC 2.62 (L) 4.35 - 5.65 M/uL    Hemoglobin 7.3 (L) 13.0 - 16.0 g/dL    Hematocrit 22.8 (L) 36.0 - 48.0 %    MCV 87.0 78.0 - 100.0 FL    MCH 27.9 24.0 - 34.0 PG    MCHC 32.0 31.0 - 37.0 g/dL    RDW 18.2 (H) 11.6 - 14.5 %    Platelets 118 (L) 135 - 420 K/uL    MPV 13.0 (H) 9.2 - 11.8 FL    Nucleated RBCs 1.4 (H) 0  WBC    nRBC 0.14 (H) 0.00 - 0.01 K/uL    Neutrophils % 87.8 (H) 40.0 - 73.0 %    Lymphocytes % 4.5 (L) 21.0 - 52.0 %    Monocytes % 6.4 3.0 - 10.0 %    Eosinophils % 0.0 0.0 - 5.0 %    Basophils % 0.0 0.0 - 2.0 %    Immature Granulocytes % 1.3 (H) 0.0 - 0.5 %    Neutrophils Absolute 9.08 (H) 1.80 - 8.00 K/UL    Lymphocytes Absolute 0.47 (L) 0.90 - 3.60 K/UL    Monocytes Absolute 0.66 0.05 - 1.20 K/UL    Eosinophils Absolute 0.00 0.00 - 0.40 K/UL    Basophils Absolute 0.00 0.00 - 0.10 K/UL    Immature Granulocytes Absolute 0.13 (H) 0.00 - 0.04 K/UL    Differential Type AUTOMATED     Basic Metabolic Panel    Collection Time: 01/31/25  3:30 AM   Result Value Ref Range    Sodium 147 (H) 136 - 145 mmol/L    Potassium 3.2 (L) 3.5 - 5.5 mmol/L    Chloride 112 (H) 100 - 111 mmol/L    CO2 30 21 - 32 mmol/L    Anion Gap 5 3.0 - 18 mmol/L    Glucose 149 (H) 74 - 99 mg/dL    BUN 39 (H) 7.0 - 18 MG/DL    Creatinine 1.29 0.6 - 1.3 MG/DL 
- 18 mmol/L    Glucose 126 (H) 74 - 99 mg/dL    BUN 15 7.0 - 18 MG/DL    Creatinine 0.57 (L) 0.6 - 1.3 MG/DL    BUN/Creatinine Ratio 26 (H) 12 - 20      Est, Glom Filt Rate >90 >60 ml/min/1.73m2    Calcium 8.3 (L) 8.5 - 10.1 MG/DL   Magnesium    Collection Time: 02/12/25  4:18 AM   Result Value Ref Range    Magnesium 2.4 1.6 - 2.6 mg/dL   Phosphorus    Collection Time: 02/12/25  4:18 AM   Result Value Ref Range    Phosphorus 2.0 (L) 2.5 - 4.9 MG/DL   POCT Glucose    Collection Time: 02/12/25  7:14 AM   Result Value Ref Range    POC Glucose 122 (H) 70 - 110 mg/dL   POCT Glucose    Collection Time: 02/12/25 12:18 PM   Result Value Ref Range    POC Glucose 146 (H) 70 - 110 mg/dL   Vascular duplex upper extremity venous right    Collection Time: 02/12/25  3:31 PM   Result Value Ref Range    Body Surface Area 1.74 m2   POCT Glucose    Collection Time: 02/12/25  4:40 PM   Result Value Ref Range    POC Glucose 143 (H) 70 - 110 mg/dL   POCT Glucose    Collection Time: 02/12/25  8:56 PM   Result Value Ref Range    POC Glucose 123 (H) 70 - 110 mg/dL       Signed By: Charles Momin DO     February 12, 2025      Disclaimer: Sections of this note are dictated using utilizing voice recognition software.  Minor typographical errors may be present. If questions arise, please do not hesitate to contact me or call our department.     
02/10/25  0305   WBC 16.7* 15.0* 11.3   RBC 2.55* 2.63* 2.30*   HGB 7.4* 7.6* 6.6*   HCT 23.4* 24.5* 21.7*    443* 378      Microbiology Results       Procedure Component Value Units Date/Time    Culture, MRSA, Screening [[id number]] Collected: 02/06/25 1725    Order Status: Completed Specimen: Nares Updated: 02/08/25 0033     Special Requests NO SPECIAL REQUESTS        Culture MRSA NOT PRESENT               Screening of patient nares for MRSA is for surveillance purposes and, if positive, to facilitate isolation considerations in high risk settings. It is not intended for automatic decolonization interventions per se as regimens are not sufficiently effective to warrant routine use.      Culture, Respiratory [[id number]] Collected: 02/06/25 1645    Order Status: Canceled Specimen: Sputum, Suctioned     Culture, Respiratory [[id number]] Collected: 02/06/25 1430    Order Status: Canceled Specimen: Endotracheal     Culture, Blood 1 [[id number]] Collected: 02/05/25 1424    Order Status: Completed Specimen: Blood Updated: 02/10/25 0655     Special Requests --        NO SPECIAL REQUESTS  LEFT  FOREARM       Culture NO GROWTH 5 DAYS       Culture, Blood 2 [[id number]] Collected: 02/05/25 1424    Order Status: Completed Specimen: Blood Updated: 02/10/25 0655     Special Requests --        NO SPECIAL REQUESTS  RIGHT  HAND       Culture NO GROWTH 5 DAYS       Culture, MRSA, Screening [[id number]] Collected: 01/30/25 0445    Order Status: Completed Specimen: Nares Updated: 01/31/25 1512     Special Requests NO SPECIAL REQUESTS        Culture MRSA NOT PRESENT               Screening of patient nares for MRSA is for surveillance purposes and, if positive, to facilitate isolation considerations in high risk settings. It is not intended for automatic decolonization interventions per se as regimens are not sufficiently effective to warrant routine use.      Culture, Respiratory [[id number]] Collected: 01/29/25 0335    Order 
daily 7/29/24  Yes Rowan Loza MD   pantoprazole (PROTONIX) 40 MG tablet Take 1 tablet by mouth daily 9/29/23  Yes Kulwinder Emmanuel MD   albuterol (PROVENTIL) (2.5 MG/3ML) 0.083% nebulizer solution Take 3 mLs by nebulization every 6 hours as needed for Wheezing or Shortness of Breath 10/4/23  Yes Rowan Loza MD   amLODIPine (NORVASC) 10 MG tablet amlodipine 10 mg tablet   TAKE 1 TABLET BY MOUTH ONCE DAILY 5/15/17  Yes Kulwinder Emmanuel MD   vitamin D (CHOLECALCIFEROL) 15030 UNIT CAPS cholecalciferol (vitamin D3) 1,250 mcg (50,000 unit) capsule   TAKE 1 CAPSULE BY MOUTH ONCE WEEKLY   Yes Kulwinder Emmanuel MD   docusate (COLACE, DULCOLAX) 100 MG CAPS docusate sodium 100 mg capsule   TAKE 1 CAPSULE BY MOUTH 2 TIMES A DAY 12/26/22  Yes Kulwinder Emmanuel MD   HYDROcodone-acetaminophen (NORCO) 5-325 MG per tablet hydrocodone 5 mg-acetaminophen 325 mg tablet   Yes ProviderKulwinder MD   linaclotide (LINZESS) 290 MCG CAPS capsule Linzess 290 mcg capsule   TAKE 1 CAPSULE BY MOUTH DAILY, 30 MINUTES BEFORE BREAKFAST.   Yes Kulwinder Emmanuel MD   loratadine (CLARITIN) 10 MG tablet loratadine 10 mg tablet   TAKE 1 TABLET BY MOUTH DAILY 4/18/16  Yes Kulwinder Emmanuel MD   triamterene-hydroCHLOROthiazide (MAXZIDE-25) 37.5-25 MG per tablet triamterene 37.5 mg-hydrochlorothiazide 25 mg tablet   TAKE 1 TABLET BY MOUTH ONCE DAILY 5/15/17  Yes Kulwinder Emmanuel MD   sildenafil (VIAGRA) 100 MG tablet Take 1 tablet by mouth as needed for Erectile Dysfunction 8/28/24   Farhad Fountain MD   aspirin 81 MG EC tablet aspirin 81 mg tablet,delayed release   TAKE 1 TABLET BY MOUTH ONCE DAILY FOR 90 DAYS 5/15/17   Kulwinder Emmanuel MD     @[facility]@  No Known Allergies   Social History     Tobacco Use    Smoking status: Never    Smokeless tobacco: Never   Substance Use Topics    Alcohol use: Yes      Family History   Problem Relation Age of Onset    Hypertension Father     Lung Disease Father     
                   Imaging:  [x]   I have personally reviewed the patient’s radiographs and reports  Most recent imaging:  XR CHEST PORTABLE    Result Date: 2/5/2025  1.  Support apparatus as above. See above to correlate for intended positioning. 2.  Interval development of right hilar fullness as well as infiltrates involving the right lung base. Faint infiltrates involving the left midlung are now conspicuous.. Findings may be related to infectious or inflammatory process. Suggest continued radiographic follow-up to resolution. 3.  Left upper lobe nodular opacity/mass again visualized measuring 3.0 cm. 4.  Small right-sided pleural effusion. 5.  Additional findings as above. Electronically signed by Laurent Pool    XR ABDOMEN (KUB) (SINGLE AP VIEW)    Result Date: 2/4/2025  Partial small bowel obstruction. Follow-up with plain imaging of the abdomen. Nasogastric tube is projecting in the region of the right lower lobe bronchus. Nasogastric tube was removed at the time of this dictation. Electronically signed by Ambrose Guevara    XR CHEST PORTABLE    Result Date: 2/4/2025   IMPRESSION: 1. Unchanged exam. Follow-up with plain imaging of the chest. Electronically signed by Ambrose Guevara      No results found for this or any previous visit.     No valid procedures specified.              12 minutes were spent with the patient at the bedside. This care involved high complexity decision making to assess, manipulate, and support vital system functions, to treat this degreee vital organ system failure and to prevent further life threatening deterioration of the patient’s condition  The services I provided to this patient were to treat and/or prevent clinically significant deterioration that could result in the failure of one or more body systems and/or organ systems due to respiratory distress, hypoxia, cardiac dysrhythmia.       Juliet Redmond PA-C  02/06/25  Pulmonary, Critical Care Medicine  Critical access hospital 
understanding  DVT and GI ppx  Code: Full  Continued ICU care            Rest of details and diagnostic/treatment plans per NP/PA note.     I have personally reviewed all pertinent data including labs, imaging and recommendations of treatment team providers.  Aggregate critical care time was 45 minutes, of which >50% was provided by myself, which includes only time during which I was engaged in work directly related to the patient's care evaluation, management and care decisions, and ordering appropriate treatment related to critical care problems exclusive of procedures. This time was independent of other practitioners.     The reason for providing this level of medical care for this critically ill patient was due a critical illness that impaired one or more vital organ systems such that there was a high probability of imminent or life threatening deterioration in the patients condition. This care involved high complexity decision making to assess, manipulate, and support vital system functions, to treat this degree vital organ system failure and to prevent further life threatening deterioration of the patient’s condition.                            Justin Lynn MD  Pulmonary, Critical Care Medicine  [facility]      
02/03/25 0500    Or    potassium chloride 10 mEq/100 mL IVPB (Peripheral Line)  10 mEq IntraVENous PRN Juliet Wakefield PA-C 100 mL/hr at 01/31/25 1254 10 mEq at 01/31/25 1254    magnesium sulfate 2000 mg in 50 mL IVPB premix  2,000 mg IntraVENous PRN Juliet Wakefield PA-C                     Imaging:  I have personally reviewed the patient’s radiographs and have reviewed the reports:    XR CHEST PORTABLE  Narrative: CHEST PORTABLE    CPT CODE: 70607    COMPARISON: 1/30/2025    INDICATIONS: Tachypnea and increased shortness of breath    FINDINGS: There is a nasogastric tube present with its tip below the GE  junction. The heart is normal in size. There is a 2.3 cm pulmonary nodule in the  left apex which appears to be chronic. There is some linear scarring in the left  lung base. There is a new moderate-sized right pleural effusion with new volume  loss in the right lung base.  Impression: New moderate right effusion. New volume loss right lung base. Thoracentesis  should be considered.    Electronically signed by Rafael Kolb                 Care Time:  The services I provided to this patient were to treat and/or prevent clinically significant deterioration that could result in the failure of one or more body systems and/or organ systems due to respiratory distress, hypoxia, cardiac dysrhythmia.     Services included the following:  -reviewing nursing notes and old charts  -vital sign assessments   -direct patient care  -medication orders and management  -interpreting and reviewing diagnostic studies/labs  -re-evaluations  -documentation time        Aggregate care time was 15  minutes, which includes only time during which I was engaged in work directly related to the patient's care as described above.    During this entire length of time I was immediately available to the patient. The reason for providing this level of medical care for this critically ill patient was due a critical illness that

## 2025-02-19 NOTE — DISCHARGE SUMMARY
Patient left AGAINST MEDICAL ADVICE    Principal diagnosis  Acute hypoxic respiratory failure - requiring mechanical ventilation for airway protection, intubated 01/29, extubated on 1/30, Emergent re-intubation 02/05 for worsening hypoxic respiratory failure and encephalopathy 2/2 mucus plugging. Extubated 2/6 to BIPAP.  Off high flow oxygen, currently on 4 L oxygen supplementation.  Hypernatremia, improving  Hypoglycemia  Leukocytosis, could be due to steroids  Hypovolemic vs septic shock 2/2 aspiration pneumonia, improved  Acute toxic/metabolic encephalopathy, resolved. Mentation to baseline  Septic shock resolved  Anemia requiring PRBC transfusion, H&H trending down since admission, no signs of bleeding  Mucous plug on RLL, s/p emergent bronchoscopy on 02/05 for mucus plugs  Moderate oropharyngeal dysphagia  Acute kidney injury. Resolved  Sarcoidosis - pulmonary nodular   COPD with asthma  DOUG on CPAP  HTN  Severe neuropathy, wheelchair bound    Hospital course  64 y.o. male with a past medical history of COPD with asthma, sarcoidosis, pulmonary nodular amyloidosis, DOUG, HTN, severe neuropathy wheelchair-bound, presented to the ED on 1/28 with complaints of generalized weakness and inability to tolerate oral secretions. Per chart review, at baseline patient is able to talk and make his own food. CT head revealed small lacunar infarct age-indeterminate. CT A/P revealed concern for aspiration and mild ileus. Workup was otherwise wnl. Pt was subsequently admitted to the floor for further workup however pt became progressively hypotensive despite fluid boluses. He was subsequently started on levophed. Dr. Vasquez was also consulted for DARRIAN with persistent hypernatremia and patient was started on maintenance fluids. Given worsening mentation, pt was emergently intubated. Upon arrival to the unit, pt was noted to be in profound hypovolemic shock, is now s/p multiple vasopressors. Neuro was consulted. MRI brain 1/29

## 2025-02-19 NOTE — CONSULTS
Consult Note      Consult requested by: Rowan Loza MD    ADMIT DATE: 1/28/2025    CONSULT DATE: January 29, 2025           Admission diagnosis: Aphasia   Reason for Nephrology Consultation: DARRIAN    Assessment:   1) nonoliguric DARRIAN ( baseline creat ~ 0.6) d/t prerenal azotemia v/s ATN   2) Ac respi failure   3) Multifactorial shock - hypovolemic vs septic due to unknown source   4) Metabolic encephalopathy  5) hypernatremia   6) Sarcoidoses   7) Pulmonary nodular amyloidosis   8) COPD with asthma   9) severe hypoalbuminemia   10) anemia /thrombocytopenia     Recommendations:   1) fluids IVF D5 1/2 NS  increase to 125 cc/hrs   2) keep pressors , MAP > 65  3) monitor electrolytes and renal functions q12hrs   4) renally dose meds for egfr ~ < 30 , creatinine overestimating GFR     Please call with questions    Sancho Vasquez MD FASN  Cell [phone]  Pager: [phone]  Fax   [phone]         HPI: Farhad Rogers is a 64 y.o. male Black /  Patient is a 64 y.o. male with a past medical history of COPD with asthma, sarcoidosis, pulmonary nodular amyloidosis, DOUG, HTN, severe neuropathy wheelchair-bound, presented to the ED on 1/28 with complaints of generalized weakness and inability to tolerate oral secretions.     Per chart review, at baseline patient is able to talk and make his own food. CT head revealed small lacunar infarct age-indeterminate. Workup was otherwise wnl. Tele neuro was consulted who recommended MRI brain. Pt was subsequently admitted to the floor for further workup however pt became progressively hypotensive despite 1.5L IVF. He was subsequently started on levophed.     Pt remained hemodynamically unstable requiring levophed and vasopressin. Pt is oliguric - RN advised to place gerardo. Nephro consulted for DARRIAN       Past Medical History:   Diagnosis Date    Essential hypertension     Essential hypertension, benign     Gout     Hypogonadism male     Impotence     
        In Patient Progress note      Admit Date: 1/28/2025      Impression:     1) nonoliguric DARRIAN ( baseline creat ~ 0.6) d/t prerenal azotemia v/s ATN   2) Acute respiratory failure   3) Multifactorial shock - hypovolemic vs septic due to unknown source   4) Metabolic encephalopathy  5) hypernatremia   6) Sarcoidoses   7) Pulmonary nodular amyloidosis   8) COPD with asthma   9) severe hypoalbuminemia   10) anemia /thrombocytopenia     Recommendations:   1) fluids IVF D5 @ 100cc/hr  2) MAP goal >65, add midodrine prn  3) monitor electrolytes and renal functions q12hrs   4) renally dose meds for egfr ~ < 30 , creatinine overestimating GFR   5) avoid nephrotoxins, contrast  6) f/u on paraprot workup     Please call with questions    Sancho Vasquez MD FASN  Cell [phone]  Pager: [phone]  Fax   [phone]   Subjective:     - No acute over night events.  - respiratory - stable  - hemodynamics - stable, no pressrs  - UOP-ok  - Nutrition -ok    Objective:     /60   Pulse 71   Temp 97.5 °F (36.4 °C)   Resp 15   Ht 1.676 m (5' 6\")   Wt 72.6 kg (160 lb)   SpO2 100%   BMI 25.82 kg/m²       Intake/Output Summary (Last 24 hours) at 1/30/2025 1531  Last data filed at 1/30/2025 1400  Gross per 24 hour   Intake 4693.13 ml   Output 970 ml   Net 3723.13 ml       Physical Exam:     Gen NAD  HENT mmm  RS AEBE   CVS s1s2 wnl no JVD  Ext edema +  Skin no rashes  Neuro oriented X 3     Data Review:    Recent Labs     01/30/25  0300   WBC 8.6   RBC 3.05*   HCT 26.6*   MCV 87.2   MCH 28.2   MCHC 32.3   RDW 18.2*   MPV 12.2*     Recent Labs     01/28/25  1315 01/28/25  2215 01/29/25  0245 01/30/25  0300 01/30/25  1010   BUN 47* 47* 52* 47* 44*   K 4.5 3.8 3.5 2.9* 3.5   * 152* 147* 147* 150*    111 109 112* 115*   CO2 35* 36* 34* 31 30   PHOS  --   --  3.9 3.1  --        Sancho Vasquez MD  
      In Patient Progress note      Admit Date: 1/28/2025      Impression:     1) nonoliguric DARRIAN ( baseline creat ~ 0.6) d/t ATN   2) Acute respiratory failure   3) Multifactorial shock - hypovolemic vs septic due to unknown source   4) Metabolic encephalopathy  5) hypernatremia   6) Sarcoidoses   7) Pulmonary nodular amyloidosis   8) COPD with asthma   9) severe hypoalbuminemia   10) anemia /thrombocytopenia     Recommendations:   1) fluids IVF D5 w/ 20meq K @ 75cc/hr  2) MAP goal >65, add midodrine prn  3) monitor electrolytes and renal functions q12hrs   4) renally dose meds for egfr ~ < 30 , creatinine overestimating GFR   5) avoid nephrotoxins, contrast  6) f/u on paraprot workup       Please call with questions    Sancho Vasquez MD FASN  Cell [phone]  Pager: [phone]  Fax   [phone]   Subjective:     - No acute over night events.  - respiratory - stable  - hemodynamics - stable, no pressrs  - UOP-good  - Nutrition -ok    Objective:     /61   Pulse 75   Temp 98.9 °F (37.2 °C) (Oral) Comment: Christian Hugger turned off  Resp 12   Ht 1.676 m (5' 6\")   Wt 67.3 kg (148 lb 5.9 oz)   SpO2 99%   BMI 23.95 kg/m²       Intake/Output Summary (Last 24 hours) at 1/31/2025 1117  Last data filed at 1/31/2025 0948  Gross per 24 hour   Intake 2535.95 ml   Output 1300 ml   Net 1235.95 ml       Physical Exam:     Gen comfortable  HENT mmm  RS AEBE   CVS s1s2 wnl no JVD  Ext edema +  Skin no rashes  Sedated, on ventilator    Data Review:    Recent Labs     01/31/25  0330   WBC 10.3   RBC 2.62*   HCT 22.8*   MCV 87.0   MCH 27.9   MCHC 32.0   RDW 18.2*   MPV 13.0*     Recent Labs     01/28/25  1315 01/28/25  2215 01/29/25  0245 01/30/25  0300 01/30/25  1010 01/30/25  1730 01/31/25  0330   BUN 47* 47* 52* 47* 44*  --  39*   K 4.5 3.8 3.5 2.9* 3.5 3.6 3.2*   * 152* 147* 147* 150* 147* 147*    111 109 112* 115*  --  112*   CO2 35* 36* 34* 31 30  --  30   PHOS  --   --  3.9 3.1  --   --  2.8       Ailyn 
      In Patient Progress note      Admit Date: 1/28/2025      Impression:     1) nonoliguric DARRIAN ( baseline creat ~ 0.6) d/t ATN   2) Acute respiratory failure   3) Multifactorial shock - hypovolemic vs septic due to unknown source   4) Metabolic encephalopathy  5) hypernatremia improved  6) Sarcoidoses   7) Pulmonary nodular amyloidosis   8) COPD with asthma   9) severe hypoalbuminemia   10) anemia /thrombocytopenia     Recommendations:   1) continue fluids IVF D5 w/ 20meq K @ 50cc/hr  2) MAP goal >65, wean off  midodrine   3) monitor electrolytes and renal functions q12hrs   4) renally dose meds for egfr ~ < 30 , creatinine overestimating GFR   5) avoid nephrotoxins, contrast  6) f/u on paraprot workup       Please call with questions    Sancho Vasquez MD FASN  Cell [phone]  Pager: [phone]  Fax   [phone]   Subjective:     - No acute over night events.  - respiratory - stable  - hemodynamics - stable, no pressrs  - UOP-good  - Nutrition -ok    Objective:     /89   Pulse 57   Temp 97.9 °F (36.6 °C) (Oral)   Resp 16   Ht 1.676 m (5' 6\")   Wt 67.3 kg (148 lb 5.9 oz)   SpO2 (!) 82%   BMI 23.95 kg/m²       Intake/Output Summary (Last 24 hours) at 2/1/2025 1010  Last data filed at 2/1/2025 0700  Gross per 24 hour   Intake 1600.92 ml   Output 1225 ml   Net 375.92 ml       Physical Exam:     Gen comfortable  HENT mmm  RS AEBE   CVS s1s2 wnl no JVD  Ext edema +  Skin no rashes  Sedated, on ventilator    Data Review:    Recent Labs     02/01/25  0205   WBC 7.7   RBC 2.97*   HCT 26.1*   MCV 87.9   MCH 27.6   MCHC 31.4   RDW 18.2*   MPV 12.1*     Recent Labs     01/30/25  0300 01/30/25  1010 01/30/25  1730 01/31/25  0330 01/31/25  1843 02/01/25  0205   BUN 47* 44*  --  39*  --  38*   K 2.9* 3.5 3.6 3.2* 3.9 3.7   * 150* 147* 147*  --  142   * 115*  --  112*  --  109   CO2 31 30  --  30  --  29   PHOS 3.1  --   --  2.8  --  3.4       Sancho Vasquez MD  
A 64 years old male patient with medical history of COPD/asthma, sarcoidosis, amyloidosis, sleep apnea, hypertension, myopathy/neuropathy wheelchair-bound at baseline was brought to the emergency room for worsening weakness and altered mental status.  History from medical records.  For about a couple of weeks, patient has been more weaker.  Has changes in his mental status.  Was not talking.  No abnormal body movement.  Does not have any fever.  Found to have respiratory failure after admission; intubated and admitted to the ICU.  He has hypotension and hypothermia.  Possibility of sepsis was considered and put on Zosyn and vancomycin.  During my evaluation, patient was off sedation.  Not following commands.  Opens his eyes to pain.  Diffuse weakness bilaterally.  He has myoclonus over the upper extremities.  MRI of the brain without contrast from today did not show any acute changes; there are few nonspecific white matter changes possibly from microvascular ischemia.  Rapid EEG running and no evidence electrographic seizure.    Social History     Socioeconomic History    Marital status: Single     Spouse name: Not on file    Number of children: Not on file    Years of education: Not on file    Highest education level: Not on file   Occupational History    Not on file   Tobacco Use    Smoking status: Never    Smokeless tobacco: Never   Vaping Use    Vaping status: Never Used   Substance and Sexual Activity    Alcohol use: Yes    Drug use: Never    Sexual activity: Not on file   Other Topics Concern    Not on file   Social History Narrative    Not on file     Social Determinants of Health     Financial Resource Strain: Not on file   Food Insecurity: Not on file   Transportation Needs: Not on file   Physical Activity: Not on file   Stress: Not on file   Social Connections: Not on file   Intimate Partner Violence: Not on file   Housing Stability: Not on file       Family History   Problem Relation Age of Onset    
Comprehensive Nutrition Assessment    Type and Reason for Visit:  Initial, Consult, NPO/clear liquid, Nutrition support    Nutrition Recommendations/Plan:   Initiate tube feeding regimen:   Formula: Vital HP (Peptide High Protein)  Goal Rate: 20 ml/hr x 20 hours (for anticipation of holding tube feeds for standard nursing care)  Water Flushes: 100 mL q 4 hours (600 mL total) - or per MD  Goal Regimen Provides (with modulars):  400 kcal [910 kcal w/ D5], 35g protein, 936 ml free water, 27% RDIs  IVF per MD.  Daily wts.   Continue to monitor tolerance of EN, weight, labs, and plan of care during admission.      Malnutrition Assessment:  Malnutrition Status:  Insufficient data (vs at risk, NPO, vent) (01/29/25 1229)    Context:  Acute Illness       Nutrition History and Allergies:      Past Medical History:   Diagnosis Date    Essential hypertension     Essential hypertension, benign     Gout     Hypogonadism male     Impotence     Microscopic hematuria     Myopathy     Obesity, unspecified     Obstructive sleep apnea     on bipap    Other chronic pulmonary heart diseases     Peripheral neuropathy 10/25/2012    Sarcoid 10/25/2012    Sarcoidosis    Limited hx available. Per H&P: baseline pt is able to talk and make his own food.    Weight Hx: Current charted wt has no source. Wt change: -5 lb (3%) x 2.5 weeks if accurate - not significant.  Wt Readings from Last 10 Encounters:   01/29/25 72.6 kg (160 lb)   01/13/25 74.8 kg (165 lb)   08/28/24 74.8 kg (165 lb)   04/29/24 74.8 kg (165 lb)   10/04/23 78.5 kg (173 lb)   08/11/23 81.6 kg (180 lb)   04/26/23 82.6 kg (182 lb)   02/16/23 82.6 kg (182 lb)   01/05/23 82.6 kg (182 lb)   04/15/22 82.6 kg (182 lb)     NFPE: unable to perform NFPE. Food Allergies: NKFA    Nutrition Assessment:    Admitted with c/o generalized weakness, AMS. 1/29: resp failure, intubated and transferred to ICU. Pt seen for - Consult: TF ordering and management. Discussed care during interdisciplinary 
Comprehensive Nutrition Assessment    Type and Reason for Visit:  Reassess, Consult, NPO/clear liquid, Nutrition support    Nutrition Recommendations/Plan:   Continue soft/bite sized, moderately thick liquids per SLP recs.  Encourage PO intake. Please document PO intake in flowsheets, even if 0%.   Order tube feeding regimen: per MD  Formula: Jevity 1.5 (Standard with Fiber)  Goal Rate: 25 ml/hr x 20 hours (for anticipation of holding tube feeds for standard nursing care)  Water Flushes: 150 mL q 4 hours (900 mL total) - or per MD.  Goal Regimen Provides (with modulars):  750 kcal, 32g protein, 1280 ml free water, 50% RDIs  Noted no BM since PTA. Bowel regimen per MD.  Daily wts.   Continue to monitor tolerance of EN, weight, labs, and plan of care during admission.      Malnutrition Assessment:  Malnutrition Status:  Moderate malnutrition (02/03/25 1101)    Context:  Acute Illness     Findings of the 6 clinical characteristics of malnutrition:  Energy Intake:  Mild decrease in energy intake  Weight Loss:  Greater than 2% over 1 week     Body Fat Loss:  Mild body fat loss Fat Overlying Ribs   Muscle Mass Loss:  Mild muscle mass loss Temples (temporalis), Clavicles (pectoralis & deltoids)  Fluid Accumulation:  No fluid accumulation Extremities, Generalized   Strength:  Not Performed    Nutrition Assessment:    Admitted with c/o generalized weakness, AMS. 1/29: resp failure, intubated and transferred to ICU. 1/30 s/p extubation. SLP following, advance PO diet from full/mod thick to soft/bite sized, moderately thick liquids on 2/3. Visited pt, has NGT in place. Per RN, pt TF currently held but was infusing. Discussed care with MD. MD ordered NGT over the weekend and told RN to start Jevity @ 25 ml/hr (not ordered). Per MD, resume Jevity @ 25 ml/hr for now, monitor PO intake. Will order TF per MD request/place TF consult. Continue to follow per policy.    Nutrition Related Findings:    Pertinent Meds: 
Comprehensive Nutrition Assessment    Type and Reason for Visit:  Reassess, Consult, NPO/clear liquid, Nutrition support    Nutrition Recommendations/Plan:   Initiate tube feeding regimen:   Formula: Vital HP (Peptide High Protein)  Goal Rate: 20 ml/hr x 20 hours (for anticipation of holding tube feeds for standard nursing care)  Water Flushes: 100 mL q 2 hours (1200 mL total)  Goal Regimen Provides (with modulars): 400 kcal [1012 kcal w/ D10], 35g protein, 1536 ml free water, 27% RDIs  D10 per ICU.  Daily wts.   Continue to monitor tolerance of EN, weight, labs, and plan of care during admission.      Malnutrition Assessment:  Malnutrition Status:  Moderate malnutrition (02/03/25 1101)    Context:  Acute Illness     Findings of the 6 clinical characteristics of malnutrition:  Energy Intake:  Mild decrease in energy intake  Weight Loss:  Greater than 2% over 1 week     Body Fat Loss:  Mild body fat loss Fat Overlying Ribs   Muscle Mass Loss:  Mild muscle mass loss Temples (temporalis), Clavicles (pectoralis & deltoids)  Fluid Accumulation:  No fluid accumulation Extremities, Generalized   Strength:  Not Performed    Nutrition Assessment:    Admitted with c/o generalized weakness, AMS. 1/29: resp failure, intubated and transferred to ICU. 1/30 s/p extubation. SLP following, advance PO diet from full/mod thick to soft/bite sized, moderately thick liquids on 2/3. Per chart review, pt pulled NGT 2/4. Discussed care during interdisciplinary rounds. Pt required emergent re-intubation this AM. Per RN, pt has OGT in place, awaiting confirmation; requiring levo and started on D10 d/t hypoglycemia. Per MD, initiate trickle feeds only with  ml q2h for hypernatremia. RD to place TF consult and order TF.    Nutrition Related Findings:    Pertinent Meds:   Pepcid  Heparin  Solumedrol  Midodrine  Precedex gtt  D10 @ 75 ml/hr (180g dex, 612 kcal)   Levo @ 13 mcg/min  Pertinent Labs:  Recent Labs     02/04/25  0112 
Infectious Disease Consultation Note        Reason: Evaluate for sepsis, worsening leukocytosis, pneumonia    Current abx Prior abx   Piperacillin/tazobactam 1/29 -2/4 , restarted 2/5 Vancomycin 1/29-1/31     Lines:       Assessment :  64 old man with past medical history significant for hypertension, gout, peripheral neuropathy, obesity admitted to [facility] on 1/28/2025 for altered mental status.    Septic shock-present on admission due to aspiration pneumonia    Now with persistent leukocytosis    Clinical presentation consistent with sepsis, acute hypoxic respiratory failure-likely due to recurrent aspiration pneumonia    Intubated 1/29-1/30, 2/5-2/6    Status post bronchoscopy 2/5-Significant mucus plugging noted in right-sided lung including right mainstem, right upper lobe, right middle lobe, right lower lobe.   Sputum cx 1/29-normal respiratory magda    Worsening leukocytosis noted today despite improvement in respiratory status likely due to steroids/significant pneumonia.  Monitor for partially treated pneumonia secondary to piperacillin resistant gram-negative's, gram-positive's    Recommendations:    Continue piperacillin/tazobactam  Obtain sputum cultures  Taper steroids per ICU team  Aspiration precautions  Monitor CBC, temperature, procalcitonin, clinically    Thank you for consultation request. Above plan was discussed in details with patient, family and dr Lynn. Please call me if any further questions or concerns. Will continue to participate in the care of this patient.    HPI:    64 old man with past medical history significant for hypertension, gout, peripheral neuropathy, obesity admitted to [facility] on 1/28/2025 for altered mental status.    Patient is unable to provide any history.  Obtain his most history from review of records, talking to the ICU team.  Patient was initially admitted to floor.  However he became progressively hypotensive despite fluid boluses.  Required initiation of pressors.  Was 
Palliative care consult received for poor prognosis. Team arrived to patient room, nurse Azevedo at bedside, Dr. Denis at bedside discussing risk of patient leaving AMA. Patient A/O x4, in wheelchair, visitors in room arguing with doctor. Palliative team observed, once outside room Dr. Denis stated no need to see patient as he is leaving AMA.   
Room #: 2310      HÉCTOR: [id number]      Situation: Wound Care Consult    Background:    PMH:   Active Ambulatory Problems     Diagnosis Date Noted    Myopathy     Peripheral neuropathy     Asthma     Sarcoid     Pulmonary artery hypertension (HCC)     GI bleed 03/17/2017    HTN (hypertension) 03/18/2017    Shortness of breath     Iron deficiency anemia 03/18/2017    Hypogonadism in male 05/01/2018    Hypogonadism male 03/18/2017    Symptomatic anemia 03/17/2017    Hypokalemia 03/18/2017    Impotence of organic origin 05/01/2018    Obstructive apnea     Gastric polyp 03/18/2017    Calf muscle weakness 04/26/2023     Resolved Ambulatory Problems     Diagnosis Date Noted    No Resolved Ambulatory Problems     Past Medical History:   Diagnosis Date    Essential hypertension     Essential hypertension, benign     Gout     Impotence     Microscopic hematuria     Obesity, unspecified     Obstructive sleep apnea     Other chronic pulmonary heart diseases     Sarcoidosis         Pato Score: 11/23   BMI: Body mass index is 27.33 kg/m².   Preventive measures in place: limited layers, silicone to heels and sacrum, pillow to right foot.    Assessment:   Patient found sitting.  Patient is Awake and alert and Oriented x person, place, time and situation.     Wound(s) Description:           Wound 01/29/25 Sacrum Dorsal (Active)   Wound Image   02/11/25 1344   Wound Etiology Pressure Unstageable 02/11/25 1344   Dressing Status Reinforced dressing 02/11/25 1344   Wound Cleansed Cleansed with saline;Wound cleanser 02/10/25 1040   Dressing/Treatment Silicone border 02/11/25 1344   Dressing Change Due 02/11/25 02/10/25 0400   Wound Length (cm) 4 cm 02/11/25 1344   Wound Width (cm) 2 cm 02/11/25 1344   Wound Surface Area (cm^2) 8 cm^2 02/11/25 1344   Wound Assessment Slough;Granulation tissue 02/11/25 1344   Drainage Amount Scant (moist but unmeasurable) 02/11/25 1344   Drainage Description Serosanguinous 02/11/25 1344   Odor None 
[facility]  Pulmonary, Critical Care, and Sleep Medicine    Initial Patient Consult    Name: Farhad Rogers MRN: [id number]   : 1960 Hospital: [facility]   Date: 2025        IMPRESSION:   Acute hypoxic respiratory failure - requiring mechanical ventilation for airway protection, intubated , extubated on , to 4L NC  Emergent re-intubation  for worsening hypoxic respiratory failure and encephalopathy 2/2 mucus plugging.   Extubated  to BIPAP. Now on HFNC 35L/50%.   Mucous plug on RLL, s/p emergent bronchoscopy on  for mucus plugs  Moderate oropharyngeal dysphagia  Recurrent aspiration  Aspiration pneumonia  COPD with asthma  DOUG on CPAP  Sarcoidosis - pulmonary nodular   Hypovolemic vs septic shock 2/2 aspiration pneumonia, resolved  Acute toxic/metabolic encephalopathy, resolved  Anemia requiring pRBC transfusion   Acute kidney injury - prerenal azotemia vs ATN, resolved  Hypernatremia 2/2 dehydration   HTN  Severe neuropathy, wheelchair bound       RECOMMENDATIONS:   Oxygen: Supplemental O2 to maintain SpO2 88-92%. Avoid over-oxygenation.   BiPAP at night  Bronchial hygiene protocol: Albuterol + 3% HTS + CPT/PEP  Bronchodilators: Pulmicort/Brovana BID, DuoNeb PRN  Steroids: Solu-Medrol 40mg IV daily  Antibiotics: Zosyn, start date   Aspiration precautions  SLP following; diet modifications implemented.   Reviewed imaging: CXR, CT scans.   D5W per primary team for hypernatremia  DVT, PUD prophylaxis per primary team  Healthy weight    OT, PT, OOB   Assess home oxygen needs at discharge    Will Follow       Subjective:     This patient has been seen and evaluated at the request of Dr. Church for acute hypoxic respiratory failure.     2025: Caretaker at bedside. Overnight, patient became confused with oxygen desaturation. VBG was obtained and showed 7.41/58.4. This morning on my evaluation, patient was awake, alert, conversant, and 
chloride flush 0.9 % injection 5-40 mL  5-40 mL IntraVENous 2 times per day    sodium chloride flush 0.9 % injection 5-40 mL  5-40 mL IntraVENous PRN    potassium chloride (KLOR-CON M) extended release tablet 40 mEq  40 mEq Oral PRN    Or    potassium bicarb-citric acid (EFFER-K) effervescent tablet 40 mEq  40 mEq Oral PRN    Or    potassium chloride 10 mEq/100 mL IVPB (Peripheral Line)  10 mEq IntraVENous PRN    magnesium sulfate 2000 mg in 50 mL IVPB premix  2,000 mg IntraVENous PRN         Physical Exam:     Vitals:    02/01/25 1415   BP:    Pulse: 53   Resp: 16   Temp: (!) 95.5 °F (35.3 °C)   SpO2: 97%       BP Readings from Last 3 Encounters:   02/01/25 99/60   01/13/25 (!) 101/57   10/04/23 128/76     Pulse Readings from Last 3 Encounters:   02/01/25 53   01/13/25 69   10/04/23 95     Wt Readings from Last 3 Encounters:   01/31/25 67.3 kg (148 lb 5.9 oz)   01/13/25 74.8 kg (165 lb)   08/28/24 74.8 kg (165 lb)       EXAMINATION:  General:  Alert, cooperative, no distress  Head:  Normocephalic, without obvious abnormality, atraumatic.  Eyes:  Conjunctivae/corneas clear  Lungs:   Rhonchi bl, no rales, no wheeze  Heart:  Regular rate and rhythm, S1, S2 normal, no murmur, click, rub or gallop.  Abdomen:   Soft, non-tender. Bowel sounds normal.   Extremities: Extremities normal, no edema  Skin:  No rashes or lesions visible extremities  Neurologic:  Normal strength, sensation       Data Review:     Recent Labs     01/30/25  0300 01/31/25  0330 02/01/25  0205   WBC 8.6 10.3 7.7   HGB 8.6* 7.3* 8.2*   HCT 26.6* 22.8* 26.1*   * 118* 147     Recent Labs     01/30/25  0300 01/30/25  1010 01/30/25  1730 01/31/25  0330 01/31/25  1843 02/01/25  0205   * 150* 147* 147*  --  142   K 2.9* 3.5 3.6 3.2* 3.9 3.7   * 115*  --  112*  --  109   CO2 31 30  --  30  --  29   BUN 47* 44*  --  39*  --  38*   CREATININE 1.41* 1.43*  --  1.29  --  1.20   MG 1.9 2.6  --  2.4  --  2.4   PHOS 3.1  --   --  2.8  --  3.4 
=================== Note: [facility] maintains that all CT scans at their facilities are performed by using dose optimization technique as appropriate to a performed examination, to include automated exposure control, adjustment of the mAs and/or kVp according to patient size (including appropriate matching for site specific examinations) or use of iterative reconstruction technique. Electronically signed by Tirso Flower       No results found for this or any previous visit.     Ultrasound Result (most recent):  No results found for this or any previous visit from the past 3650 days.     12/12/18 12/12/2018  4:26 PM, 12/12/2018 12:00 AM (Final)    Narrative  This is a summary report. The complete report is available in the patient's medical record. If you cannot access the medical record, please contact the sending organization for a detailed fax or copy.    · Technically difficult study with poor endocardial visualization and technically difficult study due to patient's body habitus. Definity contrast was given to enhance imaging.  · Estimated left ventricular ejection fraction is 56 - 60%. Visually measured ejection fraction. Left ventricular mild concentric hypertrophy. Normal left ventricular wall motion, no regional wall motion abnormality noted.  · Mild aortic root dilatation.    Signed by: Anthony Man MD on 12/12/2018  4:26 PM, Signed by: Unknown Provider Result on 12/12/2018 12:00 AM                  Total of  35   min critical care time spent at bedside during the course of care providing evaluation,management and care decisions and ordering appropriate treatment related to critical care problems exclusive of procedures.  The reason for providing this level of medical care for this critically ill patient was due a critical illness that impaired one or more vital organ systems such that there was a high probability of imminent or life threatening deterioration in the patients condition. 
CAPSULE BY MOUTH ONCE WEEKLY   Yes Kulwinder Emmanuel MD   docusate (COLACE, DULCOLAX) 100 MG CAPS docusate sodium 100 mg capsule   TAKE 1 CAPSULE BY MOUTH 2 TIMES A DAY 12/26/22  Yes Kulwinder Emmanuel MD   HYDROcodone-acetaminophen (NORCO) 5-325 MG per tablet hydrocodone 5 mg-acetaminophen 325 mg tablet   Yes Kulwinder Emmaunel MD   linaclotide (LINZESS) 290 MCG CAPS capsule Linzess 290 mcg capsule   TAKE 1 CAPSULE BY MOUTH DAILY, 30 MINUTES BEFORE BREAKFAST.   Yes Kulwinder Emmnauel MD   loratadine (CLARITIN) 10 MG tablet loratadine 10 mg tablet   TAKE 1 TABLET BY MOUTH DAILY 4/18/16  Yes Kulwinder Emmanuel MD   triamterene-hydroCHLOROthiazide (MAXZIDE-25) 37.5-25 MG per tablet triamterene 37.5 mg-hydrochlorothiazide 25 mg tablet   TAKE 1 TABLET BY MOUTH ONCE DAILY 5/15/17  Yes Kulwinder Emmanuel MD   sildenafil (VIAGRA) 100 MG tablet Take 1 tablet by mouth as needed for Erectile Dysfunction 8/28/24   Farhad Fountain MD   aspirin 81 MG EC tablet aspirin 81 mg tablet,delayed release   TAKE 1 TABLET BY MOUTH ONCE DAILY FOR 90 DAYS 5/15/17   ProviderKulwinder MD Kevin Kumar Patel, MD  [facility]  Office  [phone]    This report has been produced using speech recognition software and may contain errors related to that system (errors in grammar, punctuation, spelling and possibly words and phrases that may be inappropriate). If there are any questions or concerns please feel free to contact the dictating provider for clarification.

## 2025-02-19 NOTE — PLAN OF CARE
Problem: Discharge Planning  Goal: Discharge to home or other facility with appropriate resources  1/30/2025 0110 by Gerber Pang, RN  Note: Patient currently intubated on levophed drip - not ready for discharge at this time.  1/29/2025 2233 by Gerber Pang, RN  Outcome: Progressing  Flowsheets (Taken 1/29/2025 2000)  Discharge to home or other facility with appropriate resources:   Identify barriers to discharge with patient and caregiver   Identify discharge learning needs (meds, wound care, etc)   Refer to discharge planning if patient needs post-hospital services based on physician order or complex needs related to functional status, cognitive ability or social support system     Problem: Safety - Adult  Goal: Free from fall injury  1/30/2025 0110 by Gerber Pang, RN  Note: Hourly rounding. Call bell in reach for family use. Reorient as needed. Side rail elevation X3.   1/29/2025 2233 by Gerber Pang, RN  Outcome: Progressing     Problem: Nutrition Deficit:  Goal: Optimize nutritional status  1/30/2025 0110 by Gerber Pang, RN  Note: Patient NPO with OGT clamped. Advance diet per MD. Monitor labs and vitals. Monitor I&O.  1/29/2025 2233 by Gerber Pang RN  Outcome: Progressing     Problem: Safety - Medical Restraint  Goal: Remains free of injury from restraints (Restraint for Interference with Medical Device)  Description: INTERVENTIONS:  1. Determine that other, less restrictive measures have been tried or would not be effective before applying the restraint  2. Evaluate the patient's condition at the time of restraint application  3. Inform patient/family regarding the reason for restraint  4. Q2H: Monitor safety, psychosocial status, comfort, nutrition and hydration  1/30/2025 0110 by Gerber Pang, RN  Flowsheets (Taken 1/30/2025 0000)  Remains free of injury from restraints (restraint for interference with medical device):   Determine that other, less restrictive measures have been 
  Problem: Discharge Planning  Goal: Discharge to home or other facility with appropriate resources  1/31/2025 0516 by Yaakov Cheema RN  Outcome: Progressing  Flowsheets (Taken 1/30/2025 2000)  Discharge to home or other facility with appropriate resources:   Arrange for interpreters to assist at discharge as needed   Arrange for needed discharge resources and transportation as appropriate   Identify barriers to discharge with patient and caregiver   Identify discharge learning needs (meds, wound care, etc)   Refer to discharge planning if patient needs post-hospital services based on physician order or complex needs related to functional status, cognitive ability or social support system  1/30/2025 1810 by Nubia Head, RN  Outcome: Progressing     Problem: Safety - Adult  Goal: Free from fall injury  1/31/2025 0516 by Yaakov Cheema RN  Outcome: Progressing  1/30/2025 1810 by Nubia Head, RN  Outcome: Progressing     Problem: Nutrition Deficit:  Goal: Optimize nutritional status  Outcome: Progressing     Problem: Pain  Goal: Verbalizes/displays adequate comfort level or baseline comfort level  Outcome: Progressing  Flowsheets  Taken 1/31/2025 0430 by Yaakov Cheema RN  Verbalizes/displays adequate comfort level or baseline comfort level:   Administer analgesics based on type and severity of pain and evaluate response   Assess pain using appropriate pain scale   Consider cultural and social influences on pain and pain management   Encourage patient to monitor pain and request assistance   Implement non-pharmacological measures as appropriate and evaluate response   Notify Licensed Independent Practitioner if interventions unsuccessful or patient reports new pain  Taken 1/31/2025 0000 by Yaakov Cheema RN  Verbalizes/displays adequate comfort level or baseline comfort level:   Administer analgesics based on type and severity of pain and evaluate response   Assess pain using appropriate pain scale   Consider 
  Problem: Discharge Planning  Goal: Discharge to home or other facility with appropriate resources  1/31/2025 0830 by Leilani Salgado, RN  Outcome: Progressing  Flowsheets (Taken 1/31/2025 0830)  Discharge to home or other facility with appropriate resources:   Identify barriers to discharge with patient and caregiver   Arrange for needed discharge resources and transportation as appropriate   Identify discharge learning needs (meds, wound care, etc)   Arrange for interpreters to assist at discharge as needed   Refer to discharge planning if patient needs post-hospital services based on physician order or complex needs related to functional status, cognitive ability or social support system     Problem: Safety - Adult  Goal: Free from fall injury  1/31/2025 0830 by Leilani Salgado RN  Outcome: Progressing  Flowsheets (Taken 1/31/2025 0830)  Free From Fall Injury: Instruct family/caregiver on patient safety      Problem: Pain  Goal: Verbalizes/displays adequate comfort level or baseline comfort level  1/31/2025 0830 by Leilani Salgado RN  Outcome: Progressing  Flowsheets (Taken 1/31/2025 0830  Problem: Neurosensory - Adult  Goal: Achieves stable or improved neurological status  1/31/2025 0830 by Leilani Salgado RN  Outcome: Progressing  Flowsheets (Taken 1/31/2025 0830)  Achieves stable or improved neurological status:   Assess for and report changes in neurological status   Initiate measures to prevent increased intracranial pressure   Maintain blood pressure and fluid volume within ordered parameters to optimize cerebral perfusion and minimize risk of hemorrhage   Monitor temperature, glucose, and sodium. Initiate appropriate interventions as ordered     Problem: Respiratory - Adult  Goal: Achieves optimal ventilation and oxygenation  1/31/2025 0830 by Leilani Salgado, RN  Outcome: Progressing  Flowsheets (Taken 1/31/2025 0830)  Achieves optimal ventilation and oxygenation:   Assess for changes in 
  Problem: Discharge Planning  Goal: Discharge to home or other facility with appropriate resources  2/1/2025 0209 by Gabi Alejo RN  Outcome: Progressing  Flowsheets (Taken 2/1/2025 0209)  Discharge to home or other facility with appropriate resources:   Identify barriers to discharge with patient and caregiver   Arrange for needed discharge resources and transportation as appropriate   Identify discharge learning needs (meds, wound care, etc)   Refer to discharge planning if patient needs post-hospital services based on physician order or complex needs related to functional status, cognitive ability or social support system  1/31/2025 1549 by Leilani Salgado, RN  Outcome: Progressing  Flowsheets (Taken 1/31/2025 0800)  Discharge to home or other facility with appropriate resources:   Identify barriers to discharge with patient and caregiver   Arrange for needed discharge resources and transportation as appropriate   Identify discharge learning needs (meds, wound care, etc)   Arrange for interpreters to assist at discharge as needed   Refer to discharge planning if patient needs post-hospital services based on physician order or complex needs related to functional status, cognitive ability or social support system     Problem: Safety - Adult  Goal: Free from fall injury  2/1/2025 0209 by Gabi Alejo RN  Outcome: Progressing  Flowsheets (Taken 2/1/2025 0209)  Free From Fall Injury: Instruct family/caregiver on patient safety  1/31/2025 1549 by Leilani Salgado, RN  Outcome: Progressing  Flowsheets (Taken 1/31/2025 0830)  Free From Fall Injury: Instruct family/caregiver on patient safety     Problem: Nutrition Deficit:  Goal: Optimize nutritional status  Outcome: Progressing  Flowsheets (Taken 2/1/2025 0209)  Nutrient intake appropriate for improving, restoring, or maintaining nutritional needs:   Assess nutritional status and recommend course of action   Monitor oral intake, labs, and treatment plans   
  Problem: Discharge Planning  Goal: Discharge to home or other facility with appropriate resources  2/12/2025 0055 by Margarette Ohara RN  Outcome: Progressing  Flowsheets  Taken 2/12/2025 0055  Discharge to home or other facility with appropriate resources: Identify barriers to discharge with patient and caregiver  Taken 2/11/2025 2030  Discharge to home or other facility with appropriate resources:   Identify barriers to discharge with patient and caregiver   Arrange for needed discharge resources and transportation as appropriate  Note: Patient accepted with [facility] pending approval for discharge. Aim to wean O2 prior to discharge.      Problem: Safety - Adult  Goal: Free from fall injury  2/12/2025 1227 by Roberto Carlos Laureano RN  Outcome: Progressing  Flowsheets (Taken 2/12/2025 1227)  Free From Fall Injury:   Instruct family/caregiver on patient safety   Based on caregiver fall risk screen, instruct family/caregiver to ask for assistance with transferring infant if caregiver noted to have fall risk factors  2/12/2025 0055 by Margarette Ohara RN  Outcome: Progressing  Flowsheets (Taken 2/12/2025 0055)  Free From Fall Injury: Instruct family/caregiver on patient safety  Note: Patient to remain free of any falls or injuries during length of stay. Safety interventions implemented: non-skid yellow socks on, bed alarm on, and call light within reach.      Problem: Nutrition Deficit:  Goal: Optimize nutritional status  Outcome: Progressing  Flowsheets (Taken 2/12/2025 1227)  Nutrient intake appropriate for improving, restoring, or maintaining nutritional needs:   Assess nutritional status and recommend course of action   Recommend appropriate diets, oral nutritional supplements, and vitamin/mineral supplements   Monitor oral intake, labs, and treatment plans     Problem: Pain  Goal: Verbalizes/displays adequate comfort level or baseline comfort level  2/12/2025 0055 by Margarette Ohara, RN  Outcome: 
  Problem: Discharge Planning  Goal: Discharge to home or other facility with appropriate resources  2/16/2025 1021 by Gracia Alcantara RN  Outcome: Progressing  Flowsheets (Taken 2/16/2025 0845)  Discharge to home or other facility with appropriate resources: Identify barriers to discharge with patient and caregiver  2/16/2025 0645 by Nitza Gaitan RN  Outcome: Progressing     Problem: Safety - Adult  Goal: Free from fall injury  2/16/2025 1021 by Gracia Alcantara RN  Outcome: Progressing  Flowsheets (Taken 2/15/2025 1313 by Roberto Carlos Laureano RN)  Free From Fall Injury: Instruct family/caregiver on patient safety  2/16/2025 0645 by Nitza Gaitan RN  Outcome: Progressing     Problem: Nutrition Deficit:  Goal: Optimize nutritional status  2/16/2025 1021 by Gracia Alcantara RN  Outcome: Progressing  Flowsheets (Taken 2/15/2025 1313 by Roberto Carlos Laureano RN)  Nutrient intake appropriate for improving, restoring, or maintaining nutritional needs:   Assess nutritional status and recommend course of action   Recommend appropriate diets, oral nutritional supplements, and vitamin/mineral supplements  2/16/2025 0645 by Nitza Gaitan RN  Outcome: Progressing     Problem: Pain  Goal: Verbalizes/displays adequate comfort level or baseline comfort level  2/16/2025 1021 by Gracia Alcantara RN  Outcome: Progressing  Flowsheets (Taken 2/14/2025 0612 by Bela Fermin, RN)  Verbalizes/displays adequate comfort level or baseline comfort level:   Encourage patient to monitor pain and request assistance   Assess pain using appropriate pain scale   Administer analgesics based on type and severity of pain and evaluate response   Implement non-pharmacological measures as appropriate and evaluate response  2/16/2025 0645 by Nitza Gaitan RN  Outcome: Progressing     Problem: Neurosensory - Adult  Goal: Achieves stable or improved neurological status  2/16/2025 1021 by Gracia Alcantara RN  Outcome: Progressing  Flowsheets (Taken 2/16/2025 0845)  Achieves stable 
  Problem: Discharge Planning  Goal: Discharge to home or other facility with appropriate resources  2/9/2025 1745 by Bell Chen RN  Outcome: Progressing  2/9/2025 0735 by Yaakov Cheema RN  Outcome: Progressing     Problem: Safety - Adult  Goal: Free from fall injury  2/9/2025 1745 by Bell Chen RN  Outcome: Progressing  2/9/2025 0735 by Yaakov Cheema RN  Outcome: Progressing     Problem: Nutrition Deficit:  Goal: Optimize nutritional status  2/9/2025 1745 by Bell Chen RN  Outcome: Progressing  2/9/2025 0735 by Yaakov Cheema RN  Outcome: Progressing     Problem: Safety - Medical Restraint  Goal: Remains free of injury from restraints (Restraint for Interference with Medical Device)  Description: INTERVENTIONS:  1. Determine that other, less restrictive measures have been tried or would not be effective before applying the restraint  2. Evaluate the patient's condition at the time of restraint application  3. Inform patient/family regarding the reason for restraint  4. Q2H: Monitor safety, psychosocial status, comfort, nutrition and hydration  2/9/2025 1745 by Bell Chen RN  Outcome: Progressing  2/9/2025 0735 by Yaakov Cheema RN  Outcome: Progressing     Problem: Pain  Goal: Verbalizes/displays adequate comfort level or baseline comfort level  2/9/2025 1745 by Bell Chen RN  Outcome: Progressing  2/9/2025 0735 by Yaakov Cheema RN  Outcome: Progressing  Flowsheets (Taken 2/9/2025 0000)  Verbalizes/displays adequate comfort level or baseline comfort level:   Administer analgesics based on type and severity of pain and evaluate response   Assess pain using appropriate pain scale   Consider cultural and social influences on pain and pain management   Encourage patient to monitor pain and request assistance   Implement non-pharmacological measures as appropriate and evaluate response   Notify Licensed Independent Practitioner if interventions unsuccessful or patient reports new pain   
  Problem: Discharge Planning  Goal: Discharge to home or other facility with appropriate resources  Outcome: Progressing     Problem: Safety - Adult  Goal: Free from fall injury  Outcome: Progressing     Problem: Nutrition Deficit:  Goal: Optimize nutritional status  Outcome: Progressing     Problem: Pain  Goal: Verbalizes/displays adequate comfort level or baseline comfort level  Outcome: Progressing  Flowsheets (Taken 2/9/2025 0000)  Verbalizes/displays adequate comfort level or baseline comfort level:   Administer analgesics based on type and severity of pain and evaluate response   Assess pain using appropriate pain scale   Consider cultural and social influences on pain and pain management   Encourage patient to monitor pain and request assistance   Implement non-pharmacological measures as appropriate and evaluate response   Notify Licensed Independent Practitioner if interventions unsuccessful or patient reports new pain     Problem: Neurosensory - Adult  Goal: Achieves stable or improved neurological status  Outcome: Progressing  Flowsheets (Taken 2/8/2025 2000)  Achieves stable or improved neurological status:   Assess for and report changes in neurological status   Initiate measures to prevent increased intracranial pressure   Maintain blood pressure and fluid volume within ordered parameters to optimize cerebral perfusion and minimize risk of hemorrhage   Monitor temperature, glucose, and sodium. Initiate appropriate interventions as ordered     Problem: Respiratory - Adult  Goal: Achieves optimal ventilation and oxygenation  Outcome: Progressing  Flowsheets (Taken 2/8/2025 2000)  Achieves optimal ventilation and oxygenation:   Assess and instruct to report shortness of breath or any respiratory difficulty   Assess for changes in mentation and behavior   Assess for changes in respiratory status   Assess the need for suctioning and aspirate as needed   Encourage broncho-pulmonary hygiene including cough, 
  Problem: Discharge Planning  Goal: Discharge to home or other facility with appropriate resources  Outcome: Progressing     Problem: Safety - Adult  Goal: Free from fall injury  Outcome: Progressing     Problem: Respiratory - Adult  Goal: Achieves optimal ventilation and oxygenation  Outcome: Progressing  Flowsheets  Taken 1/30/2025 1206 by Antony Aguila RT  Achieves optimal ventilation and oxygenation:   Assess for changes in respiratory status   Assess for changes in mentation and behavior   Position to facilitate oxygenation and minimize respiratory effort   Oxygen supplementation based on oxygen saturation or arterial blood gases   Encourage broncho-pulmonary hygiene including cough, deep breathe, incentive spirometry   Assess the need for suctioning and aspirate as needed   Assess and instruct to report shortness of breath or any respiratory difficulty   Respiratory therapy support as indicated  Taken 1/30/2025 0717 by Antony Aguila RT  Achieves optimal ventilation and oxygenation:   Assess for changes in respiratory status   Assess for changes in mentation and behavior   Position to facilitate oxygenation and minimize respiratory effort   Oxygen supplementation based on oxygen saturation or arterial blood gases   Encourage broncho-pulmonary hygiene including cough, deep breathe, incentive spirometry   Assess the need for suctioning and aspirate as needed   Assess and instruct to report shortness of breath or any respiratory difficulty   Respiratory therapy support as indicated     Problem: Safety - Medical Restraint  Goal: Remains free of injury from restraints (Restraint for Interference with Medical Device)  Description: INTERVENTIONS:  1. Determine that other, less restrictive measures have been tried or would not be effective before applying the restraint  2. Evaluate the patient's condition at the time of restraint application  3. Inform patient/family regarding the reason for restraint  4. Q2H: 
  Problem: Discharge Planning  Goal: Discharge to home or other facility with appropriate resources  Outcome: Progressing     Problem: Safety - Adult  Goal: Free from fall injury  Outcome: Progressing  Patient encouraged to call for assistance as needed. Call bell within reach. Bed in lowest locked position. Bed alarm active and audible. Patient verbalizes understanding and demonstrates use of call bell.     Problem: Nutrition Deficit:  Goal: Optimize nutritional status  Outcome: Progressing     
  Problem: Discharge Planning  Goal: Discharge to home or other facility with appropriate resources  Outcome: Progressing  Flowsheets (Taken 2/18/2025 2140)  Discharge to home or other facility with appropriate resources: Identify barriers to discharge with patient and caregiver     Problem: Nutrition Deficit:  Goal: Optimize nutritional status  2/18/2025 2140 by Jasvir Doss, RN  Outcome: Progressing  Flowsheets (Taken 2/18/2025 2140)  Nutrient intake appropriate for improving, restoring, or maintaining nutritional needs:   Recommend appropriate diets, oral nutritional supplements, and vitamin/mineral supplements   Assess nutritional status and recommend course of action   Monitor oral intake, labs, and treatment plans     Problem: Respiratory - Adult  Goal: Achieves optimal ventilation and oxygenation  Outcome: Progressing  Flowsheets (Taken 2/18/2025 2140)  Achieves optimal ventilation and oxygenation:   Assess for changes in respiratory status   Assess for changes in mentation and behavior   Position to facilitate oxygenation and minimize respiratory effort   Oxygen supplementation based on oxygen saturation or arterial blood gases   Encourage broncho-pulmonary hygiene including cough, deep breathe, incentive spirometry   Assess and instruct to report shortness of breath or any respiratory difficulty   Respiratory therapy support as indicated     
  Problem: Discharge Planning  Goal: Discharge to home or other facility with appropriate resources  Outcome: Progressing  Flowsheets (Taken 2/3/2025 0122)  Discharge to home or other facility with appropriate resources:   Identify barriers to discharge with patient and caregiver   Arrange for needed discharge resources and transportation as appropriate   Identify discharge learning needs (meds, wound care, etc)   Refer to discharge planning if patient needs post-hospital services based on physician order or complex needs related to functional status, cognitive ability or social support system     Problem: Safety - Adult  Goal: Free from fall injury  Outcome: Progressing  Flowsheets (Taken 2/3/2025 0122)  Free From Fall Injury: Instruct family/caregiver on patient safety     Problem: Nutrition Deficit:  Goal: Optimize nutritional status  Outcome: Progressing  Flowsheets (Taken 2/3/2025 0122)  Nutrient intake appropriate for improving, restoring, or maintaining nutritional needs:   Assess nutritional status and recommend course of action   Monitor oral intake, labs, and treatment plans   Recommend, monitor, and adjust tube feedings and TPN/PPN based on assessed needs   Provide specific nutrition education to patient or family as appropriate     Problem: Pain  Goal: Verbalizes/displays adequate comfort level or baseline comfort level  Outcome: Progressing  Flowsheets (Taken 2/3/2025 0122)  Verbalizes/displays adequate comfort level or baseline comfort level:   Encourage patient to monitor pain and request assistance   Assess pain using appropriate pain scale   Administer analgesics based on type and severity of pain and evaluate response   Implement non-pharmacological measures as appropriate and evaluate response   Notify Licensed Independent Practitioner if interventions unsuccessful or patient reports new pain     Problem: Neurosensory - Adult  Goal: Achieves stable or improved neurological status  Outcome: 
  Problem: Discharge Planning  Goal: Discharge to home or other facility with appropriate resources  Outcome: Progressing  Flowsheets (Taken 2/4/2025 1946 by Keith Hanley, RN)  Discharge to home or other facility with appropriate resources: Identify barriers to discharge with patient and caregiver     Problem: Safety - Adult  Goal: Free from fall injury  Outcome: Progressing  Flowsheets (Taken 2/3/2025 0122 by Gabi Alejo RN)  Free From Fall Injury: Instruct family/caregiver on patient safety     Problem: Nutrition Deficit:  Goal: Optimize nutritional status  Outcome: Progressing  Flowsheets (Taken 2/5/2025 0804)  Nutrient intake appropriate for improving, restoring, or maintaining nutritional needs:   Monitor oral intake, labs, and treatment plans   Assess nutritional status and recommend course of action   Recommend appropriate diets, oral nutritional supplements, and vitamin/mineral supplements     Problem: Pain  Goal: Verbalizes/displays adequate comfort level or baseline comfort level  Outcome: Progressing  Flowsheets  Taken 2/5/2025 0804 by Inga Leonard RN  Verbalizes/displays adequate comfort level or baseline comfort level:   Encourage patient to monitor pain and request assistance   Assess pain using appropriate pain scale   Administer analgesics based on type and severity of pain and evaluate response  Taken 2/5/2025 0000 by Keith Hanley, RN  Verbalizes/displays adequate comfort level or baseline comfort level:   Administer analgesics based on type and severity of pain and evaluate response   Assess pain using appropriate pain scale  Taken 2/4/2025 1943 by Keith Hanley, RN  Verbalizes/displays adequate comfort level or baseline comfort level:   Encourage patient to monitor pain and request assistance   Assess pain using appropriate pain scale   Administer analgesics based on type and severity of pain and evaluate response     Problem: Neurosensory - Adult  Goal: Achieves stable 
  Problem: Occupational Therapy - Adult  Goal: By Discharge: Performs self-care activities at highest level of function for planned discharge setting.  See evaluation for individualized goals.  Description: Occupational Therapy Goals:  Initiated 1/31/2025 to be met within 7-10 days.    1.  Patient will perform grooming with supervision/set-up.   2.  Patient will perform supine>sit EOB with moderate assistance in preparation for seated ADLs.   3.  Patient will perform upper body dressing  with moderate assistance.  4.  Patient will complete functional activity while seated EOB, minimal assistance, for at least 3 minutes.   5.  Patient will participate in upper extremity therapeutic exercise/activities with supervision/set-up for 8-10 minutes to increase strength/endurance for ADLs.    6.  Patient will utilize energy conservation techniques during functional activities with verbal cues.    PLOF: Pt lives w/ s/o in a first floor apartment, level entry, tub/shower. Pt completes scoot pivot/lateral transfers and uses w/c for mobility, and reports being independent w/ ADLs.      Outcome: Progressing   OCCUPATIONAL THERAPY TREATMENT    Patient: Farhad Rogers (64 y.o. male)  Date: 2/5/2025  Diagnosis: Aphasia [R47.01]  Metabolic encephalopathy [G93.41]  Hypernatremia [E87.0]  Hypoalbuminemia [E88.09]  Acute hypoxic respiratory failure [J96.01] Aphasia      Precautions: Aspiration Risk, Fall Risk, General Precautions,  ,  ,  ,  ,  ,  ,      Chart, occupational therapy assessment, plan of care, and goals were reviewed.  ASSESSMENT:  Pt cleared by RN for OT tx at this time. PT co tx to maximize pt safety and participation. Pt presented supine in bed upon entry and on 40L HFNC. Pt did not open eyes during session or have command follow. Provided neck roll to facilitate improved cervical midline requiring TOTAL A. Pt's BP noted at 96/50 HR 77 bpm. Pt left with all needs within reach and RN made aware.   Progression toward 
  Problem: Occupational Therapy - Adult  Goal: By Discharge: Performs self-care activities at highest level of function for planned discharge setting.  See evaluation for individualized goals.  Description: Occupational Therapy Goals:  Initiated 1/31/2025, pt re-evaluated 2/8/2025 due to being recently intubated and now extubated, new orders received, goals remain appropriate to be met within 7-10 days.    1.  Patient will perform grooming with supervision/set-up.   2.  Patient will perform supine>sit EOB with moderate assistance in preparation for seated ADLs.   3.  Patient will perform upper body dressing  with moderate assistance.  4.  Patient will complete functional activity while seated EOB, minimal assistance, for at least 3 minutes.   5.  Patient will participate in upper extremity therapeutic exercise/activities with supervision/set-up for 8-10 minutes to increase strength/endurance for ADLs.    6.  Patient will utilize energy conservation techniques during functional activities with verbal cues.    PLOF: Pt lives w/ s/o in a first floor apartment, level entry, tub/shower. Pt completes scoot pivot/lateral transfers and uses w/c for mobility, and reports being independent w/ ADLs.      Outcome: Progressing   OCCUPATIONAL THERAPY TREATMENT    Patient: Farhad Rogers (64 y.o. male)  Date: 2/17/2025  Diagnosis: Aphasia [R47.01]  Metabolic encephalopathy [G93.41]  Hypernatremia [E87.0]  Hypoalbuminemia [E88.09]  Acute hypoxic respiratory failure [J96.01] Aphasia      Precautions: General Precautions, Fall Risk,  ,  ,  ,  ,  ,  ,      Chart, occupational therapy assessment, plan of care, and goals were reviewed.  ASSESSMENT:  PT co tx to maximize pt safety and participation. Pt presented supine in bed upon entry, on 15L HFNC, and caregiver present. He required MAX A x 2 to transition to EOB in prep for functional tasks. HR noted at 125 bpm. Once sitting, he demo F/F- balance requiring cueing to return to mid 
  Problem: Occupational Therapy - Adult  Goal: By Discharge: Performs self-care activities at highest level of function for planned discharge setting.  See evaluation for individualized goals.  Description: Occupational Therapy Goals:  Initiated 1/31/2025, pt re-evaluated 2/8/2025 due to being recently intubated and now extubated, new orders received, goals remain appropriate to be met within 7-10 days.    1.  Patient will perform grooming with supervision/set-up.   2.  Patient will perform supine>sit EOB with moderate assistance in preparation for seated ADLs.   3.  Patient will perform upper body dressing  with moderate assistance.  4.  Patient will complete functional activity while seated EOB, minimal assistance, for at least 3 minutes.   5.  Patient will participate in upper extremity therapeutic exercise/activities with supervision/set-up for 8-10 minutes to increase strength/endurance for ADLs.    6.  Patient will utilize energy conservation techniques during functional activities with verbal cues.    PLOF: Pt lives w/ s/o in a first floor apartment, level entry, tub/shower. Pt completes scoot pivot/lateral transfers and uses w/c for mobility, and reports being independent w/ ADLs.      Outcome: Progressing  OCCUPATIONAL THERAPY TREATMENT    Patient: Farhad Rogers (64 y.o. male)  Date: 2/13/2025  Diagnosis: Aphasia [R47.01]  Metabolic encephalopathy [G93.41]  Hypernatremia [E87.0]  Hypoalbuminemia [E88.09]  Acute hypoxic respiratory failure [J96.01] Aphasia      Precautions: General Precautions, Fall Risk    Chart, occupational therapy assessment, plan of care, and goals were reviewed.  ASSESSMENT:  Pt is motivated to work with OT this am, supportive caregiver present. BP  126/75. Pt required Max A to transition to sit EOB, initially Min A to remain upright position, progressing to being able to sit with support of BUE on bed rails, followed by gradual progression to be able to sit with 1 UE support in 
  Problem: Occupational Therapy - Adult  Goal: By Discharge: Performs self-care activities at highest level of function for planned discharge setting.  See evaluation for individualized goals.  Description: Occupational Therapy Goals:  Initiated 1/31/2025, pt re-evaluated due to being recently intubated and now extubated, new orders received, goals remain appropriate to be met within 7-10 days.    1.  Patient will perform grooming with supervision/set-up.   2.  Patient will perform supine>sit EOB with moderate assistance in preparation for seated ADLs.   3.  Patient will perform upper body dressing  with moderate assistance.  4.  Patient will complete functional activity while seated EOB, minimal assistance, for at least 3 minutes.   5.  Patient will participate in upper extremity therapeutic exercise/activities with supervision/set-up for 8-10 minutes to increase strength/endurance for ADLs.    6.  Patient will utilize energy conservation techniques during functional activities with verbal cues.    PLOF: Pt lives w/ s/o in a first floor apartment, level entry, tub/shower. Pt completes scoot pivot/lateral transfers and uses w/c for mobility, and reports being independent w/ ADLs.      Outcome: Progressing   OCCUPATIONAL THERAPY TREATMENT    Patient: Farhad Rogers (64 y.o. male)  Date: 2/11/2025  Diagnosis: Aphasia [R47.01]  Metabolic encephalopathy [G93.41]  Hypernatremia [E87.0]  Hypoalbuminemia [E88.09]  Acute hypoxic respiratory failure [J96.01] Aphasia      Precautions: General Precautions, Fall Risk,  ,  ,  ,  ,  ,  ,      Chart, occupational therapy assessment, plan of care, and goals were reviewed.  ASSESSMENT:  Pt presented supine in bed upon entry, on 30L HFNC, caregiver present, and agreeable. Pt assisted to EOB MAX A in prep for functional tasks requiring vc's for proper hand placement to ensure safe transition. Once sitting, pt's balance ranging MIN A-CGA tolerating ~ 5 mins. Pt engaged in facial and 
  Problem: Occupational Therapy - Adult  Goal: By Discharge: Performs self-care activities at highest level of function for planned discharge setting.  See evaluation for individualized goals.  Description: Occupational Therapy Goals:  Initiated 1/31/2025, pt re-evaluated due to being recently intubated and now extubated, new orders received, goals remain appropriate to be met within 7-10 days.    1.  Patient will perform grooming with supervision/set-up.   2.  Patient will perform supine>sit EOB with moderate assistance in preparation for seated ADLs.   3.  Patient will perform upper body dressing  with moderate assistance.  4.  Patient will complete functional activity while seated EOB, minimal assistance, for at least 3 minutes.   5.  Patient will participate in upper extremity therapeutic exercise/activities with supervision/set-up for 8-10 minutes to increase strength/endurance for ADLs.    6.  Patient will utilize energy conservation techniques during functional activities with verbal cues.    PLOF: Pt lives w/ s/o in a first floor apartment, level entry, tub/shower. Pt completes scoot pivot/lateral transfers and uses w/c for mobility, and reports being independent w/ ADLs.      Outcome: Progressing   OCCUPATIONAL THERAPY TREATMENT    Patient: Farhad Rogers (64 y.o. male)  Date: 2/12/2025  Diagnosis: Aphasia [R47.01]  Metabolic encephalopathy [G93.41]  Hypernatremia [E87.0]  Hypoalbuminemia [E88.09]  Acute hypoxic respiratory failure [J96.01] Aphasia      Precautions: General Precautions, Fall Risk    Chart, occupational therapy assessment, plan of care, and goals were reviewed.  ASSESSMENT:  Pt on 25L FiO2% 35. Caregiver, Jaleesa, present. RUE edematous. Jaleesa reports waiting for US r/o DVT. Pt agreeable to EOB activity. Generalized weakness requires MAX A w/bed mobility. Pt tolerates ~ 20 minutes sitting EOB performing NMR activity. Pt maintains SpO2 sats > 90 w/EOB activity and requires frequent rest breaks 
  Problem: Occupational Therapy - Adult  Goal: By Discharge: Performs self-care activities at highest level of function for planned discharge setting.  See evaluation for individualized goals.  Description: Occupational Therapy Goals:  Initiated 1/31/2025, pt re-evaluated due to being recently intubated and now extubated, new orders received, goals remain appropriate to be met within 7-10 days.    1.  Patient will perform grooming with supervision/set-up.   2.  Patient will perform supine>sit EOB with moderate assistance in preparation for seated ADLs.   3.  Patient will perform upper body dressing  with moderate assistance.  4.  Patient will complete functional activity while seated EOB, minimal assistance, for at least 3 minutes.   5.  Patient will participate in upper extremity therapeutic exercise/activities with supervision/set-up for 8-10 minutes to increase strength/endurance for ADLs.    6.  Patient will utilize energy conservation techniques during functional activities with verbal cues.    PLOF: Pt lives w/ s/o in a first floor apartment, level entry, tub/shower. Pt completes scoot pivot/lateral transfers and uses w/c for mobility, and reports being independent w/ ADLs.      Outcome: Progressing  OCCUPATIONAL THERAPY RE-EVALUATION    Patient: Farhad Rogers (64 y.o. male)  Date: 2/8/2025  Primary Diagnosis: Aphasia [R47.01]  Metabolic encephalopathy [G93.41]  Hypernatremia [E87.0]  Hypoalbuminemia [E88.09]  Acute hypoxic respiratory failure [J96.01]       Precautions: Aspiration Risk, Fall Risk, General Precautions    ASSESSMENT :  Pt seen for re-evaluation due to new orders received after pt's recent extubation. Pt is alert, follows most simple commands, although  oriented x1 only. RUE with decreased shd AROM and strength and hypertonicity, per pt due to hx of CVA. LUE is WFL. Pt required x2 person assist for safety to maneuver to sit EOB in prep for ADLs. Pt required support at EOB for balance, however, 
  Problem: Pain  Goal: Verbalizes/displays adequate comfort level or baseline comfort level  Outcome: Progressing     Encourage patient to monitor pain and request assistance   Assess pain using appropriate pain scale   Administer analgesics based on type and severity of pain and evaluate response   Implement non-pharmacological measures as appropriate and evaluate response     Problem: Respiratory - Adult  Goal: Achieves optimal ventilation and oxygenation  Outcome: Progressing  Flowsheets (Taken 2/14/2025 0612)  Achieves optimal ventilation and oxygenation:   Assess for changes in respiratory status   Oxygen supplementation based on oxygen saturation or arterial blood gases   Position to facilitate oxygenation and minimize respiratory effort   Encourage broncho-pulmonary hygiene including cough, deep breathe, incentive spirometry   Assess and instruct to report shortness of breath or any respiratory difficulty     Problem: Infection - Adult  Goal: Absence of infection during hospitalization  Outcome: Progressing   Assess and monitor for signs and symptoms of infection   Monitor lab/diagnostic results     Problem: Metabolic/Fluid and Electrolytes - Adult  Goal: Glucose maintained within prescribed range  Outcome: Progressing   Monitor blood glucose as ordered   Assess for signs and symptoms of hyperglycemia and hypoglycemia   Administer ordered medications to maintain glucose within target range     Problem: Skin/Tissue Integrity  Goal: Skin integrity remains intact  Outcome: Progressing          Monitor for areas of redness and/or skin breakdown  Note: Assist patient with turning and repositioning        
  Problem: Physical Therapy - Adult  Goal: By Discharge: Performs mobility at highest level of function for planned discharge setting.  See evaluation for individualized goals.  Description: Physical Therapy Goals  Initiated 1/31/2025 and to be accomplished within 7 day(s)  1.  Patient will follow 90% of commands to maximize safety and active participation in mobility training.   2.  Patient will roll side to side in bed with moderate assistance .    3.  Patient will move from supine to sit and sit to supine in bed with moderate assistance .    4.  Patient will maintain seated at edge of bed for 8 min with minimal assistance/contact guard assist to prepare for out of bed activity.     PLOF: Lives with significant other. Apartment with level entry. Has hospital bed and wheelchair; transfers via lateral scoot.       Outcome: Progressing     PHYSICAL THERAPY TREATMENT    Patient: Farhad Rogers (64 y.o. male)  Date: 2/5/2025  Diagnosis: Aphasia [R47.01]  Metabolic encephalopathy [G93.41]  Hypernatremia [E87.0]  Hypoalbuminemia [E88.09]  Acute hypoxic respiratory failure [J96.01] Aphasia      Precautions: Aspiration Risk, Fall Risk, General Precautions,  ,  ,  ,  ,  ,  ,      ASSESSMENT:  Pt with eyes closed, no responding to therapists or opening eyes throughout treatment session.  PROM of b/l LEs performed, no grimacing or resistance from pt.  Pt was not following any commands and unable to interact with therapists in any way.  Will continue to assess ability to participate in skilled PT treatment, may benefit from FMP program.  Pt left with prevalon boots donned and in position of comfort.        Progression toward goals:   []      Improving appropriately and progressing toward goals  [x]      Improving slowly and progressing toward goals  []      Not making progress toward goals and plan of care will be adjusted     PLAN:  Patient continues to benefit from skilled intervention to address the above impairments.  
  Problem: Physical Therapy - Adult  Goal: By Discharge: Performs mobility at highest level of function for planned discharge setting.  See evaluation for individualized goals.  Description: Physical Therapy Goals  Re-evaluated 2/8/25: met goal #1 and #4, other goals remain appropriate & new goals added  Initiated 1/31/2025 and to be accomplished within 7 day(s)  1.  Patient will roll side to side in bed with moderate assistance .    2.  Patient will move from supine to sit and sit to supine in bed with moderate assistance .    4.  Patient will transfer bed<>chair with moderate assistance using lateral scooting or t-transfer.    PLOF: Lives with significant other. Apartment with level entry. Has hospital bed and wheelchair; transfers via lateral scoot.   Outcome: Progressing     PHYSICAL THERAPY TREATMENT    Patient: Farhad Rogers (64 y.o. male)  Date: 2/11/2025  Diagnosis: Aphasia [R47.01]  Metabolic encephalopathy [G93.41]  Hypernatremia [E87.0]  Hypoalbuminemia [E88.09]  Acute hypoxic respiratory failure [J96.01] Aphasia      Precautions: General Precautions, Fall Risk,  ,  ,  ,  ,  ,  ,      ASSESSMENT:  Patient seen for PT follow up session with focus on progressing EOB mobility. Received supine; agreeable, on 30L supplemental O2 via HFNC, A&O x4. Bed mobility completed with mod-maxA x1-2 with cues provided for sequencing/set up. At EOB, initial Rubina provided for balance progressed to CGA. Balance tasks reported below. SpO2 86-93% throughout session. Education provided to patient and caregiver on appropriate positioning to prevent skin breakdown and contracture formation. Continue PT POC.     Recommend for next PT session:  Bed mobility and EOB balance to progress to lateral transfers    Progression toward goals:   []      Improving appropriately and progressing toward goals  [x]      Improving slowly and progressing toward goals  []      Not making progress toward goals and plan of care will be adjusted 
  Problem: Physical Therapy - Adult  Goal: By Discharge: Performs mobility at highest level of function for planned discharge setting.  See evaluation for individualized goals.  Description: Physical Therapy Goals  Re-evaluated 2/8/25: met goal #1 and #4, other goals remain appropriate & new goals added  Initiated 1/31/2025 and to be accomplished within 7 day(s)  1.  Patient will roll side to side in bed with moderate assistance .    2.  Patient will move from supine to sit and sit to supine in bed with moderate assistance .    4.  Patient will transfer bed<>chair with moderate assistance using lateral scooting or t-transfer.    PLOF: Lives with significant other. Apartment with level entry. Has hospital bed and wheelchair; transfers via lateral scoot.   Outcome: Progressing     PHYSICAL THERAPY TREATMENT    Patient: Farhad Rogers (64 y.o. male)  Date: 2/14/2025  Diagnosis: Aphasia [R47.01]  Metabolic encephalopathy [G93.41]  Hypernatremia [E87.0]  Hypoalbuminemia [E88.09]  Acute hypoxic respiratory failure [J96.01] Aphasia      Precautions: General Precautions, Fall Risk,  ,  ,  ,  ,  ,  ,      ASSESSMENT:  Patient seen for PT follow up session. Received supine; agreeable. Caregiver present. On 25L supplemental O2 via HFNC. MaxA provided for bed mobility and Rubina progressed to CGA provided for EOB balance activities (including but not limited to self adjustment of center of mass over base of support, trunk extension and weight shifting). This date, able to participate in lateral transfers out of bed to chair. Cues provided as listed below, with good carryover noted. SpO2 ranging from 87% to 92% with mobility. Continue PT POC.    Recommend for next PT session:  Lateral transfers to chair    Progression toward goals:   []      Improving appropriately and progressing toward goals  [x]      Improving slowly and progressing toward goals  []      Not making progress toward goals and plan of care will be adjusted 
  Problem: Physical Therapy - Adult  Goal: By Discharge: Performs mobility at highest level of function for planned discharge setting.  See evaluation for individualized goals.  Description: Physical Therapy Goals  Re-evaluated 2/8/25: met goal #1 and #4, other goals remain appropriate & new goals added  Initiated 1/31/2025 and to be accomplished within 7 day(s)  1.  Patient will roll side to side in bed with moderate assistance .    2.  Patient will move from supine to sit and sit to supine in bed with moderate assistance .    4.  Patient will transfer bed<>chair with moderate assistance using lateral scooting or t-transfer.    PLOF: Lives with significant other. Apartment with level entry. Has hospital bed and wheelchair; transfers via lateral scoot.   Outcome: Progressing     PHYSICAL THERAPY WEEKLY REASSESSMENT    Patient: Farhad Rogers (64 y.o. male)  Date: 2/17/2025  Primary Diagnosis: Aphasia [R47.01]  Metabolic encephalopathy [G93.41]  Hypernatremia [E87.0]  Hypoalbuminemia [E88.09]  Acute hypoxic respiratory failure [J96.01]       Precautions: General Precautions, Fall Risk,  ,  ,  ,  ,  ,  ,      ASSESSMENT :  Patient seen for weekly reassessment. Patient on 15 L HFNC. Joined with OT to maximize safety with EOB activity. Needs max A for supine to sit transfer. Initially max A for trunk support, but improved to min A with cues to right hand placement on bed. Worked on lateral weight shifts to improve core strength. Heart rate increased to 125 bpm. Reports fatigue and assisted back to bed. Patient left with all needs in reach at end of session.     DEFICITS/IMPAIRMENTS:    , Body Structures, Functions, Activity Limitations Requiring Skilled Therapeutic Intervention: Decreased functional mobility ;Decreased ADL status;Decreased ROM;Decreased strength;Decreased endurance;Decreased balance;Decreased coordination;Decreased tolerance to work activity    Patient will continue to benefit from skilled intervention 
  Problem: SLP Adult - Impaired Swallowing  Goal: By Discharge: Advance to least restrictive diet without signs or symptoms of aspiration for planned discharge setting.  See evaluation for individualized goals.  Description: Patient will:  1. Tolerate PO trials with 0 s/s overt distress in 4/5 trials  2. Utilize compensatory swallow strategies/maneuvers (decrease bite/sip, size/rate, alt. liq/sol) with min cues in 4/5 trials  3. Perform oral-motor/laryngeal exercises to increase oropharyngeal swallow function with min cues  4. Complete an objective swallow study (i.e., MBSS) to assess swallow integrity, r/o aspiration, and determine of safest LRD, min A as indicated/ordered by MD     Rec:     Easy to chew solids and honey-thick liquids  Aspiration precautions  HOB >45 during po intake, remain >30 for 30-45 minutes after po   Small bites/sips; alternate liquid/solid with slow feeding rate   Oral care TID  Meds in puree      Outcome: Progressing     SPEECH LANGUAGE PATHOLOGY DYSPHAGIA TREATMENT    Patient: Farhad Rogers (64 y.o. male)  Date: 2/13/2025  Diagnosis: Aphasia [R47.01]  Metabolic encephalopathy [G93.41]  Hypernatremia [E87.0]  Hypoalbuminemia [E88.09]  Acute hypoxic respiratory failure [J96.01] Aphasia      Precautions: Standard, aspiration  PLOF: As per H&P      ASSESSMENT:  Pt was seen at bedside for follow up dysphagia management on 30L 45% FiO2 HFNC. He tolerated honey-thick PO trials via straw without any overt s/sx of aspiration. Laryngeal elevation was weak/delayed to palpation. Reviewed importance of complying with thickened liquids to reduce risk of aspiration and safe swallowing strategies. The pt provided verbal understanding. Recommend easy to chew solids and honey-thick liquids, aspiration precautions, oral care TID, and meds in puree. Will continue to follow for further dysphagia management.    Progression toward goals:  []         Improving appropriately and progressing toward goals  [x]    
  Problem: SLP Adult - Impaired Swallowing  Goal: By Discharge: Advance to least restrictive diet without signs or symptoms of aspiration for planned discharge setting.  See evaluation for individualized goals.  Description: Patient will:  1. Tolerate PO trials with 0 s/s overt distress in 4/5 trials  2. Utilize compensatory swallow strategies/maneuvers (decrease bite/sip, size/rate, alt. liq/sol) with min cues in 4/5 trials  3. Perform oral-motor/laryngeal exercises to increase oropharyngeal swallow function with min cues  4. Complete an objective swallow study (i.e., MBSS) to assess swallow integrity, r/o aspiration, and determine of safest LRD, min A as indicated/ordered by MD     Rec:     Easy to chew solids and honey-thick liquids  Aspiration precautions  HOB >45 during po intake, remain >30 for 30-45 minutes after po   Small bites/sips; alternate liquid/solid with slow feeding rate   Oral care TID  Meds in puree      Outcome: Progressing   SPEECH LANGUAGE PATHOLOGY DYSPHAGIA TREATMENT    Patient: Farhad Rogers (64 y.o. male)  Date: 2/12/2025  Diagnosis: Aphasia [R47.01]  Metabolic encephalopathy [G93.41]  Hypernatremia [E87.0]  Hypoalbuminemia [E88.09]  Acute hypoxic respiratory failure [J96.01] Aphasia      Precautions: Standard, aspiration  PLOF: As per H&P      ASSESSMENT:  Pt was seen at bedside for follow up dysphagia management on 40L 50% FiO2 HFNC. He tolerated honey-thick liquid trials at bedside with no overt s/sx of aspiration. Laryngeal elevation was weak/delayed to palpation. Reviewed importance of complying with thickened liquids to reduce risk of aspiration and safe swallowing techniques/strategies with verbalized comp and teach back. Further educated pt and family that SLP would depend on oxygen dependence regarding whether or not we could complete diagnostic testing in radiology to further assess swallow fxn. Continue to rec reg solid diet with honey-thick liquids, aspiration precautions, 
  Problem: SLP Adult - Impaired Swallowing  Goal: By Discharge: Advance to least restrictive diet without signs or symptoms of aspiration for planned discharge setting.  See evaluation for individualized goals.  Description: Patient will:  1. Tolerate PO trials with 0 s/s overt distress in 4/5 trials  2. Utilize compensatory swallow strategies/maneuvers (decrease bite/sip, size/rate, alt. liq/sol) with min cues in 4/5 trials  3. Perform oral-motor/laryngeal exercises to increase oropharyngeal swallow function with min cues  4. Complete an objective swallow study (i.e., MBSS) to assess swallow integrity, r/o aspiration, and determine of safest LRD, min A as indicated/ordered by MD     Rec:     Easy to chew solids and nectar thick liquids  Aspiration precautions  HOB >45 during po intake, remain >30 for 30-45 minutes after po   Small bites/sips; alternate liquid/solid with slow feeding rate   Oral care TID  Meds in puree    Outcome: Progressing   SPEECH LANGUAGE PATHOLOGY DYSPHAGIA TREATMENT    Patient: Farhad Rogers (64 y.o. male)  Date: 2/18/2025  Diagnosis: Aphasia [R47.01]  Metabolic encephalopathy [G93.41]  Hypernatremia [E87.0]  Hypoalbuminemia [E88.09]  Acute hypoxic respiratory failure [J96.01] Aphasia      Precautions: Standard, aspiration  PLOF: As per H&P      ASSESSMENT:  Pt seen at bedside for follow up dysphagia management and on 2L NC. Pt tolerated easy to chew solids, nectar thick liquids via straw, and 1 trial thin liquids via straw without any overt s/sx of aspiration across all PO trials. Decreased laryngeal elevation to palpation. Mastication of easy to chew solids was appropriate with positive oral clearance. Pt reported that he wants to drink thin liquids only. Pt educated on importance of complying with thickened liquids to reduce risk of aspiration and safe swallowing strategies- suspect limited carryover. Recommend easy to chew solids and nectar thick liquids, aspiration precautions, oral 
  Problem: SLP Adult - Impaired Swallowing  Goal: By Discharge: Advance to least restrictive diet without signs or symptoms of aspiration for planned discharge setting.  See evaluation for individualized goals.  Description: Patient will:  1. Tolerate PO trials with 0 s/s overt distress in 4/5 trials  2. Utilize compensatory swallow strategies/maneuvers (decrease bite/sip, size/rate, alt. liq/sol) with min cues in 4/5 trials  3. Perform oral-motor/laryngeal exercises to increase oropharyngeal swallow function with min cues  4. Complete an objective swallow study (i.e., MBSS) to assess swallow integrity, r/o aspiration, and determine of safest LRD, min A as indicated/ordered by MD     Rec:     Soft/bite sized with honey-thick  Aspiration precautions  HOB >45 during po intake, remain >30 for 30-45 minutes after po   Small bites/sips; alternate liquid/solid with slow feeding rate   Oral care TID  Meds crushed in puree      Outcome: Progressing   SPEECH LANGUAGE PATHOLOGY DYSPHAGIA TREATMENT    Patient: Farhad Rogers (64 y.o. male)  Date: 2/3/2025  Diagnosis: Aphasia [R47.01]  Metabolic encephalopathy [G93.41]  Hypernatremia [E87.0]  Hypoalbuminemia [E88.09]  Acute hypoxic respiratory failure [J96.01] Aphasia      Precautions: Standard, aspiration  PLOF: As per H&P      ASSESSMENT:  Pt was seen at bedside for follow up dysphagia management. Pt on 30L, 40% FiO2 upon SLP arrival. He tolerated reg solids, puree, and honey-thick liquid trials without any overt s/sx of aspiration. Labored mastication observed secondary to edentulous status with positive oral clearance across trials. He demo increase in RR and delayed throat clear s/p nectar-thick liquid trials. Laryngeal elevation was weak/decreased to palpation across all trials. Recommend soft/bite sized with honey-thick, aspiration precautions, oral care TID, and meds crushed. He may benefit from assistance with PO. Will continue to follow.     Progression toward 
  Problem: Safety - Adult  Goal: Free from fall injury  2/11/2025 1403 by Roberto Carlos Laureano RN  Outcome: Progressing  Flowsheets (Taken 2/11/2025 1403)  Free From Fall Injury:   Based on caregiver fall risk screen, instruct family/caregiver to ask for assistance with transferring infant if caregiver noted to have fall risk factors   Instruct family/caregiver on patient safety     Problem: Neurosensory - Adult  Goal: Achieves stable or improved neurological status  Outcome: Progressing  Flowsheets (Taken 2/11/2025 1403)  Achieves stable or improved neurological status:   Assess for and report changes in neurological status   Initiate measures to prevent increased intracranial pressure   Maintain blood pressure and fluid volume within ordered parameters to optimize cerebral perfusion and minimize risk of hemorrhage     Problem: Respiratory - Adult  Goal: Achieves optimal ventilation and oxygenation  2/11/2025 1403 by Roberto Carlos Laureano RN  Outcome: Progressing  Flowsheets (Taken 2/11/2025 1403)  Achieves optimal ventilation and oxygenation:   Assess for changes in respiratory status   Assess for changes in mentation and behavior   Position to facilitate oxygenation and minimize respiratory effort     Problem: Gastrointestinal - Adult  Goal: Maintains adequate nutritional intake  Outcome: Progressing  Flowsheets (Taken 2/11/2025 1403)  Maintains adequate nutritional intake:   Monitor percentage of each meal consumed   Assist with meals as needed     Problem: Skin/Tissue Integrity  Goal: Skin integrity remains intact  Description: 1.  Monitor for areas of redness and/or skin breakdown  2.  Assess vascular access sites hourly  3.  Every 4-6 hours minimum:  Change oxygen saturation probe site  4.  Every 4-6 hours:  If on nasal continuous positive airway pressure, respiratory therapy assess nares and determine need for appliance change or resting period  2/11/2025 1403 by Roberto Carlos Laureano RN  Outcome: Progressing  Flowsheets 
  Problem: Safety - Adult  Goal: Free from fall injury  Outcome: Progressing  Flowsheets (Taken 2/10/2025 1134)  Free From Fall Injury:   Instruct family/caregiver on patient safety   Based on caregiver fall risk screen, instruct family/caregiver to ask for assistance with transferring infant if caregiver noted to have fall risk factors     Problem: Nutrition Deficit:  Goal: Optimize nutritional status  Outcome: Progressing  Flowsheets  Taken 2/10/2025 1134 by Roberto Carlos Laureano RN  Nutrient intake appropriate for improving, restoring, or maintaining nutritional needs:   Assess nutritional status and recommend course of action   Recommend appropriate diets, oral nutritional supplements, and vitamin/mineral supplements   Order, calculate, and assess calorie counts as needed  Taken 2/10/2025 1048 by Dockweiler, Megan, RD  Nutrient intake appropriate for improving, restoring, or maintaining nutritional needs:   Assess nutritional status and recommend course of action   Monitor oral intake, labs, and treatment plans   Recommend appropriate diets, oral nutritional supplements, and vitamin/mineral supplements     Problem: Neurosensory - Adult  Goal: Achieves stable or improved neurological status  Outcome: Progressing  Flowsheets (Taken 2/10/2025 1134)  Achieves stable or improved neurological status:   Assess for and report changes in neurological status   Initiate measures to prevent increased intracranial pressure   Maintain blood pressure and fluid volume within ordered parameters to optimize cerebral perfusion and minimize risk of hemorrhage     Problem: Respiratory - Adult  Goal: Achieves optimal ventilation and oxygenation  Outcome: Progressing  Flowsheets (Taken 2/10/2025 1134)  Achieves optimal ventilation and oxygenation:   Assess for changes in respiratory status   Assess for changes in mentation and behavior   Position to facilitate oxygenation and minimize respiratory effort   Oxygen supplementation based on 
  Problem: Safety - Adult  Goal: Free from fall injury  Outcome: Progressing  Flowsheets (Taken 2/15/2025 1313)  Free From Fall Injury: Instruct family/caregiver on patient safety     Problem: Nutrition Deficit:  Goal: Optimize nutritional status  Outcome: Progressing  Flowsheets (Taken 2/15/2025 1313)  Nutrient intake appropriate for improving, restoring, or maintaining nutritional needs:   Assess nutritional status and recommend course of action   Recommend appropriate diets, oral nutritional supplements, and vitamin/mineral supplements     Problem: Respiratory - Adult  Goal: Achieves optimal ventilation and oxygenation  Outcome: Progressing  Flowsheets (Taken 2/15/2025 1313)  Achieves optimal ventilation and oxygenation:   Assess for changes in respiratory status   Assess for changes in mentation and behavior   Position to facilitate oxygenation and minimize respiratory effort     Problem: Metabolic/Fluid and Electrolytes - Adult  Goal: Glucose maintained within prescribed range  Outcome: Progressing  Flowsheets (Taken 2/15/2025 1313)  Glucose maintained within prescribed range:   Monitor blood glucose as ordered   Assess for signs and symptoms of hyperglycemia and hypoglycemia     Problem: Skin/Tissue Integrity  Goal: Skin integrity remains intact  Description: 1.  Monitor for areas of redness and/or skin breakdown  2.  Assess vascular access sites hourly  3.  Every 4-6 hours minimum:  Change oxygen saturation probe site  4.  Every 4-6 hours:  If on nasal continuous positive airway pressure, respiratory therapy assess nares and determine need for appliance change or resting period  Outcome: Progressing  Flowsheets (Taken 2/15/2025 1313)  Skin Integrity Remains Intact:   Monitor for areas of redness and/or skin breakdown   Assess vascular access sites hourly     
  Problem: Safety - Adult  Goal: Free from fall injury  Outcome: Progressing  Flowsheets (Taken 2/18/2025 1135)  Free From Fall Injury:   Instruct family/caregiver on patient safety   Based on caregiver fall risk screen, instruct family/caregiver to ask for assistance with transferring infant if caregiver noted to have fall risk factors     Problem: Nutrition Deficit:  Goal: Optimize nutritional status  Outcome: Progressing  Flowsheets (Taken 2/18/2025 1135)  Nutrient intake appropriate for improving, restoring, or maintaining nutritional needs:   Recommend appropriate diets, oral nutritional supplements, and vitamin/mineral supplements   Assess nutritional status and recommend course of action   Monitor oral intake, labs, and treatment plans     Problem: Neurosensory - Adult  Goal: Achieves stable or improved neurological status  Outcome: Progressing  Flowsheets (Taken 2/18/2025 1135)  Achieves stable or improved neurological status:   Assess for and report changes in neurological status   Initiate measures to prevent increased intracranial pressure     Problem: Skin/Tissue Integrity  Goal: Skin integrity remains intact  Description: 1.  Monitor for areas of redness and/or skin breakdown  2.  Assess vascular access sites hourly  3.  Every 4-6 hours minimum:  Change oxygen saturation probe site  4.  Every 4-6 hours:  If on nasal continuous positive airway pressure, respiratory therapy assess nares and determine need for appliance change or resting period  Outcome: Progressing  Flowsheets (Taken 2/18/2025 1135)  Skin Integrity Remains Intact:   Monitor for areas of redness and/or skin breakdown   Assess vascular access sites hourly     Problem: Safety - Medical Restraint  Goal: Remains free of injury from restraints (Restraint for Interference with Medical Device)  Description: INTERVENTIONS:  1. Determine that other, less restrictive measures have been tried or would not be effective before applying the 
  Problem: Safety - Adult  Goal: Free from fall injury  Outcome: Progressing  Flowsheets (Taken 2/2/2025 0250)  Free From Fall Injury: Instruct family/caregiver on patient safety     Problem: Nutrition Deficit:  Goal: Optimize nutritional status  Outcome: Progressing  Flowsheets (Taken 2/2/2025 0250)  Nutrient intake appropriate for improving, restoring, or maintaining nutritional needs:   Assess nutritional status and recommend course of action   Monitor oral intake, labs, and treatment plans   Recommend appropriate diets, oral nutritional supplements, and vitamin/mineral supplements     Problem: Pain  Goal: Verbalizes/displays adequate comfort level or baseline comfort level  Outcome: Progressing  Flowsheets (Taken 2/2/2025 0250)  Verbalizes/displays adequate comfort level or baseline comfort level:   Encourage patient to monitor pain and request assistance   Assess pain using appropriate pain scale   Administer analgesics based on type and severity of pain and evaluate response   Implement non-pharmacological measures as appropriate and evaluate response   Consider cultural and social influences on pain and pain management   Notify Licensed Independent Practitioner if interventions unsuccessful or patient reports new pain     Problem: Neurosensory - Adult  Goal: Achieves stable or improved neurological status  Outcome: Progressing  Flowsheets (Taken 2/2/2025 0250)  Achieves stable or improved neurological status:   Assess for and report changes in neurological status   Monitor temperature, glucose, and sodium. Initiate appropriate interventions as ordered     Problem: Respiratory - Adult  Goal: Achieves optimal ventilation and oxygenation  Outcome: Progressing  Flowsheets (Taken 2/2/2025 0250)  Achieves optimal ventilation and oxygenation:   Assess for changes in respiratory status   Assess for changes in mentation and behavior   Position to facilitate oxygenation and minimize respiratory effort   Oxygen 
  Problem: Safety - Adult  Goal: Free from fall injury  Outcome: Progressing  Note: Hourly rounding     Problem: Pain  Goal: Verbalizes/displays adequate comfort level or baseline comfort level  Outcome: Progressing   Assess pain using appropriate pain scale   Administer analgesics based on type and severity of pain and evaluate response   Implement non-pharmacological measures as appropriate and evaluate response     Problem: Respiratory - Adult  Goal: Achieves optimal ventilation and oxygenation  Outcome: Progressing   Assess for changes in respiratory status   Position to facilitate oxygenation and minimize respiratory effort     Problem: Cardiovascular - Adult  Goal: Maintains optimal cardiac output and hemodynamic stability  Outcome: Progressing                 Monitor blood pressure and heart rate    Problem: Skin/Tissue Integrity  Goal: Skin integrity remains intact                  Monitor for areas of redness and/or skin breakdown  Note: Assist patient with repositioning and turning    Problem: Infection - Adult  Goal: Absence of infection during hospitalization  Outcome: Progressing   Assess and monitor for signs and symptoms of infection   Monitor lab/diagnostic results    Problem: Metabolic/Fluid and Electrolytes - Adult  Goal: Glucose maintained within prescribed range  Outcome: Progressing   Monitor blood glucose as ordered   Assess for signs and symptoms of hyperglycemia and hypoglycemia   Administer ordered medications to maintain glucose within target range        
  Problem: Safety - Adult  Goal: Free from fall injury  Recent Flowsheet Documentation  Taken 2/14/2025 0851 by David Whitten RN  Free From Fall Injury:   Instruct family/caregiver on patient safety   Based on caregiver fall risk screen, instruct family/caregiver to ask for assistance with transferring infant if caregiver noted to have fall risk factors     Problem: Discharge Planning  Goal: Discharge to home or other facility with appropriate resources  Outcome: Progressing  Flowsheets (Taken 2/14/2025 0851)  Discharge to home or other facility with appropriate resources: Identify barriers to discharge with patient and caregiver     
  Problem: Safety - Medical Restraint  Goal: Remains free of injury from restraints (Restraint for Interference with Medical Device)  Description: INTERVENTIONS:  1. Determine that other, less restrictive measures have been tried or would not be effective before applying the restraint  2. Evaluate the patient's condition at the time of restraint application  3. Inform patient/family regarding the reason for restraint  4. Q2H: Monitor safety, psychosocial status, comfort, nutrition and hydration  Outcome: Not Progressing  Flowsheets (Taken 2/6/2025 0152)  Remains free of injury from restraints (restraint for interference with medical device):   Determine that other, less restrictive measures have been tried or would not be effective before applying the restraint   Evaluate the patient's condition at the time of restraint application   Inform patient/family regarding the reason for restraint   Every 2 hours: Monitor safety, psychosocial status, comfort, nutrition and hydration  Note: Restarted restraints d/t pt grabbing on to ETT.      
  Problem: Safety - Medical Restraint  Goal: Remains free of injury from restraints (Restraint for Interference with Medical Device)  Description: INTERVENTIONS:  1. Determine that other, less restrictive measures have been tried or would not be effective before applying the restraint  2. Evaluate the patient's condition at the time of restraint application  3. Inform patient/family regarding the reason for restraint  4. Q2H: Monitor safety, psychosocial status, comfort, nutrition and hydration  Outcome: Progressing  Flowsheets (Taken 2/16/2025 1027)  Remains free of injury from restraints (restraint for interference with medical device):   Determine that other, less restrictive measures have been tried or would not be effective before applying the restraint   Evaluate the patient's condition at the time of restraint application   Inform patient/family regarding the reason for restraint   Every 2 hours: Monitor safety, psychosocial status, comfort, nutrition and hydration     
  Problem: Skin/Tissue Integrity  Goal: Skin integrity remains intact  Description: 1.  Monitor for areas of redness and/or skin breakdown  2.  Assess vascular access sites hourly  3.  Every 4-6 hours minimum:  Change oxygen saturation probe site  4.  Every 4-6 hours:  If on nasal continuous positive airway pressure, respiratory therapy assess nares and determine need for appliance change or resting period  Outcome: Progressing     Problem: Metabolic/Fluid and Electrolytes - Adult  Goal: Electrolytes maintained within normal limits  Outcome: Progressing  Goal: Hemodynamic stability and optimal renal function maintained  Outcome: Progressing  Goal: Glucose maintained within prescribed range  Outcome: Progressing     Problem: Infection - Adult  Goal: Absence of infection during hospitalization  Outcome: Progressing     Problem: Gastrointestinal - Adult  Goal: Maintains adequate nutritional intake  Outcome: Progressing     Problem: Cardiovascular - Adult  Goal: Maintains optimal cardiac output and hemodynamic stability  Outcome: Progressing     Problem: Respiratory - Adult  Goal: Achieves optimal ventilation and oxygenation  Outcome: Progressing     Problem: Neurosensory - Adult  Goal: Achieves stable or improved neurological status  Outcome: Progressing     Problem: Pain  Goal: Verbalizes/displays adequate comfort level or baseline comfort level  Outcome: Progressing     Problem: Nutrition Deficit:  Goal: Optimize nutritional status  Outcome: Progressing     Problem: Safety - Adult  Goal: Free from fall injury  Outcome: Progressing     Problem: Discharge Planning  Goal: Discharge to home or other facility with appropriate resources  Outcome: Progressing     
Problem: Discharge Planning  Goal: Discharge to home or other facility with appropriate resources  Outcome: Progressing  Flowsheets  Taken 2/12/2025 0055  Discharge to home or other facility with appropriate resources: Identify barriers to discharge with patient and caregiver  Taken 2/11/2025 2030  Discharge to home or other facility with appropriate resources:   Identify barriers to discharge with patient and caregiver   Arrange for needed discharge resources and transportation as appropriate  Note: Patient accepted with [facility] pending approval for discharge. Aim to wean O2 prior to discharge.      Problem: Safety - Adult  Goal: Free from fall injury  2/12/2025 0055 by Margarette Ohara RN  Outcome: Progressing  Flowsheets (Taken 2/12/2025 0055)  Free From Fall Injury: Instruct family/caregiver on patient safety  Note: Patient to remain free of any falls or injuries during length of stay. Safety interventions implemented: non-skid yellow socks on, bed alarm on, and call light within reach.     Problem: Pain  Goal: Verbalizes/displays adequate comfort level or baseline comfort level  Outcome: Progressing  Flowsheets (Taken 2/12/2025 0055)  Verbalizes/displays adequate comfort level or baseline comfort level: Assess pain using appropriate pain scale  Note: Patient assessed using verbal 1-10 pain scale, patient denies pain at this time but will continue to be reassessed Q4 and as needed for reported changes.      Problem: Respiratory - Adult  Goal: Achieves optimal ventilation and oxygenation  2/12/2025 0055 by Margarette Ohara, RN  Outcome: Progressing  Flowsheets  Taken 2/12/2025 0055  Achieves optimal ventilation and oxygenation:   Assess for changes in mentation and behavior   Assess for changes in respiratory status  Taken 2/11/2025 2030  Achieves optimal ventilation and oxygenation:   Assess for changes in respiratory status   Assess for changes in mentation and behavior   Position to facilitate 
Problem: Physical Therapy - Adult  Goal: By Discharge: Performs mobility at highest level of function for planned discharge setting.  See evaluation for individualized goals.  Description: Physical Therapy Goals  Initiated 1/31/2025 and to be accomplished within 7 day(s)  1.  Patient will follow 90% of commands to maximize safety and active participation in mobility training.   2.  Patient will roll side to side in bed with moderate assistance .    3.  Patient will move from supine to sit and sit to supine in bed with moderate assistance .    4.  Patient will maintain seated at edge of bed for 8 min with minimal assistance/contact guard assist to prepare for out of bed activity.     PLOF: Lives with significant other. Apartment with level entry. Has hospital bed and wheelchair; transfers via lateral scoot.     Outcome: Progressing   PHYSICAL THERAPY EVALUATION    Patient: Farhad Rogers (64 y.o. male)  Date: 1/31/2025  Primary Diagnosis: Aphasia [R47.01]  Metabolic encephalopathy [G93.41]  Hypernatremia [E87.0]  Hypoalbuminemia [E88.09]  Acute hypoxic respiratory failure [J96.01]  Precautions: Aspiration Risk  ASSESSMENT :  Seated in bed. On 2L O2, O2 saturation 98%. BP 99/56. HR 85 bpm. Caregiver at bedside; assists in PLOF. Able to wiggle toes. No AROM BLE. PROM BLE WFL for positioning. Bilateral prevalon boots to prevent skin breakdown. Max A x2 for long sit in bed. Poor trunk control in long sit. Left seated in bed. RN Leilani aware of session. Educated on need for RN assistance with mobility; verbalized understanding. Call bell in reach.     DEFICITS/IMPAIRMENTS:   Body Structures, Functions, Activity Limitations Requiring Skilled Therapeutic Intervention: Decreased strength;Decreased functional mobility ;Decreased safe awareness;Decreased endurance;Decreased balance;Decreased ROM    Patient will benefit from skilled intervention to address the above impairments.  Patient's rehabilitation potential: Therapy 
Problem: Physical Therapy - Adult  Goal: By Discharge: Performs mobility at highest level of function for planned discharge setting.  See evaluation for individualized goals.  Description: Physical Therapy Goals  Re-evaluated 2/8/25: met goal #1 and #4, other goals remain appropriate & new goals added  Initiated 1/31/2025 and to be accomplished within 7 day(s)  1.  Patient will roll side to side in bed with moderate assistance .    2.  Patient will move from supine to sit and sit to supine in bed with moderate assistance .    4.  Patient will transfer bed<>chair with moderate assistance using lateral scooting or t-transfer.    PLOF: Lives with significant other. Apartment with level entry. Has hospital bed and wheelchair; transfers via lateral scoot.   Outcome: Progressing   PHYSICAL THERAPY TREATMENT    Patient: Farhad Rogers (64 y.o. male)  Date: 2/10/2025  Diagnosis: Aphasia [R47.01]  Metabolic encephalopathy [G93.41]  Hypernatremia [E87.0]  Hypoalbuminemia [E88.09]  Acute hypoxic respiratory failure [J96.01] Aphasia  Precautions: Aspiration Risk  ASSESSMENT:  On 30L 50% fiO2 hiflow. Alert upon entry. Follows commands. Reports \"scooting\" to wheelchair at baseline. Reports in wheelchair for 22 years. Denies AROM of BLE; demonstrates weak toe wiggling, no active ankle dorsiflexion in bed. Once seated EOB able to demonstrate 25% of knee extension and minimal foot movement. Encouraged to move BLE as able. Max A for supine to sit from elevated HOB. Requires total A for BLE and max A for trunk. Initially max A for seated balance at EOB. Once placed with BUE on bedrails able to hold self up with intermittent min A for left sided lean. O2 saturation fluctuated between 86-90% in seated. Reports fatigue with seated EOB. Max A for sit to supine. Total A for scooting up in bed. Seated in bed with HOB elevated. BLE elevate and positioned to neutral on pillows. JONE Azevedo notified of chronic pain in left leg per patient 
Problem: SLP Adult - Impaired Swallowing  Goal: By Discharge: Advance to least restrictive diet without signs or symptoms of aspiration for planned discharge setting.  See evaluation for individualized goals.  Description: Patient will:  1. Tolerate PO trials with 0 s/s overt distress in 4/5 trials  2. Utilize compensatory swallow strategies/maneuvers (decrease bite/sip, size/rate, alt. liq/sol) with min cues in 4/5 trials  3. Perform oral-motor/laryngeal exercises to increase oropharyngeal swallow function with min cues  4. Complete an objective swallow study (i.e., MBSS) to assess swallow integrity, r/o aspiration, and determine of safest LRD, min A as indicated/ordered by MD     Rec:     Easy to chew solids and honey-thick liquids  Aspiration precautions  HOB >45 during po intake, remain >30 for 30-45 minutes after po   Small bites/sips; alternate liquid/solid with slow feeding rate   Oral care TID  Meds in puree    Outcome: Progressing  SPEECH LANGUAGE PATHOLOGY DYSPHAGIA TREATMENT    Patient: Farhad Rogers (64 y.o. male)  Date: 2/11/2025  Diagnosis: Aphasia [R47.01]  Metabolic encephalopathy [G93.41]  Hypernatremia [E87.0]  Hypoalbuminemia [E88.09]  Acute hypoxic respiratory failure [J96.01] Aphasia      Precautions: Standard, aspiration  PLOF: As per H&P      ASSESSMENT:  Pt was seen at bedside for follow up dysphagia management on 30L 40% FiO2 HFNC. Pt's O2 sats continue to hover between 88-90% throughout treatment with increased SOB during conversation. Pt with no overt s/s of aspiration with  reg solid and honey-thick liquid trials. Laryngeal elevation was weak/delayed to palpation. Reviewed aspiration precautions and safe swallowing techniques/strategies. Pt did verbalize understanding, however suspect limtied carryover due to cognition. Recommend easy to chew diet with honey-thick liquids, aspiration precautions, oral care TID, and meds as tolerated. Will continue to follow for further dysphagia 
Problem: SLP Adult - Impaired Swallowing  Goal: By Discharge: Advance to least restrictive diet without signs or symptoms of aspiration for planned discharge setting.  See evaluation for individualized goals.  Description: Patient will:  1. Tolerate PO trials with 0 s/s overt distress in 4/5 trials  2. Utilize compensatory swallow strategies/maneuvers (decrease bite/sip, size/rate, alt. liq/sol) with min cues in 4/5 trials  3. Perform oral-motor/laryngeal exercises to increase oropharyngeal swallow function with min cues  4. Complete an objective swallow study (i.e., MBSS) to assess swallow integrity, r/o aspiration, and determine of safest LRD, min A as indicated/ordered by MD     Rec:     Full honey thick liquids  Aspiration precautions  HOB >45 during po intake, remain >30 for 30-45 minutes after po   Small bites/sips; alternate liquid/solid with slow feeding rate   Oral care TID  Meds crushed in puree    Outcome: Progressing  SPEECH LANGUAGE PATHOLOGY BEDSIDE SWALLOW EVALUATION/TREATMENT    Patient: Farhad Rogers (64 y.o. male)  Date: 1/31/2025  Primary Diagnosis: Aphasia [R47.01]  Metabolic encephalopathy [G93.41]  Hypernatremia [E87.0]  Hypoalbuminemia [E88.09]  Acute hypoxic respiratory failure [J96.01]       Precautions: Aspiration  PLOF: As per H&P  ASSESSMENT:  Based on the objective data described below, the patient presents with moderate oropharyngeal dysphagia. Pt s/p extubation on 1/30/25, intubated for ~2 days now on 2L NC. Pt highly lethargic requiring consistent verbal cues to remain alert for safe PO trials. Pt with no attempts to verbalized, but will following simple commands for PO trials. Pt unable to create labial seal for use of a straw. Pt with increase in RR and watery eyes s/p thin liquids via tsp. Pt tolerated puree and honey thick liquids via tsp without overt s/s of aspiration. Poor mastication of puree and delayed A-P transit of puree solids requiring verbal cues to clear. Reduced 
Problem: SLP Adult - Impaired Swallowing  Goal: By Discharge: Advance to least restrictive diet without signs or symptoms of aspiration for planned discharge setting.  See evaluation for individualized goals.  Description: Patient will:  1. Tolerate PO trials with 0 s/s overt distress in 4/5 trials  2. Utilize compensatory swallow strategies/maneuvers (decrease bite/sip, size/rate, alt. liq/sol) with min cues in 4/5 trials  3. Perform oral-motor/laryngeal exercises to increase oropharyngeal swallow function with min cues  4. Complete an objective swallow study (i.e., MBSS) to assess swallow integrity, r/o aspiration, and determine of safest LRD, min A as indicated/ordered by MD     Rec:     Regular solids and nectar thick liquids  Aspiration precautions  HOB >45 during po intake, remain >30 for 30-45 minutes after po   Small bites/sips; alternate liquid/solid with slow feeding rate   Oral care TID  Meds in puree    Outcome: Progressing  SPEECH LANGUAGE PATHOLOGY BEDSIDE SWALLOW EVALUATION/TREATMENT    Patient: Farhad Rogers (64 y.o. male)  Date: 2/7/2025  Primary Diagnosis: Aphasia [R47.01]  Metabolic encephalopathy [G93.41]  Hypernatremia [E87.0]  Hypoalbuminemia [E88.09]  Acute hypoxic respiratory failure [J96.01]       Precautions: Aspiration  PLOF: As per H&P  ASSESSMENT:  Based on the objective data described below, the patient presents with moderate pharyngeal dysphagia s/p bronchoscopy and extubation on 2/6/25 intubated for ~1 day. Pt was on 30L 40% FiO2 HFNC, however Pt removed it from his face upon SLP arrival still satting at 100% O2. Pt with much improved mentation this date alert/oriented x3. Pt with increase in RR and watery eyes s/p thin liquids +/- straw. Pt tolerated reg solids, puree, and nectar thick liquids +/- straw consecutive swallows without overt s/s of aspiration. Prolonged mastication due to being edentulous, however caregiver did bring his dentures. Slightly reduced laryngeal elevation 
Problem: Safety - Adult  Goal: Free from fall injury  2/11/2025 0050 by Margarette Ohara RN  Outcome: Progressing  Flowsheets (Taken 2/11/2025 0050)  Free From Fall Injury: Instruct family/caregiver on patient safety  Note: Patient will remain free of any falls or injuries during length of stay. Safety interventions implemented: non-skid yellow socks on, bed alarm on, and call light within reach.     Problem: Nutrition Deficit:  Goal: Optimize nutritional status  2/11/2025 0050 by Margarette Ohara, RN  Outcome: Progressing  Flowsheets (Taken 2/11/2025 0050)  Nutrient intake appropriate for improving, restoring, or maintaining nutritional needs: Recommend appropriate diets, oral nutritional supplements, and vitamin/mineral supplements  Note: Patient currently on mild honey thick liquids and easy chew diet, with strict aspiration precautions. Speech already consulted. Patient to be set up for meds and monitored for excessive oral secretions.     Problem: Pain  Goal: Verbalizes/displays adequate comfort level or baseline comfort level  Outcome: Progressing  Flowsheets  Taken 2/11/2025 0050  Verbalizes/displays adequate comfort level or baseline comfort level:   Encourage patient to monitor pain and request assistance   Assess pain using appropriate pain scale  Taken 2/10/2025 2058  Verbalizes/displays adequate comfort level or baseline comfort level:   Encourage patient to monitor pain and request assistance   Assess pain using appropriate pain scale  Taken 2/10/2025 2028  Verbalizes/displays adequate comfort level or baseline comfort level:   Encourage patient to monitor pain and request assistance   Assess pain using appropriate pain scale  Taken 2/10/2025 1949  Verbalizes/displays adequate comfort level or baseline comfort level:   Encourage patient to monitor pain and request assistance   Assess pain using appropriate pain scale  Note: Patient assessed using verbal 1-10 pain scale. Patient endorsed pain to his back that 
reach.     Progression toward goals:   []      Improving appropriately and progressing toward goals  [x]      Improving slowly and progressing toward goals  []      Not making progress toward goals and plan of care will be adjusted     PLAN:  Patient continues to benefit from skilled intervention to address the above impairments.  Continue treatment per established plan of care.    Further Equipment Recommendations for Discharge: hospital bed and mechanical lift    AM-PAC Inpatient Mobility Raw Score : 8    This [facility] score should be considered in conjunction with interdisciplinary team recommendations to determine the most appropriate discharge setting. Patient's social support, diagnosis, medical stability, and prior level of function should also be taken into consideration.     Current research shows that an AM-PAC score of 17 (13 without stairs) or less is not associated with a discharge to the patient's home setting.     SUBJECTIVE:   Patient stated, \"Is there anyone in the bathroom?\"    OBJECTIVE DATA SUMMARY:   Critical Behavior:  Orientation  Orientation Level: Oriented to person;Disoriented to time;Disoriented to place     Functional Mobility Training:  Bed Mobility:  Bed Mobility Training  Supine to Sit: Maximum assistance (long sit)  Scooting: Total assistance  Neuro Re-Education:  Long sit 30 seconds  Therapeutic Exercises:   BLE supine hip/knee flexion, hip abduction x10    Pain:  Intensity Pre-treatment: 0/10   Intensity Post-treatment: 0/10  Scale: Numeric Rating Scale    Activity Tolerance:   Activity Tolerance: Patient tolerated treatment well    After treatment:   [] Patient left in no apparent distress sitting up in chair  [x] Patient left in no apparent distress in bed  [x] Call bell left within reach  [x] Nursing notified  [] Caregiver present  [] Bed alarm activated  [] Chair alarm activated  [] SCDs applied    COMMUNICATION/EDUCATION:   Patient Education  Education Given To: Patient  Education 
in RR, Watery eyes  Pharyngeal Phase Characteristics: Poor endurance, Easily fatigued  Effective Modifications: None  Cues for Modifications: Minimal          DYSPHAGIA DIAGNOSIS: Dysphagia Diagnosis: Moderate pharyngeal stage dysphagia    PAIN:  Intensity Pre-treatment: 0/10   Intensity Post-treatment: 0/10  Scale: Numeric Rating Scale    After treatment:   []              Patient left in no apparent distress sitting up in chair  [x]              Patient left in no apparent distress in bed  [x]              Call bell left within reach  [x]              Nursing notified  []              Family present  []              Caregiver present  []              Bed alarm activated    COMMUNICATION/EDUCATION:   [x]            Aspiration precautions; swallow safety; compensatory techniques provided via demonstration, verbalization and teach back of comprehension  [x]         Patient/family have participated as able in goal setting and plan of care.  []            Patient/family agree to work toward stated goals and plan of care.  []            Patient understands intent and goals of therapy, neutral about participation.  []            Patient unable to participate in goal setting/plan of care secondary to cognition, hearing/vision deficits; education ongoing with interdisciplinary staff   []            Handout regarding diet recommendations and thickener instructions provided.  [x]         Posted safety precautions in patient's room.    Thank you for this referral.    SCOTT iWseS., CCC-SLP/L  Speech-Language Pathologist    
Decision Making: Medium Complexity   
apparent distress in bed  [x]         Call bell left within reach  [x]         Nursing notified  [x]         Caregiver present  []         Bed alarm activated  []         SCDs applied    COMMUNICATION/EDUCATION:   Patient Education  Education Given To: Patient  Education Provided: Plan of Care;Transfer Training;Mobility Training  Education Method: Verbal;Teach Back  Barriers to Learning: Cognition  Education Outcome: Verbalized understanding;Continued education needed    Thank you for this referral.  Toya Navarrete, JEYSON  Minutes: 26

## 2025-02-21 ENCOUNTER — TELEPHONE (OUTPATIENT)
Age: 65
End: 2025-02-21

## 2025-02-21 NOTE — TELEPHONE ENCOUNTER
Pt caregiver wants to speak with nurse in regards to Portable O2. Pt does have an appt on 6/10 w/BERENICE @ [facility] pt is currently on the waitlist. Please advise [phone]

## 2025-02-22 LAB
BACTERIA SPEC CULT: NORMAL
BACTERIA SPEC CULT: NORMAL
SERVICE CMNT-IMP: NORMAL
SERVICE CMNT-IMP: NORMAL

## 2025-02-26 ENCOUNTER — APPOINTMENT (OUTPATIENT)
Facility: HOSPITAL | Age: 65
End: 2025-02-26
Payer: MEDICAID

## 2025-02-26 ENCOUNTER — HOSPITAL ENCOUNTER (INPATIENT)
Facility: HOSPITAL | Age: 65
LOS: 2 days | End: 2025-02-28
Attending: EMERGENCY MEDICINE | Admitting: HOSPITALIST
Payer: MEDICAID

## 2025-02-26 DIAGNOSIS — J96.02 ACUTE RESPIRATORY FAILURE WITH HYPOXIA AND HYPERCAPNIA (HCC): Primary | ICD-10-CM

## 2025-02-26 DIAGNOSIS — I50.811 ACUTE RIGHT-SIDED CONGESTIVE HEART FAILURE (HCC): ICD-10-CM

## 2025-02-26 DIAGNOSIS — A41.9 SEPTIC SHOCK (HCC): ICD-10-CM

## 2025-02-26 DIAGNOSIS — J96.01 ACUTE RESPIRATORY FAILURE WITH HYPOXIA AND HYPERCAPNIA (HCC): Primary | ICD-10-CM

## 2025-02-26 DIAGNOSIS — T68.XXXA HYPOTHERMIA, INITIAL ENCOUNTER: ICD-10-CM

## 2025-02-26 DIAGNOSIS — R65.21 SEPTIC SHOCK (HCC): ICD-10-CM

## 2025-02-26 DIAGNOSIS — E87.6 HYPOKALEMIA: ICD-10-CM

## 2025-02-26 PROBLEM — K56.7 ILEUS (HCC): Status: ACTIVE | Noted: 2025-02-26

## 2025-02-26 PROBLEM — G93.40 ACUTE ENCEPHALOPATHY: Status: ACTIVE | Noted: 2025-02-26

## 2025-02-26 PROBLEM — K52.9 COLITIS: Status: ACTIVE | Noted: 2025-02-26

## 2025-02-26 PROBLEM — L89.159 PRESSURE INJURY OF SKIN OF SACRAL REGION: Status: ACTIVE | Noted: 2025-02-26

## 2025-02-26 PROBLEM — J69.0 ASPIRATION PNEUMONIA (HCC): Status: ACTIVE | Noted: 2025-02-26

## 2025-02-26 PROBLEM — M86.9 OSTEOMYELITIS (HCC): Status: ACTIVE | Noted: 2025-02-26

## 2025-02-26 PROBLEM — R74.01 TRANSAMINITIS: Status: ACTIVE | Noted: 2025-02-26

## 2025-02-26 LAB
ACETONE BLOOD: NOT DETECTED MG/L
ALBUMIN SERPL-MCNC: 1.6 G/DL (ref 3.4–5)
ALBUMIN SERPL-MCNC: 2.1 G/DL (ref 3.4–5)
ALBUMIN/GLOB SERPL: 0.6 (ref 0.8–1.7)
ALBUMIN/GLOB SERPL: 0.6 (ref 0.8–1.7)
ALP SERPL-CCNC: 185 U/L (ref 45–117)
ALP SERPL-CCNC: 239 U/L (ref 45–117)
ALT SERPL-CCNC: 77 U/L (ref 16–61)
ALT SERPL-CCNC: 98 U/L (ref 16–61)
AMPHET UR QL SCN: NEGATIVE
ANION GAP BLD CALC-SCNC: ABNORMAL MMOL/L (ref 10–20)
ANION GAP BLD CALC-SCNC: ABNORMAL MMOL/L (ref 10–20)
ANION GAP SERPL CALC-SCNC: 2 MMOL/L (ref 3–18)
ANION GAP SERPL CALC-SCNC: 3 MMOL/L (ref 3–18)
ANION GAP SERPL CALC-SCNC: 4 MMOL/L (ref 3–18)
APAP SERPL-MCNC: 6 UG/ML (ref 10–30)
APPEARANCE UR: CLEAR
ARTERIAL PATENCY WRIST A: ABNORMAL
ARTERIAL PATENCY WRIST A: NEGATIVE
ARTERIAL PATENCY WRIST A: POSITIVE
ARTERIAL PATENCY WRIST A: POSITIVE
AST SERPL-CCNC: 109 U/L (ref 10–38)
AST SERPL-CCNC: 98 U/L (ref 10–38)
B PERT DNA SPEC QL NAA+PROBE: NOT DETECTED
BACTERIA URNS QL MICRO: NEGATIVE /HPF
BARBITURATES UR QL SCN: NEGATIVE
BASE EXCESS BLD CALC-SCNC: 11.9 MMOL/L
BASE EXCESS BLD CALC-SCNC: 13.8 MMOL/L
BASE EXCESS BLD CALC-SCNC: 9.2 MMOL/L
BASOPHILS # BLD: 0 K/UL (ref 0–0.1)
BASOPHILS # BLD: 0 K/UL (ref 0–0.1)
BASOPHILS NFR BLD: 0 % (ref 0–2)
BASOPHILS NFR BLD: 0 % (ref 0–2)
BDY SITE: ABNORMAL
BENZODIAZ UR QL: NEGATIVE
BILIRUB SERPL-MCNC: 0.8 MG/DL (ref 0.2–1)
BILIRUB SERPL-MCNC: 1 MG/DL (ref 0.2–1)
BILIRUB UR QL: NEGATIVE
BORDETELLA PARAPERTUSSIS BY PCR: NOT DETECTED
BUN SERPL-MCNC: 18 MG/DL (ref 7–18)
BUN SERPL-MCNC: 19 MG/DL (ref 7–18)
BUN SERPL-MCNC: 19 MG/DL (ref 7–18)
BUN/CREAT SERPL: 44 (ref 12–20)
BUN/CREAT SERPL: 58 (ref 12–20)
BUN/CREAT SERPL: 64 (ref 12–20)
C PNEUM DNA SPEC QL NAA+PROBE: NOT DETECTED
CA-I BLD-MCNC: 1.29 MMOL/L (ref 1.15–1.33)
CA-I BLD-MCNC: 1.52 MMOL/L (ref 1.15–1.33)
CALCIUM SERPL-MCNC: 8 MG/DL (ref 8.5–10.1)
CALCIUM SERPL-MCNC: 8.4 MG/DL (ref 8.5–10.1)
CALCIUM SERPL-MCNC: 9.9 MG/DL (ref 8.5–10.1)
CANNABINOIDS UR QL SCN: NEGATIVE
CHAIN OF CUSTODY?: NO
CHLORIDE BLD-SCNC: 102 MMOL/L (ref 98–107)
CHLORIDE BLD-SCNC: 105 MMOL/L (ref 98–107)
CHLORIDE SERPL-SCNC: 104 MMOL/L (ref 100–111)
CHLORIDE SERPL-SCNC: 110 MMOL/L (ref 100–111)
CHLORIDE SERPL-SCNC: 110 MMOL/L (ref 100–111)
CO2 BLD-SCNC: 38 MMOL/L (ref 22–29)
CO2 SERPL-SCNC: 32 MMOL/L (ref 21–32)
CO2 SERPL-SCNC: 33 MMOL/L (ref 21–32)
CO2 SERPL-SCNC: 39 MMOL/L (ref 21–32)
COCAINE UR QL SCN: NEGATIVE
COLOR UR: YELLOW
CREAT BLD-MCNC: 0.44 MG/DL (ref 0.6–1.3)
CREAT BLD-MCNC: 0.63 MG/DL (ref 0.6–1.3)
CREAT SERPL-MCNC: 0.28 MG/DL (ref 0.6–1.3)
CREAT SERPL-MCNC: 0.33 MG/DL (ref 0.6–1.3)
CREAT SERPL-MCNC: 0.43 MG/DL (ref 0.6–1.3)
DIFFERENTIAL METHOD BLD: ABNORMAL
DIFFERENTIAL METHOD BLD: ABNORMAL
ECHO AV PEAK GRADIENT: 4 MMHG
ECHO AV PEAK VELOCITY: 1 M/S
ECHO BSA: 1.72 M2
ECHO EST RA PRESSURE: 3 MMHG
ECHO LA DIAMETER INDEX: 1.8 CM/M2
ECHO LA DIAMETER: 3.1 CM
ECHO LV EDV A4C: 34 ML
ECHO LV EDV INDEX A4C: 20 ML/M2
ECHO LV EF PHYSICIAN: 45 %
ECHO LV EJECTION FRACTION A4C: 42 %
ECHO LV ESV A4C: 20 ML
ECHO LV ESV INDEX A4C: 12 ML/M2
ECHO LV FRACTIONAL SHORTENING: 19 % (ref 28–44)
ECHO LV INTERNAL DIMENSION DIASTOLE INDEX: 1.8 CM/M2
ECHO LV INTERNAL DIMENSION DIASTOLIC: 3.1 CM (ref 4.2–5.9)
ECHO LV INTERNAL DIMENSION SYSTOLIC INDEX: 1.45 CM/M2
ECHO LV INTERNAL DIMENSION SYSTOLIC: 2.5 CM
ECHO LV IVSD: 1.2 CM (ref 0.6–1)
ECHO LV MASS 2D: 106.8 G (ref 88–224)
ECHO LV MASS INDEX 2D: 62.1 G/M2 (ref 49–115)
ECHO LV POSTERIOR WALL DIASTOLIC: 1.1 CM (ref 0.6–1)
ECHO LV RELATIVE WALL THICKNESS RATIO: 0.71
ECHO PULMONARY ARTERY END DIASTOLIC PRESSURE: 21 MMHG
ECHO PV MAX VELOCITY: 1.3 M/S
ECHO PV PEAK GRADIENT: 7 MMHG
ECHO PV REGURGITANT MAX VELOCITY: 2.3 M/S
ECHO RIGHT VENTRICULAR SYSTOLIC PRESSURE (RVSP): 31 MMHG
ECHO TV REGURGITANT MAX VELOCITY: 2.63 M/S
ECHO TV REGURGITANT PEAK GRADIENT: 28 MMHG
EKG ATRIAL RATE: 52 BPM
EKG DIAGNOSIS: NORMAL
EKG P AXIS: 266 DEGREES
EKG P-R INTERVAL: 166 MS
EKG Q-T INTERVAL: 542 MS
EKG QRS DURATION: 100 MS
EKG QTC CALCULATION (BAZETT): 504 MS
EKG R AXIS: 68 DEGREES
EKG T AXIS: 212 DEGREES
EKG VENTRICULAR RATE: 52 BPM
EOSINOPHIL # BLD: 0 K/UL (ref 0–0.4)
EOSINOPHIL # BLD: 0.04 K/UL (ref 0–0.4)
EOSINOPHIL NFR BLD: 0 % (ref 0–5)
EOSINOPHIL NFR BLD: 1 % (ref 0–5)
EPITH CASTS URNS QL MICRO: NEGATIVE /LPF (ref 0–5)
ERYTHROCYTE [DISTWIDTH] IN BLOOD BY AUTOMATED COUNT: 17.7 % (ref 11.6–14.5)
ERYTHROCYTE [DISTWIDTH] IN BLOOD BY AUTOMATED COUNT: 17.9 % (ref 11.6–14.5)
ETHANOL: <10 MG/L (ref 0–0.08)
ETHYLENE GLYCOL: NOT DETECTED MG/L
FENTANYL: NEGATIVE
FIO2 ON VENT: 100 %
FIO2 ON VENT: 100 %
FLUAV SUBTYP SPEC NAA+PROBE: NOT DETECTED
FLUBV RNA SPEC QL NAA+PROBE: NOT DETECTED
GAS FLOW.O2 O2 DELIVERY SYS: ABNORMAL
GAS FLOW.O2 SETTING OXYMISER: 20 BPM
GAS FLOW.O2 SETTING OXYMISER: 24 BPM
GLOBULIN SER CALC-MCNC: 2.9 G/DL (ref 2–4)
GLOBULIN SER CALC-MCNC: 3.7 G/DL (ref 2–4)
GLUCOSE BLD STRIP.AUTO-MCNC: 122 MG/DL (ref 70–110)
GLUCOSE BLD STRIP.AUTO-MCNC: 65 MG/DL (ref 70–110)
GLUCOSE BLD STRIP.AUTO-MCNC: 94 MG/DL (ref 70–110)
GLUCOSE BLD-MCNC: 107 MG/DL (ref 74–99)
GLUCOSE BLD-MCNC: 148 MG/DL (ref 74–99)
GLUCOSE SERPL-MCNC: 115 MG/DL (ref 74–99)
GLUCOSE SERPL-MCNC: 130 MG/DL (ref 74–99)
GLUCOSE SERPL-MCNC: 84 MG/DL (ref 74–99)
GLUCOSE UR STRIP.AUTO-MCNC: NEGATIVE MG/DL
HADV DNA SPEC QL NAA+PROBE: NOT DETECTED
HCO3 BLD-SCNC: 34 MMOL/L (ref 21–28)
HCO3 BLD-SCNC: 36.9 MMOL/L (ref 21–28)
HCO3 BLD-SCNC: 39.1 MMOL/L (ref 21–28)
HCOV 229E RNA SPEC QL NAA+PROBE: NOT DETECTED
HCOV HKU1 RNA SPEC QL NAA+PROBE: NOT DETECTED
HCOV NL63 RNA SPEC QL NAA+PROBE: NOT DETECTED
HCOV OC43 RNA SPEC QL NAA+PROBE: NOT DETECTED
HCT VFR BLD AUTO: 29.3 % (ref 36–48)
HCT VFR BLD AUTO: 35.3 % (ref 36–48)
HGB BLD-MCNC: 10.7 G/DL (ref 13–16)
HGB BLD-MCNC: 9 G/DL (ref 13–16)
HGB UR QL STRIP: NEGATIVE
HMPV RNA SPEC QL NAA+PROBE: NOT DETECTED
HPIV1 RNA SPEC QL NAA+PROBE: NOT DETECTED
HPIV2 RNA SPEC QL NAA+PROBE: NOT DETECTED
HPIV3 RNA SPEC QL NAA+PROBE: NOT DETECTED
HPIV4 RNA SPEC QL NAA+PROBE: NOT DETECTED
HYALINE CASTS URNS QL MICRO: NORMAL /LPF (ref 0–2)
IMM GRANULOCYTES # BLD AUTO: 0 K/UL (ref 0–0.04)
IMM GRANULOCYTES # BLD AUTO: 0 K/UL (ref 0–0.04)
IMM GRANULOCYTES NFR BLD AUTO: 0 % (ref 0–0.5)
IMM GRANULOCYTES NFR BLD AUTO: 0 % (ref 0–0.5)
ISOPROPANOL: NOT DETECTED MG/L
KETONES UR QL STRIP.AUTO: NEGATIVE MG/DL
LACTATE BLD-SCNC: 0.89 MMOL/L (ref 0.4–2)
LACTATE BLD-SCNC: 1.55 MMOL/L (ref 0.4–2)
LACTATE BLD-SCNC: 1.88 MMOL/L (ref 0.4–2)
LEUKOCYTE ESTERASE UR QL STRIP.AUTO: NEGATIVE
LYMPHOCYTES # BLD: 0.28 K/UL (ref 0.9–3.6)
LYMPHOCYTES # BLD: 0.29 K/UL (ref 0.9–3.6)
LYMPHOCYTES NFR BLD: 5 % (ref 21–52)
LYMPHOCYTES NFR BLD: 7 % (ref 21–52)
Lab: ABNORMAL
Lab: NORMAL
M PNEUMO DNA SPEC QL NAA+PROBE: NOT DETECTED
MAGNESIUM SERPL-MCNC: 1.7 MG/DL (ref 1.6–2.6)
MAGNESIUM SERPL-MCNC: 1.7 MG/DL (ref 1.6–2.6)
MCH RBC QN AUTO: 27.7 PG (ref 24–34)
MCH RBC QN AUTO: 27.9 PG (ref 24–34)
MCHC RBC AUTO-ENTMCNC: 30.3 G/DL (ref 31–37)
MCHC RBC AUTO-ENTMCNC: 30.7 G/DL (ref 31–37)
MCV RBC AUTO: 90.2 FL (ref 78–100)
MCV RBC AUTO: 92.2 FL (ref 78–100)
METHADONE UR QL: NEGATIVE
METHANOL: NOT DETECTED MG/L
MONOCYTES # BLD: 0.16 K/UL (ref 0.05–1.2)
MONOCYTES # BLD: 0.35 K/UL (ref 0.05–1.2)
MONOCYTES NFR BLD: 4 % (ref 3–10)
MONOCYTES NFR BLD: 6 % (ref 3–10)
MYELOCYTES NFR BLD MANUAL: 1 %
NEUTS BAND NFR BLD MANUAL: 11 %
NEUTS BAND NFR BLD MANUAL: 9 %
NEUTS SEG # BLD: 3.48 K/UL (ref 1.8–8)
NEUTS SEG # BLD: 5.16 K/UL (ref 1.8–8)
NEUTS SEG NFR BLD: 78 % (ref 40–73)
NEUTS SEG NFR BLD: 78 % (ref 40–73)
NITRITE UR QL STRIP.AUTO: NEGATIVE
NRBC # BLD: 0.05 K/UL (ref 0–0.01)
NRBC # BLD: 0.09 K/UL (ref 0–0.01)
NRBC BLD-RTO: 0.9 PER 100 WBC
NRBC BLD-RTO: 2.3 PER 100 WBC
O2/TOTAL GAS SETTING VFR VENT: 100 %
O2/TOTAL GAS SETTING VFR VENT: 50 %
OPIATES UR QL: POSITIVE
OXYCODONE UR QL SCN: NEGATIVE
PCO2 BLD: 47.2 MMHG (ref 35–48)
PCO2 BLD: 49.2 MMHG (ref 35–48)
PCO2 BLD: 60.3 MMHG (ref 35–48)
PCO2 BLD: >110 MMHG (ref 35–48)
PCP UR QL: NEGATIVE
PEEP RESPIRATORY: 10 CMH2O
PEEP RESPIRATORY: 10 CMH2O
PEEP RESPIRATORY: 12
PH BLD: 7.15 (ref 7.35–7.45)
PH BLD: 7.42 (ref 7.35–7.45)
PH BLD: 7.47 (ref 7.35–7.45)
PH BLD: 7.48 (ref 7.35–7.45)
PH UR STRIP: 5.5 (ref 5–8)
PHOSPHATE SERPL-MCNC: 1.6 MG/DL (ref 2.5–4.9)
PHOSPHATE SERPL-MCNC: 2.3 MG/DL (ref 2.5–4.9)
PLATELET # BLD AUTO: 173 K/UL (ref 135–420)
PLATELET # BLD AUTO: 180 K/UL (ref 135–420)
PLATELET COMMENT: ABNORMAL
PLATELET COMMENT: ABNORMAL
PMV BLD AUTO: 11.1 FL (ref 9.2–11.8)
PMV BLD AUTO: 11.4 FL (ref 9.2–11.8)
PO2 BLD: 135 MMHG (ref 83–108)
PO2 BLD: 338 MMHG (ref 83–108)
PO2 BLD: 58 MMHG (ref 83–108)
PO2 BLD: 80 MMHG (ref 83–108)
POTASSIUM BLD-SCNC: 2.7 MMOL/L (ref 3.5–5.1)
POTASSIUM BLD-SCNC: 4.2 MMOL/L (ref 3.5–5.1)
POTASSIUM SERPL-SCNC: 2.7 MMOL/L (ref 3.5–5.5)
POTASSIUM SERPL-SCNC: 3 MMOL/L (ref 3.5–5.5)
POTASSIUM SERPL-SCNC: 3.8 MMOL/L (ref 3.5–5.5)
PROCALCITONIN SERPL-MCNC: 0.3 NG/ML
PROT SERPL-MCNC: 4.5 G/DL (ref 6.4–8.2)
PROT SERPL-MCNC: 5.8 G/DL (ref 6.4–8.2)
PROT UR STRIP-MCNC: ABNORMAL MG/DL
RBC # BLD AUTO: 3.25 M/UL (ref 4.35–5.65)
RBC # BLD AUTO: 3.83 M/UL (ref 4.35–5.65)
RBC #/AREA URNS HPF: NEGATIVE /HPF (ref 0–5)
RBC MORPH BLD: ABNORMAL
REPORT STATUS: ABNORMAL
RESPIRATORY RATE, POC: 20 (ref 5–40)
RESPIRATORY RATE, POC: 20 (ref 5–40)
RESPIRATORY RATE, POC: 24 (ref 5–40)
RSV RNA SPEC QL NAA+PROBE: NOT DETECTED
RV+EV RNA SPEC QL NAA+PROBE: NOT DETECTED
SALICYLATES SERPL-MCNC: <1.7 MG/DL (ref 2.8–20)
SAO2 % BLD: 91 % (ref 92–97)
SAO2 % BLD: 99 % (ref 94–98)
SAO2 % BLD: 99.9 % (ref 92–97)
SARS-COV-2 RNA RESP QL NAA+PROBE: NOT DETECTED
SERVICE CMNT-IMP: ABNORMAL
SODIUM BLD-SCNC: 145 MMOL/L (ref 136–145)
SODIUM BLD-SCNC: 149 MMOL/L (ref 136–145)
SODIUM SERPL-SCNC: 145 MMOL/L (ref 136–145)
SODIUM SERPL-SCNC: 145 MMOL/L (ref 136–145)
SODIUM SERPL-SCNC: 147 MMOL/L (ref 136–145)
SP GR UR REFRACTOMETRY: 1.01 (ref 1–1.03)
SPECIMEN SITE: ABNORMAL
SPECIMEN SITE: ABNORMAL
SPECIMEN SOURCE: ABNORMAL
SPECIMEN TYPE: ABNORMAL
SPECIMEN TYPE: ABNORMAL
TROPONIN I SERPL HS-MCNC: 6 NG/L (ref 0–78)
TROPONIN I SERPL HS-MCNC: 9 NG/L (ref 0–78)
TSH SERPL DL<=0.05 MIU/L-ACNC: 1.13 UIU/ML (ref 0.36–3.74)
UROBILINOGEN UR QL STRIP.AUTO: 1 EU/DL (ref 0.2–1)
VENTILATION MODE VENT: ABNORMAL
VT SETTING VENT: 400
VT SETTING VENT: 400 ML
VT SETTING VENT: 400 ML
WBC # BLD AUTO: 4 K/UL (ref 4.6–13.2)
WBC # BLD AUTO: 5.8 K/UL (ref 4.6–13.2)
WBC MORPH BLD: ABNORMAL
WBC URNS QL MICRO: NORMAL /HPF (ref 0–5)

## 2025-02-26 PROCEDURE — 36620 INSERTION CATHETER ARTERY: CPT | Performed by: INTERNAL MEDICINE

## 2025-02-26 PROCEDURE — 96375 TX/PRO/DX INJ NEW DRUG ADDON: CPT

## 2025-02-26 PROCEDURE — 93005 ELECTROCARDIOGRAM TRACING: CPT

## 2025-02-26 PROCEDURE — 84295 ASSAY OF SERUM SODIUM: CPT

## 2025-02-26 PROCEDURE — 71045 X-RAY EXAM CHEST 1 VIEW: CPT

## 2025-02-26 PROCEDURE — 6360000002 HC RX W HCPCS: Performed by: NURSE PRACTITIONER

## 2025-02-26 PROCEDURE — 70450 CT HEAD/BRAIN W/O DYE: CPT

## 2025-02-26 PROCEDURE — 6360000004 HC RX CONTRAST MEDICATION: Performed by: HOSPITALIST

## 2025-02-26 PROCEDURE — 94644 CONT INHLJ TX 1ST HOUR: CPT

## 2025-02-26 PROCEDURE — 99291 CRITICAL CARE FIRST HOUR: CPT

## 2025-02-26 PROCEDURE — 83735 ASSAY OF MAGNESIUM: CPT

## 2025-02-26 PROCEDURE — 2500000003 HC RX 250 WO HCPCS: Performed by: NURSE PRACTITIONER

## 2025-02-26 PROCEDURE — 87040 BLOOD CULTURE FOR BACTERIA: CPT

## 2025-02-26 PROCEDURE — APPSS60 APP SPLIT SHARED TIME 46-60 MINUTES: Performed by: NURSE PRACTITIONER

## 2025-02-26 PROCEDURE — 95706 EEG WO VID 2-12HR INTMT MNTR: CPT

## 2025-02-26 PROCEDURE — 87086 URINE CULTURE/COLONY COUNT: CPT

## 2025-02-26 PROCEDURE — 0BH17EZ INSERTION OF ENDOTRACHEAL AIRWAY INTO TRACHEA, VIA NATURAL OR ARTIFICIAL OPENING: ICD-10-PCS | Performed by: EMERGENCY MEDICINE

## 2025-02-26 PROCEDURE — 6370000000 HC RX 637 (ALT 250 FOR IP): Performed by: NURSE PRACTITIONER

## 2025-02-26 PROCEDURE — 93306 TTE W/DOPPLER COMPLETE: CPT | Performed by: INTERNAL MEDICINE

## 2025-02-26 PROCEDURE — 87154 CUL TYP ID BLD PTHGN 6+ TRGT: CPT

## 2025-02-26 PROCEDURE — 6360000002 HC RX W HCPCS: Performed by: PHYSICIAN ASSISTANT

## 2025-02-26 PROCEDURE — 87205 SMEAR GRAM STAIN: CPT

## 2025-02-26 PROCEDURE — 87185 SC STD ENZYME DETCJ PER NZM: CPT

## 2025-02-26 PROCEDURE — 5A1935Z RESPIRATORY VENTILATION, LESS THAN 24 CONSECUTIVE HOURS: ICD-10-PCS | Performed by: STUDENT IN AN ORGANIZED HEALTH CARE EDUCATION/TRAINING PROGRAM

## 2025-02-26 PROCEDURE — 85014 HEMATOCRIT: CPT

## 2025-02-26 PROCEDURE — 82947 ASSAY GLUCOSE BLOOD QUANT: CPT

## 2025-02-26 PROCEDURE — 93306 TTE W/DOPPLER COMPLETE: CPT

## 2025-02-26 PROCEDURE — 2580000003 HC RX 258: Performed by: PHYSICIAN ASSISTANT

## 2025-02-26 PROCEDURE — 87070 CULTURE OTHR SPECIMN AEROBIC: CPT

## 2025-02-26 PROCEDURE — 36600 WITHDRAWAL OF ARTERIAL BLOOD: CPT

## 2025-02-26 PROCEDURE — 2720000010 HC SURG SUPPLY STERILE

## 2025-02-26 PROCEDURE — 82330 ASSAY OF CALCIUM: CPT

## 2025-02-26 PROCEDURE — 36556 INSERT NON-TUNNEL CV CATH: CPT

## 2025-02-26 PROCEDURE — 71275 CT ANGIOGRAPHY CHEST: CPT

## 2025-02-26 PROCEDURE — 80143 DRUG ASSAY ACETAMINOPHEN: CPT

## 2025-02-26 PROCEDURE — 6360000002 HC RX W HCPCS

## 2025-02-26 PROCEDURE — 2500000003 HC RX 250 WO HCPCS

## 2025-02-26 PROCEDURE — 6360000002 HC RX W HCPCS: Performed by: EMERGENCY MEDICINE

## 2025-02-26 PROCEDURE — 03HY32Z INSERTION OF MONITORING DEVICE INTO UPPER ARTERY, PERCUTANEOUS APPROACH: ICD-10-PCS | Performed by: PHYSICIAN ASSISTANT

## 2025-02-26 PROCEDURE — 02HV33Z INSERTION OF INFUSION DEVICE INTO SUPERIOR VENA CAVA, PERCUTANEOUS APPROACH: ICD-10-PCS | Performed by: NURSE PRACTITIONER

## 2025-02-26 PROCEDURE — 96374 THER/PROPH/DIAG INJ IV PUSH: CPT

## 2025-02-26 PROCEDURE — 82962 GLUCOSE BLOOD TEST: CPT

## 2025-02-26 PROCEDURE — 3E033XZ INTRODUCTION OF VASOPRESSOR INTO PERIPHERAL VEIN, PERCUTANEOUS APPROACH: ICD-10-PCS | Performed by: EMERGENCY MEDICINE

## 2025-02-26 PROCEDURE — 94002 VENT MGMT INPAT INIT DAY: CPT

## 2025-02-26 PROCEDURE — 94645 CONT INHLJ TX EACH ADDL HOUR: CPT

## 2025-02-26 PROCEDURE — 74177 CT ABD & PELVIS W/CONTRAST: CPT

## 2025-02-26 PROCEDURE — 84443 ASSAY THYROID STIM HORMONE: CPT

## 2025-02-26 PROCEDURE — 99223 1ST HOSP IP/OBS HIGH 75: CPT | Performed by: NURSE PRACTITIONER

## 2025-02-26 PROCEDURE — 2500000003 HC RX 250 WO HCPCS: Performed by: EMERGENCY MEDICINE

## 2025-02-26 PROCEDURE — 2580000003 HC RX 258

## 2025-02-26 PROCEDURE — 84100 ASSAY OF PHOSPHORUS: CPT

## 2025-02-26 PROCEDURE — 80320 DRUG SCREEN QUANTALCOHOLS: CPT

## 2025-02-26 PROCEDURE — 81001 URINALYSIS AUTO W/SCOPE: CPT

## 2025-02-26 PROCEDURE — 96366 THER/PROPH/DIAG IV INF ADDON: CPT

## 2025-02-26 PROCEDURE — 2580000003 HC RX 258: Performed by: NURSE PRACTITIONER

## 2025-02-26 PROCEDURE — 2580000003 HC RX 258: Performed by: EMERGENCY MEDICINE

## 2025-02-26 PROCEDURE — 99292 CRITICAL CARE ADDL 30 MIN: CPT

## 2025-02-26 PROCEDURE — 2500000003 HC RX 250 WO HCPCS: Performed by: PHYSICIAN ASSISTANT

## 2025-02-26 PROCEDURE — 6360000002 HC RX W HCPCS: Performed by: HOSPITALIST

## 2025-02-26 PROCEDURE — 94640 AIRWAY INHALATION TREATMENT: CPT

## 2025-02-26 PROCEDURE — 80179 DRUG ASSAY SALICYLATE: CPT

## 2025-02-26 PROCEDURE — 84145 PROCALCITONIN (PCT): CPT

## 2025-02-26 PROCEDURE — 0202U NFCT DS 22 TRGT SARS-COV-2: CPT

## 2025-02-26 PROCEDURE — 31500 INSERT EMERGENCY AIRWAY: CPT

## 2025-02-26 PROCEDURE — 84484 ASSAY OF TROPONIN QUANT: CPT

## 2025-02-26 PROCEDURE — 85025 COMPLETE CBC W/AUTO DIFF WBC: CPT

## 2025-02-26 PROCEDURE — 83605 ASSAY OF LACTIC ACID: CPT

## 2025-02-26 PROCEDURE — 80307 DRUG TEST PRSMV CHEM ANLYZR: CPT

## 2025-02-26 PROCEDURE — 2000000000 HC ICU R&B

## 2025-02-26 PROCEDURE — 37799 UNLISTED PX VASCULAR SURGERY: CPT

## 2025-02-26 PROCEDURE — 99291 CRITICAL CARE FIRST HOUR: CPT | Performed by: INTERNAL MEDICINE

## 2025-02-26 PROCEDURE — 93010 ELECTROCARDIOGRAM REPORT: CPT | Performed by: INTERNAL MEDICINE

## 2025-02-26 PROCEDURE — 82803 BLOOD GASES ANY COMBINATION: CPT

## 2025-02-26 PROCEDURE — 2700000000 HC OXYGEN THERAPY PER DAY

## 2025-02-26 PROCEDURE — 36556 INSERT NON-TUNNEL CV CATH: CPT | Performed by: NURSE PRACTITIONER

## 2025-02-26 PROCEDURE — 84132 ASSAY OF SERUM POTASSIUM: CPT

## 2025-02-26 PROCEDURE — 96365 THER/PROPH/DIAG IV INF INIT: CPT

## 2025-02-26 PROCEDURE — 87076 CULTURE ANAEROBE IDENT EACH: CPT

## 2025-02-26 PROCEDURE — 80053 COMPREHEN METABOLIC PANEL: CPT

## 2025-02-26 PROCEDURE — 51702 INSERT TEMP BLADDER CATH: CPT

## 2025-02-26 RX ORDER — ACETYLCYSTEINE 100 MG/ML
4 SOLUTION ORAL; RESPIRATORY (INHALATION) EVERY 4 HOURS
Status: DISCONTINUED | OUTPATIENT
Start: 2025-02-26 | End: 2025-02-26

## 2025-02-26 RX ORDER — THIAMINE HYDROCHLORIDE 100 MG/ML
100 INJECTION, SOLUTION INTRAMUSCULAR; INTRAVENOUS DAILY
Status: DISCONTINUED | OUTPATIENT
Start: 2025-02-26 | End: 2025-02-26

## 2025-02-26 RX ORDER — MAGNESIUM SULFATE IN WATER 40 MG/ML
2000 INJECTION, SOLUTION INTRAVENOUS ONCE
Status: COMPLETED | OUTPATIENT
Start: 2025-02-26 | End: 2025-02-26

## 2025-02-26 RX ORDER — SODIUM CHLORIDE FOR INHALATION 3 %
4 VIAL, NEBULIZER (ML) INHALATION EVERY 4 HOURS
Status: DISCONTINUED | OUTPATIENT
Start: 2025-02-26 | End: 2025-02-26

## 2025-02-26 RX ORDER — SODIUM CHLORIDE 0.9 % (FLUSH) 0.9 %
5-40 SYRINGE (ML) INJECTION PRN
Status: DISCONTINUED | OUTPATIENT
Start: 2025-02-26 | End: 2025-02-26

## 2025-02-26 RX ORDER — MIDAZOLAM HYDROCHLORIDE 2 MG/2ML
2 INJECTION, SOLUTION INTRAMUSCULAR; INTRAVENOUS
Status: DISCONTINUED | OUTPATIENT
Start: 2025-02-26 | End: 2025-02-26

## 2025-02-26 RX ORDER — POTASSIUM CHLORIDE 7.45 MG/ML
10 INJECTION INTRAVENOUS PRN
Status: DISCONTINUED | OUTPATIENT
Start: 2025-02-26 | End: 2025-02-26

## 2025-02-26 RX ORDER — HYDROCORTISONE SODIUM SUCCINATE 100 MG/2ML
100 INJECTION INTRAMUSCULAR; INTRAVENOUS EVERY 8 HOURS
Status: DISCONTINUED | OUTPATIENT
Start: 2025-02-26 | End: 2025-02-26

## 2025-02-26 RX ORDER — MORPHINE SULFATE 2 MG/ML
2 INJECTION, SOLUTION INTRAMUSCULAR; INTRAVENOUS EVERY 10 MIN PRN
Status: DISCONTINUED | OUTPATIENT
Start: 2025-02-26 | End: 2025-02-28 | Stop reason: HOSPADM

## 2025-02-26 RX ORDER — ARFORMOTEROL TARTRATE 15 UG/2ML
15 SOLUTION RESPIRATORY (INHALATION)
Status: DISCONTINUED | OUTPATIENT
Start: 2025-02-26 | End: 2025-02-26

## 2025-02-26 RX ORDER — SODIUM CHLORIDE 0.9 % (FLUSH) 0.9 %
5-40 SYRINGE (ML) INJECTION EVERY 12 HOURS SCHEDULED
Status: DISCONTINUED | OUTPATIENT
Start: 2025-02-26 | End: 2025-02-26

## 2025-02-26 RX ORDER — MORPHINE SULFATE/PF 50 MG/50ML
2 PATIENT CONTROLLED ANALGESIA SYRINGE INTRAVENOUS CONTINUOUS
Status: DISCONTINUED | OUTPATIENT
Start: 2025-02-26 | End: 2025-02-28 | Stop reason: HOSPADM

## 2025-02-26 RX ORDER — ACETAMINOPHEN 650 MG/1
650 SUPPOSITORY RECTAL EVERY 6 HOURS PRN
Status: DISCONTINUED | OUTPATIENT
Start: 2025-02-26 | End: 2025-02-26

## 2025-02-26 RX ORDER — MAGNESIUM SULFATE IN WATER 40 MG/ML
2000 INJECTION, SOLUTION INTRAVENOUS PRN
Status: DISCONTINUED | OUTPATIENT
Start: 2025-02-26 | End: 2025-02-26

## 2025-02-26 RX ORDER — INSULIN LISPRO 100 [IU]/ML
0-4 INJECTION, SOLUTION INTRAVENOUS; SUBCUTANEOUS EVERY 6 HOURS SCHEDULED
Status: DISCONTINUED | OUTPATIENT
Start: 2025-02-26 | End: 2025-02-26

## 2025-02-26 RX ORDER — ONDANSETRON 4 MG/1
4 TABLET, ORALLY DISINTEGRATING ORAL EVERY 8 HOURS PRN
Status: DISCONTINUED | OUTPATIENT
Start: 2025-02-26 | End: 2025-02-26

## 2025-02-26 RX ORDER — THIAMINE HYDROCHLORIDE 100 MG/ML
200 INJECTION, SOLUTION INTRAMUSCULAR; INTRAVENOUS DAILY
Status: DISCONTINUED | OUTPATIENT
Start: 2025-02-27 | End: 2025-02-26

## 2025-02-26 RX ORDER — BUDESONIDE 1 MG/2ML
1 INHALANT ORAL 2 TIMES DAILY
Status: DISCONTINUED | OUTPATIENT
Start: 2025-02-26 | End: 2025-02-26

## 2025-02-26 RX ORDER — ALBUTEROL SULFATE 1.25 MG/3ML
1.25 SOLUTION RESPIRATORY (INHALATION) EVERY 4 HOURS
Status: DISCONTINUED | OUTPATIENT
Start: 2025-02-26 | End: 2025-02-26

## 2025-02-26 RX ORDER — NALOXONE HYDROCHLORIDE 0.4 MG/ML
2 INJECTION, SOLUTION INTRAMUSCULAR; INTRAVENOUS; SUBCUTANEOUS
Status: COMPLETED | OUTPATIENT
Start: 2025-02-26 | End: 2025-02-26

## 2025-02-26 RX ORDER — ENOXAPARIN SODIUM 100 MG/ML
40 INJECTION SUBCUTANEOUS DAILY
Status: DISCONTINUED | OUTPATIENT
Start: 2025-02-26 | End: 2025-02-26

## 2025-02-26 RX ORDER — LORAZEPAM 2 MG/ML
1 INJECTION INTRAMUSCULAR EVERY 10 MIN PRN
Status: DISCONTINUED | OUTPATIENT
Start: 2025-02-26 | End: 2025-02-26

## 2025-02-26 RX ORDER — POTASSIUM CHLORIDE 29.8 MG/ML
20 INJECTION INTRAVENOUS PRN
Status: DISCONTINUED | OUTPATIENT
Start: 2025-02-26 | End: 2025-02-26

## 2025-02-26 RX ORDER — POLYETHYLENE GLYCOL 3350 17 G/17G
17 POWDER, FOR SOLUTION ORAL DAILY PRN
Status: DISCONTINUED | OUTPATIENT
Start: 2025-02-26 | End: 2025-02-26

## 2025-02-26 RX ORDER — ACETAMINOPHEN 325 MG/1
650 TABLET ORAL EVERY 6 HOURS PRN
Status: DISCONTINUED | OUTPATIENT
Start: 2025-02-26 | End: 2025-02-26

## 2025-02-26 RX ORDER — SODIUM CHLORIDE, SODIUM LACTATE, POTASSIUM CHLORIDE, AND CALCIUM CHLORIDE .6; .31; .03; .02 G/100ML; G/100ML; G/100ML; G/100ML
500 INJECTION, SOLUTION INTRAVENOUS ONCE
Status: COMPLETED | OUTPATIENT
Start: 2025-02-26 | End: 2025-02-26

## 2025-02-26 RX ORDER — CHLORHEXIDINE GLUCONATE ORAL RINSE 1.2 MG/ML
15 SOLUTION DENTAL 2 TIMES DAILY
Status: DISCONTINUED | OUTPATIENT
Start: 2025-02-26 | End: 2025-02-26

## 2025-02-26 RX ORDER — IOPAMIDOL 755 MG/ML
90 INJECTION, SOLUTION INTRAVASCULAR
Status: COMPLETED | OUTPATIENT
Start: 2025-02-26 | End: 2025-02-26

## 2025-02-26 RX ORDER — ROCURONIUM BROMIDE 10 MG/ML
INJECTION, SOLUTION INTRAVENOUS
Status: COMPLETED
Start: 2025-02-26 | End: 2025-02-26

## 2025-02-26 RX ORDER — HYDROCORTISONE SODIUM SUCCINATE 100 MG/2ML
50 INJECTION INTRAMUSCULAR; INTRAVENOUS EVERY 6 HOURS
Status: DISCONTINUED | OUTPATIENT
Start: 2025-02-26 | End: 2025-02-26

## 2025-02-26 RX ORDER — NALOXONE HYDROCHLORIDE 0.4 MG/ML
0.4 INJECTION, SOLUTION INTRAMUSCULAR; INTRAVENOUS; SUBCUTANEOUS
Status: DISCONTINUED | OUTPATIENT
Start: 2025-02-26 | End: 2025-02-26

## 2025-02-26 RX ORDER — MORPHINE SULFATE 4 MG/ML
4 INJECTION, SOLUTION INTRAMUSCULAR; INTRAVENOUS
Status: DISCONTINUED | OUTPATIENT
Start: 2025-02-26 | End: 2025-02-28 | Stop reason: HOSPADM

## 2025-02-26 RX ORDER — 0.9 % SODIUM CHLORIDE 0.9 %
30 INTRAVENOUS SOLUTION INTRAVENOUS ONCE
Status: COMPLETED | OUTPATIENT
Start: 2025-02-26 | End: 2025-02-26

## 2025-02-26 RX ORDER — ONDANSETRON 2 MG/ML
4 INJECTION INTRAMUSCULAR; INTRAVENOUS EVERY 6 HOURS PRN
Status: DISCONTINUED | OUTPATIENT
Start: 2025-02-26 | End: 2025-02-26

## 2025-02-26 RX ORDER — GLYCOPYRROLATE 0.2 MG/ML
0.2 INJECTION INTRAMUSCULAR; INTRAVENOUS EVERY 4 HOURS PRN
Status: DISCONTINUED | OUTPATIENT
Start: 2025-02-26 | End: 2025-02-28 | Stop reason: HOSPADM

## 2025-02-26 RX ORDER — DEXTROSE MONOHYDRATE 100 MG/ML
INJECTION, SOLUTION INTRAVENOUS CONTINUOUS PRN
Status: DISCONTINUED | OUTPATIENT
Start: 2025-02-26 | End: 2025-02-26

## 2025-02-26 RX ORDER — SODIUM CHLORIDE 9 MG/ML
INJECTION, SOLUTION INTRAVENOUS PRN
Status: DISCONTINUED | OUTPATIENT
Start: 2025-02-26 | End: 2025-02-26

## 2025-02-26 RX ORDER — ETOMIDATE 2 MG/ML
INJECTION INTRAVENOUS
Status: COMPLETED
Start: 2025-02-26 | End: 2025-02-26

## 2025-02-26 RX ORDER — NOREPINEPHRINE BITARTRATE 0.06 MG/ML
1-100 INJECTION, SOLUTION INTRAVENOUS CONTINUOUS
Status: DISCONTINUED | OUTPATIENT
Start: 2025-02-26 | End: 2025-02-26

## 2025-02-26 RX ORDER — POTASSIUM CHLORIDE 7.45 MG/ML
10 INJECTION INTRAVENOUS
Status: COMPLETED | OUTPATIENT
Start: 2025-02-26 | End: 2025-02-26

## 2025-02-26 RX ORDER — MIDAZOLAM HYDROCHLORIDE 2 MG/2ML
2 INJECTION, SOLUTION INTRAMUSCULAR; INTRAVENOUS
Status: DISCONTINUED | OUTPATIENT
Start: 2025-02-26 | End: 2025-02-28 | Stop reason: HOSPADM

## 2025-02-26 RX ORDER — FENTANYL CITRATE 50 UG/ML
100 INJECTION, SOLUTION INTRAMUSCULAR; INTRAVENOUS ONCE
Status: COMPLETED | OUTPATIENT
Start: 2025-02-26 | End: 2025-02-26

## 2025-02-26 RX ORDER — IPRATROPIUM BROMIDE AND ALBUTEROL SULFATE 2.5; .5 MG/3ML; MG/3ML
1 SOLUTION RESPIRATORY (INHALATION) EVERY 4 HOURS PRN
Status: DISCONTINUED | OUTPATIENT
Start: 2025-02-26 | End: 2025-02-26

## 2025-02-26 RX ORDER — FENTANYL CITRATE 50 UG/ML
INJECTION, SOLUTION INTRAMUSCULAR; INTRAVENOUS
Status: COMPLETED
Start: 2025-02-26 | End: 2025-02-26

## 2025-02-26 RX ADMIN — POTASSIUM PHOSPHATE, MONOBASIC POTASSIUM PHOSPHATE, DIBASIC 30 MMOL: 224; 236 INJECTION, SOLUTION, CONCENTRATE INTRAVENOUS at 08:30

## 2025-02-26 RX ADMIN — WATER 40 MG: 1 INJECTION INTRAMUSCULAR; INTRAVENOUS; SUBCUTANEOUS at 11:19

## 2025-02-26 RX ADMIN — FAMOTIDINE 20 MG: 10 INJECTION, SOLUTION INTRAVENOUS at 09:23

## 2025-02-26 RX ADMIN — CHLORHEXIDINE GLUCONATE 0.12% ORAL RINSE 15 ML: 1.2 LIQUID ORAL at 06:23

## 2025-02-26 RX ADMIN — ETOMIDATE 20 MG: 2 INJECTION, SOLUTION INTRAVENOUS at 00:21

## 2025-02-26 RX ADMIN — Medication 48 MCG/MIN: at 09:52

## 2025-02-26 RX ADMIN — MORPHINE SULFATE 2 MG: 2 INJECTION, SOLUTION INTRAMUSCULAR; INTRAVENOUS at 17:36

## 2025-02-26 RX ADMIN — POTASSIUM CHLORIDE 10 MEQ: 7.46 INJECTION, SOLUTION INTRAVENOUS at 03:16

## 2025-02-26 RX ADMIN — SODIUM CHLORIDE, SODIUM LACTATE, POTASSIUM CHLORIDE, AND CALCIUM CHLORIDE 500 ML: 600; 310; 30; 20 INJECTION, SOLUTION INTRAVENOUS at 09:00

## 2025-02-26 RX ADMIN — FENTANYL CITRATE 100 MCG: 50 INJECTION INTRAMUSCULAR; INTRAVENOUS at 08:00

## 2025-02-26 RX ADMIN — PIPERACILLIN AND TAZOBACTAM 4500 MG: 4; .5 INJECTION, POWDER, FOR SOLUTION INTRAVENOUS at 02:07

## 2025-02-26 RX ADMIN — MORPHINE SULFATE 2 MG: 2 INJECTION, SOLUTION INTRAMUSCULAR; INTRAVENOUS at 16:12

## 2025-02-26 RX ADMIN — THIAMINE HYDROCHLORIDE 100 MG: 100 INJECTION, SOLUTION INTRAMUSCULAR; INTRAVENOUS at 09:23

## 2025-02-26 RX ADMIN — MORPHINE SULFATE 2 MG: 2 INJECTION, SOLUTION INTRAMUSCULAR; INTRAVENOUS at 14:20

## 2025-02-26 RX ADMIN — CHLORHEXIDINE GLUCONATE 0.12% ORAL RINSE 15 ML: 1.2 LIQUID ORAL at 09:22

## 2025-02-26 RX ADMIN — ARFORMOTEROL TARTRATE 15 MCG: 15 SOLUTION RESPIRATORY (INHALATION) at 08:08

## 2025-02-26 RX ADMIN — MORPHINE SULFATE 2 MG: 2 INJECTION, SOLUTION INTRAMUSCULAR; INTRAVENOUS at 14:42

## 2025-02-26 RX ADMIN — SODIUM CHLORIDE: 9 INJECTION, SOLUTION INTRAVENOUS at 10:19

## 2025-02-26 RX ADMIN — ACETYLCYSTEINE 400 MG: 100 INHALANT RESPIRATORY (INHALATION) at 11:22

## 2025-02-26 RX ADMIN — NALOXONE HYDROCHLORIDE 2 MG: 0.4 INJECTION, SOLUTION INTRAMUSCULAR; INTRAVENOUS; SUBCUTANEOUS at 00:19

## 2025-02-26 RX ADMIN — MORPHINE SULFATE 2 MG: 2 INJECTION, SOLUTION INTRAMUSCULAR; INTRAVENOUS at 15:09

## 2025-02-26 RX ADMIN — MORPHINE SULFATE 2 MG: 2 INJECTION, SOLUTION INTRAMUSCULAR; INTRAVENOUS at 14:01

## 2025-02-26 RX ADMIN — FAMOTIDINE 20 MG: 10 INJECTION, SOLUTION INTRAVENOUS at 03:16

## 2025-02-26 RX ADMIN — Medication 5 MCG/MIN: at 00:43

## 2025-02-26 RX ADMIN — IOPAMIDOL 90 ML: 755 INJECTION, SOLUTION INTRAVENOUS at 04:26

## 2025-02-26 RX ADMIN — ALBUTEROL SULFATE 1.25 MG: 1.25 SOLUTION RESPIRATORY (INHALATION) at 11:22

## 2025-02-26 RX ADMIN — SODIUM CHLORIDE: 900 INJECTION INTRAVENOUS at 10:19

## 2025-02-26 RX ADMIN — EPOPROSTENOL 50 NG/KG/MIN: 1.5 INJECTION, POWDER, LYOPHILIZED, FOR SOLUTION INTRAVENOUS at 11:10

## 2025-02-26 RX ADMIN — MORPHINE SULFATE 4 MG: 4 INJECTION, SOLUTION INTRAMUSCULAR; INTRAVENOUS at 22:49

## 2025-02-26 RX ADMIN — ACETYLCYSTEINE 400 MG: 100 INHALANT RESPIRATORY (INHALATION) at 08:08

## 2025-02-26 RX ADMIN — PIPERACILLIN AND TAZOBACTAM 3375 MG: 3; .375 INJECTION, POWDER, LYOPHILIZED, FOR SOLUTION INTRAVENOUS at 09:35

## 2025-02-26 RX ADMIN — ROCURONIUM BROMIDE 50 MG: 10 INJECTION, SOLUTION INTRAVENOUS at 00:21

## 2025-02-26 RX ADMIN — ENOXAPARIN SODIUM 40 MG: 100 INJECTION SUBCUTANEOUS at 09:23

## 2025-02-26 RX ADMIN — SODIUM CHLORIDE 1905 ML: 9 INJECTION, SOLUTION INTRAVENOUS at 02:16

## 2025-02-26 RX ADMIN — POTASSIUM CHLORIDE 10 MEQ: 7.46 INJECTION, SOLUTION INTRAVENOUS at 04:16

## 2025-02-26 RX ADMIN — MORPHINE SULFATE 4 MG: 4 INJECTION, SOLUTION INTRAMUSCULAR; INTRAVENOUS at 13:31

## 2025-02-26 RX ADMIN — MORPHINE SULFATE 2 MG: 2 INJECTION, SOLUTION INTRAMUSCULAR; INTRAVENOUS at 13:44

## 2025-02-26 RX ADMIN — POTASSIUM BICARBONATE 40 MEQ: 782 TABLET, EFFERVESCENT ORAL at 03:23

## 2025-02-26 RX ADMIN — BUDESONIDE 1 MG: 1 SUSPENSION RESPIRATORY (INHALATION) at 08:08

## 2025-02-26 RX ADMIN — SODIUM CHLORIDE SOLN NEBU 3% 4 ML: 3 NEBU SOLN at 08:08

## 2025-02-26 RX ADMIN — ALBUTEROL SULFATE 1.25 MG: 1.25 SOLUTION RESPIRATORY (INHALATION) at 08:09

## 2025-02-26 RX ADMIN — VASOPRESSIN 0.03 UNITS/MIN: 0.2 SOLUTION INTRAVENOUS at 05:30

## 2025-02-26 RX ADMIN — MAGNESIUM SULFATE HEPTAHYDRATE 2000 MG: 40 INJECTION, SOLUTION INTRAVENOUS at 06:25

## 2025-02-26 RX ADMIN — GLYCOPYRROLATE 0.2 MG: 0.2 INJECTION INTRAMUSCULAR; INTRAVENOUS at 13:31

## 2025-02-26 RX ADMIN — VANCOMYCIN HYDROCHLORIDE 1500 MG: 500 INJECTION, POWDER, LYOPHILIZED, FOR SOLUTION INTRAVENOUS at 02:52

## 2025-02-26 RX ADMIN — HYDROCORTISONE SODIUM SUCCINATE 50 MG: 100 INJECTION, POWDER, FOR SOLUTION INTRAMUSCULAR; INTRAVENOUS at 05:35

## 2025-02-26 RX ADMIN — MORPHINE SULFATE 2 MG: 2 INJECTION, SOLUTION INTRAMUSCULAR; INTRAVENOUS at 15:41

## 2025-02-26 RX ADMIN — DEXTROSE 125 ML: 10 SOLUTION INTRAVENOUS at 07:09

## 2025-02-26 RX ADMIN — FENTANYL CITRATE 100 MCG: 50 INJECTION, SOLUTION INTRAMUSCULAR; INTRAVENOUS at 08:00

## 2025-02-26 ASSESSMENT — PULMONARY FUNCTION TESTS
PIF_VALUE: 24
PIF_VALUE: 19
PIF_VALUE: 21
PIF_VALUE: 21
PIF_VALUE: 23

## 2025-02-26 ASSESSMENT — PAIN - FUNCTIONAL ASSESSMENT: PAIN_FUNCTIONAL_ASSESSMENT: 0-10

## 2025-02-26 NOTE — CARE COORDINATION
Yasemin from [facility] called to informed SW that patient was in the process of establishing HH with [facility].         Rubina Figueroa BSW  Case Management

## 2025-02-26 NOTE — PROGRESS NOTES
02/26/25 1340   Vent Information   Ventilator Discontinue (S)  Yes     Patient extubated to comfort care per order. Placed on 2L NC.     RN at bedside.     MD and PA made aware.

## 2025-02-26 NOTE — CARE COORDINATION
02/26/25 0959   Service Assessment   Patient Orientation Unable to Assess;Unresponsive  (Intubated)   Cognition Other (see comment)  (Intubated and unresponsive)   History Provided By Medical Record;Significant Other  (Cath)   Primary Caregiver Other (Comment)  (Friend assist with needs)   Accompanied By/Relationship Significant Other currently at patient bedside   Support Systems Parent;Spouse/Significant Other   Patient's Healthcare Decision Maker is: Legal Next of Kin   PCP Verified by CM Yes  (Dr. Tong)   Last Visit to PCP Within last 3 months   Prior Functional Level Assistance with the following:;Bathing;Dressing;Toileting;Feeding;Cooking;Housework;Shopping;Mobility   Current Functional Level Assistance with the following:;Cooking;Toileting;Dressing;Bathing;Housework;Shopping;Feeding;Mobility   Can patient return to prior living arrangement Yes   Ability to make needs known: Unable  (intubated)   Family able to assist with home care needs: Other (comment)   Would you like for me to discuss the discharge plan with any other family members/significant others, and if so, who? Yes  (Mother and Significant other)   Financial Resources Medicaid   Community Resources None   CM/SW Referral Other (see comment)  (Discharge Planning)   Social/Functional History   Lives With Significant other   Type of Home Apartment   Home Layout One level   Home Access Level entry   Bathroom Shower/Tub Tub/Shower unit   Bathroom Toilet Handicap height   Bathroom Equipment None   Bathroom Accessibility Accessible   Home Equipment Wheelchair - Manual;Hospital bed   Receives Help From Other (Comment)  (Significant other)   Prior Level of Assist for ADLs Needs assistance   Toileting Needs assistance   Prior Level of Assist for Homemaking Needs assistance   Homemaking Responsibilities No   Ambulation Assistance Needs assistance   Prior Level of Assist for Transfers Needs assistance   Active  No   Patient's  Info Significant  Appeal   Discharge Appealed by Other (comment)  (n/a)   Freedom of Choice  2/26/2025, 10:25 AM    Patient Name: Farhad Rogers                   YOB: 1960    Patient offered star rated Freedom of Choice for  Skilled Nursing Facility and Home Health services.    Patient had [facility] with [facility] for PT/OT and skilled nursing.    SW verified HIPAA, demographics and completed initial assessments  via phone with patient significant other-Cath.      Rubina Figueroa BSW  Case Management

## 2025-02-26 NOTE — PROGRESS NOTES
Advance Care Planning   Healthcare Decision Maker:    Primary Decision Maker: Danny Hammond - Child    Click here to complete Healthcare Decision Makers including selection of the Healthcare Decision Maker Relationship (ie \"Primary\").    Spiritual Health History and Assessment/Progress Note  [facility]    Crisis, Family Care, Family Care, Anticipatory Grief,      Name: Farhad Rogers MRN: [id number]    Age: 64 y.o.     Sex: male   Language: English   Orthodoxy: Synagogue   Acute respiratory failure with hypoxia and hypercapnia (HCC)     Date: 2/26/2025            Total Time Calculated: 17 min              Spiritual Assessment began in [facility] 3 INTENSIVE CARE UNIT        Referral/Consult From: Nurse   Encounter Overview/Reason: Crisis, Family Care  Service Provided For: Patient and family together    Jesusita, Belief, Meaning:   Patient unable to assess at this time  Family/Friends identify as spiritual and have beliefs or practices that help with coping during difficult times      Importance and Influence:  Patient unable to assess at this time  Family/Friends have spiritual/personal beliefs that influence decisions regarding the patient's health    Community:  Patient Other: Mother and family bedside  Family/Friends are connected with a spiritual community: and feel well-supported. Support system includes: Extended family    Assessment and Plan of Care:   Patient now comfort care.  Parent and Elder at the bedside. Will continue to support. Provided prayer of comfort.     Patient Interventions include: Provided sacramental/Christianity ritual  Family/Friends Interventions include: Facilitated expression of thoughts and feelings, Explored spiritual coping/struggle/distress, Affirmed coping skills/support systems, and Provided sacramental/Christianity ritual    Patient Plan of Care: Spiritual Care available upon further referral  Family/Friends Plan of Care: Spiritual Care available upon further  referral    Electronically signed by Chaplain Osvaldo on 2/26/2025 at 1:50 PM

## 2025-02-26 NOTE — CARE COORDINATION
02/26/25 1028   Readmission Assessment   Number of Days since last admission? 8-30 days   Previous Disposition Home with Home Health  ([facility])   Who is being Interviewed Caregiver  (Significant other- Cath)   What was the patient's/caregiver's perception as to why they think they needed to return back to the hospital? Other (Comment)  (The ambulance  was called by patient signicant other because patient blood pressure was low.)   Did you visit your Primary Care Physician after you left the hospital, before you returned this time? Yes   Did you see a specialist, such as Cardiac, Pulmonary, Orthopedic Physician, etc. after you left the hospital? No   Who advised the patient to return to the hospital? Caregiver   Does the patient report anything that got in the way of taking their medications? No   In our efforts to provide the best possible care to you and others like you, can you think of anything that we could have done to help you after you left the hospital the first time, so that you might not have needed to return so soon? Arrange for more help when leaving the hospital;Identify patient's health literacy needs;Discharge instructions that are concise, clear, and non contradictory;Teach back instructions regarding management of illness     Rubina Figueroa BSW  Case Management

## 2025-02-26 NOTE — ED NOTES
Dr. Sharp requesting sedation:    20mg of etom in at 0021    50mg of EDWARD in at 0021    Dr. Sharp performing intubation

## 2025-02-26 NOTE — ED NOTES
TRANSFER - OUT REPORT:    Verbal report given to Rosy on Farhad Rogers  being transferred to ICU for routine progression of patient care       Report consisted of patient's Situation, Background, Assessment and   Recommendations(SBAR).     Information from the following report(s) Nurse Handoff Report, ED Encounter Summary, ED SBAR, Intake/Output, MAR, Recent Results, and Med Rec Status was reviewed with the receiving nurse.    May Fall Assessment:    Presents to emergency department  because of falls (Syncope, seizure, or loss of consciousness): No  Age > 70: No  Altered Mental Status, Intoxication with alcohol or substance confusion (Disorientation, impaired judgment, poor safety awaremess, or inability to follow instructions): Yes  Impaired Mobility: Ambulates or transfers with assistive devices or assistance; Unable to ambulate or transer.: Yes  Nursing Judgement: Yes          Lines:   Peripheral IV 02/26/25 Right Cephalic (Active)       Peripheral IV 02/26/25 Right Forearm (Active)       Peripheral IV 02/26/25 Left;Anterior External Jugular (Active)       Peripheral IV 02/26/25 Left;Dorsal Wrist (Active)       Peripheral IV 02/26/25 Right;Dorsal Wrist (Active)        CRITICAL LABS:     Verbally repeated the following test results Lab/POC: pH 7.15, pCO2 >110, K 2.7, positive for opiates to Rosy    Orders for critical lab are (in progress, ordered      Additional comments:patient recently left AMA after pneumonia diagnosis, patient on jacqueline hugger for hypothermia    Elly Ghosh RN     Opportunity for questions and clarification was provided.      Patient transported with:  Monitor and Registered Nurse, RT, ventilator

## 2025-02-26 NOTE — H&P
Pulmonary / Critical Care Physician Addendum:    Chart and note reviewed. Data reviewed. Seen on rounds earlier today - 2/26/25. I have independently evaluated and examined the patient. I agree with the exam, assessment and plans outlined by NP/PA.    In brief, my findings, evaluation and recommendations are as stated below:    Patient is a 64-year-old male with a past medical history of COPD, asthma, sarcoidosis, amyloidosis, DOUG, and hypertension who presented to [facility] ED on 2/26/2025 after being found unresponsive and hypoxic by family. Patient is currently admitted for acute hypoxic and hypercapnic respiratory failure along with sepsis with septic shock likely secondary to aspiration pneumonia/pneumonitis and possible ascending colitis and osteomyelitis of the coccyx. PCCM consulted for management. I saw and evaluated patient in .    P/E - General - Intubated; unresponsive. Head - atraumatic, normocephalic. HEENT: PERRL, no scleral icterus, moist mucus membranes. Neck - trachea midline, no JVD, no palpable lymph nodes. Chest - symmetrical chest rise, no crepitus. Lungs - +Coarse breath sounds. Heart - RRR, no murmurs or rubs. Abdomen - soft, NT/ND, no rebound tenderness or guarding, +BS. Extremities - +2 pulses in all extremities, no edema. Neuro - unresponsive, non-focal, moving all extremities. Skin - no rash, cyanosis or lesions.     Assessment:  -Acute hypoxic and hypercapnic respiratory failure - requiring mechanical ventilation  -Shock - likely septic + cardiogenic  -Sepsis - unknown source - aspiration PNA (aspirated contents in a/w) vs segmental colitis vs osteomyelitis of the coccyx  -Suspect   -Pulmonary Sarcoidosis  -COPD/Asthma overlap syndrome   -Recurrent aspiration    Plan:  -Continue Ventilatory support with FiO2 100% / peep 12 cwp; wean as able  -Sepsis bundle per hospital protocol  -Follow-up cultures  -Abx coverage with Vanco/Zosyn  -Vasopressor support with norepinephrine/vaso; titrate

## 2025-02-26 NOTE — CONSULTS
Palliative Medicine  Patient Name: Farhad Rogers  YOB: 1960  MRN: [id number]  Age: 64 y.o.  Gender: male    Date of Initial Consult: 2/256/2025   Date of Service: 2/26/2025  Time: 3:21 PM  Provider: OLEG Reyna NP  Hospital Day: 1  Admit Date: 2/26/2025  Referring Provider: ICU team        Reasons for Consultation:  Goals of Care    HISTORY OF PRESENT ILLNESS (HPI):   Farhad Rogers is a 64 y.o. male with a past medical history of COPD with asthma, sarcoidosis, pulmonary nodular amyloidosis DOUG hypertension severe neuropathies wheelchair-bound, who was admitted on 2/26/2025 from home with a diagnosis of acute on chronic hypoxic hypercapnic respiratory failure.  Patient presented from home with unresponsiveness.  EMS found O2 sats to be 50 on room air his pupils were pinpoint he was given Narcan 2 mg x 1 with no improvement in neurological status.  He remained unresponsive and round he was emergently intubated in the ER.  He was also found to be hypotensive with systolic blood pressures in the 80s.  He was started on pressor support with concern for sepsis.  Of note his pCO2 was over 110 and pH was 7.15 CT of his head was negative.  Patient was admitted to the ICU requiring ventilator support as well as pressor support.  He is critically ill.  Palliative medicine was consulted for goals of care discussions and support.    2/26/2025 patient remains poorly responsive not on sedation.  He has an FiO2 50% and a PEEP of 10.            PALLIATIVE DIAGNOSES:    Encounter for palliative medicine/advance care plan discussions  Goals of care  Acute on chronic respiratory failure  Sepsis  Aspiration pneumonia  Colitis  Ileus  Acute encephalopathy  Transaminitis  Sacral decubitus ulcer    ASSESSMENT AND PLAN:   Encounter for palliative medicine/advance care plan discussions    2/26/2025 patient seen along with Ms. Scott RN.  He is currently orally intubated and ventilated poorly responsive not on  Other:  Neurological: [] Normal speech  [] Normal sensation  [x]Deficits present: Poorly responsive    Wt Readings from Last 15 Encounters:   02/26/25 63.5 kg (140 lb)   02/19/25 63.5 kg (140 lb)   01/13/25 74.8 kg (165 lb)   08/28/24 74.8 kg (165 lb)   04/29/24 74.8 kg (165 lb)   10/04/23 78.5 kg (173 lb)   08/11/23 81.6 kg (180 lb)   04/26/23 82.6 kg (182 lb)   02/16/23 82.6 kg (182 lb)   01/05/23 82.6 kg (182 lb)   04/15/22 82.6 kg (182 lb)   02/02/22 82.6 kg (182 lb)   09/28/21 82.6 kg (182 lb)   03/30/21 82.6 kg (182 lb)        Current Diet: No diet orders on file       PSYCHOSOCIAL/SPIRITUAL SCREENING:   Palliative IDT has assessed this patient for cultural preferences / practices and a referral made as appropriate to needs (Cultural Services, Patient Advocacy, Ethics, etc.)    Spiritual Affiliation: Gnosticist    Any spiritual / Worship concerns:  [] Yes /  [x] No   If \"Yes\" to discuss with pastoral care during IDT     Does caregiver feel burdened by caring for their loved one:   [] Yes /  [x] No /  [] No Caregiver Present/Available [] No Caregiver [] Pt Lives at Facility  If \"Yes\" to discuss with social work during IDT    Anticipatory grief assessment:   [x] Normal  / [] Maladaptive     If \"Maladaptive\" to discuss with social work during IDT       LAB AND IMAGING FINDINGS:   Objective data reviewed:  labs, images, records, medication use, vitals, and chart     FINAL COMMENTS   Thank you for allowing Palliative Medicine to participate in the care of Farhad Rogers.    Only check if applicable and billing time based rather than MDM  [x] The total encounter time on this service date was __75__ minutes which was spent performing a face-to-face encounter and personally completing the provider-level activities documented in the note. This includes time spent prior to the visit and after the visit in direct care of the patient. This time does not include time spent in any separately reportable

## 2025-02-26 NOTE — PROGRESS NOTES
Patient was seen in presence of hospice team and family members.  Patient appears to be very comfortable.  No tachypnea noted.  Does not open eyes on verbal or tactile commands currently.  /67   Pulse (!) 145   Temp 99 °F (37.2 °C)   Resp (!) 42   Ht 1.676 m (5' 6\")   Wt 63.5 kg (140 lb)   SpO2 99%   BMI 22.60 kg/m²       General appearance -eyes closed, cachectic, does not follow any verbal or tactile commands  Chest -diminished breath sound bibasilar, poor inspiratory efforts  Heart - S1 and S2 normal, tachycardia  Abdomen - soft, nontender, nondistended, Bowel sounds present  Neurological -eyes closed, does not follow any verbal or tactile commands, no tremors  Extremities - no pedal edema noted, right upper extremity edema present    Patient is very comfortable and does not appear to be imminent at this point.  Continue comfort measures.  Hospice team will reevaluate this patient tomorrow morning and patient can be admitted to inpatient comfort care service if patient survives overnight.  Discussed with patient's family at bedside about discharge disposition plan and they agreed that they will have a plan to take patient home with hospice after 72-96 hours from inpatient comfort care admission.    Primary hospice diagnosis: Advanced COPD and sepsis  Related comorbidities: Debility, severe malnutrition, recent acute respiratory failure, recurrent mucous plugging, dysphagia, recurrent aspiration pneumonia, history of asthma and DOUG, pulmonary sarcoidosis, encephalopathy, wheelchair-bound [PPS is 10]  Other comorbidities: Hypertension, renal failure, severe neuropathy

## 2025-02-26 NOTE — PROGRESS NOTES
Nursing Note by Kristen Haley RN at 05/25/17 10:17 AM     Author:  Kristen Haley, RN Service:  (none) Author Type:  Certified Medical Assistant     Filed:  05/25/17 10:17 AM Encounter Date:  5/25/2017 Status:  Signed     :  Kristen Haley RN (Registered Nurse)            Following the last injection, did any of the following last longer than 24 hours?[SG1.1T] None[SG1.1M]     Has the patient receiving the injection today used the following in the past week:[SG1.1T] None[SG1.1M]     Within the past week, has the patient receiving the injection today experienced the following:[SG1.1T] None     Pt notified RTC in 4 weeks for next injection and she verbalized understanding.[SG1.1M]          Revision History        User Key Date/Time User Provider Type Action    > SG1.1 05/25/17 10:17 AM Kristen Haley, RN Registered Nurse Sign    M - Manual, T - Template             [facility]   Pharmacy Pharmacokinetic Monitoring Service - Vancomycin     Farhad Rogers is a 64 y.o. male starting on vancomycin therapy for Sepsis of Unknown Etiology. Pharmacy consulted for monitoring and adjustment.    Target Concentration: Dosing based on anticipated concentration <15 mg/L due to renal impairment/insufficiency    Additional Antimicrobials: Piperacillin/Tazobactam    Pertinent Laboratory Values:   Temp: (!) 88.9 °F (31.6 °C), Weight - Scale: 63.5 kg (140 lb)  Recent Labs     02/26/25  0014 02/26/25  0015   CREATININE 0.63 0.43*   BUN  --  19*   WBC  --  5.8   PROCAL  --  0.30     Estimated Creatinine Clearance: 154 mL/min (A) (based on SCr of 0.43 mg/dL (L)).    Pertinent Cultures:  Culture Date Source Results   02/26 Blood Pending   02/26 Urine Pending   MRSA Nasal Swab: N/A. Non-respiratory infection    Plan:  Concentration-guided dosing due to renal impairment/insufficiency  Start vancomycin 1500 mg IV x 1  Will dose according to vancomycin concentration levels at this time due to renal insufficiency  Renal labs as indicated   Vancomycin concentration ordered for 02/28 at 0400  Pharmacy will continue to monitor patient and adjust therapy as indicated    Thank you for the consult,  SOHAIL MINAYA [facility]  2/26/2025

## 2025-02-26 NOTE — PROGRESS NOTES
Culture NO GROWTH 6 DAYS                   Imaging:  I have personally reviewed the patient’s radiographs and have reviewed the reports:    Recent image results  XR CHEST PORTABLE    Result Date: 2/26/2025  1. Endotracheal tube in satisfactory position. Enteric tube with the sideport just distal to the GE junction. 2. Improved right basilar airspace disease with new left infrahilar airspace disease. Thank you for this referral. Electronically signed by Asim Mccauley      01/28/25    VAS DUP UPPER EXTREMITY VENOUS RIGHT 02/12/2025  4:46 PM (Final)    Interpretation Summary    No evidence of acute and chronic deep vein thrombosis in the right upper extremity veins examined.    For comparison purposes, the left subclavian vein was investigated. This vein appeared to show normal color filling and Doppler interrogation showed phasic, spontaneous, and somewhat pulsatile flow.    Right cephalic vein (Upper Arm): Not well visualized.    Signed by: Simona Carranza MD on 2/12/2025  4:46 PM     Ultrasound Result (most recent):  US LIVER 01/29/2025    Narrative  EXAM: US LIVER    CLINICAL INDICATION/HISTORY : transaminitis.    TECHNIQUE: Selected images from limited abdominal ultrasound provided for  interpretation.  Gray scale, color and spectral doppler/duplex waveform analysis  was performed of the portal vein to evaluate for direction of flow and thrombus    COMPARISON: January 29, 2025 CT    FINDINGS: Some limitations due to limited patient mobility.  Pancreas is not well seen due to bowel gas. Visualized portions are  unremarkable.    The liver is of homogeneous echotexture without mass in the visualized portions.    The right kidney measures 9.9 cm and there is a 1.3 cm cyst in the mid RIGHT  kidney.    There is no biliary ductal dilatation.    The common bile duct measures 9 mm.    Cholecystectomy      No free fluid is identified.  Gray scale, color and spectral doppler/duplex waveform  analysis  was performed  of the portal vein to evaluate for direction of flow and thrombus demonstrates  normal direction of flow and no thrombus    Impression  1.  Small RIGHT renal cyst.  2.  Cholecystectomy.  3.  Otherwise unremarkable    Electronically signed by Daisy Joseph      01/28/25    ECHO (TTE) COMPLETE (PRN CONTRAST/BUBBLE/STRAIN/3D) 01/29/2025  4:56 PM (Final)    Interpretation Summary    Image quality is technically difficult. Technically difficult study with poor endocardial visualization and procedure performed with the patient in a supine position due to mechanically ventilation.  Most views obtained from subcostal window.    Left Ventricle: Normal left ventricular systolic function with a visually estimated EF of 55 - 60%. Left ventricle is smaller than normal. Normal wall thickness. No obvious regional wall motion abnormalities. Indeterminate diastolic function.    Right Ventricle: Right ventricle is dilated. Reduced systolic function.    Tricuspid Valve: Valve structure is normal. Mild to moderate regurgitation. No stenosis noted. Unable to assess RVSP, patient mechanically ventilated.  PA pressure appears at least mildly elevated.    Right Atrium: Right atrium is dilated.    Signed by: Familia Rm MD on 1/29/2025  4:56 PM                Total of  60   min critical care time spent at bedside during the course of care providing evaluation,management and care decisions and ordering appropriate treatment related to critical care problems exclusive of procedures.  The reason for providing this level of medical care for this critically ill patient was due a critical illness that impaired one or more vital organ systems such that there was a high probability of imminent or life threatening deterioration in the patients condition. This care involved high complexity decision making to assess, manipulate, and support vital system functions, to treat this degree vital organ system failure and to prevent

## 2025-02-26 NOTE — PROGRESS NOTES
02/26/25 1122   Weaning Parameters   Spontaneous Breathing Trial Complete (S)  No (comment)  (Patient Status)

## 2025-02-26 NOTE — PROCEDURES
PROCEDURE NOTE  Date: 2/26/2025   Name: Farhad Rogers  YOB: 1960    Procedures        [facility]  Central Line Procedure Note With Ultrasound Guidance    Indication/ pre procedure diagnosis: Need for vasopressors  Post procedure diagnosis: same    Placed emergently for profound hypotension     Central line Bundle:  Full sterile barrier precautions used.  7-Step Sterility Protocol followed.  (cap, mask sterile gown, sterile gloves, large sterile sheet, hand hygiene, 2% chlorhexidine for cutaneous antisepsis)  5 mL 1% Lidocaine placed at insertion site.      Using ultrasound guidance,   RIGHT/LEFT: rightfemoral vein cannulated x 1 attempt(s) utilizing the modified Seldinger technique.    Position of guidewire confirmed in vein using ultrasound prior to dilating.   Dilation completed once successfully.   Guidewire was removed.  Central venous catheter was placed without difficulty.  mild immediate complications encountered.  Good blood return on all 3 ports.  Catheter secured & Biopatch applied.  Sterile Tegaderm placed.            OLEG Swann - NP  02/26/25  Pulmonary, Critical Care Medicine  [facility]

## 2025-02-26 NOTE — ED NOTES
Pt was changed into a gown and 16 Fr temp gerardo was placed. Pt's family member is at bedside and notes Pt continued to take his antihypertensive despite being started on vasopressor.

## 2025-02-26 NOTE — ACP (ADVANCE CARE PLANNING)
Advance Care Planning     Palliative Team Advance Care Planning (ACP) Conversation    Date of Conversation: 02/26/25    Individuals present for the conversation: Patient and Girlfriend/caregiver-Jamilah Wood   MrDenver Rogers is not . He has one adult son, Danny Hammond and a mother, Rebecca Rogers. IN the absence of an AMD the legal next of kin will act as Healthcare agent for decisions. This is patient's son, Danny Hammond.     ACP documents on file prior to discussion:  -None    Previously completed document/s not on file: Patient / participant reports that there are no previously executed ACP documents.    Healthcare Decision Maker:    Primary Decision Maker: Danny Hammond - Child Legal next of Kin     Conversation Summary:  Farhad Rogers  is a 64 y.o. male  with a past medical history of COPD with asthma, sarcoidosis, pulmonary nodular amyloidosis, DOUG, HTN, severe neuropathy, wheelchair-bound. He was admitted via the ED from home after being found unresponsive and hypoxic with O2 saturation in the 50's on room air. Pupils were found to be pinpoint, Narcan was given without improvement in his neurological status.  Patient was intubated for respiratory distress.   Input from ICU Attending team greatly appreciated.  Team met with Girlfriend/Caregiver who shared that patient would most;y not approve of living a life in bed \"he is a go getter\". Patient's mother arrived at bedside. Medical update was given by ICU Attending Staff. SonDanny primary healthcare agent was also updated via phone. All agree to move forward with compassionate extubation with transition to comfort.  Per notes patient was extubated at 13:46.      Resuscitation Status:   Code Status: DNR     Documentation Completed:  -No new documents completed.    Team spent 55 minutes with the patient and/or surrogate decision maker discussing the patient's wishes and goals.      Denise Chavez, JONE

## 2025-02-26 NOTE — PROGRESS NOTES
Pressure regulated volume control   Respiratory Rate -   20   POC pH 7.35 - 7.45   7.47 (H)   POC pCO2 35.0 - 48.0 MMHG 47.2   POC PO2 83 - 108 MMHG 58 (L)   POC HCO3 21 - 28 MMOL/L 34.0 (H)   POC O2 SAT 92 - 97 % 91.0 (L)   POC Nacho's Test -   Negative   POC TIDAL VOLUME ml 400   FIO2 % 50   POC PEEP cmH2O 10   (H): Data is abnormally high  (L): Data is abnormally low  Chest X-ray:  Date:2/26/2025  Findings:  Endotracheal tube terminates 8.4 cm above the monae. OG tube terminates with  the sideport just distal to the GE junction. There are ongoing emphysematous  changes in the lungs. There is improved right basilar airspace disease. The  calcified left upper lobe nodule is again seen. There is some new left perihilar  airspace disease. No pleural effusion or pneumothorax. Heart size is stable.     IMPRESSION:  1. Endotracheal tube in satisfactory position. Enteric tube with the sideport  just distal to the GE junction.     2. Improved right basilar airspace disease with new left infrahilar airspace  disease.    Lab Test:  Date:2/26/2025  WBC:   Lab Results   Component Value Date/Time    WBC 5.8 02/26/2025 12:15 AM   HGB:   Lab Results   Component Value Date/Time    HGB 10.7 02/26/2025 12:15 AM    PLTS:   Lab Results   Component Value Date/Time     02/26/2025 12:15 AM         Vital Signs:   Patient Vitals for the past 8 hrs:   Temp Pulse Resp BP SpO2   02/26/25 0415 (!) 92.7 °F (33.7 °C) 70 20 100/65 99 %   02/26/25 0410 (!) 92.4 °F (33.6 °C) 68 19 108/70 99 %   02/26/25 0405 (!) 92.2 °F (33.4 °C) 58 20 100/63 99 %   02/26/25 0400 (!) 92 °F (33.3 °C) 75 24 (!) 87/58 99 %   02/26/25 0355 (!) 91.8 °F (33.2 °C) 73 24 (!) 90/55 99 %   02/26/25 0350 (!) 91.5 °F (33.1 °C) 71 21 97/64 99 %   02/26/25 0345 (!) 91.4 °F (33 °C) 70 24 94/66 99 %   02/26/25 0340 (!) 91.2 °F (32.9 °C) 67 24 99/66 99 %   02/26/25 0335 (!) 91 °F (32.8 °C) 67 24 105/66 99 %   02/26/25 0330 (!) 90.7 °F (32.6 °C) 65 24 104/67 99 %   02/26/25  0327 (!) 90.5 °F (32.5 °C) 67 24 -- --   02/26/25 0325 (!) 90.4 °F (32.4 °C) 68 24 97/66 100 %   02/26/25 0320 (!) 90.3 °F (32.4 °C) 64 24 102/66 99 %   02/26/25 0315 (!) 90 °F (32.2 °C) 63 24 104/66 99 %   02/26/25 0310 (!) 89.8 °F (32.1 °C) 62 24 103/73 99 %   02/26/25 0305 (!) 89.6 °F (32 °C) 65 24 113/79 99 %   02/26/25 0300 (!) 89.5 °F (31.9 °C) 59 24 112/70 99 %   02/26/25 0255 (!) 89.4 °F (31.9 °C) 65 24 98/66 100 %   02/26/25 0250 (!) 89.3 °F (31.8 °C) 59 24 127/77 99 %   02/26/25 0245 (!) 89.2 °F (31.8 °C) 53 24 123/71 99 %   02/26/25 0240 (!) 89.1 °F (31.7 °C) 57 24 129/73 99 %   02/26/25 0235 (!) 89.1 °F (31.7 °C) (!) 48 24 120/74 99 %   02/26/25 0230 (!) 89 °F (31.7 °C) 52 24 125/74 99 %   02/26/25 0225 (!) 89 °F (31.7 °C) 54 24 116/70 98 %   02/26/25 0220 (!) 88.9 °F (31.6 °C) 53 24 101/68 98 %   02/26/25 0215 (!) 88.8 °F (31.6 °C) 61 21 (!) 89/59 100 %   02/26/25 0210 (!) 88.7 °F (31.5 °C) 57 24 97/68 98 %   02/26/25 0205 (!) 88.7 °F (31.5 °C) 60 24 96/67 100 %   02/26/25 0204 (!) 88.6 °F (31.4 °C) 61 24 96/67 100 %   02/26/25 0200 (!) 88.6 °F (31.4 °C) 60 24 99/69 99 %   02/26/25 0151 (!) 89.1 °F (31.7 °C) 63 21 (!) 142/90 100 %   02/26/25 0150 -- 57 24 (!) 142/90 100 %   02/26/25 0145 (!) 89 °F (31.7 °C) 63 24 130/82 91 %   02/26/25 0140 -- 56 24 127/79 93 %   02/26/25 0135 -- 56 24 115/76 95 %   02/26/25 0130 -- 55 24 99/65 100 %   02/26/25 0125 -- 55 24 104/68 100 %   02/26/25 0124 -- 54 21 104/68 100 %   02/26/25 0120 -- 54 24 103/66 --   02/26/25 0055 -- 53 24 122/71 96 %   02/26/25 0045 -- 70 20 (!) 44/19 96 %   02/26/25 0040 -- (!) 45 20 (!) 40/27 92 %   02/26/25 0038 -- (!) 43 -- -- --   02/26/25 0033 -- (!) 48 20 -- --   02/26/25 0015 -- 74 (!) 35 (!) 103/54 97 %   02/26/25 0014 -- 75 29 -- 93 %

## 2025-02-26 NOTE — PROGRESS NOTES
[facility] Update:    I had a very lengthy discussion with patient's mother and caretaker at bedside. I informed them that patient is in profound shock requiring multiple vasopressors and extremely hypoxic in the setting of chronic aspiration. I explained that patient was very debilitated and has several chronic medical problems at baseline. I also made them aware that given multiple intubations in the recent past, patient would not be able to be medically extubated, would require trach/peg this admission, subsequently would need to be at a long term care facility. However, unclear if he would be stable to even get the procedures done. I stated patient's overall prognosis is extremely poor and explained DNR vs comfort care measures to them. They both stated understanding and patient's mother stated several times that she does not want the patient to suffer any longer. They would like to transition him to comfort care measures however she then stated patient has a son. I informed them that legally, he would be the medical decision maker and that they should do their best to reach out to him today and discuss above information.    Addendum - Case discussed with patient's son, Mr. Danny Hammond, via phone and I explained all of the above information to him. He was in agreement to transition patient to comfort care measures only at this time. Conversation was witnessed by patient's primary nurse Darren and patient's mother was also present at bedside. Orders placed.     Juliet Redmond PA-C  02/26/25  Pulmonary, Critical Care Medicine  [facility]

## 2025-02-26 NOTE — ED TRIAGE NOTES
EMS reports 911 was called for altered mental status that was noticed about 1 hour prior to arrival. EMS states family found him difficult to arouse. Upon arrival of EMS SpO2 on RA was 55%. BVM was applied and SpO2 increased to 90%. EMS notes pupils were pinpoint. 4 mg of narcan was administered IV, but the IV infiltrated. 2 mg of narcan was administered through another IV; no response. BP was in the 80s systolic. BG was 141. EMS stats Pt was recently admitted for pneumonia.

## 2025-02-26 NOTE — PROGRESS NOTES
RT assisted in transport of patient on oxygen equipment to Ct Scan then to ICU Rm3  Dual verification of equipment completed with Nathanael RN.

## 2025-02-26 NOTE — CONSULTS
Comprehensive Nutrition Assessment    Type and Reason for Visit:  Initial, Consult, NPO/clear liquid    Nutrition Recommendations/Plan:   Continue NPO status for today.  Modify IV thiamine to 200 mg daily. Pt at high risk for refeeding.  Daily wts.   Continue to monitor readiness for nutrition support, weight, labs, and plan of care during admission.         Malnutrition Assessment:  Malnutrition Status:  Severe malnutrition (02/26/25 1147)    Context:  Chronic Illness     Findings of the 6 clinical characteristics of malnutrition:  Energy Intake:  75% or less estimated energy requirements for 1 month or longer  Weight Loss:  Greater than 5% over 1 month     Body Fat Loss:  Severe body fat loss Fat Overlying Ribs   Muscle Mass Loss:  Severe muscle mass loss Temples (temporalis), Clavicles (pectoralis & deltoids)  Fluid Accumulation:  Unable to assess     Strength:  Not Performed    Nutrition History and Allergies:      Past Medical History:   Diagnosis Date    Essential hypertension     Essential hypertension, benign     Gout     Hypogonadism male     Impotence     Microscopic hematuria     Myopathy     Obesity, unspecified     Obstructive sleep apnea     on bipap    Other chronic pulmonary heart diseases     Peripheral neuropathy 10/25/2012    Sarcoid 10/25/2012    Sarcoidosis    Pt left AMA from prolonged hospitalization on 2/19.     Weight Hx: Current charted wt is estimated. Wt change: -25 lb (15.1%) x ~6 weeks - significant.  Wt Readings from Last 10 Encounters:   02/26/25 63.5 kg (140 lb)   02/19/25 63.5 kg (140 lb)   01/13/25 74.8 kg (165 lb)   08/28/24 74.8 kg (165 lb)   04/29/24 74.8 kg (165 lb)   10/04/23 78.5 kg (173 lb)   08/11/23 81.6 kg (180 lb)   04/26/23 82.6 kg (182 lb)   02/16/23 82.6 kg (182 lb)   01/05/23 82.6 kg (182 lb)     NFPE: limited visual NFPE. Food Allergies: NKFA    Nutrition Assessment:    Admitted after he was found unresponsive and hypoxic; resp failure. 2/26: intubated and  22.0 to 24.9) age over 65    Estimated Daily Nutrient Needs:  Energy Requirements Based On: Formula  Weight Used for Energy Requirements: Admission  Energy (kcal/day): 3268-5200 ([facility] 2003b x 0.9-1.1 vs 6826-0272 25-30 kcal/kg)  Weight Used for Protein Requirements: Admission  Protein (g/day):  (1.2-2)  Method Used for Fluid Requirements: 1 ml/kcal  Fluid (ml/day): 1633-2998    Nutrition Diagnosis:   Severe malnutrition, in context of chronic illness related to inadequate protein-energy intake, impaired respiratory function, increase demand for energy/nutrients (recent prolonged hospitalization) as evidenced by criteria as identified in malnutrition assessment  Inadequate oral intake related to increase demand for energy/nutrients, impaired respiratory function as evidenced by NPO or clear liquid status due to medical condition, intubation, wounds    Nutrition Interventions:   Food and/or Nutrient Delivery: Continue NPO  Nutrition Education/Counseling: No recommendation at this time  Coordination of Nutrition Care: Interdisciplinary Rounds  Plan of Care discussed with: interdisciplinary team    Goals:  Previous Goal Met: New Goal  Goals: Meet at least 75% of estimated needs, Initiation of nutrition, by next RD assessment       Nutrition Monitoring and Evaluation:   Behavioral-Environmental Outcomes: None Identified  Food/Nutrient Intake Outcomes: Progression of Nutrition, Vitamin/Mineral Intake, IVF Intake  Physical Signs/Symptoms Outcomes: Biochemical Data, GI Status, Nausea or Vomiting, Fluid Status or Edema, Weight, Hemodynamic Status, Skin    Discharge Planning:    Too soon to determine     Megan Dockweiler, MS, RD, CNSC  Contact: [phone]   Available via Interface21

## 2025-02-26 NOTE — PROCEDURES
PROCEDURE NOTE  Date: 2/26/2025   Name: Farhad Rogers  YOB: 1960    Procedures    [facility]  Arterial  Line Procedure Note    Indication/ pre procedure diagnosis:  respiratory failure, shock, need for frequent ABGs  Post procedure diagnosis: same     Emergent procedure     Line Bundle:   Full sterile barrier precautions used.  7-Step Sterility Protocol followed.  (cap, mask sterile gown, sterile gloves, large sterile sheet, hand hygiene, 2% chlorhexidine for cutaneous antisepsis)  5 mL 1% Lidocaine placed at insertion site.        RIGHT/LEFT: rightradial artery cannulated x 1 attempt(s) utilizing the modified Seldinger technique.    Good blood return.  Catheter secured & Biopatch applied.  Sterile Tegaderm placed.            Juliet Redmond PA-C  02/26/25  Pulmonary, Critical Care Medicine  [facility]

## 2025-02-26 NOTE — ED NOTES
Confirmed with Lita Sharp NP that Pt can go to CT with a temp of 92F and that he can go straight to ICU afterwards. RT contacted for transport.

## 2025-02-26 NOTE — ED NOTES
Started an IV via ultrasound guidance   Started an IV  Sent blood work   Sent blood cultures 1 and 2 drawn by me  POC Glu- 122  POC LAC- 1.88

## 2025-02-26 NOTE — CONSULTS
Infectious Disease Consultation Note        Reason:  septic shock    Current abx Prior abx   Pip/tazo, vancomycin since 2/26/25      Lines:       Assessment :  64 y.o. male with a past medical history of COPD with asthma, sarcoidosis, pulmonary nodular amyloidosis, DOUG, HTN, severe neuropathy wheelchair-bound who presented to [facility] ER via EMS on 2/26/25 after he was found unresponsive and hypoxic with O2 saturation in the 50's on room air.     Clinical presentation consistent with septic shock, acute on chronic hypoxic/hypercapnic respiratory failure-present on admission likely due to recurrent aspiration pneumonia    Concern for colitis, ascending colon on CT scan-monitor for C. difficile colitis    Multiple decubiti.  Evidence of chronic infection of sacral ulcer.  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  Follow-up sputum cultures, blood cultures  Obtain stool C. difficile if diarrhea noted  Continue local wound care sacral ulcer  Follow-up palliative care recommendation regarding goals of care.  Patient is at high risk for recurrent aspiration as mentioned previously.  Wean pressors as tolerated  Monitor clinically    Thank you for consultation request. Above plan was discussed in details with mom at bedside, ICU team and dr Sandhu. Please call me if any further questions or concerns. Will continue to participate in the care of this patient.  HPI:    64 y.o. male with a past medical history of COPD with asthma, sarcoidosis, pulmonary nodular amyloidosis, DOUG, HTN, severe neuropathy wheelchair-bound who presented to [facility] ER via EMS on 2/26/25 after he was found unresponsive and hypoxic with O2 saturation in the 50's on room air. Pupils were found to be pinpoint, so he was given Narcan 4 mg x1, but Iv infiltrated, so he was given an additional Narcan 2 mg IV x1 with no

## 2025-02-26 NOTE — PROGRESS NOTES
02/26/25 0124   Vent Patient Data (Readings)   PEEP Intrinsic (cm H2O) 0 cm H2O   Insp Rise Time (%) 60 %   Backup Apnea On   Backup Rate 24 Breaths Per Minute   Backup Vt 400   Backup I Time 1   Vent Alarm Settings   Low Pressure (cmH2O) 8 cmH2O   High Pressure (cmH2O) 40 cmH2O   Low Minute Volume (lpm) 3 L/min   High Minute Volume (lpm) 20 L/min   Low Exhaled Vt (ml) 200 mL   High Exhaled Vt (ml) 100 mL   RR High (bpm) 40 br/min   Apnea (secs) 20 secs   Additional Respiratoray Assessments   Humidification Source Heated wire   Humidification Temp 36   Ambu Bag With Mask At Bedside Yes   Airway Clearance   Suction ET Tube   Suction Device Inline suction catheter   Sputum Method Obtained Endotracheal   Sputum Amount Scant   Sputum Color/Odor White   Sputum Consistency Thin   ETT    Placement Date: 02/26/25   Present on Admission/Arrival: No  Placed By: In ED  Placement Verified By: Auscultation;Chest X-ray;Colorimetric ETCO2 device  Preoxygenation: Yes  Airway Type: Cuffed  Airway Tube Size: 7.5 mm  Secured At: 23 cm  Measured F...   Secured At 23 cm   Measured From Lips   ETT Placement (S)  Center   Secured By Commercial tube lara   Site Assessment Cool;Dry   Tie/Lara Changed No   Ventilator Associated Pneumonia Bundle   Elevation of Head of Bed to 30-45 Degrees  Yes   Skin Assessment   Skin Assessment Clean, dry, & intact   Skin Protection for O2 Device Yes   Orientation Anterior;Bilateral   Location Cheek;Lip;Tongue   Interventions Mouth care;Reposition device   Respiratory   Respiratory (WDL) X   Respiratory Interventions H.O.B. elevated

## 2025-02-26 NOTE — ED PROVIDER NOTES
[facility] EMERGENCY DEPARTMENT  EMERGENCY DEPARTMENT ENCOUNTER    Patient Name: Farhad Rogers  MRN: [id number]  YOB: 1960  Provider: José Miguel Sharp DO  PCP: Teo Tong MD   Time/Date of evaluation: 2:50 AM EST on 2/26/25    History of Presenting Illness     History Provided by: EMS  History is limited by: Patient Unresponsive     HISTORY:   Farhad Rogers is a 64 y.o. male brought in by EMS with a chief complaint of altered mental status EMS technician reports that they were called to the patient's residence and he was found to be altered and hypoxic with initial O2 sats in the 50s on room air.  He reports that his pupils were pinpoint.  He was treated with Narcan with no improvement in his neurological or respiratory status.  EMS technician reports that they provided assist ventilation via bag-valve-mask.  Patient arrived unresponsive.  Patient with life-threatening illness required my immediate attention at bedside.    Nursing Notes were all reviewed and agreed with or any disagreements were addressed in the HPI.    Past History     PAST MEDICAL HISTORY:  Past Medical History:   Diagnosis Date    Essential hypertension     Essential hypertension, benign     Gout     Hypogonadism male     Impotence     Microscopic hematuria     Myopathy     Obesity, unspecified     Obstructive sleep apnea     on bipap    Other chronic pulmonary heart diseases     Peripheral neuropathy 10/25/2012    Sarcoid 10/25/2012    Sarcoidosis        PAST SURGICAL HISTORY:  Past Surgical History:   Procedure Laterality Date    APPENDECTOMY      CARDIAC CATHETERIZATION  2002?    negative per pt    CHOLECYSTECTOMY  01/25/2023    Robotic cholecystectomy    COLONOSCOPY N/A 04/21/2017    COLONOSCOPY with polypectomy performed by MILY Link MD at [facility] ENDOSCOPY    COLONOSCOPY N/A 05/27/2022    COLONOSCOPY with polypectomies performed by MILY Link MD at [facility] ENDOSCOPY    CT NEEDLE BIOPSY LUNG  Chloride 102 98 - 107 mmol/L    Anion Gap, POC Cannot be calculated  Cannot be calculated   10 - 20 mmol/L    eGFR, POC >90 >60 ml/min/1.73m2   CBC with Auto Differential    Collection Time: 02/26/25 12:15 AM   Result Value Ref Range    WBC 5.8 4.6 - 13.2 K/uL    RBC 3.83 (L) 4.35 - 5.65 M/uL    Hemoglobin 10.7 (L) 13.0 - 16.0 g/dL    Hematocrit 35.3 (L) 36.0 - 48.0 %    MCV 92.2 78.0 - 100.0 FL    MCH 27.9 24.0 - 34.0 PG    MCHC 30.3 (L) 31.0 - 37.0 g/dL    RDW 17.9 (H) 11.6 - 14.5 %    Platelets 180 135 - 420 K/uL    MPV 11.4 9.2 - 11.8 FL    Nucleated RBCs 0.9 (H) 0  WBC    nRBC 0.05 (H) 0.00 - 0.01 K/uL    Neutrophils % 78 (H) 40.0 - 73.0 %    Band Neutrophils 11 %    Lymphocytes % 5 (L) 21.0 - 52.0 %    Monocytes % 6 3.0 - 10.0 %    Eosinophils % 0 0.0 - 5.0 %    Basophils % 0 0.0 - 2.0 %    Immature Granulocytes % 0 0.0 - 0.5 %    Neutrophils Absolute 5.16 1.80 - 8.00 K/UL    Lymphocytes Absolute 0.29 (L) 0.90 - 3.60 K/UL    Monocytes Absolute 0.35 0.05 - 1.20 K/UL    Eosinophils Absolute 0.00 0.00 - 0.40 K/UL    Basophils Absolute 0.00 0.00 - 0.10 K/UL    Immature Granulocytes Absolute 0.00 0.00 - 0.04 K/UL    Differential Type MANUAL      Platelet Comment ADEQUATE PLATELETS      RBC Comment ANISOCYTOSIS  1+       Comprehensive Metabolic Panel    Collection Time: 02/26/25 12:15 AM   Result Value Ref Range    Sodium 145 136 - 145 mmol/L    Potassium 3.0 (L) 3.5 - 5.5 mmol/L    Chloride 104 100 - 111 mmol/L    CO2 39 (H) 21 - 32 mmol/L    Anion Gap 2 (L) 3.0 - 18 mmol/L    Glucose 130 (H) 74 - 99 mg/dL    BUN 19 (H) 7.0 - 18 MG/DL    Creatinine 0.43 (L) 0.6 - 1.3 MG/DL    BUN/Creatinine Ratio 44 (H) 12 - 20      Est, Glom Filt Rate >90 >60 ml/min/1.73m2    Calcium 9.9 8.5 - 10.1 MG/DL    Total Bilirubin 0.8 0.2 - 1.0 MG/DL    ALT 98 (H) 16 - 61 U/L     (H) 10 - 38 U/L    Alk Phosphatase 239 (H) 45 - 117 U/L    Total Protein 5.8 (L) 6.4 - 8.2 g/dL    Albumin 2.1 (L) 3.4 - 5.0 g/dL    Globulin 3.7

## 2025-02-27 PROBLEM — Z71.89 GOALS OF CARE, COUNSELING/DISCUSSION: Status: ACTIVE | Noted: 2025-02-27

## 2025-02-27 PROBLEM — Z51.5 ENCOUNTER FOR PALLIATIVE CARE: Status: ACTIVE | Noted: 2025-02-27

## 2025-02-27 LAB
ACB COMPLEX DNA BLD POS QL NAA+NON-PROBE: NOT DETECTED
ACCESSION NUMBER, LLC1M: NORMAL
B FRAGILIS DNA BLD POS QL NAA+NON-PROBE: NOT DETECTED
BACTERIA SPEC CULT: NORMAL
BIOFIRE TEST COMMENT: NORMAL
C ALBICANS DNA BLD POS QL NAA+NON-PROBE: NOT DETECTED
C AURIS DNA BLD POS QL NAA+NON-PROBE: NOT DETECTED
C GATTII+NEOFOR DNA BLD POS QL NAA+N-PRB: NOT DETECTED
C GLABRATA DNA BLD POS QL NAA+NON-PROBE: NOT DETECTED
C KRUSEI DNA BLD POS QL NAA+NON-PROBE: NOT DETECTED
C PARAP DNA BLD POS QL NAA+NON-PROBE: NOT DETECTED
C TROPICLS DNA BLD POS QL NAA+NON-PROBE: NOT DETECTED
E CLOAC COMP DNA BLD POS NAA+NON-PROBE: NOT DETECTED
E COLI DNA BLD POS QL NAA+NON-PROBE: NOT DETECTED
E FAECALIS DNA BLD POS QL NAA+NON-PROBE: NOT DETECTED
E FAECIUM DNA BLD POS QL NAA+NON-PROBE: NOT DETECTED
ENTEROBACTERALES DNA BLD POS NAA+N-PRB: NOT DETECTED
GP B STREP DNA BLD POS QL NAA+NON-PROBE: NOT DETECTED
HAEM INFLU DNA BLD POS QL NAA+NON-PROBE: NOT DETECTED
K OXYTOCA DNA BLD POS QL NAA+NON-PROBE: NOT DETECTED
KLEBSIELLA SP DNA BLD POS QL NAA+NON-PRB: NOT DETECTED
KLEBSIELLA SP DNA BLD POS QL NAA+NON-PRB: NOT DETECTED
L MONOCYTOG DNA BLD POS QL NAA+NON-PROBE: NOT DETECTED
N MEN DNA BLD POS QL NAA+NON-PROBE: NOT DETECTED
P AERUGINOSA DNA BLD POS NAA+NON-PROBE: NOT DETECTED
PROTEUS SP DNA BLD POS QL NAA+NON-PROBE: NOT DETECTED
RESISTANT GENE TARGETS: NORMAL
S AUREUS DNA BLD POS QL NAA+NON-PROBE: NOT DETECTED
S AUREUS+CONS DNA BLD POS NAA+NON-PROBE: NOT DETECTED
S EPIDERMIDIS DNA BLD POS QL NAA+NON-PRB: NOT DETECTED
S LUGDUNENSIS DNA BLD POS QL NAA+NON-PRB: NOT DETECTED
S MALTOPHILIA DNA BLD POS QL NAA+NON-PRB: NOT DETECTED
S MARCESCENS DNA BLD POS NAA+NON-PROBE: NOT DETECTED
S PNEUM DNA BLD POS QL NAA+NON-PROBE: NOT DETECTED
S PYO DNA BLD POS QL NAA+NON-PROBE: NOT DETECTED
SALMONELLA DNA BLD POS QL NAA+NON-PROBE: NOT DETECTED
SERVICE CMNT-IMP: NORMAL
STREPTOCOCCUS DNA BLD POS NAA+NON-PROBE: NOT DETECTED

## 2025-02-27 PROCEDURE — 2000000000 HC ICU R&B

## 2025-02-27 PROCEDURE — APPNB15 APP NON BILLABLE TIME 0-15 MINS: Performed by: PHYSICIAN ASSISTANT

## 2025-02-27 PROCEDURE — 99233 SBSQ HOSP IP/OBS HIGH 50: CPT | Performed by: STUDENT IN AN ORGANIZED HEALTH CARE EDUCATION/TRAINING PROGRAM

## 2025-02-27 PROCEDURE — 6360000002 HC RX W HCPCS: Performed by: PHYSICIAN ASSISTANT

## 2025-02-27 PROCEDURE — 99232 SBSQ HOSP IP/OBS MODERATE 35: CPT | Performed by: NURSE PRACTITIONER

## 2025-02-27 RX ADMIN — MORPHINE SULFATE 4 MG: 4 INJECTION, SOLUTION INTRAMUSCULAR; INTRAVENOUS at 09:17

## 2025-02-27 RX ADMIN — MORPHINE SULFATE 4 MG: 4 INJECTION, SOLUTION INTRAMUSCULAR; INTRAVENOUS at 20:19

## 2025-02-27 RX ADMIN — MORPHINE SULFATE 4 MG: 4 INJECTION, SOLUTION INTRAMUSCULAR; INTRAVENOUS at 05:00

## 2025-02-27 RX ADMIN — GLYCOPYRROLATE 0.2 MG: 0.2 INJECTION INTRAMUSCULAR; INTRAVENOUS at 13:54

## 2025-02-27 RX ADMIN — MORPHINE SULFATE 4 MG: 4 INJECTION, SOLUTION INTRAMUSCULAR; INTRAVENOUS at 13:54

## 2025-02-27 NOTE — PROGRESS NOTES
discussions    2/27/2025 Follow up on Mr Rogers. He remains on comfort measures. Poorly responsive this am to voice and light touch. His respirations are very shallow. He appears comfortable this am with face serene. Girlfriend Jamilah at bedside. We offered support in this difficult time. Mr Rogers appears as if he is actively passing. We did discuss this with Jamilah. Comfort order set in place. MAR reviewed, he has received a total of 8 mg the past 24 hours ( 4 mg x2).Goals of care DNR/DNI comfort measures.     Please see below for previous notes per palliative team.     2/26/2025 patient seen along with Ms. Scott RN.  He is currently orally intubated and ventilated poorly responsive not on sedation.  He is currently not able to participate in his goals of care discussions.  His girlfriend Jaleesa was at bedside.  He and girlfriend live together and she is main caregiver.He is wheel chair bound. Ms Askew tells us patient has \" good and bad days.\" He does like to take car rides and get out of the house. She mentioned he left \" early last admission\".  Per girlfriend's description he was having some cognitive impairment at home.  She also mentions that he was not taking his medication correctly sounds if he was not taking his midodrine.  At times she would feed him.  AMD there is no AMD on file.  Patient lives with his girlfriend Jaleesa has a mother who is 91 and a son Wondell.  Goals of care Long discussion at bedside with his girlfriend about his overall poor prognosis poor functional status.  He has had multiple intubations multiple hospital stays all of which have taken a toll on his health.  We discussed in detail his current dependence on the ventilator and pressor support.  Concerned of his ability to be liberated from these treatments.  We also discussed the interplay between his quality of life and sustaining of life.  His girlfriend was very clear that she did not believe he would find it acceptable to be in a  bed receiving total care from others.  We discussed a more comfort oriented approach including compassionate extubation.  We also shared with his girlfriend all that God has the last say clinically is unlikely without these support treatments patient's life would be short.  She voiced her understanding and shared his mother is in route to bedside.  Greatly appreciate ICU team's assistance as they spoke with girlfriend and mother and son via phone.  All agreed this would not be an acceptable way of life for Farhad and wished to focus on his comfort understanding this means compassionate extubation and stopping of aggressive treatment.  ICU has been kind enough to place comfort orders.  Goals of care we also note the patient is now DNR/DNI comfort measures only.  Initial consult note routed to primary continuity provider and/or primary health care team members  Please call with any palliative questions or concerns.  Palliative Care Team is available via perfect serve or via phone.    Referrals to:   [] Outpatient Palliative Care  [] Home Based Palliative Care  [] Home Based Primary Care  [] Hospice       ADVANCE CARE PLANNING:   [] [facility] Interdisciplinary Team has updated the ACP Navigator with Health Care Decision Maker and Patient Capacity      Primary Decision Maker: Danny Hammond - Child       Current Code Status: DNR     Goals of Care:  Comfort measures       Please refer to Palliative Medicine ACP notes for further details.    PALLIATIVE ASSESSMENT:      Palliative Performance Scale (PPS): 20            Modified ESAS:  Modified-[place] Symptom Assessment Scale (ESAS)  Pain Score: No pain  Dyspnea Score: No shortness of breath    Clinical Pain Assessment (nonverbal scale for severity on nonverbal patients):   Clinical Pain Assessment  Severity: 0       NVPS:  Adult Nonverbal Pain Scale (NVPS)  Face: No particular expression or smile  Activity (Movement): Laid quietly, normal position  Guarding: Lying

## 2025-02-27 NOTE — PROGRESS NOTES
with hypoxia and hypercapnia (HCC)    Hypothermia    Colitis    Osteomyelitis (HCC)    Ileus (HCC)    Acute encephalopathy    Transaminitis    Pressure injury of skin of sacral region    Aspiration pneumonia (HCC)        RECOMMENDATIONS:   Compassionately extubated on 2/27  Hospice consulted  CMO   MPOA: Rebecca Rogers, mother , updated: will update today        Best practice:    Glycemic control  IHI ICU bundles:   Central Line Bundle Followed , Gerardo Bundle Followed, and Vent Bundle Followed, Vent Day 2/26/25  [facility] Vent patients-    VAP bundle-[place] tube to suction at 20-30 cm Hg, Maintain [place] tube with 5-10ml air every 4 hours, Routine oral care every 4 hours, Elevation of head > 45 degree, Daily sedation holiday and SBT evaluation starting at 6.00am.  Stress ulcer prophylaxis.   Pepcid  DVT prophylaxis.   Lovenox  Need for Lines, gerardo assessed.  Palliative care evaluation.  Restraints need.  Attending Non-violent Restraint Reevaluation     I have reevaluated the patient one hour after initiation of intervention. The patient is comfortable, uninjured, but continues to pose an imminent risk of injury to self to themselves and/or serious disruption of medical treatment required to keep patient stable.     The patient's current medical and behavioral conditions that warrant the use intervention include Behavioral management problems and Pulling out devices:  Pulling tubes.  Restraint or seclusion will be discontinued at the earliest possible time, regardless of the scheduled expiration of the order.    Based on my evaluation, restraints will be continued: Yes          Subjective/History:     This patient has been seen and evaluated at the request of Dr. Sharp for acute on chronic hypoxic hypercapnic respiratory failure requiring mechanical ventilator.    02/26/25     Patient is a 64 y.o. male Patient is a 64 y.o. male with a past medical history of COPD with asthma, sarcoidosis, pulmonary nodular amyloidosis, DOUG,  illness that impaired one or more vital organ systems such that there was a high probability of imminent or life threatening deterioration in the patients condition. This care involved high complexity decision making to assess, manipulate, and support vital system functions, to treat this degree vital organ system failure and to prevent further life threatening deterioration of the patient’s condition.        Rosita Denton PA-C  02/27/25  Pulmonary, Critical Care Medicine  [facility]

## 2025-02-27 NOTE — PALLIATIVE CARE
Palliative Medicine     Palliative Medicine team members Jessy Allen NP and Denise Chavez RN rounded on comfort care patient in bed 303 ICU.  Mr. Rogers was lying in bed with no outward signs of discomfort. Patient with agonal breathing with some apneic episodes. Patient's girlfriend, Jamilah Wood was at bedside. Palliative team provided a brief update on patient's current condition as his transitions. Briefly explained the signs of discomfort and changes to his breathing. Reviewed the medications the nurses are using to keep him comfortable. She is open to taking him home if he is able to be transferred.   Palliative Team will continue to follow for comfort assessment.      CODE STATUS- DNR/DNI  Comfort Measures      Dneise Chavez BSN, RN  Palliative Medicine Inpatient RN  Palliative COPE Line: [phone]

## 2025-02-27 NOTE — PROGRESS NOTES
[facility]  Progress Note    Patient: Farhad oRgers Age: 64 y.o. : 1960 MR#: [id number] SSN: [id number]  Date/Time: 2025     Chief Complaint   Patient presents with    Altered Mental Status     Subjective:   Notified per ICU team of patient's transition to comfort care status.  Accepted patient onto the general medicine service. Hospice team is following.  Patient is imminent.  Patient compassionately extubated.   Assessment/Plan:   Acute on chronic hypoxic hypercapnic respiratory failure  -Required multiple intubations in the setting of recurrent mucous plugging aspiration pneumonia.    -Hospice team following.  Patient is comfort measures only.    Advanced COPD     sepsis with septic shock likely in the setting of aspiration pneumonitis versus recurrent aspiration pneumonia    Debility, wheelchair-bound at baseline  severe malnutrition  Dysphagia  recurrent aspiration pneumonia  History of asthma and DOUG on CPAP  Pulmonary sarcoidosis  Encephalopathy   Hypertension  Sacral decubitus ulcer  Transaminitis likely shock liver  Anemia    Noted for transition to comfort measures and accepted to general medicine service. Orders have been reflected in the chart.  Hospice and palliative care team following.      I spent 50 minutes with the patient in face-to-face consultation, of which greater than 50% was spent in counseling and coordination of care as described above.    I discussed with patient's mother at bedside. Questions were answered to the best of my ability.  Encouraged for visitor/family to arrive per her request.    Case discussed with:  [x]Patient  [x]Family  [x]Nursing  []Case Management  DVT Prophylaxis: None     Objective:   VS: BP (!) 58/36   Pulse 87   Temp (!) 91.4 °F (33 °C)   Resp 13   Ht 1.676 m (5' 6\")   Wt 63.5 kg (140 lb)   SpO2 (!) 86%   BMI 22.60 kg/m²    Tmax/24hrs: Temp (24hrs), Av.4 °F (34.7 °C), Min:91.2 °F (32.9 °C),  Max:97.7 °F (36.5 °C)  IOBRIEF  Intake/Output Summary (Last 24 hours) at 2/27/2025 1645  Last data filed at 2/26/2025 2000  Gross per 24 hour   Intake 123.97 ml   Output 0 ml   Net 123.97 ml     General: Unresponsive, agonal breathing.  Appears comfortable. Christian hugger in place.  HEENT: PERRLA, anicteric sclerae.  Pulmonary:  CTA Bilaterally. No Wheezing/Rales.  Cardiovascular: Regular rate and Rhythm.  GI:  Soft, Non distended, Non tender. + Bowel sounds.  Extremities:  2+ edema. No calf tenderness.   Psych: Good insight. Not anxious or agitated.  Neurologic: Alert and oriented X 3. Moves all ext.  Medications:   Current Facility-Administered Medications   Medication Dose Route Frequency    morphine (PF) injection 2 mg  2 mg IntraVENous Q10 Min PRN    Or    morphine sulfate (PF) injection 4 mg  4 mg IntraVENous Q2H PRN    glycopyrrolate (ROBINUL) injection 0.2 mg  0.2 mg IntraVENous Q4H PRN    morphine infusion 1 mg/mL  2 mg/hr IntraVENous Continuous    midazolam PF (VERSED) injection 2 mg  2 mg IntraVENous Q15 Min PRN     Labs:    No results found for this or any previous visit (from the past 24 hour(s)).    Signed By: Stanley Benson DO     February 27, 2025      Disclaimer: Sections of this note are dictated using utilizing voice recognition software.  Minor typographical errors may be present. If questions arise, please do not hesitate to contact me or call our department.

## 2025-02-28 VITALS — TEMPERATURE: 87.6 F | BODY MASS INDEX: 22.5 KG/M2 | OXYGEN SATURATION: 46 % | HEIGHT: 66 IN | WEIGHT: 140 LBS

## 2025-02-28 LAB
BACTERIA SPEC CULT: ABNORMAL
BACTERIA SPEC CULT: ABNORMAL
BACTERIA SPEC CULT: NORMAL
GRAM STN SPEC: ABNORMAL
GRAM STN SPEC: NORMAL
SERVICE CMNT-IMP: ABNORMAL
SERVICE CMNT-IMP: ABNORMAL
SERVICE CMNT-IMP: NORMAL

## 2025-02-28 PROCEDURE — 2700000000 HC OXYGEN THERAPY PER DAY

## 2025-02-28 PROCEDURE — 6360000002 HC RX W HCPCS: Performed by: PHYSICIAN ASSISTANT

## 2025-02-28 PROCEDURE — 94761 N-INVAS EAR/PLS OXIMETRY MLT: CPT

## 2025-02-28 PROCEDURE — 99239 HOSP IP/OBS DSCHRG MGMT >30: CPT | Performed by: STUDENT IN AN ORGANIZED HEALTH CARE EDUCATION/TRAINING PROGRAM

## 2025-02-28 RX ADMIN — MORPHINE SULFATE 4 MG: 4 INJECTION, SOLUTION INTRAMUSCULAR; INTRAVENOUS at 03:48

## 2025-02-28 NOTE — DISCHARGE SUMMARY
Discharge Summary    Patient: Farhad Rogers MRN: [id number]  CSN: [id number]    YOB: 1960  Age: 64 y.o.  Sex: male    DOA: 2025 LOS:  LOS: 2 days   Discharge Date: 2025      Admission Diagnosis: Hypokalemia [E87.6]  Septic shock (HCC) [A41.9, R65.21]  Acute respiratory failure with hypoxia and hypercapnia (HCC) [J96.01, J96.02]    Discharge Diagnosis: Acute on chronic hypoxic hypercapnic respiratory failure  End-stage COPD  Sepsis with septic shock  Aspiration pneumonia  Debility  Severe malnutrition  Dysphagia  DOUG  Pulmonary sarcoidosis  Encephalopathy  Hypertension  Sacral decubitus ulcer  Shock liver    Discharge Condition:     Discharge Disposition:     PHYSICAL EXAM    Pulseless apneic unresponsive to any stimuli    Hospital Course By Problem:   Patient presented to the emergency room secondary to unresponsive hypoxic.  Patient was worked up and treated emergently and admitted to ICU in critical state.  Patient noted to have severe encephalopathy, respiratory failure.  Patient required pretty significant supportive measures including pressors to maintain pressure palliative medicine was consulted and discussion had over his goals of care along with the state that he may have be in after treatment.  Eventually decision was made to transition patient over to comfort measures only.  Patient was extubated and made comfortable and  shortly after transition to comfort measures only    Consults: PCCM, infectious disease, palliative medicine    Significant Diagnostic Studies: Xray Result (most recent):  XR CHEST PORTABLE 2025    Narrative  PORTABLE CHEST RADIOGRAPH    INDICATION: hypoxia.    COMPARISON: 2025 at 00:22.    FINDINGS:    ET tube is present with tip above the monae. Enteric tube extends below the  diaphragm with tip and sideport in the region of the stomach. Multiple EKG leads  overlie the chest. The cardiomediastinal silhouette

## 2025-02-28 NOTE — DEATH NOTES
[facility]  Death Pronouncement Note    Patient: Farhad Rogers MRN: [id number]   SSN: [id number]  YOB: 1960   Age: 64 y.o.  Sex: male    Admission Date: 2/26/2025 12:06 AM Time of Death: 0601          Called to patient's bedside for apnea and pulselessness.     Patient lying in bed, mouth closed, eyes closed. Unresponsive to voice or painful stimuli. No spontaneous respirations and chest rise absent. No palpable carotid pulse bilaterally. No heart sounds or breath sounds. Pupils fixed and dilated bilaterally. Corneal reflexes absent. Family to be notified by nursing.    Death officially pronounced at 0601, 2/28/2025.  Attending physician, Stanley Hernandez, to be notified by nursing.    MAY ZAMORA DO, PGY-1  [facility]  2/28/2025 6:09 AM

## 2025-02-28 NOTE — PLAN OF CARE
Problem: Safety - Adult  Goal: Free from fall injury  Outcome: Progressing     Problem: Discharge Planning  Goal: Discharge to home or other facility with appropriate resources  Outcome: Progressing  Flowsheets (Taken 2/27/2025 2000)  Discharge to home or other facility with appropriate resources: Refer to discharge planning if patient needs post-hospital services based on physician order or complex needs related to functional status, cognitive ability or social support system     Problem: Pain  Goal: Verbalizes/displays adequate comfort level or baseline comfort level  Outcome: Progressing  Note: PATIENT NONVERBAL ASSESSING PAIN THRU VITALS AND ACTIVITY.     Problem: Safety - Medical Restraint  Goal: Remains free of injury from restraints (Restraint for Interference with Medical Device)  Description: INTERVENTIONS:  1. Determine that other, less restrictive measures have been tried or would not be effective before applying the restraint  2. Evaluate the patient's condition at the time of restraint application  3. Inform patient/family regarding the reason for restraint  4. Q2H: Monitor safety, psychosocial status, comfort, nutrition and hydration  Outcome: Completed     Problem: Nutrition Deficit:  Goal: Optimize nutritional status  Outcome: Not Progressing     Problem: Skin/Tissue Integrity  Goal: Skin integrity remains intact  Description: 1.  Monitor for areas of redness and/or skin breakdown  2.  Assess vascular access sites hourly  3.  Every 4-6 hours minimum:  Change oxygen saturation probe site  4.  Every 4-6 hours:  If on nasal continuous positive airway pressure, respiratory therapy assess nares and determine need for appliance change or resting period  Outcome: Not Progressing  Flowsheets (Taken 2/27/2025 2000)  Skin Integrity Remains Intact:   Monitor for areas of redness and/or skin breakdown   Assess vascular access sites hourly  Note: Patient presented with skin breakdown.     Problem: Nutrition

## 2025-02-28 NOTE — PROGRESS NOTES
Patient  at 0601. PATIENT without pulse, without respirations, no blood pressure. ALL PIV's and gerardo dc'd. Nubia Gaxiola RN notified that cvp line needs to be removed. Mom , lifen[facility] and DR handy attending on this pm notified.

## 2025-02-28 NOTE — PROGRESS NOTES
responded to Death of  Farhad Rogers, who was a 64 y.o.,male,     The reason patient came to hospital is:   Patient Active Problem List    Diagnosis Date Noted    Encounter for palliative care 2025    Goals of care, counseling/discussion 2025    Acute respiratory failure with hypoxia and hypercapnia (HCC) 2025    Hypothermia 2025    Colitis 2025    Osteomyelitis (HCC) 2025    Ileus (HCC) 2025    Acute encephalopathy 2025    Transaminitis 2025    Pressure injury of skin of sacral region 2025    Aspiration pneumonia (HCC) 2025    Hypoalbuminemia 2025    Severe protein-calorie malnutrition 02/10/2025    Septic shock (HCC) 2025    Aspiration pneumonia due to gastric secretions (HCC) 2025    Mucus plugging of bronchi 2025    Bradycardia 2025    Moderate tricuspid valve regurgitation 2025    Metabolic encephalopathy 2025    Hypernatremia 2025    Aphasia 2025    Acute hypoxic respiratory failure (HCC) 2025    Calf muscle weakness 2023    Hypogonadism in male 2018    Impotence of organic origin 2018    HTN (hypertension) 2017    Iron deficiency anemia 2017    Hypogonadism male 2017    Hypokalemia 2017    Gastric polyp 2017    Asthma     Sarcoid     Obstructive apnea     GI bleed 2017    Symptomatic anemia 2017    Myopathy     Peripheral neuropathy     Pulmonary artery hypertension (HCC)     Shortness of breath        The  provided the following Interventions:  Call by staff for death of patient. Patient at 0601. Family arrive at 0730. Provided crisis pastoral care, pastoral support and grief interventions. [facility]  home will assist family with  arrangements. Offered prayers and provided support to the family. Chart reviewed.     Georges George  Staff   Spiritual Health   [phone]

## 2025-05-27 NOTE — CONSULTS
Natasha brought in a urine sample. Patient has had a headache and feels weak.took urine sample back to don     Please advise   Room #: 303      HÉCTOR: [id number]      Situation: Wound Care Consult    Background:    PMH:   Active Ambulatory Problems     Diagnosis Date Noted    Myopathy     Peripheral neuropathy     Asthma     Sarcoid     Pulmonary artery hypertension (HCC)     GI bleed 03/17/2017    HTN (hypertension) 03/18/2017    Shortness of breath     Iron deficiency anemia 03/18/2017    Hypogonadism in male 05/01/2018    Hypogonadism male 03/18/2017    Symptomatic anemia 03/17/2017    Hypokalemia 03/18/2017    Impotence of organic origin 05/01/2018    Obstructive apnea     Gastric polyp 03/18/2017    Calf muscle weakness 04/26/2023    Aphasia 01/28/2025    Acute hypoxic respiratory failure (HCC) 01/28/2025    Metabolic encephalopathy 01/29/2025    Hypernatremia 01/29/2025    Bradycardia 02/01/2025    Moderate tricuspid valve regurgitation 02/01/2025    Mucus plugging of bronchi 02/03/2025    Septic shock (HCC) 02/06/2025    Aspiration pneumonia due to gastric secretions (HCC) 02/06/2025    Severe protein-calorie malnutrition 02/10/2025    Hypoalbuminemia 02/12/2025     Resolved Ambulatory Problems     Diagnosis Date Noted    No Resolved Ambulatory Problems     Past Medical History:   Diagnosis Date    Essential hypertension     Essential hypertension, benign     Gout     Impotence     Microscopic hematuria     Obesity, unspecified     Obstructive sleep apnea     Other chronic pulmonary heart diseases     Sarcoidosis         Pato Score: 11/23   BMI: Body mass index is 22.6 kg/m².   Preventive measures in place: limited layers, Glide sheet, turning [id number], heels on pillows. FMS inserted per primary RN.    Assessment:   Patient found reclined.  Patient is Vented and sedated.     Wound(s) Description:           Wound 01/29/25 Sacrum (Active)   Wound Image   02/26/25 1242   Wound Etiology Pressure Unstageable 02/26/25 1242   Dressing Status Clean;Dry;Intact 02/19/25 0800   Wound Cleansed Wound cleanser 02/19/25 0800   Dressing/Treatment  (Active)   Wound Image   02/26/25 1241   Wound Etiology Traumatic 02/26/25 1241   Dressing Status New dressing applied 02/19/25 0800   Wound Cleansed Not Cleansed 02/19/25 0800   Dressing/Treatment Silicone border 02/19/25 0800   Wound Length (cm) 2.5 cm 02/26/25 1241   Wound Width (cm) 1.1 cm 02/26/25 1241   Wound Surface Area (cm^2) 2.75 cm^2 02/26/25 1241   Change in Wound Size % (l*w) 81.67 02/26/25 1241   Wound Assessment Granulation tissue 02/26/25 1241   Drainage Amount Scant (moist but unmeasurable) 02/26/25 1241   Drainage Description Serosanguinous 02/26/25 1241   Odor None 02/26/25 1241   Debra-wound Assessment Intact;Maceration 02/26/25 1241   Number of days: 15       Wound 02/11/25 Heel Right;Medial (Active)   Wound Image   02/26/25 1244   Wound Etiology Deep tissue/Injury 02/26/25 1244   Dressing Status Reinforced dressing 02/19/25 0800   Wound Cleansed Not Cleansed 02/19/25 0800   Dressing/Treatment Silicone border 02/26/25 1244   Wound Length (cm) 2.2 cm 02/26/25 1244   Wound Width (cm) 1.6 cm 02/26/25 1244   Wound Surface Area (cm^2) 3.52 cm^2 02/26/25 1244   Change in Wound Size % (l*w) -56.44 02/26/25 1244   Wound Assessment Purple/maroon 02/26/25 1244   Drainage Amount None (dry) 02/26/25 1244   Odor None 02/26/25 1244   Debra-wound Assessment Intact 02/26/25 1244   Number of days: 15       Wound 02/26/25 Ankle Distal;Right;Lateral (Active)   Wound Image   02/26/25 1248   Wound Etiology Deep tissue/Injury 02/26/25 1248   Dressing/Treatment Silicone border 02/26/25 1248   Wound Length (cm) 1.5 cm 02/26/25 1248   Wound Width (cm) 1.5 cm 02/26/25 1248   Wound Surface Area (cm^2) 2.25 cm^2 02/26/25 1248   Wound Assessment Purple/maroon 02/26/25 1248   Drainage Amount None (dry) 02/26/25 1248   Odor None 02/26/25 1248   Debra-wound Assessment Intact 02/26/25 1248   Number of days: 0       Wound 02/26/25 Foot Right;Lateral (Active)   Wound Etiology Deep tissue/Injury 02/26/25 1249   Dressing/Treatment

## (undated) DEVICE — LINER SUCT CANSTR 3000CC PLAS SFT PRE ASSEMB W/OUT TBNG W/

## (undated) DEVICE — YANKAUER,SMOOTH HANDLE,HIGH CAPACITY: Brand: MEDLINE INDUSTRIES, INC.

## (undated) DEVICE — BLANKET WRM AD W50XL85.8IN PACU FULL BODY FORC AIR

## (undated) DEVICE — ENDOSCOPY PUMP TUBING/ CAP SET: Brand: ERBE

## (undated) DEVICE — OBTRTR BLDELSS OPT 8MM DISP -- DA VINICI XI - SNGL USE

## (undated) DEVICE — TISSUE RETRIEVAL SYSTEM: Brand: INZII RETRIEVAL SYSTEM

## (undated) DEVICE — SEAL UNIV 5-8MM DISP BX/10 -- DA VINCI XI - SNGL USE

## (undated) DEVICE — 3M™ UNIVERSAL ELECTROSURGICAL PLATE, SPLIT, UNCORDED 9160: Brand: 3M™

## (undated) DEVICE — FLUFF AND POLYMER UNDERPAD,EXTRA HEAVY: Brand: WINGS

## (undated) DEVICE — Device

## (undated) DEVICE — ARM DRAPE

## (undated) DEVICE — CANNULA NSL AD TBNG L14FT STD PVC O2 CRV CONN NONFLARED NSL

## (undated) DEVICE — SOLUTION IRRIGATION SODIUM CHL 0.9% 100 ML BTL

## (undated) DEVICE — SYR 20ML LL STRL LF --

## (undated) DEVICE — SYRINGE MED 25GA 3ML L5/8IN SUBQ PLAS W/ DETACH NDL SFTY

## (undated) DEVICE — MEDI-VAC SUCTION HIGH CAPACITY: Brand: CARDINAL HEALTH

## (undated) DEVICE — SYRINGE MED 30ML STD CLR PLAS LUERLOCK TIP N CTRL DISP

## (undated) DEVICE — CATHETER SUCT TR FL TIP 14FR W/ O CTRL

## (undated) DEVICE — COLUMN DRAPE

## (undated) DEVICE — TROCAR: Brand: KII SHIELDED BLADED ACCESS SYSTEM

## (undated) DEVICE — CANNULA ORIG TL CLR W FOAM CUSHIONS AND 14FT SUPL TB 3 CHN

## (undated) DEVICE — GAUZE SPONGES,16 PLY: Brand: CURITY

## (undated) DEVICE — INTENDED FOR TISSUE SEPARATION, AND OTHER PROCEDURES THAT REQUIRE A SHARP SURGICAL BLADE TO PUNCTURE OR CUT.: Brand: BARD-PARKER ®  SAFETY SCALPED

## (undated) DEVICE — SYR 50ML SLIP TIP NSAF LF STRL --

## (undated) DEVICE — SYR 10ML LUER LOK 1/5ML GRAD --

## (undated) DEVICE — DRAPE TOWEL: Brand: CONVERTORS

## (undated) DEVICE — BITE BLOCK ENDOSCP UNIV AD 6 TO 9.4 MM

## (undated) DEVICE — FCPS RAD JAW 4LC 240CM W/NDL -- BX/20 RADIAL JAW 4

## (undated) DEVICE — GARMENT,MEDLINE,DVT,INT,CALF,MED, GEN2: Brand: MEDLINE

## (undated) DEVICE — SOLUTION IRRIG 1000ML H2O STRL BLT

## (undated) DEVICE — FLEX ADVANTAGE 3000CC: Brand: FLEX ADVANTAGE

## (undated) DEVICE — SNARE ENDOSCP POLYP 2.4 MM 240 CM 10 MM 2.8 MM CAPTIVATOR

## (undated) DEVICE — GAUZE,SPONGE,4"X4",16PLY,STRL,LF,10/TRAY: Brand: MEDLINE

## (undated) DEVICE — GOWN ISOL IMPERV UNIV, DISP, OPEN BACK, BLUE --

## (undated) DEVICE — ELECTRODE PT RET AD L9FT HI MOIST COND ADH HYDRGEL CORDED

## (undated) DEVICE — COVER MPLR TIP CRV SCIS ACC DA VINCI

## (undated) DEVICE — ADHESIVE SKIN CLSR 0.7ML TOP DERMBND ADV

## (undated) DEVICE — AIRLIFE™ NASAL OXYGEN CANNULA CURVED, NONFLARED TIP WITH 14 FOOT (4.3 M) CRUSH-RESISTANT TUBING, OVER-THE-EAR STYLE: Brand: AIRLIFE™

## (undated) DEVICE — SUTURE MCRYL SZ 4-0 L27IN ABSRB UD L24MM PS-1 3/8 CIR PRIM Y935H

## (undated) DEVICE — BASIN EMESIS 500CC ROSE 250/CS 60/PLT: Brand: MEDEGEN MEDICAL PRODUCTS, LLC

## (undated) DEVICE — SYRINGE MED 20ML STD CLR PLAS LUERLOCK TIP N CTRL DISP

## (undated) DEVICE — SNARE POLYP M W27MMXL240CM OVL STIFF DISP CAPTIVATOR

## (undated) DEVICE — (D)GLOVE EXAM LG NITRL NS -- DISC BY MFR NO SUB

## (undated) DEVICE — STERILE POLYISOPRENE POWDER-FREE SURGICAL GLOVES: Brand: PROTEXIS

## (undated) DEVICE — NEEDLE SYR 18GA L1.5IN RED PLAS HUB S STL BLNT FILL W/O

## (undated) DEVICE — APPLICATOR MEDICATED 26 CC SOLUTION HI LT ORNG CHLORAPREP

## (undated) DEVICE — AIRLIFE™ NASAL OXYGEN CANNULA CURVED, FLARED TIP WITH 14 FOOT (4.3 M) CRUSH-RESISTANT TUBING, OVER-THE-EAR STYLE: Brand: AIRLIFE™

## (undated) DEVICE — TRAP SPEC COLL POLYP POLYSTYR --

## (undated) DEVICE — MEDI-VAC NON-CONDUCTIVE SUCTION TUBING: Brand: CARDINAL HEALTH

## (undated) DEVICE — SYRINGE MED 10ML LUERLOCK TIP W/O SFTY DISP

## (undated) DEVICE — (D)SYR 10ML 1/5ML GRAD NSAF -- PKGING CHANGE USE ITEM 338027